# Patient Record
Sex: MALE | Race: WHITE | NOT HISPANIC OR LATINO | Employment: OTHER | URBAN - METROPOLITAN AREA
[De-identification: names, ages, dates, MRNs, and addresses within clinical notes are randomized per-mention and may not be internally consistent; named-entity substitution may affect disease eponyms.]

---

## 2017-01-16 RX ORDER — LISINOPRIL 5 MG/1
5 TABLET ORAL DAILY
Qty: 90 TABLET | Refills: 0 | Status: SHIPPED | OUTPATIENT
Start: 2017-01-16 | End: 2017-04-27 | Stop reason: SDUPTHER

## 2017-03-08 RX ORDER — METOPROLOL SUCCINATE 25 MG/1
TABLET, EXTENDED RELEASE ORAL
Qty: 90 TABLET | Refills: 0 | Status: SHIPPED | OUTPATIENT
Start: 2017-03-08 | End: 2017-03-23 | Stop reason: SDUPTHER

## 2017-03-23 ENCOUNTER — OFFICE VISIT (OUTPATIENT)
Dept: CARDIOLOGY | Facility: CLINIC | Age: 67
End: 2017-03-23

## 2017-03-23 VITALS
DIASTOLIC BLOOD PRESSURE: 80 MMHG | HEIGHT: 72 IN | WEIGHT: 234 LBS | HEART RATE: 93 BPM | SYSTOLIC BLOOD PRESSURE: 138 MMHG | BODY MASS INDEX: 31.69 KG/M2

## 2017-03-23 DIAGNOSIS — I48.20 CHRONIC ATRIAL FIBRILLATION (HCC): Primary | ICD-10-CM

## 2017-03-23 DIAGNOSIS — I25.10 CORONARY ARTERY DISEASE INVOLVING NATIVE CORONARY ARTERY OF NATIVE HEART WITHOUT ANGINA PECTORIS: Chronic | ICD-10-CM

## 2017-03-23 DIAGNOSIS — Z79.01 CHRONIC ANTICOAGULATION: Chronic | ICD-10-CM

## 2017-03-23 DIAGNOSIS — I25.5 ISCHEMIC CARDIOMYOPATHY: Chronic | ICD-10-CM

## 2017-03-23 DIAGNOSIS — I34.0 NON-RHEUMATIC MITRAL REGURGITATION: Chronic | ICD-10-CM

## 2017-03-23 PROCEDURE — 99214 OFFICE O/P EST MOD 30 MIN: CPT | Performed by: INTERNAL MEDICINE

## 2017-03-23 PROCEDURE — 93000 ELECTROCARDIOGRAM COMPLETE: CPT | Performed by: INTERNAL MEDICINE

## 2017-03-23 RX ORDER — METOPROLOL SUCCINATE 50 MG/1
50 TABLET, EXTENDED RELEASE ORAL DAILY
Qty: 90 TABLET | Refills: 3 | Status: SHIPPED | OUTPATIENT
Start: 2017-03-23 | End: 2018-01-18 | Stop reason: DRUGHIGH

## 2017-03-23 RX ORDER — ASPIRIN 81 MG/1
81 TABLET ORAL DAILY
COMMUNITY
End: 2021-02-26

## 2017-03-23 NOTE — PROGRESS NOTES
Subjective:     Encounter Date:03/23/2017      Patient ID: Renato Marquez is a 66 y.o. male.    Chief Complaint:  History of Present Illness    Dear Dr. Mata,    This patient comes in for routine follow-up of their cardiac status.  He has a history of chronic atrial fibrillation. He has a history of CAD with prior CABG. This was performed in 2009 by Dr. Jeet Bhagat. He had a three-vessel CABG with LIMA to the LAD, SVG to the obtuse marginal, and SVG to the posterior descending coronary arteries. He also has a history of cerebrovascular disease with a right carotid endarterectomy in 2009. The carotid endarterectomy was performed at the same time by Dr. Lewis.He had an ejection fraction of 37% on his stress test that was performed in January 2016.  That showed no ischemia.  We discussed cardioversion with him, but he was having no symptoms and elected to remain on his current medical regimen.    This patient denies any cardiac complaints.  This patient denies any chest pain, pressure, tightness, squeezing, or heartburn.  This patient has not experienced any feeling of palpitations, tachycardia or heart racing and no presyncope or syncope.  There has not been any problems with dizziness or lightheadedness.  There has not been any orthopnea or PND, and no problems with lower extremity edema.  This patient denies any shortness of breath at rest or with activity and has not had any wheezing.  This patient has not had any problems with unexplained nausea or vomiting. The patient has continued to perform daily activities of living without any specific problem or change in the level of activity.  He plays golf on a regular basis without any problem.  This patient has not been recently hospitalized for any reason.    The following portions of the patient's history were reviewed and updated as appropriate: allergies, current medications, past family history, past medical history, past social history, past surgical  history and problem list.    Past Medical History:   Diagnosis Date   • CAD (coronary artery disease)    • Cancer    • Carotid arterial disease    • Chronic atrial fibrillation        Past Surgical History:   Procedure Laterality Date   • CAROTID ENDARTERECTOMY Right 2009    Dr Lewis   • CORONARY ARTERY BYPASS GRAFT  2009    MIMI Frederick to LAD, SVG to OM, SVG Post Desc   • FINGER SURGERY         Social History     Social History   • Marital status:      Spouse name: N/A   • Number of children: N/A   • Years of education: N/A     Occupational History   • Not on file.     Social History Main Topics   • Smoking status: Former Smoker   • Smokeless tobacco: Not on file      Comment: caffine use   • Alcohol use Yes   • Drug use: No   • Sexual activity: Not on file     Other Topics Concern   • Not on file     Social History Narrative   • No narrative on file       Review of Systems   Constitution: Negative for chills, decreased appetite, fever and night sweats.   HENT: Negative for ear discharge, ear pain, hearing loss, nosebleeds and sore throat.    Eyes: Negative for blurred vision, double vision and pain.   Cardiovascular: Negative for cyanosis.   Respiratory: Negative for hemoptysis and sputum production.    Endocrine: Negative for cold intolerance and heat intolerance.   Hematologic/Lymphatic: Negative for adenopathy.   Skin: Negative for dry skin, itching, nail changes, rash and suspicious lesions.   Musculoskeletal: Negative for arthritis, gout, muscle cramps, muscle weakness, myalgias and neck pain.   Gastrointestinal: Negative for anorexia, bowel incontinence, constipation, diarrhea, dysphagia, hematemesis and jaundice.   Genitourinary: Negative for bladder incontinence, dysuria, flank pain, frequency, hematuria and nocturia.   Neurological: Negative for focal weakness, numbness, paresthesias and seizures.   Psychiatric/Behavioral: Negative for altered mental status, hallucinations, hypervigilance,  "suicidal ideas and thoughts of violence.   Allergic/Immunologic: Negative for persistent infections.         ECG 12 Lead  Date/Time: 3/23/2017 11:21 AM  Performed by: CINTHYA BETTS III.  Authorized by: CINTHYA BETTS III   Comparison: compared with previous ECG   Similar to previous ECG  Rhythm: atrial fibrillation  Rate: normal  Conduction: conduction normal  ST Flattening: II, III, aVF, V4, V5 and V6  T flattening: all  QRS axis: normal  Other: no other findings  Clinical impression: abnormal ECG               Objective:     Vitals:    03/23/17 1039   BP: 138/80   Pulse: 93   Weight: 234 lb (106 kg)   Height: 72\" (182.9 cm)         Physical Exam   Constitutional: He is oriented to person, place, and time. He appears well-developed and well-nourished. No distress.   HENT:   Head: Normocephalic and atraumatic.   Nose: Nose normal.   Mouth/Throat: Oropharynx is clear and moist.   Eyes: Conjunctivae and EOM are normal. Pupils are equal, round, and reactive to light. Right eye exhibits no discharge. Left eye exhibits no discharge.   Neck: Normal range of motion. Neck supple. No tracheal deviation present. No thyromegaly present.   Cardiovascular: Normal rate, S1 normal, S2 normal and normal pulses.  An irregularly irregular rhythm present. Exam reveals no S3.    Murmur heard.  High-pitched blowing holosystolic murmur is present with a grade of 1/6  at the apex  Pulmonary/Chest: Effort normal and breath sounds normal. No stridor. No respiratory distress. He exhibits no tenderness.   Abdominal: Soft. Bowel sounds are normal. He exhibits no distension and no mass. There is no tenderness. There is no rebound and no guarding.   Musculoskeletal: Normal range of motion. He exhibits no tenderness or deformity.   Lymphadenopathy:     He has no cervical adenopathy.   Neurological: He is alert and oriented to person, place, and time. He has normal reflexes.   Skin: Skin is warm and dry. No rash noted. He is not diaphoretic. No " erythema.   Psychiatric: He has a normal mood and affect. Thought content normal.       Lab Review:             Performed        Assessment:          Diagnosis Plan   1. Chronic atrial fibrillation  metoprolol succinate XL (TOPROL-XL) 50 MG 24 hr tablet    ECG 12 Lead   2. Coronary artery disease involving native coronary artery of native heart without angina pectoris  ECG 12 Lead   3. Ischemic cardiomyopathy  ECG 12 Lead   4. Non-rheumatic mitral regurgitation  ECG 12 Lead   5. Chronic anticoagulation  ECG 12 Lead          Plan:       Coronary Artery Disease  Assessment  • The patient has no angina    Plan  • Lifestyle modifications discussed include adhering to a heart healthy diet, avoidance of tobacco products, maintenance of a healthy weight, medication compliance, regular exercise and regular monitoring of cholesterol and blood pressure    Subjective - Objective  • There is a history of previous coronary artery bypass graft  • Current antiplatelet therapy includes aspirin 81 mg    Atrial Fibrillation and Atrial Flutter  Assessment  • The patient has permanent atrial fibrillation  • This is non-valvular in etiology  • The patient's CHADS2-VASc score is 2  • A [id number] score of 2 or more is considered a high risk for a thromboembolic event  Patient remains on Edoxiban and metoprolol; will increase the dose of metoprolol with his HR of 90 at rest.    I will see back in one year-we will anticipate a follow-up echocardiogram to follow-up on his ventricular function and valvular function.    Thank you very much for allowing us to participate in the care of this pleasant patient.  Please don't hesitate to call if I can be of assistance in any way.      Current Outpatient Prescriptions:   •  aspirin 81 MG EC tablet, Take 81 mg by mouth Daily., Disp: , Rfl:   •  Edoxaban Tosylate (SAVAYSA) 60 MG tablet, Take 60 mg by mouth Daily., Disp: 30 tablet, Rfl: 3  •  lisinopril (PRINIVIL,ZESTRIL) 5 MG tablet, Take 1 tablet  by mouth Daily. PT NEEDS APPT, Disp: 90 tablet, Rfl: 0  •  metoprolol succinate XL (TOPROL-XL) 25 MG 24 hr tablet, TAKE ONE TABLET BY MOUTH ONCE DAILY, Disp: 90 tablet, Rfl: 0  •  Multiple Vitamin (MULTI VITAMIN PO), Take  by mouth., Disp: , Rfl:   •  simvastatin (ZOCOR) 40 MG tablet, TAKE ONE TABLET BY MOUTH AT BEDTIME, Disp: 90 tablet, Rfl: 0         EMR Dragon/Transcription disclaimer:    Much of this encounter note is an electronic transcription/translation of spoken language to printed text. The electronic translation of spoken language may permit erroneous, or at times, nonsensical words or phrases to be inadvertently transcribed; Although I have reviewed the note for such errors, some may still exist.

## 2017-04-27 RX ORDER — SIMVASTATIN 40 MG
TABLET ORAL
Qty: 90 TABLET | Refills: 0 | Status: SHIPPED | OUTPATIENT
Start: 2017-04-27 | End: 2017-08-15 | Stop reason: SDUPTHER

## 2017-04-27 RX ORDER — LISINOPRIL 5 MG/1
TABLET ORAL
Qty: 90 TABLET | Refills: 1 | Status: SHIPPED | OUTPATIENT
Start: 2017-04-27 | End: 2017-11-29 | Stop reason: SDUPTHER

## 2017-04-27 RX ORDER — METOPROLOL SUCCINATE 25 MG/1
TABLET, EXTENDED RELEASE ORAL
Qty: 90 TABLET | Refills: 1 | Status: SHIPPED | OUTPATIENT
Start: 2017-04-27 | End: 2020-01-30

## 2017-07-06 RX ORDER — METOPROLOL SUCCINATE 50 MG/1
50 TABLET, EXTENDED RELEASE ORAL DAILY
Qty: 90 TABLET | Refills: 1 | Status: SHIPPED | OUTPATIENT
Start: 2017-07-06 | End: 2018-01-18 | Stop reason: DRUGHIGH

## 2017-07-06 RX ORDER — METOPROLOL SUCCINATE 25 MG/1
25 TABLET, EXTENDED RELEASE ORAL 2 TIMES DAILY
Qty: 90 TABLET | Refills: 1 | Status: CANCELLED | OUTPATIENT
Start: 2017-07-06

## 2017-08-16 RX ORDER — SIMVASTATIN 40 MG
TABLET ORAL
Qty: 14 TABLET | Refills: 0 | Status: SHIPPED | OUTPATIENT
Start: 2017-08-16 | End: 2020-01-30

## 2017-08-30 RX ORDER — SIMVASTATIN 40 MG
TABLET ORAL
Qty: 14 TABLET | Refills: 0 | OUTPATIENT
Start: 2017-08-30

## 2017-08-31 DIAGNOSIS — E78.2 MIXED HYPERLIPIDEMIA: Primary | ICD-10-CM

## 2017-08-31 RX ORDER — EDOXABAN TOSYLATE 60 MG/1
TABLET, FILM COATED ORAL
Qty: 90 TABLET | Refills: 1 | Status: SHIPPED | OUTPATIENT
Start: 2017-08-31 | End: 2018-01-05 | Stop reason: SDUPTHER

## 2017-08-31 RX ORDER — SIMVASTATIN 40 MG
TABLET ORAL
Qty: 14 TABLET | Refills: 0 | OUTPATIENT
Start: 2017-08-31

## 2017-09-02 ENCOUNTER — LAB (OUTPATIENT)
Dept: LAB | Facility: HOSPITAL | Age: 67
End: 2017-09-02
Attending: INTERNAL MEDICINE

## 2017-09-02 DIAGNOSIS — E78.2 MIXED HYPERLIPIDEMIA: ICD-10-CM

## 2017-09-02 LAB
ALBUMIN SERPL-MCNC: 3.9 G/DL (ref 3.5–5.2)
ALBUMIN/GLOB SERPL: 1.3 G/DL
ALP SERPL-CCNC: 64 U/L (ref 40–129)
ALT SERPL W P-5'-P-CCNC: 30 U/L (ref 5–41)
ANION GAP SERPL CALCULATED.3IONS-SCNC: 12.6 MMOL/L
AST SERPL-CCNC: 32 U/L (ref 5–40)
BILIRUB SERPL-MCNC: 1 MG/DL (ref 0.2–1.2)
BUN BLD-MCNC: 20 MG/DL (ref 8–23)
BUN/CREAT SERPL: 21.3 (ref 7–25)
CALCIUM SPEC-SCNC: 9.1 MG/DL (ref 8.8–10.5)
CHLORIDE SERPL-SCNC: 99 MMOL/L (ref 98–107)
CHOLEST SERPL-MCNC: 168 MG/DL (ref 0–200)
CO2 SERPL-SCNC: 27.4 MMOL/L (ref 22–29)
CREAT BLD-MCNC: 0.94 MG/DL (ref 0.76–1.27)
GFR SERPL CREATININE-BSD FRML MDRD: 80 ML/MIN/1.73
GLOBULIN UR ELPH-MCNC: 3.1 GM/DL
GLUCOSE BLD-MCNC: 149 MG/DL (ref 65–99)
HDLC SERPL-MCNC: 69 MG/DL (ref 40–60)
LDLC SERPL CALC-MCNC: 84 MG/DL (ref 0–100)
LDLC/HDLC SERPL: 1.22 {RATIO}
POTASSIUM BLD-SCNC: 4.1 MMOL/L (ref 3.5–5.2)
PROT SERPL-MCNC: 7 G/DL (ref 6–8.5)
SODIUM BLD-SCNC: 139 MMOL/L (ref 136–145)
TRIGL SERPL-MCNC: 75 MG/DL (ref 0–150)
VLDLC SERPL-MCNC: 15 MG/DL (ref 8–32)

## 2017-09-02 PROCEDURE — 80053 COMPREHEN METABOLIC PANEL: CPT

## 2017-09-02 PROCEDURE — 36415 COLL VENOUS BLD VENIPUNCTURE: CPT

## 2017-09-02 PROCEDURE — 80061 LIPID PANEL: CPT

## 2017-09-05 RX ORDER — SIMVASTATIN 40 MG
40 TABLET ORAL NIGHTLY
Qty: 90 TABLET | Refills: 2 | Status: SHIPPED | OUTPATIENT
Start: 2017-09-05 | End: 2018-01-18 | Stop reason: SDUPTHER

## 2017-11-29 RX ORDER — LISINOPRIL 5 MG/1
TABLET ORAL
Qty: 90 TABLET | Refills: 1 | Status: SHIPPED | OUTPATIENT
Start: 2017-11-29 | End: 2018-06-04 | Stop reason: SDUPTHER

## 2018-01-05 ENCOUNTER — TELEPHONE (OUTPATIENT)
Dept: CARDIOLOGY | Facility: CLINIC | Age: 68
End: 2018-01-05

## 2018-01-05 NOTE — TELEPHONE ENCOUNTER
Received a PA request for Savaysa 60mg    PA sent to [facility] through cover my meds.    Savayse is not covered through insurance even with a PA

## 2018-01-08 NOTE — TELEPHONE ENCOUNTER
Pt is calling about savaysa. I told him that is was not cover through his insurance even with a PA and that we would check with his insurance for an alternative.    Have you had a chance to check with [facility] to see if there is an alternative? Please advise.    Thanks Maribel

## 2018-01-08 NOTE — TELEPHONE ENCOUNTER
Spoke with Adryan at [facility]([phone]) and the preferred medications are Xarelto, Eliquis, and Warfarin

## 2018-01-11 NOTE — TELEPHONE ENCOUNTER
Can you please add the patient to  schedule on 1/18/118 3:15PM in the [place] office?     DX: discuss how to safely make the switch to Xarelto, eliquis or warfarin    Pt is aware    Thanks Maribel

## 2018-01-11 NOTE — TELEPHONE ENCOUNTER
No! He needs to refill his Savaysa to stay on OAC until we can safely switch him to something else

## 2018-01-11 NOTE — TELEPHONE ENCOUNTER
FYI: pt has ran out of Savaysa 60 MG. Is it ok for the pt to wait until his next visit with you before taking a blood thinner. His appointment with you is 1/18/18. Please advise    Thanks Maribel

## 2018-01-11 NOTE — TELEPHONE ENCOUNTER
I spoke to his wife and she will stop at the [place] office to  samples today. She is aware that he should not stop taking this until we see him on 1/18/18

## 2018-01-18 ENCOUNTER — OFFICE VISIT (OUTPATIENT)
Dept: CARDIOLOGY | Facility: CLINIC | Age: 68
End: 2018-01-18

## 2018-01-18 VITALS
WEIGHT: 240 LBS | SYSTOLIC BLOOD PRESSURE: 112 MMHG | HEIGHT: 72 IN | BODY MASS INDEX: 32.51 KG/M2 | HEART RATE: 100 BPM | DIASTOLIC BLOOD PRESSURE: 70 MMHG

## 2018-01-18 DIAGNOSIS — I25.10 CORONARY ARTERY DISEASE INVOLVING NATIVE CORONARY ARTERY OF NATIVE HEART WITHOUT ANGINA PECTORIS: Chronic | ICD-10-CM

## 2018-01-18 DIAGNOSIS — I34.0 NON-RHEUMATIC MITRAL REGURGITATION: Chronic | ICD-10-CM

## 2018-01-18 DIAGNOSIS — I48.20 CHRONIC ATRIAL FIBRILLATION (HCC): Primary | Chronic | ICD-10-CM

## 2018-01-18 DIAGNOSIS — Z79.01 CHRONIC ANTICOAGULATION: Chronic | ICD-10-CM

## 2018-01-18 DIAGNOSIS — I25.5 ISCHEMIC CARDIOMYOPATHY: Chronic | ICD-10-CM

## 2018-01-18 PROCEDURE — 93000 ELECTROCARDIOGRAM COMPLETE: CPT | Performed by: INTERNAL MEDICINE

## 2018-01-18 PROCEDURE — 99214 OFFICE O/P EST MOD 30 MIN: CPT | Performed by: INTERNAL MEDICINE

## 2018-01-18 NOTE — PROGRESS NOTES
Subjective:     Encounter Date: 1/18/2018      Patient ID: Renato Marquez is a 67 y.o. male.    Chief Complaint:  History of Present Illness    Dear Dr. Mata,    This patient comes in for routine follow-up of their cardiac status. He comes in a little bit before his one year appointment-his insurance just change the formulary and he needs to change to an alternative anticoagulant.  He has a history of chronic atrial fibrillation. He has a history of CAD with prior CABG. This was performed in 2009 by Dr. Jeet Bhagat. He had a three-vessel CABG with LIMA to the LAD, SVG to the obtuse marginal, and SVG to the posterior descending coronary arteries. He also has a history of cerebrovascular disease with a right carotid endarterectomy in 2009. The carotid endarterectomy was performed at the same time by Dr. Lewis.He had an ejection fraction of 37% on his stress test that was performed in January 2016.  That showed no ischemia.  We discussed cardioversion with him, but he was having no symptoms and elected to remain on his current medical regimen.    He states that from a symptom standpoint he's been doing great without complaints.This patient denies any chest pain, pressure, tightness, squeezing, or heartburn.  This patient has not experienced any feeling of palpitations, tachycardia or heart racing and no presyncope or syncope.  There has not been any problems with dizziness or lightheadedness.  There has not been any orthopnea or PND, and no problems with lower extremity edema.  This patient denies any shortness of breath at rest or with activity and has not had any wheezing.  This patient has not had any problems with unexplained nausea or vomiting. The patient has continued to perform daily activities of living without any specific problem or change in the level of activity.  This patient has not been recently hospitalized for any reason.      The following portions of the patient's history were reviewed and  updated as appropriate: allergies, current medications, past family history, past medical history, past social history, past surgical history and problem list.    Past Medical History:   Diagnosis Date   • CAD (coronary artery disease)    • Cancer    • Carotid arterial disease    • Chronic atrial fibrillation    • Ischemic cardiomyopathy    • Mitral regurgitation        Past Surgical History:   Procedure Laterality Date   • CAROTID ENDARTERECTOMY Right 2009    Dr Lewis   • CORONARY ARTERY BYPASS GRAFT  2009    Dr Bhagat, LIMA to LAD, SVG to OM, SVG Post Desc   • FINGER SURGERY         Social History     Social History   • Marital status:      Spouse name: N/A   • Number of children: N/A   • Years of education: N/A     Occupational History   • Not on file.     Social History Main Topics   • Smoking status: Former Smoker   • Smokeless tobacco: Not on file      Comment: caffine use   • Alcohol use Yes   • Drug use: No   • Sexual activity: Not on file     Other Topics Concern   • Not on file     Social History Narrative       Review of Systems   Constitution: Negative for chills, decreased appetite, fever and night sweats.   HENT: Negative for ear discharge, ear pain, hearing loss, nosebleeds and sore throat.    Eyes: Negative for blurred vision, double vision and pain.   Cardiovascular: Negative for cyanosis.   Respiratory: Negative for hemoptysis and sputum production.    Endocrine: Negative for cold intolerance and heat intolerance.   Hematologic/Lymphatic: Negative for adenopathy.   Skin: Negative for dry skin, itching, nail changes, rash and suspicious lesions.   Musculoskeletal: Negative for arthritis, gout, muscle cramps, muscle weakness, myalgias and neck pain.   Gastrointestinal: Negative for anorexia, bowel incontinence, constipation, diarrhea, dysphagia, hematemesis and jaundice.   Genitourinary: Negative for bladder incontinence, dysuria, flank pain, frequency, hematuria and nocturia.  "  Neurological: Negative for focal weakness, numbness, paresthesias and seizures.   Psychiatric/Behavioral: Negative for altered mental status, hallucinations, hypervigilance, suicidal ideas and thoughts of violence.   Allergic/Immunologic: Negative for persistent infections.         ECG 12 Lead  Date/Time: 1/18/2018 1:03 PM  Performed by: CINTHYA BETTS III.  Authorized by: CINTHYA BETTS III   Comparison: compared with previous ECG   Similar to previous ECG  Rhythm: atrial fibrillation  Rate: normal  Conduction: conduction normal  ST Depression: II, III, aVF, V4, V5 and V6  T Waves: T waves normal  QRS axis: normal  Other: no other findings  Clinical impression: abnormal ECG               Objective:     Vitals:    01/18/18 1220   BP: 112/70   Pulse: 100   Weight: 109 kg (240 lb)   Height: 182.9 cm (72.01\")         Physical Exam   Constitutional: He is oriented to person, place, and time. He appears well-developed and well-nourished. No distress.   HENT:   Head: Normocephalic and atraumatic.   Nose: Nose normal.   Mouth/Throat: Oropharynx is clear and moist.   Eyes: Conjunctivae and EOM are normal. Pupils are equal, round, and reactive to light. Right eye exhibits no discharge. Left eye exhibits no discharge.   Neck: Normal range of motion. Neck supple. No tracheal deviation present. No thyromegaly present.   Cardiovascular: Normal rate, S1 normal, S2 normal and normal pulses.  An irregularly irregular rhythm present. Exam reveals no S3.    Murmur heard.  High-pitched blowing holosystolic murmur is present with a grade of 1/6  at the apex  Pulmonary/Chest: Effort normal and breath sounds normal. No stridor. No respiratory distress. He exhibits no tenderness.   Abdominal: Soft. Bowel sounds are normal. He exhibits no distension and no mass. There is no tenderness. There is no rebound and no guarding.   Musculoskeletal: Normal range of motion. He exhibits no tenderness or deformity.   Lymphadenopathy:     He has no " cervical adenopathy.   Neurological: He is alert and oriented to person, place, and time. He has normal reflexes.   Skin: Skin is warm and dry. No rash noted. He is not diaphoretic. No erythema.   Psychiatric: He has a normal mood and affect. Thought content normal.       Lab Review:             Performed        Assessment:          Diagnosis Plan   1. Chronic atrial fibrillation  rivaroxaban (XARELTO) 20 MG tablet    Adult Transthoracic Echo Complete W/ Cont if Necessary Per Protocol   2. Coronary artery disease involving native coronary artery of native heart without angina pectoris  Adult Transthoracic Echo Complete W/ Cont if Necessary Per Protocol   3. Ischemic cardiomyopathy  Adult Transthoracic Echo Complete W/ Cont if Necessary Per Protocol   4. Non-rheumatic mitral regurgitation  Adult Transthoracic Echo Complete W/ Cont if Necessary Per Protocol   5. Chronic anticoagulation            Plan:       Coronary Artery Disease  Assessment  • The patient has no angina    Plan  • Lifestyle modifications discussed include adhering to a heart healthy diet, avoidance of tobacco products, maintenance of a healthy weight, medication compliance, regular exercise and regular monitoring of cholesterol and blood pressure    Subjective - Objective  • There is a history of previous coronary artery bypass graft  • Current antiplatelet therapy includes aspirin 81 mg    Atrial Fibrillation and Atrial Flutter  Assessment  • The patient has permanent atrial fibrillation  • This is non-valvular in etiology  • The patient's CHADS2-VASc score is 2  • A [id number] score of 2 or more is considered a high risk for a thromboembolic event    Plan  • Continue in atrial fibrillation with rate control  • Continue rivaroxaban for antithrombotic therapy, bleeding issues discussed  • Continue beta blocker for rate control  • We will switch him from Savaysa to Xarelto    3.  We will repeat an echocardiogram to reassess valvular and ventricular  function    Thank you very much for allowing us to participate in the care of this pleasant patient.  Please don't hesitate to call if I can be of assistance in any way.      Current Outpatient Prescriptions:   •  aspirin 81 MG EC tablet, Take 81 mg by mouth Daily., Disp: , Rfl:   •  Glucosamine-Chondroitin (MOVE FREE PO), Take  by mouth 2 (Two) Times a Day., Disp: , Rfl:   •  lisinopril (PRINIVIL,ZESTRIL) 5 MG tablet, TAKE 1 TABLET BY MOUTH ONCE DAILY, Disp: 90 tablet, Rfl: 1  •  metoprolol succinate XL (TOPROL-XL) 25 MG 24 hr tablet, TAKE ONE TABLET BY MOUTH ONCE DAILY, Disp: 90 tablet, Rfl: 1  •  Multiple Vitamin (MULTI VITAMIN PO), Take  by mouth., Disp: , Rfl:   •  simvastatin (ZOCOR) 40 MG tablet, TAKE ONE TABLET BY MOUTH AT BEDTIME, Disp: 14 tablet, Rfl: 0  •  rivaroxaban (XARELTO) 20 MG tablet, Take 1 tablet by mouth Daily., Disp: 90 tablet, Rfl: 3

## 2018-01-18 NOTE — PATIENT INSTRUCTIONS
Atrial Fibrillation  Atrial fibrillation is a type of irregular or rapid heartbeat (arrhythmia). In atrial fibrillation, the heart quivers continuously in a chaotic pattern. This occurs when parts of the heart receive disorganized signals that make the heart unable to pump blood normally. This can increase the risk for stroke, heart failure, and other heart-related conditions. There are different types of atrial fibrillation, including:  · Paroxysmal atrial fibrillation. This type starts suddenly, and it usually stops on its own shortly after it starts.  · Persistent atrial fibrillation. This type often lasts longer than a week. It may stop on its own or with treatment.  · Long-lasting persistent atrial fibrillation. This type lasts longer than 12 months.  · Permanent atrial fibrillation. This type does not go away.  Talk with your health care provider to learn about the type of atrial fibrillation that you have.  What are the causes?  This condition is caused by some heart-related conditions or procedures, including:  · A heart attack.  · Coronary artery disease.  · Heart failure.  · Heart valve conditions.  · High blood pressure.  · Inflammation of the sac that surrounds the heart (pericarditis).  · Heart surgery.  · Certain heart rhythm disorders, such as Akbar-Parkinson-White syndrome.  Other causes include:  · Pneumonia.  · Obstructive sleep apnea.  · Blockage of an artery in the lungs (pulmonary embolism, or PE).  · Lung cancer.  · Chronic lung disease.  · Thyroid problems, especially if the thyroid is overactive (hyperthyroidism).  · Caffeine.  · Excessive alcohol use or illegal drug use.  · Use of some medicines, including certain decongestants and diet pills.  Sometimes, the cause cannot be found.  What increases the risk?  This condition is more likely to develop in:  · People who are older in age.  · People who smoke.  · People who have diabetes mellitus.  · People who are overweight (obese).  · Athletes  who exercise vigorously.  What are the signs or symptoms?  Symptoms of this condition include:  · A feeling that your heart is beating rapidly or irregularly.  · A feeling of discomfort or pain in your chest.  · Shortness of breath.  · Sudden light-headedness or weakness.  · Getting tired easily during exercise.  In some cases, there are no symptoms.  How is this diagnosed?  Your health care provider may be able to detect atrial fibrillation when taking your pulse. If detected, this condition may be diagnosed with:  · An electrocardiogram (ECG).  · A Holter monitor test that records your heartbeat patterns over a 24-hour period.  · Transthoracic echocardiogram (TTE) to evaluate how blood flows through your heart.  · Transesophageal echocardiogram (ADDY) to view more detailed images of your heart.  · A stress test.  · Imaging tests, such as a CT scan or chest X-ray.  · Blood tests.  How is this treated?  The main goals of treatment are to prevent blood clots from forming and to keep your heart beating at a normal rate and rhythm. The type of treatment that you receive depends on many factors, such as your underlying medical conditions and how you feel when you are experiencing atrial fibrillation.  This condition may be treated with:  · Medicine to slow down the heart rate, bring the heart’s rhythm back to normal, or prevent clots from forming.  · Electrical cardioversion. This is a procedure that resets your heart’s rhythm by delivering a controlled, low-energy shock to the heart through your skin.  · Different types of ablation, such as catheter ablation, catheter ablation with pacemaker, or surgical ablation. These procedures destroy the heart tissues that send abnormal signals. When the pacemaker is used, it is placed under your skin to help your heart beat in a regular rhythm.  Follow these instructions at home:  · Take over-the counter and prescription medicines only as told by your health care provider.  · If  your health care provider prescribed a blood-thinning medicine (anticoagulant), take it exactly as told. Taking too much blood-thinning medicine can cause bleeding. If you do not take enough blood-thinning medicine, you will not have the protection that you need against stroke and other problems.  · Do not use tobacco products, including cigarettes, chewing tobacco, and e-cigarettes. If you need help quitting, ask your health care provider.  · If you have obstructive sleep apnea, manage your condition as told by your health care provider.  · Do not drink alcohol.  · Do not drink beverages that contain caffeine, such as coffee, soda, and tea.  · Maintain a healthy weight. Do not use diet pills unless your health care provider approves. Diet pills may make heart problems worse.  · Follow diet instructions as told by your health care provider.  · Exercise regularly as told by your health care provider.  · Keep all follow-up visits as told by your health care provider. This is important.  How is this prevented?  · Avoid drinking beverages that contain caffeine or alcohol.  · Avoid certain medicines, especially medicines that are used for breathing problems.  · Avoid certain herbs and herbal medicines, such as those that contain ephedra or ginseng.  · Do not use illegal drugs, such as cocaine and amphetamines.  · Do not smoke.  · Manage your high blood pressure.  Contact a health care provider if:  · You notice a change in the rate, rhythm, or strength of your heartbeat.  · You are taking an anticoagulant and you notice increased bruising.  · You tire more easily when you exercise or exert yourself.  Get help right away if:  · You have chest pain, abdominal pain, sweating, or weakness.  · You feel nauseous.  · You notice blood in your vomit, bowel movement, or urine.  · You have shortness of breath.  · You suddenly have swollen feet and ankles.  · You feel dizzy.  · You have sudden weakness or numbness of the face, arm,  or leg, especially on one side of the body.  · You have trouble speaking, trouble understanding, or both (aphasia).  · Your face or your eyelid droops on one side.  These symptoms may represent a serious problem that is an emergency. Do not wait to see if the symptoms will go away. Get medical help right away. Call your local emergency services (911 in the [place] Do not drive yourself to the hospital.   This information is not intended to replace advice given to you by your health care provider. Make sure you discuss any questions you have with your health care provider.  Document Released: 12/18/2006 Document Revised: 04/26/2017 Document Reviewed: 04/13/2016  [employer]vier Interactive Patient Education © 2017 [employer]

## 2018-01-24 ENCOUNTER — HOSPITAL ENCOUNTER (OUTPATIENT)
Dept: CARDIOLOGY | Facility: HOSPITAL | Age: 68
Discharge: HOME | End: 2018-01-24
Attending: INTERNAL MEDICINE | Admitting: INTERNAL MEDICINE

## 2018-01-24 VITALS
SYSTOLIC BLOOD PRESSURE: 129 MMHG | HEART RATE: 98 BPM | BODY MASS INDEX: 32.55 KG/M2 | WEIGHT: 240.3 LBS | HEIGHT: 72 IN | OXYGEN SATURATION: 94 % | DIASTOLIC BLOOD PRESSURE: 71 MMHG

## 2018-01-24 DIAGNOSIS — I25.10 CORONARY ARTERY DISEASE INVOLVING NATIVE CORONARY ARTERY OF NATIVE HEART WITHOUT ANGINA PECTORIS: Chronic | ICD-10-CM

## 2018-01-24 DIAGNOSIS — I48.20 CHRONIC ATRIAL FIBRILLATION (HCC): Chronic | ICD-10-CM

## 2018-01-24 DIAGNOSIS — I25.5 ISCHEMIC CARDIOMYOPATHY: Chronic | ICD-10-CM

## 2018-01-24 DIAGNOSIS — I34.0 NON-RHEUMATIC MITRAL REGURGITATION: Chronic | ICD-10-CM

## 2018-01-24 LAB
BH CV ECHO MEAS - ACS: 2.5 CM
BH CV ECHO MEAS - AO MAX PG (FULL): 2.2 MMHG
BH CV ECHO MEAS - AO MAX PG: 4.8 MMHG
BH CV ECHO MEAS - AO MEAN PG (FULL): 1 MMHG
BH CV ECHO MEAS - AO MEAN PG: 2 MMHG
BH CV ECHO MEAS - AO ROOT AREA (BSA CORRECTED): 1.7
BH CV ECHO MEAS - AO ROOT AREA: 12.6 CM^2
BH CV ECHO MEAS - AO ROOT DIAM: 4 CM
BH CV ECHO MEAS - AO V2 MAX: 110 CM/SEC
BH CV ECHO MEAS - AO V2 MEAN: 64.5 CM/SEC
BH CV ECHO MEAS - AO V2 VTI: 14.4 CM
BH CV ECHO MEAS - AVA(I,A): 3.9 CM^2
BH CV ECHO MEAS - AVA(I,D): 3.9 CM^2
BH CV ECHO MEAS - AVA(V,A): 3.4 CM^2
BH CV ECHO MEAS - AVA(V,D): 3.4 CM^2
BH CV ECHO MEAS - BSA(HAYCOCK): 2.4 M^2
BH CV ECHO MEAS - BSA: 2.3 M^2
BH CV ECHO MEAS - BZI_BMI: 32.5 KILOGRAMS/M^2
BH CV ECHO MEAS - BZI_METRIC_HEIGHT: 183 CM
BH CV ECHO MEAS - BZI_METRIC_WEIGHT: 109 KG
BH CV ECHO MEAS - CONTRAST EF (2CH): 52.5 ML/M^2
BH CV ECHO MEAS - CONTRAST EF 4CH: 50 ML/M^2
BH CV ECHO MEAS - EDV(CUBED): 148.9 ML
BH CV ECHO MEAS - EDV(MOD-SP2): 132 ML
BH CV ECHO MEAS - EDV(MOD-SP4): 120 ML
BH CV ECHO MEAS - EDV(TEICH): 135.3 ML
BH CV ECHO MEAS - EF(CUBED): 64.6 %
BH CV ECHO MEAS - EF(MOD-SP2): 52.5 %
BH CV ECHO MEAS - EF(MOD-SP4): 50 %
BH CV ECHO MEAS - EF(TEICH): 55.7 %
BH CV ECHO MEAS - ESV(CUBED): 52.7 ML
BH CV ECHO MEAS - ESV(MOD-SP2): 62.7 ML
BH CV ECHO MEAS - ESV(MOD-SP4): 60 ML
BH CV ECHO MEAS - ESV(TEICH): 60 ML
BH CV ECHO MEAS - FS: 29.2 %
BH CV ECHO MEAS - IVS/LVPW: 1
BH CV ECHO MEAS - IVSD: 1.1 CM
BH CV ECHO MEAS - LA DIMENSION: 5.2 CM
BH CV ECHO MEAS - LA/AO: 1.3
BH CV ECHO MEAS - LAT PEAK E' VEL: 12 CM/SEC
BH CV ECHO MEAS - LV DIASTOLIC VOL/BSA (35-75): 52.1 ML/M^2
BH CV ECHO MEAS - LV MASS(C)D: 222.1 GRAMS
BH CV ECHO MEAS - LV MASS(C)DI: 96.4 GRAMS/M^2
BH CV ECHO MEAS - LV MAX PG: 2.7 MMHG
BH CV ECHO MEAS - LV MEAN PG: 1 MMHG
BH CV ECHO MEAS - LV SYSTOLIC VOL/BSA (12-30): 26 ML/M^2
BH CV ECHO MEAS - LV V1 MAX: 82 CM/SEC
BH CV ECHO MEAS - LV V1 MEAN: 49.3 CM/SEC
BH CV ECHO MEAS - LV V1 VTI: 12.3 CM
BH CV ECHO MEAS - LVIDD: 5.3 CM
BH CV ECHO MEAS - LVIDS: 3.8 CM
BH CV ECHO MEAS - LVLD AP2: 8.7 CM
BH CV ECHO MEAS - LVLD AP4: 8.5 CM
BH CV ECHO MEAS - LVLS AP2: 8.2 CM
BH CV ECHO MEAS - LVLS AP4: 8.4 CM
BH CV ECHO MEAS - LVOT AREA (M): 4.5 CM^2
BH CV ECHO MEAS - LVOT AREA: 4.5 CM^2
BH CV ECHO MEAS - LVOT DIAM: 2.4 CM
BH CV ECHO MEAS - LVPWD: 1.1 CM
BH CV ECHO MEAS - MED PEAK E' VEL: 7 CM/SEC
BH CV ECHO MEAS - MR MAX PG: 82.8 MMHG
BH CV ECHO MEAS - MR MAX VEL: 455 CM/SEC
BH CV ECHO MEAS - MV DEC SLOPE: 641 CM/SEC^2
BH CV ECHO MEAS - MV DEC TIME: 0.15 SEC
BH CV ECHO MEAS - MV E MAX VEL: 113 CM/SEC
BH CV ECHO MEAS - MV MAX PG: 6.1 MMHG
BH CV ECHO MEAS - MV MEAN PG: 2 MMHG
BH CV ECHO MEAS - MV P1/2T MAX VEL: 121 CM/SEC
BH CV ECHO MEAS - MV P1/2T: 55.3 MSEC
BH CV ECHO MEAS - MV V2 MAX: 123 CM/SEC
BH CV ECHO MEAS - MV V2 MEAN: 56.2 CM/SEC
BH CV ECHO MEAS - MV V2 VTI: 22.1 CM
BH CV ECHO MEAS - MVA P1/2T LCG: 1.8 CM^2
BH CV ECHO MEAS - MVA(P1/2T): 4 CM^2
BH CV ECHO MEAS - MVA(VTI): 2.5 CM^2
BH CV ECHO MEAS - PA ACC TIME: 0.09 SEC
BH CV ECHO MEAS - PA MAX PG (FULL): 2 MMHG
BH CV ECHO MEAS - PA MAX PG: 3.3 MMHG
BH CV ECHO MEAS - PA PR(ACCEL): 37.6 MMHG
BH CV ECHO MEAS - PA V2 MAX: 91.2 CM/SEC
BH CV ECHO MEAS - PI END-D VEL: 164 CM/SEC
BH CV ECHO MEAS - PULM DIAS VEL: 67.7 CM/SEC
BH CV ECHO MEAS - PULM S/D: 0.6
BH CV ECHO MEAS - PULM SYS VEL: 40.7 CM/SEC
BH CV ECHO MEAS - PVA(V,A): 3.8 CM^2
BH CV ECHO MEAS - PVA(V,D): 3.8 CM^2
BH CV ECHO MEAS - QP/QS: 1.3
BH CV ECHO MEAS - RAP SYSTOLE: 3 MMHG
BH CV ECHO MEAS - RV MAX PG: 1.3 MMHG
BH CV ECHO MEAS - RV MEAN PG: 1 MMHG
BH CV ECHO MEAS - RV V1 MAX: 56.9 CM/SEC
BH CV ECHO MEAS - RV V1 MEAN: 35.6 CM/SEC
BH CV ECHO MEAS - RV V1 VTI: 11.3 CM
BH CV ECHO MEAS - RVOT AREA: 6.2 CM^2
BH CV ECHO MEAS - RVOT DIAM: 2.8 CM
BH CV ECHO MEAS - RVSP: 26.4 MMHG
BH CV ECHO MEAS - SI(AO): 78.5 ML/M^2
BH CV ECHO MEAS - SI(CUBED): 41.7 ML/M^2
BH CV ECHO MEAS - SI(LVOT): 24.1 ML/M^2
BH CV ECHO MEAS - SI(MOD-SP2): 30.1 ML/M^2
BH CV ECHO MEAS - SI(MOD-SP4): 26 ML/M^2
BH CV ECHO MEAS - SI(TEICH): 32.7 ML/M^2
BH CV ECHO MEAS - SV(AO): 181 ML
BH CV ECHO MEAS - SV(CUBED): 96.1 ML
BH CV ECHO MEAS - SV(LVOT): 55.6 ML
BH CV ECHO MEAS - SV(MOD-SP2): 69.3 ML
BH CV ECHO MEAS - SV(MOD-SP4): 60 ML
BH CV ECHO MEAS - SV(RVOT): 69.6 ML
BH CV ECHO MEAS - SV(TEICH): 75.3 ML
BH CV ECHO MEAS - TAPSE (>1.6): 1.2 CM2
BH CV ECHO MEAS - TR MAX VEL: 242 CM/SEC
BH CV VAS BP RIGHT ARM: NORMAL MMHG
BH CV XLRA - RV BASE: 4.3 CM
BH CV XLRA - TDI S': 12 CM/SEC
E/E' RATIO: 13
LEFT ATRIUM VOLUME INDEX: 48 ML/M2
LEFT ATRIUM VOLUME: 97 CM3
MAXIMAL PREDICTED HEART RATE: 151 BPM
STRESS TARGET HR: 128 BPM

## 2018-01-24 PROCEDURE — 93306 TTE W/DOPPLER COMPLETE: CPT

## 2018-01-24 PROCEDURE — 93306 TTE W/DOPPLER COMPLETE: CPT | Performed by: INTERNAL MEDICINE

## 2018-04-22 DIAGNOSIS — I48.20 CHRONIC ATRIAL FIBRILLATION (HCC): ICD-10-CM

## 2018-04-23 RX ORDER — METOPROLOL SUCCINATE 50 MG/1
TABLET, EXTENDED RELEASE ORAL
Qty: 90 TABLET | Refills: 1 | Status: SHIPPED | OUTPATIENT
Start: 2018-04-23 | End: 2018-11-07 | Stop reason: SDUPTHER

## 2018-06-05 RX ORDER — LISINOPRIL 5 MG/1
TABLET ORAL
Qty: 90 TABLET | Refills: 1 | Status: SHIPPED | OUTPATIENT
Start: 2018-06-05 | End: 2018-12-10 | Stop reason: SDUPTHER

## 2018-06-19 RX ORDER — SIMVASTATIN 40 MG
TABLET ORAL
Qty: 90 TABLET | Refills: 2 | Status: SHIPPED | OUTPATIENT
Start: 2018-06-19 | End: 2019-01-24 | Stop reason: SDUPTHER

## 2018-11-07 DIAGNOSIS — I48.20 CHRONIC ATRIAL FIBRILLATION (HCC): ICD-10-CM

## 2018-11-07 RX ORDER — METOPROLOL SUCCINATE 50 MG/1
TABLET, EXTENDED RELEASE ORAL
Qty: 90 TABLET | Refills: 1 | Status: SHIPPED | OUTPATIENT
Start: 2018-11-07 | End: 2019-01-24 | Stop reason: DRUGHIGH

## 2018-12-10 RX ORDER — LISINOPRIL 5 MG/1
5 TABLET ORAL DAILY
Qty: 30 TABLET | Refills: 0 | Status: SHIPPED | OUTPATIENT
Start: 2018-12-10 | End: 2019-01-18 | Stop reason: SDUPTHER

## 2019-01-21 RX ORDER — LISINOPRIL 5 MG/1
TABLET ORAL
Qty: 30 TABLET | Refills: 0 | Status: SHIPPED | OUTPATIENT
Start: 2019-01-21 | End: 2019-02-16 | Stop reason: SDUPTHER

## 2019-01-24 ENCOUNTER — OFFICE VISIT (OUTPATIENT)
Dept: CARDIOLOGY | Facility: CLINIC | Age: 69
End: 2019-01-24

## 2019-01-24 VITALS
BODY MASS INDEX: 29.12 KG/M2 | SYSTOLIC BLOOD PRESSURE: 122 MMHG | DIASTOLIC BLOOD PRESSURE: 74 MMHG | WEIGHT: 215 LBS | HEART RATE: 84 BPM | HEIGHT: 72 IN

## 2019-01-24 DIAGNOSIS — I48.20 CHRONIC ATRIAL FIBRILLATION (HCC): Primary | Chronic | ICD-10-CM

## 2019-01-24 DIAGNOSIS — I34.0 NON-RHEUMATIC MITRAL REGURGITATION: Chronic | ICD-10-CM

## 2019-01-24 DIAGNOSIS — I25.10 CORONARY ARTERY DISEASE INVOLVING NATIVE CORONARY ARTERY OF NATIVE HEART WITHOUT ANGINA PECTORIS: Chronic | ICD-10-CM

## 2019-01-24 DIAGNOSIS — I25.5 ISCHEMIC CARDIOMYOPATHY: Chronic | ICD-10-CM

## 2019-01-24 DIAGNOSIS — Z79.01 CHRONIC ANTICOAGULATION: Chronic | ICD-10-CM

## 2019-01-24 PROCEDURE — 99214 OFFICE O/P EST MOD 30 MIN: CPT | Performed by: INTERNAL MEDICINE

## 2019-01-24 PROCEDURE — 93000 ELECTROCARDIOGRAM COMPLETE: CPT | Performed by: INTERNAL MEDICINE

## 2019-01-24 NOTE — PROGRESS NOTES
Subjective:     Encounter Date: 1/18/2018      Patient ID: Renato Marquez is a 68 y.o. male.    Chief Complaint: CAD  History of Present Illness    Dear Dr. Mata,    This patient comes in for routine follow-up of their cardiac status.  It has been one year since his last visit.  He has a history of chronic atrial fibrillation. He has a history of CAD with prior CABG. This was performed in 2009 by Dr. Jeet Bhagat. He had a three-vessel CABG with LIMA to the LAD, SVG to the obtuse marginal, and SVG to the posterior descending coronary arteries. He also has a history of cerebrovascular disease with a right carotid endarterectomy in 2009. The carotid endarterectomy was performed at the same time by Dr. Lewis.He had an ejection fraction of 37% on his stress test that was performed in January 2016.  That showed no ischemia.  We discussed cardioversion with him, but he was having no symptoms and elected to remain on his current medical regimen.    Since his last visit he's been doing great without any complaints.  He denies any chest pain, pressure, tightness, squeezing, or heartburn.  He is not having any complaints of palpitations or tachycardia.  He has not had any presyncope or syncope.  He has not had any other medical issues.    Echo 1/2018:  Interpretation Summary     · Calculated EF = 50%.  · Left ventricular systolic function is normal.  · Left ventricular diastolic dysfunction (grade III) consistent with reversible restrictive pattern.  · Mild mitral valve regurgitation is present  · Mild pulmonic valve regurgitation is present.  · Mild tricuspid valve regurgitation is present.  · Estimated right ventricular systolic pressure from tricuspid regurgitation is normal (<35 mmHg).          He states that from a symptom standpoint he's been doing great without complaints.This patient denies any chest pain, pressure, tightness, squeezing, or heartburn.  This patient has not experienced any feeling of  palpitations, tachycardia or heart racing and no presyncope or syncope.  There has not been any problems with dizziness or lightheadedness.  There has not been any orthopnea or PND, and no problems with lower extremity edema.  This patient denies any shortness of breath at rest or with activity and has not had any wheezing.  This patient has not had any problems with unexplained nausea or vomiting. The patient has continued to perform daily activities of living without any specific problem or change in the level of activity.  This patient has not been recently hospitalized for any reason.      The following portions of the patient's history were reviewed and updated as appropriate: allergies, current medications, past family history, past medical history, past social history, past surgical history and problem list.    Past Medical History:   Diagnosis Date   • CAD (coronary artery disease)    • Cancer (CMS/HCC)    • Carotid arterial disease (CMS/HCC)    • Chronic atrial fibrillation (CMS/HCC)    • Ischemic cardiomyopathy    • Mitral regurgitation        Past Surgical History:   Procedure Laterality Date   • CAROTID ENDARTERECTOMY Right 2009    Dr Lewis   • CORONARY ARTERY BYPASS GRAFT  2009    MIMI Frederick to LAD, SVG to OM, SVG Post Desc   • FINGER SURGERY         Social History     Socioeconomic History   • Marital status:      Spouse name: Not on file   • Number of children: Not on file   • Years of education: Not on file   • Highest education level: Not on file   Social Needs   • Financial resource strain: Not on file   • Food insecurity - worry: Not on file   • Food insecurity - inability: Not on file   • Transportation needs - medical: Not on file   • Transportation needs - non-medical: Not on file   Occupational History   • Not on file   Tobacco Use   • Smoking status: Former Smoker   • Tobacco comment: caffine use   Substance and Sexual Activity   • Alcohol use: Yes   • Drug use: No   • Sexual  "activity: Not on file   Other Topics Concern   • Not on file   Social History Narrative   • Not on file       Review of Systems   Constitution: Negative for chills, decreased appetite, fever and night sweats.   HENT: Negative for ear discharge, ear pain, hearing loss, nosebleeds and sore throat.    Eyes: Negative for blurred vision, double vision and pain.   Cardiovascular: Negative for cyanosis.   Respiratory: Negative for hemoptysis and sputum production.    Endocrine: Negative for cold intolerance and heat intolerance.   Hematologic/Lymphatic: Negative for adenopathy.   Skin: Negative for dry skin, itching, nail changes, rash and suspicious lesions.   Musculoskeletal: Negative for arthritis, gout, muscle cramps, muscle weakness, myalgias and neck pain.   Gastrointestinal: Negative for anorexia, bowel incontinence, constipation, diarrhea, dysphagia, hematemesis and jaundice.   Genitourinary: Negative for bladder incontinence, dysuria, flank pain, frequency, hematuria and nocturia.   Neurological: Negative for focal weakness, numbness, paresthesias and seizures.   Psychiatric/Behavioral: Negative for altered mental status, hallucinations, hypervigilance, suicidal ideas and thoughts of violence.   Allergic/Immunologic: Negative for persistent infections.         ECG 12 Lead  Date/Time: 1/24/2019 3:42 PM  Performed by: Mauricio Scott III, MD  Authorized by: Mauricio Scott III, MD   Comparison: compared with previous ECG   Similar to previous ECG  Rhythm: atrial fibrillation  Rate: normal  Conduction: conduction normal  ST Segments: ST segments normal  T Waves: T waves normal  QRS axis: normal  Other: no other findings  Clinical impression: abnormal ECG               Objective:     Vitals:    01/24/19 1312   BP: 122/74   Pulse: 84   Weight: 97.5 kg (215 lb)   Height: 182.9 cm (72\")         Physical Exam   Constitutional: He is oriented to person, place, and time. He appears well-developed and well-nourished. No " distress.   HENT:   Head: Normocephalic and atraumatic.   Nose: Nose normal.   Mouth/Throat: Oropharynx is clear and moist.   Eyes: Conjunctivae and EOM are normal. Pupils are equal, round, and reactive to light. Right eye exhibits no discharge. Left eye exhibits no discharge.   Neck: Normal range of motion. Neck supple. No tracheal deviation present. No thyromegaly present.   Cardiovascular: Normal rate, S1 normal, S2 normal and normal pulses. An irregularly irregular rhythm present. Exam reveals no S3.   Murmur heard.  High-pitched blowing holosystolic murmur is present with a grade of 1/6 at the apex.  Pulmonary/Chest: Effort normal and breath sounds normal. No stridor. No respiratory distress. He exhibits no tenderness.   Abdominal: Soft. Bowel sounds are normal. He exhibits no distension and no mass. There is no tenderness. There is no rebound and no guarding.   Musculoskeletal: Normal range of motion. He exhibits no tenderness or deformity.   Lymphadenopathy:     He has no cervical adenopathy.   Neurological: He is alert and oriented to person, place, and time. He has normal reflexes.   Skin: Skin is warm and dry. No rash noted. He is not diaphoretic. No erythema.   Psychiatric: He has a normal mood and affect. Thought content normal.       Lab Review:             Performed        Assessment:          Diagnosis Plan   1. Chronic atrial fibrillation (CMS/HCC)  Lipid Panel    Comprehensive Metabolic Panel    ECG 12 Lead   2. Coronary artery disease involving native coronary artery of native heart without angina pectoris  Lipid Panel    Comprehensive Metabolic Panel    ECG 12 Lead   3. Chronic anticoagulation  Lipid Panel    Comprehensive Metabolic Panel    ECG 12 Lead   4. Non-rheumatic mitral regurgitation  Lipid Panel    Comprehensive Metabolic Panel    ECG 12 Lead   5. Ischemic cardiomyopathy  Lipid Panel    Comprehensive Metabolic Panel    ECG 12 Lead          Plan:       Coronary Artery  Disease  Assessment  • The patient has no angina    Plan  • Lifestyle modifications discussed include adhering to a heart healthy diet, avoidance of tobacco products, maintenance of a healthy weight, medication compliance, regular exercise and regular monitoring of cholesterol and blood pressure    Subjective - Objective  • There is a history of previous coronary artery bypass graft  • Current antiplatelet therapy includes aspirin 81 mg    Atrial Fibrillation and Atrial Flutter  Assessment  • The patient has permanent atrial fibrillation  • This is non-valvular in etiology  • The patient's CHADS2-VASc score is 2  • A [id number] score of 2 or more is considered a high risk for a thromboembolic event    Plan  • Continue in atrial fibrillation with rate control  • Continue rivaroxaban for antithrombotic therapy, bleeding issues discussed  • Continue beta blocker for rate control      Thank you very much for allowing us to participate in the care of this pleasant patient.  Please don't hesitate to call if I can be of assistance in any way.      Current Outpatient Medications:   •  aspirin 81 MG EC tablet, Take 81 mg by mouth Daily., Disp: , Rfl:   •  Glucosamine-Chondroitin (MOVE FREE PO), Take  by mouth 2 (Two) Times a Day., Disp: , Rfl:   •  lisinopril (PRINIVIL,ZESTRIL) 5 MG tablet, TAKE 1 TABLET BY MOUTH ONCE DAILY, Disp: 30 tablet, Rfl: 0  •  metoprolol succinate XL (TOPROL-XL) 25 MG 24 hr tablet, TAKE ONE TABLET BY MOUTH ONCE DAILY, Disp: 90 tablet, Rfl: 1  •  Multiple Vitamin (MULTI VITAMIN PO), Take  by mouth., Disp: , Rfl:   •  rivaroxaban (XARELTO) 20 MG tablet, Take 1 tablet by mouth Daily., Disp: 90 tablet, Rfl: 3  •  simvastatin (ZOCOR) 40 MG tablet, TAKE ONE TABLET BY MOUTH AT BEDTIME, Disp: 14 tablet, Rfl: 0

## 2019-01-29 ENCOUNTER — LAB (OUTPATIENT)
Dept: LAB | Facility: HOSPITAL | Age: 69
End: 2019-01-29
Attending: INTERNAL MEDICINE

## 2019-01-29 ENCOUNTER — TELEPHONE (OUTPATIENT)
Dept: CARDIOLOGY | Facility: CLINIC | Age: 69
End: 2019-01-29

## 2019-01-29 DIAGNOSIS — I25.5 ISCHEMIC CARDIOMYOPATHY: Chronic | ICD-10-CM

## 2019-01-29 DIAGNOSIS — I25.10 CORONARY ARTERY DISEASE INVOLVING NATIVE CORONARY ARTERY OF NATIVE HEART WITHOUT ANGINA PECTORIS: Chronic | ICD-10-CM

## 2019-01-29 DIAGNOSIS — Z79.01 CHRONIC ANTICOAGULATION: Chronic | ICD-10-CM

## 2019-01-29 DIAGNOSIS — I34.0 NON-RHEUMATIC MITRAL REGURGITATION: Chronic | ICD-10-CM

## 2019-01-29 DIAGNOSIS — I48.20 CHRONIC ATRIAL FIBRILLATION (HCC): Chronic | ICD-10-CM

## 2019-01-29 LAB
ALBUMIN SERPL-MCNC: 4.2 G/DL (ref 3.5–5.2)
ALBUMIN/GLOB SERPL: 1.4 G/DL
ALP SERPL-CCNC: 77 U/L (ref 40–129)
ALT SERPL W P-5'-P-CCNC: 22 U/L (ref 5–41)
ANION GAP SERPL CALCULATED.3IONS-SCNC: 10.5 MMOL/L
AST SERPL-CCNC: 28 U/L (ref 5–40)
BILIRUB SERPL-MCNC: 1 MG/DL (ref 0.2–1.2)
BUN BLD-MCNC: 21 MG/DL (ref 8–23)
BUN/CREAT SERPL: 24.1 (ref 7–25)
CALCIUM SPEC-SCNC: 9.5 MG/DL (ref 8.8–10.5)
CHLORIDE SERPL-SCNC: 101 MMOL/L (ref 98–107)
CHOLEST SERPL-MCNC: 154 MG/DL (ref 0–200)
CO2 SERPL-SCNC: 26.5 MMOL/L (ref 22–29)
CREAT BLD-MCNC: 0.87 MG/DL (ref 0.76–1.27)
GFR SERPL CREATININE-BSD FRML MDRD: 87 ML/MIN/1.73
GLOBULIN UR ELPH-MCNC: 3 GM/DL
GLUCOSE BLD-MCNC: 98 MG/DL (ref 65–99)
HDLC SERPL-MCNC: 74 MG/DL (ref 40–60)
LDLC SERPL CALC-MCNC: 70 MG/DL (ref 0–100)
LDLC/HDLC SERPL: 0.94 {RATIO}
POTASSIUM BLD-SCNC: 4.4 MMOL/L (ref 3.5–5.2)
PROT SERPL-MCNC: 7.2 G/DL (ref 6–8.5)
SODIUM BLD-SCNC: 138 MMOL/L (ref 136–145)
TRIGL SERPL-MCNC: 51 MG/DL (ref 0–150)
VLDLC SERPL-MCNC: 10.2 MG/DL (ref 8–32)

## 2019-01-29 PROCEDURE — 80053 COMPREHEN METABOLIC PANEL: CPT

## 2019-01-29 PROCEDURE — 80061 LIPID PANEL: CPT

## 2019-01-29 PROCEDURE — 36415 COLL VENOUS BLD VENIPUNCTURE: CPT

## 2019-01-29 NOTE — TELEPHONE ENCOUNTER
I spoke to his wife. She is aware that his lab resutls look goo and to continue his current medical regimen.    Thanks Maribel

## 2019-01-29 NOTE — TELEPHONE ENCOUNTER
----- Message from Mauricio Scott III, MD sent at 1/29/2019  9:53 AM EST -----  Please call and let this patient know that the lab results look good. Continue the current medical regimen.

## 2019-02-09 DIAGNOSIS — I48.20 CHRONIC ATRIAL FIBRILLATION (HCC): Chronic | ICD-10-CM

## 2019-02-11 RX ORDER — RIVAROXABAN 20 MG/1
TABLET, FILM COATED ORAL
Qty: 90 TABLET | Refills: 0 | Status: SHIPPED | OUTPATIENT
Start: 2019-02-11 | End: 2019-05-11 | Stop reason: SDUPTHER

## 2019-02-18 RX ORDER — LISINOPRIL 5 MG/1
TABLET ORAL
Qty: 90 TABLET | Refills: 1 | Status: SHIPPED | OUTPATIENT
Start: 2019-02-18 | End: 2019-08-17 | Stop reason: SDUPTHER

## 2019-04-03 RX ORDER — SIMVASTATIN 40 MG
TABLET ORAL
Qty: 90 TABLET | Refills: 3 | Status: SHIPPED | OUTPATIENT
Start: 2019-04-03 | End: 2020-02-19

## 2019-05-11 DIAGNOSIS — I48.20 CHRONIC ATRIAL FIBRILLATION (HCC): Chronic | ICD-10-CM

## 2019-05-13 RX ORDER — METOPROLOL SUCCINATE 25 MG/1
25 TABLET, EXTENDED RELEASE ORAL DAILY
Qty: 90 TABLET | Refills: 0 | Status: SHIPPED | OUTPATIENT
Start: 2019-05-13 | End: 2019-08-17 | Stop reason: SDUPTHER

## 2019-05-13 RX ORDER — RIVAROXABAN 20 MG/1
TABLET, FILM COATED ORAL
Qty: 90 TABLET | Refills: 0 | Status: SHIPPED | OUTPATIENT
Start: 2019-05-13 | End: 2019-08-17 | Stop reason: SDUPTHER

## 2019-08-17 DIAGNOSIS — I48.20 CHRONIC ATRIAL FIBRILLATION (HCC): Chronic | ICD-10-CM

## 2019-08-19 RX ORDER — RIVAROXABAN 20 MG/1
TABLET, FILM COATED ORAL
Qty: 90 TABLET | Refills: 0 | Status: SHIPPED | OUTPATIENT
Start: 2019-08-19 | End: 2019-11-10 | Stop reason: SDUPTHER

## 2019-08-19 RX ORDER — METOPROLOL SUCCINATE 25 MG/1
TABLET, EXTENDED RELEASE ORAL
Qty: 90 TABLET | Refills: 0 | Status: SHIPPED | OUTPATIENT
Start: 2019-08-19 | End: 2019-11-10 | Stop reason: SDUPTHER

## 2019-08-19 RX ORDER — LISINOPRIL 5 MG/1
TABLET ORAL
Qty: 90 TABLET | Refills: 1 | Status: SHIPPED | OUTPATIENT
Start: 2019-08-19 | End: 2020-02-14

## 2019-11-10 DIAGNOSIS — I48.20 CHRONIC ATRIAL FIBRILLATION (HCC): Chronic | ICD-10-CM

## 2019-11-11 RX ORDER — METOPROLOL SUCCINATE 25 MG/1
TABLET, EXTENDED RELEASE ORAL
Qty: 90 TABLET | Refills: 0 | Status: SHIPPED | OUTPATIENT
Start: 2019-11-11 | End: 2020-02-14

## 2019-11-11 RX ORDER — RIVAROXABAN 20 MG/1
TABLET, FILM COATED ORAL
Qty: 90 TABLET | Refills: 0 | Status: SHIPPED | OUTPATIENT
Start: 2019-11-11 | End: 2020-02-14

## 2020-01-30 ENCOUNTER — OFFICE VISIT (OUTPATIENT)
Dept: CARDIOLOGY | Facility: CLINIC | Age: 70
End: 2020-01-30

## 2020-01-30 VITALS
BODY MASS INDEX: 29.8 KG/M2 | DIASTOLIC BLOOD PRESSURE: 80 MMHG | HEART RATE: 90 BPM | WEIGHT: 220 LBS | SYSTOLIC BLOOD PRESSURE: 128 MMHG | HEIGHT: 72 IN

## 2020-01-30 DIAGNOSIS — I25.5 ISCHEMIC CARDIOMYOPATHY: Chronic | ICD-10-CM

## 2020-01-30 DIAGNOSIS — I25.10 CORONARY ARTERY DISEASE INVOLVING NATIVE CORONARY ARTERY OF NATIVE HEART WITHOUT ANGINA PECTORIS: Chronic | ICD-10-CM

## 2020-01-30 DIAGNOSIS — I34.0 NONRHEUMATIC MITRAL VALVE REGURGITATION: Chronic | ICD-10-CM

## 2020-01-30 DIAGNOSIS — Z79.01 CHRONIC ANTICOAGULATION: Chronic | ICD-10-CM

## 2020-01-30 DIAGNOSIS — I48.20 CHRONIC ATRIAL FIBRILLATION (HCC): Primary | Chronic | ICD-10-CM

## 2020-01-30 PROCEDURE — 93000 ELECTROCARDIOGRAM COMPLETE: CPT | Performed by: INTERNAL MEDICINE

## 2020-01-30 PROCEDURE — 99214 OFFICE O/P EST MOD 30 MIN: CPT | Performed by: INTERNAL MEDICINE

## 2020-01-30 NOTE — PROGRESS NOTES
Subjective:     Encounter Date: 01/30/20        Patient ID: Renato Marquez is a 69 y.o. male.    Chief Complaint: CAD  History of Present Illness    Dear Dr. Mata,    This patient comes in for routine follow-up of their cardiac status.  It has been one year since his last visit.  He has a history of chronic atrial fibrillation. He has a history of CAD with prior CABG. This was performed in 2009 by Dr. Jeet Bhagat. He had a three-vessel CABG with LIMA to the LAD, SVG to the obtuse marginal, and SVG to the posterior descending coronary arteries. He also has a history of cerebrovascular disease with a right carotid endarterectomy in 2009. The carotid endarterectomy was performed at the same time by Dr. Lewis.He had an ejection fraction of 37% on his stress test that was performed in January 2016.  That showed no ischemia.  We discussed cardioversion with him, but he was having no symptoms and elected to remain on his current medical regimen.    He has been doing fine with no symptoms.  No chest pain or chest discomfort.  No shortness of breath.  He is very physically active with no limitation and no symptoms at all.  He has not had a lower extremity edema.  No orthopnea or PND.    His only complaint is leg cramps at night.  He has been having that more frequently lately.  No leg pain or discomfort with activity or exercise.    Echo 1/2018:  Interpretation Summary     · Calculated EF = 50%.  · Left ventricular systolic function is normal.  · Left ventricular diastolic dysfunction (grade III) consistent with reversible restrictive pattern.  · Mild mitral valve regurgitation is present  · Mild pulmonic valve regurgitation is present.  · Mild tricuspid valve regurgitation is present.  · Estimated right ventricular systolic pressure from tricuspid regurgitation is normal (<35 mmHg).          He had a stress test in 2016 that was normal with no ischemia.      The following portions of the patient's history were  reviewed and updated as appropriate: allergies, current medications, past family history, past medical history, past social history, past surgical history and problem list.    Past Medical History:   Diagnosis Date   • CAD (coronary artery disease)    • Cancer (CMS/HCC)    • Carotid arterial disease (CMS/HCC)    • Chronic atrial fibrillation    • Ischemic cardiomyopathy    • Mitral regurgitation        Past Surgical History:   Procedure Laterality Date   • CAROTID ENDARTERECTOMY Right 2009    Dr Lewis   • CORONARY ARTERY BYPASS GRAFT  2009    Dr Bhagat, LIMA to LAD, SVG to OM, SVG Post Desc   • FINGER SURGERY         Social History     Socioeconomic History   • Marital status:      Spouse name: Not on file   • Number of children: Not on file   • Years of education: Not on file   • Highest education level: Not on file   Tobacco Use   • Smoking status: Former Smoker   • Tobacco comment: caffine use   Substance and Sexual Activity   • Alcohol use: Yes   • Drug use: No       Review of Systems   Constitution: Negative for chills, decreased appetite, fever and night sweats.   HENT: Negative for ear discharge, ear pain, hearing loss, nosebleeds and sore throat.    Eyes: Negative for blurred vision, double vision and pain.   Cardiovascular: Negative for cyanosis.   Respiratory: Negative for hemoptysis and sputum production.    Endocrine: Negative for cold intolerance and heat intolerance.   Hematologic/Lymphatic: Negative for adenopathy.   Skin: Negative for dry skin, itching, nail changes, rash and suspicious lesions.   Musculoskeletal: Negative for arthritis, gout, muscle cramps, muscle weakness, myalgias and neck pain.   Gastrointestinal: Negative for anorexia, bowel incontinence, constipation, diarrhea, dysphagia, hematemesis and jaundice.   Genitourinary: Negative for bladder incontinence, dysuria, flank pain, frequency, hematuria and nocturia.   Neurological: Negative for focal weakness, numbness,  "paresthesias and seizures.   Psychiatric/Behavioral: Negative for altered mental status, hallucinations, hypervigilance, suicidal ideas and thoughts of violence.   Allergic/Immunologic: Negative for persistent infections.         ECG 12 Lead  Date/Time: 1/30/2020 11:04 AM  Performed by: Mauricio Scott III, MD  Authorized by: Mauricio Scott III, MD   Comparison: compared with previous ECG   Similar to previous ECG  Rhythm: atrial fibrillation  Rate: normal  Conduction: conduction normal  QRS axis: normal  Other findings: non-specific ST-T wave changes    Clinical impression: abnormal EKG               Objective:     Vitals:    01/30/20 1042   BP: 128/80   Pulse: 90   Weight: 99.8 kg (220 lb)   Height: 182.9 cm (72\")         Physical Exam   Constitutional: He is oriented to person, place, and time. He appears well-developed and well-nourished. No distress.   HENT:   Head: Normocephalic and atraumatic.   Nose: Nose normal.   Mouth/Throat: Oropharynx is clear and moist.   Eyes: Pupils are equal, round, and reactive to light. Conjunctivae and EOM are normal. Right eye exhibits no discharge. Left eye exhibits no discharge.   Neck: Normal range of motion. Neck supple. No tracheal deviation present. No thyromegaly present.   Cardiovascular: Normal rate, S1 normal, S2 normal and normal pulses. An irregularly irregular rhythm present. Exam reveals no S3.   Murmur heard.  High-pitched blowing holosystolic murmur is present with a grade of 1/6 at the apex.  Pulmonary/Chest: Effort normal and breath sounds normal. No stridor. No respiratory distress. He exhibits no tenderness.   Abdominal: Soft. Bowel sounds are normal. He exhibits no distension and no mass. There is no tenderness. There is no rebound and no guarding.   Musculoskeletal: Normal range of motion. He exhibits no tenderness or deformity.   Lymphadenopathy:     He has no cervical adenopathy.   Neurological: He is alert and oriented to person, place, and time. He has " normal reflexes.   Skin: Skin is warm and dry. No rash noted. He is not diaphoretic. No erythema.   Psychiatric: He has a normal mood and affect. Thought content normal.       Lab Review:             Performed        Assessment:          Diagnosis Plan   1. Chronic atrial fibrillation  ECG 12 Lead   2. Ischemic cardiomyopathy  ECG 12 Lead   3. Coronary artery disease involving native coronary artery of native heart without angina pectoris  ECG 12 Lead   4. Nonrheumatic mitral valve regurgitation  ECG 12 Lead   5. Chronic anticoagulation  ECG 12 Lead          Plan:       Coronary Artery Disease  Assessment  • The patient has no angina    Plan  • Lifestyle modifications discussed include adhering to a heart healthy diet, avoidance of tobacco products, maintenance of a healthy weight, medication compliance, regular exercise and regular monitoring of cholesterol and blood pressure    Subjective - Objective  • There is a history of previous coronary artery bypass graft  • Current antiplatelet therapy includes aspirin 81 mg    Atrial Fibrillation and Atrial Flutter  Assessment  • The patient has permanent atrial fibrillation  • This is non-valvular in etiology  • The patient's CHADS2-VASc score is 2  • A [id number] score of 2 or more is considered a high risk for a thromboembolic event    Plan  • Continue in atrial fibrillation with rate control  • Continue rivaroxaban for antithrombotic therapy, bleeding issues discussed  • Continue beta blocker for rate control    3.  Leg cramps, we will have him hold his simvastatin and see if that is contributing  4.  No indication for any diagnostic testing today.  I will see him back in a year.  We will consider at that point whether follow-up echocardiogram and or follow-up stress testing is indicated.    Thank you very much for allowing us to participate in the care of this pleasant patient.  Please don't hesitate to call if I can be of assistance in any way.      Current  Outpatient Medications:   •  aspirin 81 MG EC tablet, Take 81 mg by mouth Daily., Disp: , Rfl:   •  Glucosamine-Chondroitin (MOVE FREE PO), Take  by mouth 2 (Two) Times a Day., Disp: , Rfl:   •  lisinopril (PRINIVIL,ZESTRIL) 5 MG tablet, TAKE 1 TABLET BY MOUTH ONCE DAILY, Disp: 90 tablet, Rfl: 1  •  metoprolol succinate XL (TOPROL-XL) 25 MG 24 hr tablet, TAKE ONE TABLET BY MOUTH ONCE DAILY, Disp: 90 tablet, Rfl: 1  •  metoprolol succinate XL (TOPROL-XL) 25 MG 24 hr tablet, TAKE 1 TABLET BY MOUTH ONCE DAILY, Disp: 90 tablet, Rfl: 0  •  Multiple Vitamin (MULTI VITAMIN PO), Take  by mouth., Disp: , Rfl:   •  simvastatin (ZOCOR) 40 MG tablet, TAKE ONE TABLET BY MOUTH AT BEDTIME, Disp: 14 tablet, Rfl: 0  •  simvastatin (ZOCOR) 40 MG tablet, TAKE 1 TABLET BY MOUTH IN THE EVENING, Disp: 90 tablet, Rfl: 3  •  XARELTO 20 MG tablet, TAKE 1 TABLET BY MOUTH ONCE DAILY, Disp: 90 tablet, Rfl: 0

## 2020-02-14 DIAGNOSIS — I48.20 CHRONIC ATRIAL FIBRILLATION (HCC): Chronic | ICD-10-CM

## 2020-02-14 RX ORDER — METOPROLOL SUCCINATE 25 MG/1
TABLET, EXTENDED RELEASE ORAL
Qty: 90 TABLET | Refills: 2 | Status: SHIPPED | OUTPATIENT
Start: 2020-02-14 | End: 2020-10-06

## 2020-02-14 RX ORDER — LISINOPRIL 5 MG/1
TABLET ORAL
Qty: 90 TABLET | Refills: 2 | Status: SHIPPED | OUTPATIENT
Start: 2020-02-14 | End: 2020-10-06 | Stop reason: SDUPTHER

## 2020-02-14 RX ORDER — RIVAROXABAN 20 MG/1
TABLET, FILM COATED ORAL
Qty: 90 TABLET | Refills: 2 | Status: SHIPPED | OUTPATIENT
Start: 2020-02-14 | End: 2020-10-06 | Stop reason: SDUPTHER

## 2020-02-19 ENCOUNTER — TELEPHONE (OUTPATIENT)
Dept: CARDIOLOGY | Facility: CLINIC | Age: 70
End: 2020-02-19

## 2020-02-19 RX ORDER — PRAVASTATIN SODIUM 40 MG
40 TABLET ORAL DAILY
Qty: 90 TABLET | Refills: 3 | Status: SHIPPED | OUTPATIENT
Start: 2020-02-19 | End: 2020-09-23

## 2020-02-19 NOTE — TELEPHONE ENCOUNTER
Pt called. He would like you to call him about his Simvastatin.   Best Number to call [phone]    Thanks   Salvador

## 2020-02-19 NOTE — TELEPHONE ENCOUNTER
The simvastatin, I have sent in a prescription for pravastatin 40 mg to take once daily.  Start that, call us back in 2 weeks.  The pravastatin is much less likely to cause any muscle soreness or leg cramps.

## 2020-02-19 NOTE — TELEPHONE ENCOUNTER
Pt was seen on 1/30/20.         Since he held his simvastatin his leg cramps has went a way some. He still has legs cramps once in a while.     Do you want to replace his simvastatin?     PT #: [phone]    Thanks Maribel

## 2020-02-20 NOTE — TELEPHONE ENCOUNTER
Pt is aware that we are replacing simvastatin with pravastatin 40 MG. He will call back in 2 weeks with an update.    Thanks Maribel

## 2020-07-21 ENCOUNTER — HOSPITAL ENCOUNTER (OUTPATIENT)
Dept: CARDIOLOGY | Facility: HOSPITAL | Age: 70
Discharge: HOME | End: 2020-07-21
Admitting: OPHTHALMOLOGY

## 2020-07-21 ENCOUNTER — LAB (OUTPATIENT)
Dept: LAB | Facility: HOSPITAL | Age: 70
End: 2020-07-21

## 2020-07-21 ENCOUNTER — TRANSCRIBE ORDERS (OUTPATIENT)
Dept: ADMINISTRATIVE | Facility: HOSPITAL | Age: 70
End: 2020-07-21

## 2020-07-21 DIAGNOSIS — I10 ESSENTIAL HYPERTENSION, BENIGN: Primary | ICD-10-CM

## 2020-07-21 DIAGNOSIS — I10 ESSENTIAL HYPERTENSION, BENIGN: ICD-10-CM

## 2020-07-21 LAB
ANION GAP SERPL CALCULATED.3IONS-SCNC: 7.6 MMOL/L (ref 5–15)
BUN SERPL-MCNC: 24 MG/DL (ref 8–23)
BUN/CREAT SERPL: 24.2 (ref 7–25)
CALCIUM SPEC-SCNC: 9 MG/DL (ref 8.6–10.5)
CHLORIDE SERPL-SCNC: 102 MMOL/L (ref 98–107)
CO2 SERPL-SCNC: 24.4 MMOL/L (ref 22–29)
CREAT SERPL-MCNC: 0.99 MG/DL (ref 0.76–1.27)
GFR SERPL CREATININE-BSD FRML MDRD: 74 ML/MIN/1.73
GLUCOSE SERPL-MCNC: 118 MG/DL (ref 65–99)
POTASSIUM SERPL-SCNC: 5.1 MMOL/L (ref 3.5–5.2)
SODIUM SERPL-SCNC: 134 MMOL/L (ref 136–145)

## 2020-07-21 PROCEDURE — 93010 ELECTROCARDIOGRAM REPORT: CPT | Performed by: INTERNAL MEDICINE

## 2020-07-21 PROCEDURE — 36415 COLL VENOUS BLD VENIPUNCTURE: CPT

## 2020-07-21 PROCEDURE — 93005 ELECTROCARDIOGRAM TRACING: CPT | Performed by: OPHTHALMOLOGY

## 2020-07-21 PROCEDURE — 80048 BASIC METABOLIC PNL TOTAL CA: CPT

## 2020-09-29 ENCOUNTER — OFFICE VISIT (OUTPATIENT)
Dept: FAMILY MEDICINE CLINIC | Facility: CLINIC | Age: 70
End: 2020-09-29

## 2020-09-29 VITALS
HEART RATE: 52 BPM | TEMPERATURE: 97.7 F | SYSTOLIC BLOOD PRESSURE: 126 MMHG | RESPIRATION RATE: 14 BRPM | OXYGEN SATURATION: 98 % | HEIGHT: 72 IN | WEIGHT: 229 LBS | BODY MASS INDEX: 31.02 KG/M2 | DIASTOLIC BLOOD PRESSURE: 70 MMHG

## 2020-09-29 DIAGNOSIS — G89.29 CHRONIC BILATERAL LOW BACK PAIN WITH RIGHT-SIDED SCIATICA: ICD-10-CM

## 2020-09-29 DIAGNOSIS — I65.21 STENOSIS OF RIGHT CAROTID ARTERY: Chronic | ICD-10-CM

## 2020-09-29 DIAGNOSIS — Z79.01 CHRONIC ANTICOAGULATION: Chronic | ICD-10-CM

## 2020-09-29 DIAGNOSIS — E78.5 DYSLIPIDEMIA: ICD-10-CM

## 2020-09-29 DIAGNOSIS — M51.36 DDD (DEGENERATIVE DISC DISEASE), LUMBAR: ICD-10-CM

## 2020-09-29 DIAGNOSIS — I10 ESSENTIAL HYPERTENSION: Primary | ICD-10-CM

## 2020-09-29 DIAGNOSIS — Z79.899 HIGH RISK MEDICATION USE: ICD-10-CM

## 2020-09-29 DIAGNOSIS — R25.2 LEG CRAMPS: ICD-10-CM

## 2020-09-29 DIAGNOSIS — I48.20 CHRONIC ATRIAL FIBRILLATION (HCC): Chronic | ICD-10-CM

## 2020-09-29 DIAGNOSIS — Z85.820 HX OF MELANOMA OF SKIN: ICD-10-CM

## 2020-09-29 DIAGNOSIS — I25.10 CORONARY ARTERY DISEASE INVOLVING NATIVE CORONARY ARTERY OF NATIVE HEART WITHOUT ANGINA PECTORIS: Chronic | ICD-10-CM

## 2020-09-29 DIAGNOSIS — M54.41 CHRONIC BILATERAL LOW BACK PAIN WITH RIGHT-SIDED SCIATICA: ICD-10-CM

## 2020-09-29 DIAGNOSIS — Z85.828 HISTORY OF BASAL CELL CARCINOMA (BCC) OF SKIN: ICD-10-CM

## 2020-09-29 DIAGNOSIS — I25.5 ISCHEMIC CARDIOMYOPATHY: Chronic | ICD-10-CM

## 2020-09-29 DIAGNOSIS — Z00.00 ENCOUNTER FOR WELL ADULT EXAM WITHOUT ABNORMAL FINDINGS: ICD-10-CM

## 2020-09-29 PROBLEM — M51.369 DDD (DEGENERATIVE DISC DISEASE), LUMBAR: Status: ACTIVE | Noted: 2020-09-29

## 2020-09-29 PROCEDURE — 99204 OFFICE O/P NEW MOD 45 MIN: CPT | Performed by: FAMILY MEDICINE

## 2020-09-29 RX ORDER — PRAVASTATIN SODIUM 40 MG
40 TABLET ORAL DAILY
COMMUNITY
End: 2020-10-06 | Stop reason: SDUPTHER

## 2020-09-29 RX ORDER — MAGNESIUM 200 MG
250 TABLET ORAL DAILY
COMMUNITY

## 2020-09-29 NOTE — ASSESSMENT & PLAN NOTE
Controlled with Lisinopril 5 mg daily and Metoprolol XL 25 mg daily. No medication side effects.  Continue current treatment

## 2020-09-29 NOTE — PROGRESS NOTES
Subjective   Renato Marquez is a 70 y.o. male is here for   Chief Complaint   Patient presents with   • Leg Pain   • Hypertension   • Hyperlipidemia   • Coronary Artery Disease   • Atrial Fibrillation       History of Present Illness     Pt has HTN. BP today is 126/70.    He had CABG X 3. He has had CEA on right.    He has a history of melanoma on his right shoulder area.  He follows with dermatologist Dr. Liang 1-2 times per year. He also has a history of basal cell skin cancer. He had basal cell carcinoma on the left side of his face. After the surgical removal he had drooping of his left lower eyelid and had cosmetic surgical repair, which has healed well.    He has ocassional leg cramps.  He states he drinks a lot of coffee and some whiskey at night and believes he is chronically dehydrated. He states he has cut back on coffee and is drinking more water.    He has a diagnosis of hypothyroidism on his chart but states that he is not aware that he has a low functioning thyroid.  Is not currently taking any thyroid medication.    He has DDD of the lumbar spine and chronic back pain.  He does not want any back surgery.  He has chronic LBP that goes down his right leg.    Health Maintenance Due   Topic Date Due   • HEPATITIS C SCREENING  07/06/2017   • MEDICARE ANNUAL WELLNESS  07/06/2017   • LIPID PANEL  01/29/2020   • AAA SCREEN (ONE-TIME)  09/29/2020       The following portions of the patient's history were reviewed and updated as appropriate: allergies, current medications, past family history, past medical history, past social history, past surgical history and problem list.     reports that he has quit smoking. He does not have any smokeless tobacco history on file. He reports current alcohol use. He reports that he does not use drugs.    Review of Systems   Constitutional: Negative for activity change and unexpected weight change.   Respiratory: Negative for shortness of breath and wheezing.   "  Cardiovascular: Negative for chest pain and palpitations.   Gastrointestinal: Negative for abdominal pain, blood in stool and constipation.   Genitourinary: Negative for difficulty urinating and hematuria.   Musculoskeletal: Negative for gait problem.   Skin: Negative for color change and rash.        PHQ-9 Depression Screening  Little interest or pleasure in doing things? 0   Feeling down, depressed, or hopeless? 0   Trouble falling or staying asleep, or sleeping too much?     Feeling tired or having little energy?     Poor appetite or overeating?     Feeling bad about yourself - or that you are a failure or have let yourself or your family down?     Trouble concentrating on things, such as reading the newspaper or watching television?     Moving or speaking so slowly that other people could have noticed? Or the opposite - being so fidgety or restless that you have been moving around a lot more than usual?     Thoughts that you would be better off dead, or of hurting yourself in some way?     PHQ-9 Total Score 0   If you checked off any problems, how difficult have these problems made it for you to do your work, take care of things at home, or get along with other people?       BP Readings from Last 12 Encounters:   09/29/20 126/70   09/23/20 126/82   01/30/20 128/80   01/24/19 122/74   01/24/18 129/71   01/18/18 112/70   03/23/17 138/80   01/15/16 110/70   11/12/14 110/68       Objective   /70 (BP Location: Left arm, Patient Position: Sitting, Cuff Size: Adult)   Pulse 52   Temp 97.7 °F (36.5 °C) (Temporal)   Resp 14   Ht 182.9 cm (72\")   Wt 104 kg (229 lb)   SpO2 98%   BMI 31.06 kg/m²   Physical Exam  Vitals signs and nursing note reviewed.   Constitutional:       General: He is not in acute distress.     Appearance: Normal appearance. He is well-developed. He is not diaphoretic.   HENT:      Head: Normocephalic and atraumatic.      Right Ear: External ear normal.      Left Ear: External ear " normal.      Nose: Nose normal.      Mouth/Throat:      Pharynx: No oropharyngeal exudate.   Eyes:      General: Lids are normal. No scleral icterus.        Right eye: No discharge.         Left eye: No discharge.   Neck:      Musculoskeletal: Full passive range of motion without pain, normal range of motion and neck supple. No edema.      Thyroid: No thyromegaly.      Trachea: Trachea normal. No tracheal deviation.   Cardiovascular:      Rate and Rhythm: Normal rate and regular rhythm.      Heart sounds: Normal heart sounds. No murmur. No friction rub. No gallop.    Pulmonary:      Effort: Pulmonary effort is normal. No tachypnea, bradypnea or respiratory distress.      Breath sounds: Normal breath sounds. No stridor. No decreased breath sounds, wheezing or rales.   Chest:      Chest wall: No tenderness.   Lymphadenopathy:      Head:      Right side of head: No submental, submandibular, tonsillar, preauricular, posterior auricular or occipital adenopathy.      Left side of head: No submental, submandibular, tonsillar, preauricular, posterior auricular or occipital adenopathy.      Cervical: No cervical adenopathy.      Right cervical: No superficial, deep or posterior cervical adenopathy.     Left cervical: No superficial, deep or posterior cervical adenopathy.   Skin:     General: Skin is warm and dry.      Capillary Refill: Capillary refill takes less than 2 seconds.      Coloration: Skin is not pale.      Findings: No erythema or rash.      Nails: There is no clubbing.     Neurological:      Mental Status: He is alert and oriented to person, place, and time. He is not disoriented.      Deep Tendon Reflexes: Reflexes are normal and symmetric.   Psychiatric:         Behavior: Behavior normal.         Procedures    Assessment/Plan   Diagnoses and all orders for this visit:    1. Essential hypertension (Primary)  Assessment & Plan:  Controlled with Lisinopril 5 mg daily and Metoprolol XL 25 mg daily. No medication  side effects.  Continue current treatment        2. Dyslipidemia  -     Lipid Panel With / Chol / HDL Ratio    3. High risk medication use  -     CBC & Differential  -     Comprehensive Metabolic Panel  -     Lipid Panel With / Chol / HDL Ratio  -     TSH  -     UA / M With / Rflx Culture([facility] ONLY) - Urine, Clean Catch    4. Encounter for well adult exam without abnormal findings  -     CBC & Differential  -     Comprehensive Metabolic Panel  -     Lipid Panel With / Chol / HDL Ratio  -     TSH  -     UA / M With / Rflx Culture([facility] ONLY) - Urine, Clean Catch    5. Chronic bilateral low back pain with right-sided sciatica  Assessment & Plan:  Xray L-spine      Orders:  -     XR Spine Lumbar Complete 4+VW    6. Chronic atrial fibrillation (CMS/HCC)  Assessment & Plan:  Xarelto 20 mg. No medication side effects. Following with Dr. Mauricio Scott, Cardiology.      7. Chronic anticoagulation    8. Stenosis of right carotid artery    9. Coronary artery disease involving native coronary artery of native heart without angina pectoris    10. History of basal cell carcinoma (BCC) of skin    11. Hx of melanoma of skin    12. Ischemic cardiomyopathy  Assessment & Plan:  Following with Dr. Mauricio Scott        13. Leg cramps    14. DDD (degenerative disc disease), lumbar  -     XR Spine Lumbar Complete 4+VW

## 2020-09-29 NOTE — PATIENT INSTRUCTIONS
Leg Cramps  Leg cramps occur when one or more muscles tighten and you have no control over this tightening (involuntary muscle contraction). Muscle cramps can develop in any muscle, but the most common place is in the calf muscles of the leg. Those cramps can occur during exercise or when you are at rest. Leg cramps are painful, and they may last for a few seconds to a few minutes. Cramps may return several times before they finally stop.  Usually, leg cramps are not caused by a serious medical problem. In many cases, the cause is not known. Some common causes include:  · Excessive physical effort (overexertion), such as during intense exercise.  · Overuse from repetitive motions, or doing the same thing over and over.  · Staying in a certain position for a long period of time.  · Improper preparation, form, or technique while performing a sport or an activity.  · Dehydration.  · Injury.  · Side effects of certain medicines.  · Abnormally low levels of minerals in your blood (electrolytes), especially potassium and calcium. This could result from:  ? Pregnancy.  ? Taking diuretic medicines.  Follow these instructions at home:  Eating and drinking  · Drink enough fluid to keep your urine pale yellow. Staying hydrated may help prevent cramps.  · Eat a healthy diet that includes plenty of nutrients to help your muscles function. A healthy diet includes fruits and vegetables, lean protein, whole grains, and low-fat or nonfat dairy products.  Managing pain, stiffness, and swelling         · Try massaging, stretching, and relaxing the affected muscle. Do this for several minutes at a time.  · If directed, put ice on areas that are sore or painful after a cramp:  ? Put ice in a plastic bag.  ? Place a towel between your skin and the bag.  ? Leave the ice on for 20 minutes, 2-3 times a day.  · If directed, apply heat to muscles that are tense or tight. Do this before you exercise, or as often as told by your health care  provider. Use the heat source that your health care provider recommends, such as a moist heat pack or a heating pad.  ? Place a towel between your skin and the heat source.  ? Leave the heat on for 20-30 minutes.  ? Remove the heat if your skin turns bright red. This is especially important if you are unable to feel pain, heat, or cold. You may have a greater risk of getting burned.  · Try taking hot showers or baths to help relax tight muscles.  General instructions  · If you are having frequent leg cramps, avoid intense exercise for several days.  · Take over-the-counter and prescription medicines only as told by your health care provider.  · Keep all follow-up visits as told by your health care provider. This is important.  Contact a health care provider if:  · Your leg cramps get more severe or more frequent, or they do not improve over time.  · Your foot becomes cold, numb, or blue.  Summary  · Muscle cramps can develop in any muscle, but the most common place is in the calf muscles of the leg.  · Leg cramps are painful, and they may last for a few seconds to a few minutes.  · Usually, leg cramps are not caused by a serious medical problem. Often, the cause is not known.  · Stay hydrated and take over-the-counter and prescription medicines only as told by your health care provider.  This information is not intended to replace advice given to you by your health care provider. Make sure you discuss any questions you have with your health care provider.  Document Released: 01/25/2006 Document Revised: 11/30/2018 Document Reviewed: 09/27/2018  [employer]vier Patient Education © 2020 [employer]

## 2020-09-30 PROBLEM — E87.5 HYPERKALEMIA: Status: ACTIVE | Noted: 2020-09-30

## 2020-09-30 LAB
ALBUMIN SERPL-MCNC: 4.4 G/DL (ref 3.5–5.2)
ALBUMIN/GLOB SERPL: 2.4 G/DL
ALP SERPL-CCNC: 74 U/L (ref 39–117)
ALT SERPL-CCNC: 22 U/L (ref 1–41)
APPEARANCE UR: CLEAR
AST SERPL-CCNC: 35 U/L (ref 1–40)
BACTERIA #/AREA URNS HPF: NORMAL /HPF
BASOPHILS # BLD AUTO: 0.02 10*3/MM3 (ref 0–0.2)
BASOPHILS NFR BLD AUTO: 0.3 % (ref 0–1.5)
BILIRUB SERPL-MCNC: 0.6 MG/DL (ref 0–1.2)
BILIRUB UR QL STRIP: NEGATIVE
BUN SERPL-MCNC: 29 MG/DL (ref 8–23)
BUN/CREAT SERPL: 25.4 (ref 7–25)
CALCIUM SERPL-MCNC: 9 MG/DL (ref 8.6–10.5)
CHLORIDE SERPL-SCNC: 103 MMOL/L (ref 98–107)
CHOLEST SERPL-MCNC: 162 MG/DL (ref 0–200)
CHOLEST/HDLC SERPL: 2.28 {RATIO}
CO2 SERPL-SCNC: 27.2 MMOL/L (ref 22–29)
COLOR UR: YELLOW
CREAT SERPL-MCNC: 1.14 MG/DL (ref 0.76–1.27)
EOSINOPHIL # BLD AUTO: 0.04 10*3/MM3 (ref 0–0.4)
EOSINOPHIL NFR BLD AUTO: 0.5 % (ref 0.3–6.2)
EPI CELLS #/AREA URNS HPF: NORMAL /HPF (ref 0–10)
ERYTHROCYTE [DISTWIDTH] IN BLOOD BY AUTOMATED COUNT: 12.3 % (ref 12.3–15.4)
GLOBULIN SER CALC-MCNC: 1.8 GM/DL
GLUCOSE SERPL-MCNC: 90 MG/DL (ref 65–99)
GLUCOSE UR QL: NEGATIVE
HCT VFR BLD AUTO: 42.3 % (ref 37.5–51)
HDLC SERPL-MCNC: 71 MG/DL (ref 40–60)
HGB BLD-MCNC: 14.5 G/DL (ref 13–17.7)
HGB UR QL STRIP: NEGATIVE
IMM GRANULOCYTES # BLD AUTO: 0.03 10*3/MM3 (ref 0–0.05)
IMM GRANULOCYTES NFR BLD AUTO: 0.4 % (ref 0–0.5)
KETONES UR QL STRIP: NEGATIVE
LDLC SERPL CALC-MCNC: 79 MG/DL (ref 0–100)
LEUKOCYTE ESTERASE UR QL STRIP: NEGATIVE
LYMPHOCYTES # BLD AUTO: 1.41 10*3/MM3 (ref 0.7–3.1)
LYMPHOCYTES NFR BLD AUTO: 18.5 % (ref 19.6–45.3)
MCH RBC QN AUTO: 33.6 PG (ref 26.6–33)
MCHC RBC AUTO-ENTMCNC: 34.3 G/DL (ref 31.5–35.7)
MCV RBC AUTO: 98.1 FL (ref 79–97)
MICRO URNS: NORMAL
MICRO URNS: NORMAL
MONOCYTES # BLD AUTO: 0.99 10*3/MM3 (ref 0.1–0.9)
MONOCYTES NFR BLD AUTO: 13 % (ref 5–12)
NEUTROPHILS # BLD AUTO: 5.15 10*3/MM3 (ref 1.7–7)
NEUTROPHILS NFR BLD AUTO: 67.3 % (ref 42.7–76)
NITRITE UR QL STRIP: NEGATIVE
NRBC BLD AUTO-RTO: 0 /100 WBC (ref 0–0.2)
PH UR STRIP: 6 [PH] (ref 5–7.5)
PLATELET # BLD AUTO: 143 10*3/MM3 (ref 140–450)
POTASSIUM SERPL-SCNC: 5.3 MMOL/L (ref 3.5–5.2)
PROT SERPL-MCNC: 6.2 G/DL (ref 6–8.5)
PROT UR QL STRIP: NEGATIVE
RBC # BLD AUTO: 4.31 10*6/MM3 (ref 4.14–5.8)
RBC #/AREA URNS HPF: NORMAL /HPF (ref 0–2)
SODIUM SERPL-SCNC: 137 MMOL/L (ref 136–145)
SP GR UR: 1.01 (ref 1–1.03)
TRIGL SERPL-MCNC: 62 MG/DL (ref 0–150)
TSH SERPL DL<=0.005 MIU/L-ACNC: 1.26 UIU/ML (ref 0.27–4.2)
URINALYSIS REFLEX: NORMAL
UROBILINOGEN UR STRIP-MCNC: 0.2 MG/DL (ref 0.2–1)
VLDLC SERPL CALC-MCNC: 12.4 MG/DL
WBC # BLD AUTO: 7.64 10*3/MM3 (ref 3.4–10.8)
WBC #/AREA URNS HPF: NORMAL /HPF (ref 0–5)

## 2020-09-30 NOTE — PROGRESS NOTES
Please call the patient regarding his result(s).  Please let the patient know his potassium level is elevated.  We will discuss this further at his upcoming October 6, 2020 appointment.  Please add hyperkalemia to the chief complaint and reason for visit for his October 6, 2020 appointment.

## 2020-10-02 ENCOUNTER — HOSPITAL ENCOUNTER (OUTPATIENT)
Dept: GENERAL RADIOLOGY | Facility: HOSPITAL | Age: 70
Discharge: HOME | End: 2020-10-02
Admitting: FAMILY MEDICINE

## 2020-10-02 PROBLEM — R93.7 ABNORMAL X-RAY OF LUMBAR SPINE: Status: ACTIVE | Noted: 2020-10-02

## 2020-10-02 PROBLEM — M47.816 LUMBAR FACET ARTHROPATHY: Status: ACTIVE | Noted: 2020-10-02

## 2020-10-02 PROCEDURE — 72110 X-RAY EXAM L-2 SPINE 4/>VWS: CPT

## 2020-10-06 ENCOUNTER — OFFICE VISIT (OUTPATIENT)
Dept: FAMILY MEDICINE CLINIC | Facility: CLINIC | Age: 70
End: 2020-10-06

## 2020-10-06 VITALS
WEIGHT: 227 LBS | BODY MASS INDEX: 30.75 KG/M2 | HEART RATE: 83 BPM | DIASTOLIC BLOOD PRESSURE: 66 MMHG | OXYGEN SATURATION: 98 % | SYSTOLIC BLOOD PRESSURE: 121 MMHG | HEIGHT: 72 IN | TEMPERATURE: 97.7 F

## 2020-10-06 DIAGNOSIS — M51.36 DDD (DEGENERATIVE DISC DISEASE), LUMBAR: Primary | ICD-10-CM

## 2020-10-06 DIAGNOSIS — E78.5 DYSLIPIDEMIA: ICD-10-CM

## 2020-10-06 DIAGNOSIS — M54.41 CHRONIC BILATERAL LOW BACK PAIN WITH RIGHT-SIDED SCIATICA: ICD-10-CM

## 2020-10-06 DIAGNOSIS — M47.816 LUMBAR FACET ARTHROPATHY: ICD-10-CM

## 2020-10-06 DIAGNOSIS — G89.29 CHRONIC BILATERAL LOW BACK PAIN WITH RIGHT-SIDED SCIATICA: ICD-10-CM

## 2020-10-06 DIAGNOSIS — Z79.899 HIGH RISK MEDICATION USE: ICD-10-CM

## 2020-10-06 DIAGNOSIS — I10 ESSENTIAL HYPERTENSION: ICD-10-CM

## 2020-10-06 DIAGNOSIS — Z12.5 SCREENING PSA (PROSTATE SPECIFIC ANTIGEN): ICD-10-CM

## 2020-10-06 DIAGNOSIS — I48.20 CHRONIC ATRIAL FIBRILLATION (HCC): Chronic | ICD-10-CM

## 2020-10-06 DIAGNOSIS — I65.21 STENOSIS OF RIGHT CAROTID ARTERY: ICD-10-CM

## 2020-10-06 DIAGNOSIS — R93.7 ABNORMAL X-RAY OF LUMBAR SPINE: ICD-10-CM

## 2020-10-06 PROCEDURE — G0402 INITIAL PREVENTIVE EXAM: HCPCS | Performed by: FAMILY MEDICINE

## 2020-10-06 PROCEDURE — 99214 OFFICE O/P EST MOD 30 MIN: CPT | Performed by: FAMILY MEDICINE

## 2020-10-06 RX ORDER — LISINOPRIL 5 MG/1
5 TABLET ORAL DAILY
Qty: 90 TABLET | Refills: 1 | Status: SHIPPED | OUTPATIENT
Start: 2020-10-06 | End: 2021-01-26 | Stop reason: SDUPTHER

## 2020-10-06 RX ORDER — METOPROLOL SUCCINATE 25 MG/1
50 TABLET, EXTENDED RELEASE ORAL DAILY
Qty: 180 TABLET | Refills: 0 | Status: CANCELLED | OUTPATIENT
Start: 2020-10-06 | End: 2021-01-04

## 2020-10-06 RX ORDER — PRAVASTATIN SODIUM 40 MG
40 TABLET ORAL DAILY
Qty: 90 TABLET | Refills: 1 | Status: SHIPPED | OUTPATIENT
Start: 2020-10-06 | End: 2021-08-17 | Stop reason: SDUPTHER

## 2020-10-06 RX ORDER — METOPROLOL SUCCINATE 50 MG/1
50 TABLET, EXTENDED RELEASE ORAL DAILY
Qty: 90 TABLET | Refills: 1 | Status: SHIPPED | OUTPATIENT
Start: 2020-10-06 | End: 2020-11-18 | Stop reason: SDUPTHER

## 2020-10-06 NOTE — PROGRESS NOTES
Subsequent Medicare Wellness Visit   The ABC's of the Annual Wellness Visit    Chief Complaint   Patient presents with   • Medicare Wellness-subsequent   • Leg Pain     cramps   • hyperkalemia       HPI:  Renato Marquez, -1950, is a 70 y.o. male who presents for a Subsequent Medicare Wellness Visit.    Recent Hospitalizations:  No hospitalization(s) within the last year..    Current Medical Providers:  Patient Care Team:  Everardo De Leon Jr., DO as PCP - General (Family Medicine)    Health Habits and Functional and Cognitive Screening and Depression Screening:  No flowsheet data found.    Compared to one year ago, the patient feels his physical health is the same and his mental health is the same.    Depression Screen:  PHQ-2/PHQ-9 Depression Screening 10/6/2020   Little interest or pleasure in doing things 0   Feeling down, depressed, or hopeless 0   Total Score 0         Past Medical/Family/Social History:  The following portions of the patient's history were reviewed and updated as appropriate: allergies, current medications, past family history, past medical history, past social history, past surgical history and problem list.    No Known Allergies      Current Outpatient Medications:   •  aspirin 81 MG EC tablet, Take 81 mg by mouth Daily., Disp: , Rfl:   •  lisinopril (PRINIVIL,ZESTRIL) 5 MG tablet, TAKE 1 TABLET BY MOUTH ONCE DAILY, Disp: 90 tablet, Rfl: 2  •  Magnesium 200 MG tablet, Take  by mouth., Disp: , Rfl:   •  metoprolol succinate XL (TOPROL-XL) 25 MG 24 hr tablet, TAKE 1 TABLET BY MOUTH ONCE DAILY (Patient taking differently: 50 mg.), Disp: 90 tablet, Rfl: 2  •  pravastatin (PRAVACHOL) 40 MG tablet, Take 40 mg by mouth Daily., Disp: , Rfl:   •  XARELTO 20 MG tablet, TAKE 1 TABLET BY MOUTH ONCE DAILY, Disp: 90 tablet, Rfl: 2    Aspirin use counseling: Taking ASA appropriately as indicated    Current medication list contains no high risk medications.  No harmful drug interactions have been  "identified.     Family History   Problem Relation Age of Onset   • Heart disease Father    • Stroke Father    • Hypertension Father        Social History     Tobacco Use   • Smoking status: Former Smoker   • Smokeless tobacco: Never Used   • Tobacco comment: caffine use   Substance Use Topics   • Alcohol use: Yes       Past Surgical History:   Procedure Laterality Date   • CAROTID ENDARTERECTOMY Right 2009    Dr Lewis   • CORONARY ARTERY BYPASS GRAFT  2009    Dr Bhagat, LIMA to LAD, SVG to OM, SVG Post Desc   • FINGER SURGERY         Patient Active Problem List   Diagnosis   • CAD (coronary artery disease)   • Carotid arterial disease (CMS/HCC)   • Chronic atrial fibrillation (CMS/HCC)   • Ischemic cardiomyopathy   • Mitral regurgitation   • Chronic anticoagulation   • Hypothyroidism   • HTN (hypertension)   • Dyslipidemia   • Hx of melanoma of skin   • History of basal cell carcinoma (BCC) of skin   • Leg cramps   • DDD (degenerative disc disease), lumbar   • Chronic bilateral low back pain with right-sided sciatica   • Hyperkalemia   • Abnormal x-ray of lumbar spine   • Lumbar facet arthropathy       Review of Systems    Objective     Vitals:    10/06/20 0807   BP: 121/66   BP Location: Left arm   Patient Position: Sitting   Cuff Size: Adult   Pulse: 83   Temp: 97.7 °F (36.5 °C)   SpO2: 98%   Weight: 103 kg (227 lb)   Height: 182.9 cm (72\")   PainSc: 0-No pain       Patient's Body mass index is 30.79 kg/m². BMI is above normal parameters. Recommendations include: exercise counseling and nutrition counseling.      No exam data present    The patient has no evidence of cognitve impairment.     Physical Exam    Recent Lab Results:  Lab Results   Component Value Date    GLU 90 09/29/2020     Lab Results   Component Value Date    CHOL 154 01/29/2019    TRIG 62 09/29/2020    HDL 71 (H) 09/29/2020    VLDL 12.4 09/29/2020    LDLHDL 0.94 01/29/2019       Assessment/Plan   Age-appropriate Screening Schedule:  Refer to the " list below for future screening recommendations based on patient's age, sex and/or medical conditions.      Health Maintenance   Topic Date Due   • INFLUENZA VACCINE  09/29/2021 (Originally 8/1/2020)   • ZOSTER VACCINE (1 of 2) 10/08/2021 (Originally 6/5/1998)   • LIPID PANEL  09/29/2021   • COLONOSCOPY  07/19/2026   • TDAP/TD VACCINES (2 - Td) 03/28/2027       Medicare Risks and Personalized Health Plan:  Obesity/Overweight       CMS-Preventive Services Quick Reference  Medicare Preventive Services Addressed:  Annual Wellness Visit (AWV)    Advance Care Planning:  ACP discussion was held with the patient during this visit. Patient does not have an advance directive, information provided.    Diagnoses and all orders for this visit:    1. Chronic atrial fibrillation (CMS/[facility])  -     metoprolol succinate XL (TOPROL-XL) 25 MG 24 hr tablet; Take 2 tablets by mouth Daily for 90 days.  Dispense: 180 tablet; Refill: 0  -     rivaroxaban (Xarelto) 20 MG tablet; Take 1 tablet by mouth Daily.  Dispense: 90 tablet; Refill: 2    Other orders  -     lisinopril (PRINIVIL,ZESTRIL) 5 MG tablet; Take 1 tablet by mouth Daily.  Dispense: 90 tablet; Refill: 2  -     pravastatin (PRAVACHOL) 40 MG tablet; Take 1 tablet by mouth Daily for 90 days.  Dispense: 90 tablet; Refill: 0        An After Visit Summary and PPPS with all of these plans were given to the patient.      Follow Up:  No follow-ups on file.

## 2020-10-06 NOTE — PROGRESS NOTES
Subjective   Renato Marquez is a 70 y.o. male is here for   Chief Complaint   Patient presents with   • Medicare Wellness-subsequent   • Leg Pain     cramps   • hyperkalemia       History of Present Illness     Pt states he started drinking water throughout the day and his leg cramps have completely resolved. His legs had collapsed in the recent past but that resolved since he has been drinking plenty of water.  He is drinking approximately 80 to 90 ounces water per day.    He has chronic LBP. He had an abnormal L-spine X-ray. He went to PT in the past. The PT helped some but not a lot.    He has HTN.    He has HLD.  No leg cramps since drinking water.     Health Maintenance Due   Topic Date Due   • HEPATITIS C SCREENING  07/06/2017   • ANNUAL WELLNESS VISIT  07/06/2017   • AAA SCREEN (ONE-TIME)  09/29/2020       The following portions of the patient's history were reviewed and updated as appropriate: allergies, current medications, past family history, past medical history, past social history, past surgical history and problem list.     reports that he has quit smoking. He has never used smokeless tobacco. He reports current alcohol use. He reports that he does not use drugs.    Review of Systems   Constitutional: Negative for activity change and unexpected weight change.   HENT: Negative for congestion.    Respiratory: Negative for shortness of breath and wheezing.    Cardiovascular: Negative for chest pain and palpitations.   Gastrointestinal: Negative for abdominal pain, blood in stool and constipation.   Genitourinary: Positive for difficulty urinating. Negative for hematuria.   Musculoskeletal: Negative for gait problem.   Skin: Negative for color change and rash.        PHQ-9 Depression Screening  Little interest or pleasure in doing things? 0   Feeling down, depressed, or hopeless? 0   Trouble falling or staying asleep, or sleeping too much?     Feeling tired or having little energy?     Poor appetite or  "overeating?     Feeling bad about yourself - or that you are a failure or have let yourself or your family down?     Trouble concentrating on things, such as reading the newspaper or watching television?     Moving or speaking so slowly that other people could have noticed? Or the opposite - being so fidgety or restless that you have been moving around a lot more than usual?     Thoughts that you would be better off dead, or of hurting yourself in some way?     PHQ-9 Total Score 0   If you checked off any problems, how difficult have these problems made it for you to do your work, take care of things at home, or get along with other people?       BP Readings from Last 12 Encounters:   10/06/20 121/66   09/29/20 126/70   09/23/20 126/82   01/30/20 128/80   01/24/19 122/74   01/24/18 129/71   01/18/18 112/70   03/23/17 138/80   01/15/16 110/70   11/12/14 110/68       Objective   /66 (BP Location: Left arm, Patient Position: Sitting, Cuff Size: Adult)   Pulse 83   Temp 97.7 °F (36.5 °C)   Ht 182.9 cm (72\")   Wt 103 kg (227 lb)   SpO2 98%   BMI 30.79 kg/m²   Physical Exam  Vitals signs and nursing note reviewed.   Constitutional:       General: He is not in acute distress.     Appearance: Normal appearance. He is well-developed. He is not diaphoretic.   HENT:      Head: Normocephalic and atraumatic.      Right Ear: External ear normal.      Left Ear: External ear normal.      Nose: Nose normal.      Mouth/Throat:      Pharynx: No oropharyngeal exudate.   Eyes:      General: Lids are normal. No scleral icterus.        Right eye: No discharge.         Left eye: No discharge.   Neck:      Musculoskeletal: Full passive range of motion without pain, normal range of motion and neck supple. No edema.      Thyroid: No thyromegaly.      Trachea: Trachea normal. No tracheal deviation.   Cardiovascular:      Rate and Rhythm: Normal rate and regular rhythm.      Heart sounds: Normal heart sounds. No murmur. No friction " rub. No gallop.    Pulmonary:      Effort: Pulmonary effort is normal. No tachypnea, bradypnea or respiratory distress.      Breath sounds: Normal breath sounds. No stridor. No decreased breath sounds, wheezing or rales.   Chest:      Chest wall: No tenderness.   Lymphadenopathy:      Head:      Right side of head: No submental, submandibular, tonsillar, preauricular, posterior auricular or occipital adenopathy.      Left side of head: No submental, submandibular, tonsillar, preauricular, posterior auricular or occipital adenopathy.      Cervical: No cervical adenopathy.      Right cervical: No superficial, deep or posterior cervical adenopathy.     Left cervical: No superficial, deep or posterior cervical adenopathy.   Skin:     General: Skin is warm and dry.      Capillary Refill: Capillary refill takes less than 2 seconds.      Coloration: Skin is not pale.      Findings: No erythema or rash.      Nails: There is no clubbing.     Neurological:      Mental Status: He is alert and oriented to person, place, and time. He is not disoriented.      Deep Tendon Reflexes: Reflexes are normal and symmetric.   Psychiatric:         Behavior: Behavior normal.         Procedures    Assessment/Plan   Diagnoses and all orders for this visit:    1. DDD (degenerative disc disease), lumbar (Primary)  -     MRI Lumbar Spine Without Contrast; Future    2. Chronic atrial fibrillation (CMS/HCC)  -     rivaroxaban (Xarelto) 20 MG tablet; Take 1 tablet by mouth Daily.  Dispense: 90 tablet; Refill: 1  -     metoprolol succinate XL (TOPROL-XL) 50 MG 24 hr tablet; Take 1 tablet by mouth Daily.  Dispense: 90 tablet; Refill: 1    3. Lumbar facet arthropathy  -     MRI Lumbar Spine Without Contrast; Future    4. Abnormal x-ray of lumbar spine  -     MRI Lumbar Spine Without Contrast; Future    5. Dyslipidemia  -     pravastatin (PRAVACHOL) 40 MG tablet; Take 1 tablet by mouth Daily.  Dispense: 90 tablet; Refill: 1  -     Lipid Panel With /  Chol / HDL Ratio; Future    6. Essential hypertension  -     lisinopril (PRINIVIL,ZESTRIL) 5 MG tablet; Take 1 tablet by mouth Daily.  Dispense: 90 tablet; Refill: 1    7. Stenosis of right carotid artery    8. Screening PSA (prostate specific antigen)  -     PSA Screen; Future    9. High risk medication use  -     CBC & Differential; Future  -     Comprehensive Metabolic Panel; Future    Other orders  -     Cancel: metoprolol succinate XL (TOPROL-XL) 25 MG 24 hr tablet; Take 2 tablets by mouth Daily for 90 days.  Dispense: 180 tablet; Refill: 0

## 2020-10-06 NOTE — PATIENT INSTRUCTIONS
Medicare Wellness  Personal Prevention Plan of Service     Date of Office Visit:  10/06/2020  Encounter Provider:  Everardo De Leon Jr., DO  Place of Service:  [facility]  Patient Name: Renato Marquez  :  1950    As part of the Medicare Wellness portion of your visit today, we are providing you with this personalized preventive plan of services (PPPS). This plan is based upon recommendations of the [place] Preventive Services Task Force (USPSTF) and the Advisory Committee on Immunization Practices (ACIP).    This lists the preventive care services that should be considered, and provides dates of when you are due. Items listed as completed are up-to-date and do not require any further intervention.    Health Maintenance   Topic Date Due   • HEPATITIS C SCREENING  2017   • ANNUAL WELLNESS VISIT  2017   • AAA SCREEN (ONE-TIME)  2020   • Pneumococcal Vaccine 65+ (1 of 1 - PPSV23) 2020 (Originally 2013)   • INFLUENZA VACCINE  2021 (Originally 2020)   • ZOSTER VACCINE (1 of 2) 10/08/2021 (Originally 1998)   • LIPID PANEL  2021   • COLONOSCOPY  2026   • TDAP/TD VACCINES (2 - Td) 2027       Orders Placed This Encounter   Procedures   • MRI Lumbar Spine Without Contrast     Standing Status:   Future     Standing Expiration Date:   10/6/2021       No follow-ups on file.          Advance Directive    Advance directives are legal documents that let you make choices ahead of time about your health care and medical treatment in case you become unable to communicate for yourself. Advance directives are a way for you to communicate your wishes to family, friends, and health care providers. This can help convey your decisions about end-of-life care if you become unable to communicate.  Discussing and writing advance directives should happen over time rather than all at once. Advance directives can be changed depending on  your situation and what you want, even after you have signed the advance directives.  If you do not have an advance directive, some states assign family decision makers to act on your behalf based on how closely you are related to them. Each state has its own laws regarding advance directives. You may want to check with your health care provider, , or state representative about the laws in your state. There are different types of advance directives, such as:  · Medical power of .  · Living will.  · Do not resuscitate (DNR) or do not attempt resuscitation (DNAR) order.  Health care proxy and medical power of   A health care proxy, also called a health care agent, is a person who is appointed to make medical decisions for you in cases in which you are unable to make the decisions yourself. Generally, people choose someone they know well and trust to represent their preferences. Make sure to ask this person for an agreement to act as your proxy. A proxy may have to exercise judgment in the event of a medical decision for which your wishes are not known.  A medical power of  is a legal document that names your health care proxy. Depending on the laws in your state, after the document is written, it may also need to be:  · Signed.  · Notarized.  · Dated.  · Copied.  · Witnessed.  · Incorporated into your medical record.  You may also want to appoint someone to manage your financial affairs in a situation in which you are unable to do so. This is called a durable power of  for finances. It is a separate legal document from the durable power of  for health care. You may choose the same person or someone different from your health care proxy to act as your agent in financial matters.  If you do not appoint a proxy, or if there is a concern that the proxy is not acting in your best interests, a court-appointed guardian may be designated to act on your behalf.  Living will  A  living will is a set of instructions documenting your wishes about medical care when you cannot express them yourself. Health care providers should keep a copy of your living will in your medical record. You may want to give a copy to family members or friends. To alert caregivers in case of an emergency, you can place a card in your wallet to let them know that you have a living will and where they can find it. A living will is used if you become:  · Terminally ill.  · Incapacitated.  · Unable to communicate or make decisions.  Items to consider in your living will include:  · The use or non-use of life-sustaining equipment, such as dialysis machines and breathing machines (ventilators).  · A DNR or DNAR order, which is the instruction not to use cardiopulmonary resuscitation (CPR) if breathing or heartbeat stops.  · The use or non-use of tube feeding.  · Withholding of food and fluids.  · Comfort (palliative) care when the goal becomes comfort rather than a cure.  · Organ and tissue donation.  A living will does not give instructions for distributing your money and property if you should pass away. It is recommended that you seek the advice of a  when writing a will. Decisions about taxes, beneficiaries, and asset distribution will be legally binding. This process can relieve your family and friends of any concerns surrounding disputes or questions that may come up about the distribution of your assets.  DNR or DNAR  A DNR or DNAR order is a request not to have CPR in the event that your heart stops beating or you stop breathing. If a DNR or DNAR order has not been made and shared, a health care provider will try to help any patient whose heart has stopped or who has stopped breathing. If you plan to have surgery, talk with your health care provider about how your DNR or DNAR order will be followed if problems occur.  Summary  · Advance directives are the legal documents that allow you to make choices ahead  of time about your health care and medical treatment in case you become unable to communicate for yourself.  · The process of discussing and writing advance directives should happen over time. You can change the advance directives, even after you have signed them.  · Advance directives include DNR or DNAR orders, living cantu, and designating an agent as your medical power of .  This information is not intended to replace advice given to you by your health care provider. Make sure you discuss any questions you have with your health care provider.  Document Released: 03/26/2009 Document Revised: 01/22/2020 Document Reviewed: 11/06/2017  [employer] Patient Education © 2020 [employer]

## 2020-10-12 ENCOUNTER — HOSPITAL ENCOUNTER (OUTPATIENT)
Dept: PHYSICAL THERAPY | Facility: HOSPITAL | Age: 70
Setting detail: THERAPIES SERIES
Discharge: HOME | End: 2020-10-12

## 2020-10-12 DIAGNOSIS — M54.41 CHRONIC BILATERAL LOW BACK PAIN WITH RIGHT-SIDED SCIATICA: ICD-10-CM

## 2020-10-12 DIAGNOSIS — M51.36 DDD (DEGENERATIVE DISC DISEASE), LUMBAR: Primary | ICD-10-CM

## 2020-10-12 DIAGNOSIS — G89.29 CHRONIC BILATERAL LOW BACK PAIN WITH RIGHT-SIDED SCIATICA: ICD-10-CM

## 2020-10-12 DIAGNOSIS — M47.816 LUMBAR FACET ARTHROPATHY: ICD-10-CM

## 2020-10-12 PROCEDURE — 97161 PT EVAL LOW COMPLEX 20 MIN: CPT

## 2020-10-12 PROCEDURE — 97110 THERAPEUTIC EXERCISES: CPT

## 2020-10-12 NOTE — THERAPY EVALUATION
Outpatient Physical Therapy Ortho Initial Evaluation  LISBETH Roberts     Patient Name: Renato Marquez  : 1950  MRN: [id number]  Today's Date: 10/12/2020      Visit Date: 10/12/2020    Patient Active Problem List   Diagnosis   • CAD (coronary artery disease)   • Carotid arterial disease (CMS/HCC)   • Chronic atrial fibrillation (CMS/HCC)   • Ischemic cardiomyopathy   • Mitral regurgitation   • Chronic anticoagulation   • Hypothyroidism   • HTN (hypertension)   • Dyslipidemia   • Hx of melanoma of skin   • History of basal cell carcinoma (BCC) of skin   • Leg cramps   • DDD (degenerative disc disease), lumbar   • Chronic bilateral low back pain with right-sided sciatica   • Hyperkalemia   • Abnormal x-ray of lumbar spine   • Lumbar facet arthropathy        Past Medical History:   Diagnosis Date   • CAD (coronary artery disease)    • Cancer (CMS/HCC)    • Carotid arterial disease (CMS/HCC)    • Chronic atrial fibrillation (CMS/HCC)    • Ischemic cardiomyopathy    • Mitral regurgitation         Past Surgical History:   Procedure Laterality Date   • CAROTID ENDARTERECTOMY Right     Dr Lewis   • CORONARY ARTERY BYPASS GRAFT      MIMI Frederick to LAD, SVG to OM, SVG Post Desc   • FINGER SURGERY         Visit Dx:     ICD-10-CM ICD-9-CM   1. DDD (degenerative disc disease), lumbar  M51.36 722.52   2. Lumbar facet arthropathy  M47.816 721.3   3. Chronic bilateral low back pain with right-sided sciatica  M54.41 724.2    G89.29 724.3     338.29         Patient History     Row Name 10/12/20 1000             History    Chief Complaint  Difficulty Walking;Difficulty with daily activities;Joint stiffness;Muscle tenderness;Muscle weakness;Pain  -AS      Type of Pain  Back pain Lumbar  -AS      Brief Description of Current Complaint  Patient states he has had low back pain for 2-3 years. States the pain is not worsening but is not improving either. He states most of his pain is right sided and is increased with  prolonged walking and standing. He states it is worse in the morning when standing at his bathroom sink.  -AS      Patient/Caregiver Goals  Relieve pain;Improve mobility;Return to prior level of function  -AS      Patient's Rating of General Health  Good  -AS      Hand Dominance  right-handed  -AS      Occupation/sports/leisure activities    -AS      Patient seeing anyone else for problem(s)?  Dr. De Leon  -AS      How has patient tried to help current problem?  rest  -AS      What clinical tests have you had for this problem?  X-ray MRI scheduled for next week.  -AS         Pain     Pain Location  Back  -AS      Pain at Present  0  -AS      Pain at Best  0  -AS      Pain at Worst  5  -AS      Pain Frequency  Intermittent  -AS      Pain Description  Aching  -AS      What Performance Factors Make the Current Problem(s) WORSE?  walking, standing  -AS      What Performance Factors Make the Current Problem(s) BETTER?  rest  -AS         Daily Activities    Primary Language  English  -AS      How does patient learn best?  Listening;Reading;Demonstration  -AS      Teaching needs identified  Home Exercise Program;Management of Condition  -AS      Patient is concerned about/has problems with  Difficulty with self care (i.e. bathing, dressing, toileting:;Flexibility;Performing home management (household chores, shopping, care of dependents);Performing job responsibilities/community activities (work, school,;Standing;Walking  -AS      Does patient have problems with the following?  None  -AS      Barriers to learning  None  -AS      Pt Participated in POC and Goals  Yes  -AS         Safety    Are you being hurt, hit, or frightened by anyone at home or in your life?  No  -AS      Are you being neglected by a caregiver  No  -AS        User Key  (r) = Recorded By, (t) = Taken By, (c) = Cosigned By    Initials Name Provider Type    AS Santi Grady, PT Physical Therapist          PT Ortho     Row Name  10/12/20 1000       Precautions and Contraindications    Precautions/Limitations  no known precautions/limitations  -AS       Posture/Observations    Posture- WNL  Posture is WNL  -AS       Lumbar/SI Special Tests    Standing Flexion Test (SI Dysfunction)  Right:;Negative  -AS    Stork Test (SI Dysfunction)  Right:;Negative  -AS    JACK (hip vs. SI Dysfunction)  Right:;Positive  -AS       Lumbosacral Palpation    Lumbosacral Segment  Right:;Tender  -AS    Piriformis  Right:;Tender  -AS    Quadratus Lumborum  Right:;Tender  -AS    Erector Spinae (Paraspinals)  Right:;Tender  -AS       Head/Neck/Trunk    Trunk Extension AROM  WNL  -AS    Trunk Flexion AROM  25% limited  -AS    Trunk Lt Lateral Flexion AROM  WNL  -AS    Trunk Rt Lateral Flexion AROM  WNL  -AS    Trunk Lt Rotation AROM  25% limited  -AS    Trunk Rt Rotation AROM  25% limited  -AS       MMT Neck/Trunk    Trunk Flexion MMT, Gross Movement  (4-/5) good minus  -AS    Trunk Extension MMT, Gross Movement  (4-/5) good minus  -AS       MMT Right Lower Ext    Rt Hip Extension MMT, Gross Movement  (4-/5) good minus  -AS    Rt Hip ABduction MMT, Gross Movement  (4-/5) good minus  -AS       MMT Left Lower Ext    Lt Hip Extension MMT, Gross Movement  (4-/5) good minus  -AS    Lt Hip ABduction MMT, Gross Movement  (4-/5) good minus  -AS       Sensation    Sensation WNL?  WNL  -AS    Light Touch  No apparent deficits  -AS       Lower Extremity Flexibility    Hamstrings  Bilateral:;Moderately limited  -AS    Hip External Rotators  Bilateral:;Moderately limited  -AS    Quadratus Lumborum  Bilateral:;Moderately limited  -AS      User Key  (r) = Recorded By, (t) = Taken By, (c) = Cosigned By    Initials Name Provider Type    AS Santi Grady, PT Physical Therapist                            PT OP Goals     Row Name 10/12/20 1000          PT Short Term Goals    STG Date to Achieve  11/02/20  -AS     STG 1  Patient to demonstrate compliance with his initial HEP for  flexibility, ROM and strengthening.  -AS     STG 2  Patient to report low back pain on VAS of 4-5/10 at worst with activity.  -AS     STG 3  Patient to demonstrate improved trunk and LE strength to 4/5 in all planes.  -AS        Long Term Goals    LTG Date to Achieve  11/23/20  -AS     LTG 1  Patient to demonstrate compliance with his advanced HEP for flexibility, ROM and strengthening.  -AS     LTG 2  Patient to report low back pain on VAS of 0-1/10 at worst with activity.  -AS     LTG 3  Patient to demonstrate improved trunk and LE strength to 4+/5 in all planes.  -AS     LTG 4  Patient to demonstrate trunk ROM to WNL in all planes.  -AS     LTG 5  Patient to report improved function on Back Index by 10-15 points.  -AS        Time Calculation    PT Goal Re-Cert Due Date  11/09/20  -AS       User Key  (r) = Recorded By, (t) = Taken By, (c) = Cosigned By    Initials Name Provider Type    AS Santi Grady, PT Physical Therapist          PT Assessment/Plan     Row Name 10/12/20 1000          PT Assessment    Functional Limitations  Limitation in home management;Limitations in community activities;Performance in sport activities;Performance in work activities;Performance in leisure activities  -AS     Impairments  Impaired flexibility;Joint mobility;Muscle strength;Pain;Range of motion  -AS     Assessment Comments  Patient presents to outpatient PT with chronic low back pain Patient has limited trunk ROM, limited trunk and hip strength, and increased pain with activity. Patient has limited function at this time secondary to the above. Patient is scheduled to have an MRI next week and will hold off on PT until MRI results are known.  -AS     Please refer to paper survey for additional self-reported information  Yes  -AS     Rehab Potential  Good  -AS     Patient/caregiver participated in establishment of treatment plan and goals  Yes  -AS     Patient would benefit from skilled therapy intervention  Yes  -AS         PT Plan    PT Frequency  1x/week;2x/week  -AS     Predicted Duration of Therapy Intervention (OT)  4-6 weeks  -AS     Planned CPT's?  PT RE-EVAL: 83513;PT THER PROC EA 15 MIN: 91508;PT THER ACT EA 15 MIN: 68053;PT MANUAL THERAPY EA 15 MIN: 21739;PT NEUROMUSC RE-EDUCATION EA 15 MIN: 46530;PT HOT OR COLD PACK TREAT MCARE;PT ELECTRICAL STIM UNATTEND:   -AS       User Key  (r) = Recorded By, (t) = Taken By, (c) = Cosigned By    Initials Name Provider Type    AS Santi Grady, PT Physical Therapist            OP Exercises     Row Name 10/12/20 1000             Subjective Pain    Able to rate subjective pain?  yes  -AS      Pre-Treatment Pain Level  0  -AS      Post-Treatment Pain Level  0  -AS         Exercise 1    Exercise Name 1  Polo. HS Stretch  -AS      Reps 1  10  -AS      Time 1  10 sec hold each  -AS         Exercise 2    Exercise Name 2  Polo. Piriformis Stretch  -AS      Reps 2  10  -AS      Time 2  10 sec hold each  -AS         Exercise 3    Exercise Name 3  Lumbar Rotations  -AS      Reps 3  25 each side  -AS         Exercise 4    Exercise Name 4  SKTC  -AS      Reps 4  10  -AS      Time 4  10 sec hold each  -AS        User Key  (r) = Recorded By, (t) = Taken By, (c) = Cosigned By    Initials Name Provider Type    AS Santi Grady, PT Physical Therapist                                  Time Calculation:     Start Time: 1000  Stop Time: 1040  Time Calculation (min): 40 min     Therapy Charges for Today     Code Description Service Date Service Provider Modifiers Qty    [id number]  PT EVAL LOW COMPLEXITY 1 10/12/2020 Santi Grady, PT GP 1    [id number]  PT THER PROC EA 15 MIN 10/12/2020 Santi Grady, PT GP 2                    Santi Grady PT  10/12/2020

## 2020-10-20 ENCOUNTER — HOSPITAL ENCOUNTER (OUTPATIENT)
Dept: MRI IMAGING | Facility: HOSPITAL | Age: 70
Discharge: HOME | End: 2020-10-20
Admitting: FAMILY MEDICINE

## 2020-10-20 DIAGNOSIS — R93.7 ABNORMAL X-RAY OF LUMBAR SPINE: ICD-10-CM

## 2020-10-20 DIAGNOSIS — M51.36 DDD (DEGENERATIVE DISC DISEASE), LUMBAR: ICD-10-CM

## 2020-10-20 DIAGNOSIS — M47.816 LUMBAR FACET ARTHROPATHY: ICD-10-CM

## 2020-10-20 PROCEDURE — 72148 MRI LUMBAR SPINE W/O DYE: CPT

## 2020-10-21 NOTE — PROGRESS NOTES
Please call the patient regarding his MRI result(s).  Please let him know that he has significant degenerative disc disease and arthritis of the lumbar spine.  Please schedule an appointment for further evaluation and management.

## 2020-10-26 ENCOUNTER — HOSPITAL ENCOUNTER (OUTPATIENT)
Dept: GENERAL RADIOLOGY | Facility: HOSPITAL | Age: 70
Discharge: HOME | End: 2020-10-26

## 2020-10-26 ENCOUNTER — OFFICE VISIT (OUTPATIENT)
Dept: FAMILY MEDICINE CLINIC | Facility: CLINIC | Age: 70
End: 2020-10-26

## 2020-10-26 VITALS
RESPIRATION RATE: 14 BRPM | HEIGHT: 72 IN | HEART RATE: 87 BPM | TEMPERATURE: 97.1 F | OXYGEN SATURATION: 98 % | SYSTOLIC BLOOD PRESSURE: 140 MMHG | DIASTOLIC BLOOD PRESSURE: 70 MMHG | WEIGHT: 229 LBS | BODY MASS INDEX: 31.02 KG/M2

## 2020-10-26 DIAGNOSIS — G89.29 CHRONIC PAIN OF BOTH KNEES: Primary | ICD-10-CM

## 2020-10-26 DIAGNOSIS — M25.562 CHRONIC PAIN OF BOTH KNEES: Primary | ICD-10-CM

## 2020-10-26 DIAGNOSIS — M25.561 CHRONIC PAIN OF BOTH KNEES: Primary | ICD-10-CM

## 2020-10-26 PROCEDURE — 73562 X-RAY EXAM OF KNEE 3: CPT

## 2020-10-26 PROCEDURE — 99214 OFFICE O/P EST MOD 30 MIN: CPT | Performed by: FAMILY MEDICINE

## 2020-10-26 NOTE — PROGRESS NOTES
Subjective   Renato Marquez is a 70 y.o. male is here for   Chief Complaint   Patient presents with   • Imaging Only     f/u x-ray & mri   • Leg Pain     bilat       History of Present Illness     Pt has chronic low back pain. He rates his back pain as 6/10 sometimes. He has been going to a chiropractor that does adjustments and also has him doing exercises for 25 minutes at the end of his sessions.  He recently started PT for his back pain.    He has B/L knee pain R>L.    Health Maintenance Due   Topic Date Due   • HEPATITIS C SCREENING  07/06/2017   • AAA SCREEN (ONE-TIME)  09/29/2020        reports that he has quit smoking. He has never used smokeless tobacco. He reports current alcohol use. He reports that he does not use drugs.    Review of Systems   Constitutional: Negative for activity change and unexpected weight change.   HENT: Negative for congestion.    Respiratory: Negative for shortness of breath and wheezing.    Cardiovascular: Negative for chest pain and palpitations.   Gastrointestinal: Negative for abdominal pain, blood in stool and constipation.   Genitourinary: Negative for difficulty urinating and hematuria.   Musculoskeletal: Positive for arthralgias and back pain. Negative for gait problem.   Skin: Negative for color change and rash.        PHQ-9 Depression Screening  Little interest or pleasure in doing things?     Feeling down, depressed, or hopeless?     Trouble falling or staying asleep, or sleeping too much?     Feeling tired or having little energy?     Poor appetite or overeating?     Feeling bad about yourself - or that you are a failure or have let yourself or your family down?     Trouble concentrating on things, such as reading the newspaper or watching television?     Moving or speaking so slowly that other people could have noticed? Or the opposite - being so fidgety or restless that you have been moving around a lot more than usual?     Thoughts that you would be better off dead,  "or of hurting yourself in some way?     PHQ-9 Total Score     If you checked off any problems, how difficult have these problems made it for you to do your work, take care of things at home, or get along with other people?       BP Readings from Last 12 Encounters:   10/26/20 140/70   10/06/20 121/66   09/29/20 126/70   09/23/20 126/82   01/30/20 128/80   01/24/19 122/74   01/24/18 129/71   01/18/18 112/70   03/23/17 138/80   01/15/16 110/70   11/12/14 110/68       Wt Readings from Last 12 Encounters:   10/26/20 104 kg (229 lb)   10/20/20 103 kg (227 lb)   10/06/20 103 kg (227 lb)   09/29/20 104 kg (229 lb)   09/23/20 97.5 kg (215 lb)   01/30/20 99.8 kg (220 lb)   01/24/19 97.5 kg (215 lb)   01/24/18 109 kg (240 lb 4.8 oz)   01/18/18 109 kg (240 lb)   03/23/17 106 kg (234 lb)   01/15/16 106 kg (233 lb 15.9 oz)   11/12/14 100 kg (221 lb 0.2 oz)        Objective   /70 (BP Location: Right arm, Patient Position: Sitting, Cuff Size: Adult)   Pulse 87   Temp 97.1 °F (36.2 °C) (Temporal)   Resp 14   Ht 182.9 cm (72\")   Wt 104 kg (229 lb)   SpO2 98%   BMI 31.06 kg/m²   Physical Exam  Vitals signs and nursing note reviewed.   Constitutional:       General: He is not in acute distress.     Appearance: Normal appearance. He is well-developed. He is not diaphoretic.   HENT:      Head: Normocephalic and atraumatic.      Right Ear: External ear normal.      Left Ear: External ear normal.      Nose: Nose normal.      Mouth/Throat:      Pharynx: No oropharyngeal exudate.   Eyes:      General: Lids are normal. No scleral icterus.        Right eye: No discharge.         Left eye: No discharge.   Neck:      Musculoskeletal: Full passive range of motion without pain, normal range of motion and neck supple. No edema.      Thyroid: No thyromegaly.      Trachea: Trachea normal. No tracheal deviation.   Cardiovascular:      Rate and Rhythm: Normal rate and regular rhythm.      Heart sounds: Normal heart sounds. No murmur. No " friction rub. No gallop.    Pulmonary:      Effort: Pulmonary effort is normal. No tachypnea, bradypnea or respiratory distress.      Breath sounds: Normal breath sounds. No stridor. No decreased breath sounds, wheezing or rales.   Chest:      Chest wall: No tenderness.   Musculoskeletal:      Comments: Right knee swelling and limited range of motion, active and passive, with flexion.   Lymphadenopathy:      Head:      Right side of head: No submental, submandibular, tonsillar, preauricular, posterior auricular or occipital adenopathy.      Left side of head: No submental, submandibular, tonsillar, preauricular, posterior auricular or occipital adenopathy.      Cervical: No cervical adenopathy.      Right cervical: No superficial, deep or posterior cervical adenopathy.     Left cervical: No superficial, deep or posterior cervical adenopathy.   Skin:     General: Skin is warm and dry.      Capillary Refill: Capillary refill takes less than 2 seconds.      Coloration: Skin is not pale.      Findings: No erythema or rash.      Nails: There is no clubbing.     Neurological:      Mental Status: He is alert and oriented to person, place, and time. He is not disoriented.      Deep Tendon Reflexes: Reflexes are normal and symmetric.   Psychiatric:         Behavior: Behavior normal.         Procedures    Assessment/Plan   Diagnoses and all orders for this visit:    1. Chronic pain of both knees (Primary)  -     XR Knee 3 View Right  -     XR Knee 3 View Left  -     MRI Knee Right Without Contrast; Future             Return if symptoms worsen or fail to improve.

## 2020-10-26 NOTE — PATIENT INSTRUCTIONS
Chronic Knee Pain, Adult  Chronic knee pain is pain in one or both knees that lasts longer than 3 months. Symptoms of chronic knee pain may include swelling, stiffness, and discomfort. Age-related wear and tear (osteoarthritis) of the knee joint is the most common cause of chronic knee pain. Other possible causes include:  · A long-term immune-related disease that causes inflammation of the knee (rheumatoid arthritis). This usually affects both knees.  · Inflammatory arthritis, such as gout or pseudogout.  · An injury to the knee that causes arthritis.  · An injury to the knee that damages the ligaments. Ligaments are strong tissues that connect bones to each other.  · Runner's knee or pain behind the kneecap.  Treatment for chronic knee pain depends on the cause. The main treatments for chronic knee pain are physical therapy and weight loss. This condition may also be treated with medicines, injections, a knee sleeve or brace, and by using crutches. Rest, ice, compression (pressure), and elevation (RICE) therapy may also be recommended.  Follow these instructions at home:  If you have a knee sleeve or brace:    · Wear it as told by your health care provider. Remove it only as told by your health care provider.  · Loosen it if your toes tingle, become numb, or turn cold and blue.  · Keep it clean.  · If the sleeve or brace is not waterproof:  ? Do not let it get wet.  ? Remove it if allowed by your health care provider, or cover it with a watertight covering when you take a bath or a shower.  Managing pain, stiffness, and swelling         · If directed, apply heat to the affected area as often as told by your health care provider. Use the heat source that your health care provider recommends, such as a moist heat pack or a heating pad.  ? If you have a removable sleeve or brace, remove it as told by your health care provider.  ? Place a towel between your skin and the heat source.  ? Leave the heat on for 20-30  minutes.  ? Remove the heat if your skin turns bright red. This is especially important if you are unable to feel pain, heat, or cold. You may have a greater risk of getting burned.  · If directed, put ice on the affected area.  ? If you have a removable sleeve or brace, remove it as told by your health care provider.  ? Put ice in a plastic bag.  ? Place a towel between your skin and the bag.  ? Leave the ice on for 20 minutes, 2-3 times a day.  · Move your toes often to reduce stiffness and swelling.  · Raise (elevate) the injured area above the level of your heart while you are sitting or lying down.  Activity  · Avoid activities where both feet leave the ground at the same time (high-impact activities). Examples are running, jumping rope, and doing jumping jacks.  · Return to your normal activities as told by your health care provider. Ask your health care provider what activities are safe for you.  · Follow the exercise plan that your health care provider designed for you. Your health care provider may suggest that you:  ? Avoid activities that make knee pain worse. This may require you to change your exercise routines, sport participation, or job duties.  ? Wear shoes with cushioned soles.  ? Avoid high-impact activities or sports that require running and sudden changes in direction.  ? Do physical therapy as told by your health care provider. Physical therapy is planned to match your needs and abilities. It may include exercises for strength, flexibility, stability, and endurance.  ? Do exercises that increase balance and strength, such as marie chi and yoga.  · Do not use the injured limb to support your body weight until your health care provider says that you can. Use crutches, a cane, or a walker, as told by your health care provider.  General instructions  · Take over-the-counter and prescription medicines only as told by your health care provider.  · Lose weight if you are overweight. Losing even a little  weight can reduce knee pain. Ask your health care provider what your ideal weight is, and how to safely lose extra weight. A food expert (dietitian) may be able to help you plan your meals.  · Do not use any products that contain nicotine or tobacco, such as cigarettes, e-cigarettes, and chewing tobacco. These can delay healing. If you need help quitting, ask your health care provider.  · Keep all follow-up visits as told by your health care provider. This is important.  Contact a health care provider if:  · You have knee pain that is not getting better or gets worse.  · You are unable to do your physical therapy exercises due to knee pain.  Get help right away if:  · Your knee swells and the swelling becomes worse.  · You cannot move your knee.  · You have severe knee pain.  Summary  · Knee pain that lasts more than 3 months is considered chronic knee pain.  · The main treatments for chronic knee pain are physical therapy and weight loss. You may also need to take medicines, wear a knee sleeve or brace, use crutches, and apply ice or heat.  · Losing even a little weight can reduce knee pain. Ask your health care provider what your ideal weight is, and how to safely lose extra weight. A food expert (dietitian) may be able to help you plan your meals.  · Work with a physical therapist to make a safe exercise program, as told by your health care provider.  This information is not intended to replace advice given to you by your health care provider. Make sure you discuss any questions you have with your health care provider.  Document Released: 02/27/2020 Document Revised: 02/27/2020 Document Reviewed: 02/27/2020  [employer] Patient Education © 2020 [employer]

## 2020-10-27 NOTE — PROGRESS NOTES
Please call the patient regarding his result(s).  Please let him know that he has moderate arthritis throughout his knee.

## 2020-10-28 ENCOUNTER — HOSPITAL ENCOUNTER (OUTPATIENT)
Dept: PHYSICAL THERAPY | Facility: HOSPITAL | Age: 70
Setting detail: THERAPIES SERIES
Discharge: HOME | End: 2020-10-28

## 2020-10-28 DIAGNOSIS — M51.36 DDD (DEGENERATIVE DISC DISEASE), LUMBAR: Primary | ICD-10-CM

## 2020-10-28 DIAGNOSIS — M54.41 CHRONIC BILATERAL LOW BACK PAIN WITH RIGHT-SIDED SCIATICA: ICD-10-CM

## 2020-10-28 DIAGNOSIS — M47.816 LUMBAR FACET ARTHROPATHY: ICD-10-CM

## 2020-10-28 DIAGNOSIS — G89.29 CHRONIC BILATERAL LOW BACK PAIN WITH RIGHT-SIDED SCIATICA: ICD-10-CM

## 2020-10-28 PROCEDURE — 97110 THERAPEUTIC EXERCISES: CPT | Performed by: PHYSICAL THERAPIST

## 2020-10-28 NOTE — THERAPY TREATMENT NOTE
Outpatient Physical Therapy Ortho Treatment Note  LISBETH Roberts     Patient Name: Renato Marquez  : 1950  MRN: [id number]  Today's Date: 10/28/2020      Visit Date: 10/28/2020    Visit Dx:    ICD-10-CM ICD-9-CM   1. DDD (degenerative disc disease), lumbar  M51.36 722.52   2. Lumbar facet arthropathy  M47.816 721.3   3. Chronic bilateral low back pain with right-sided sciatica  M54.41 724.2    G89.29 724.3     338.29       Patient Active Problem List   Diagnosis   • CAD (coronary artery disease)   • Carotid arterial disease (CMS/HCC)   • Chronic atrial fibrillation (CMS/HCC)   • Ischemic cardiomyopathy   • Mitral regurgitation   • Chronic anticoagulation   • Hypothyroidism   • HTN (hypertension)   • Dyslipidemia   • Hx of melanoma of skin   • History of basal cell carcinoma (BCC) of skin   • Leg cramps   • DDD (degenerative disc disease), lumbar   • Chronic bilateral low back pain with right-sided sciatica   • Hyperkalemia   • Abnormal x-ray of lumbar spine   • Lumbar facet arthropathy   • Chronic pain of both knees        Past Medical History:   Diagnosis Date   • CAD (coronary artery disease)    • Cancer (CMS/HCC)    • Carotid arterial disease (CMS/HCC)    • Chronic atrial fibrillation (CMS/HCC)    • Ischemic cardiomyopathy    • Mitral regurgitation         Past Surgical History:   Procedure Laterality Date   • CAROTID ENDARTERECTOMY Right     Dr Lewis   • CORONARY ARTERY BYPASS GRAFT      MIMI Frederick to LAD, SVG to OM, SVG Post Desc   • FINGER SURGERY         PT Ortho     Row Name 10/28/20 0031       Subjective Comments    Subjective Comments  Patient reports that he had an MRI on his back and was told he has arthritis.  He states that he has not been doing stretches/exercises at home because he was waiting for results of MRI.  Patient reports that his back has not been bothering him but he is having increasing bilateral knee pain.  He saw Dr. De Leon yesterday but does not have orders to  treat his knees and is now awaiting an MRI on his knees.  Patient plans to call Dr. De Leon tomorrow regarding MRI for knees and discuss if he needs referral to ortho or have PT orders.    -SP       Subjective Pain    Able to rate subjective pain?  yes  -SP    Pre-Treatment Pain Level  0 in low back  -SP    Post-Treatment Pain Level  0 in low back  -SP    Subjective Pain Comment  Patient complains of bilateral knee pain right more so than left  -SP       Posture/Observations    Posture/Observations Comments  Patient with support brace donned on right knee  -SP      User Key  (r) = Recorded By, (t) = Taken By, (c) = Cosigned By    Initials Name Provider Type    Nenita Guerra, PT Physical Therapist                      PT Assessment/Plan     Row Name 10/28/20 1737 10/28/20 1738       PT Assessment    Assessment Comments  --  Patient without complaints of low back pain, but presents with bilateral knee pain.  He has not been compliant with HEP and needs instruction to perform previously prescribed stretches with appropriate technique.  Patient tolerates progression to light trunk strengthening exercises but is limited by reports of knee pain.  -SP       PT Plan    PT Plan Comments  Patient to perform HEP daily.  He plans to speak with Dr. De Leon tomorrow 10-29-20 in regard to knee pain, knee MRI, and treatment plan.  Continue to progress trunk stabilization exercises next visit.  -SP  --      User Key  (r) = Recorded By, (t) = Taken By, (c) = Cosigned By    Initials Name Provider Type    Nenita Guerra, PT Physical Therapist            OP Exercises     Row Name 10/28/20 1700             Subjective Comments    Subjective Comments  Patient reports that he had an MRI on his back and was told he has arthritis.  He states that he has not been doing stretches/exercises at home because he was waiting for results of MRI.  Patient reports that his back has not been bothering him but he is having  increasing bilateral knee pain.  He saw Dr. De Leon yesterday but does not have orders to treat his knees and is now awaiting an MRI on his knees.  Patient plans to call Dr. De Leon tomorrow regarding MRI for knees and discuss if he needs referral to ortho or have PT orders.    -SP         Subjective Pain    Able to rate subjective pain?  yes  -SP      Pre-Treatment Pain Level  0 in low back  -SP      Post-Treatment Pain Level  0 in low back  -SP      Subjective Pain Comment  Patient complains of bilateral knee pain right more so than left  -SP         Exercise 1    Exercise Name 1  Polo. HS Stretch  -SP      Reps 1  10  -SP      Time 1  10 sec hold each  -SP         Exercise 2    Exercise Name 2  Polo. Piriformis Stretch  -SP      Reps 2  10  -SP      Time 2  10 sec hold each  -SP         Exercise 3    Exercise Name 3  Lumbar Rotations  -SP      Reps 3  25 each side  -SP         Exercise 4    Exercise Name 4  SKTC  -SP      Reps 4  10  -SP      Time 4  10 sec hold each  -SP         Exercise 5    Exercise Name 5  PPT  -SP      Cueing 5  Verbal  -SP      Reps 5  10  -SP      Time 5  5 sec  -SP         Exercise 6    Exercise Name 6  PPT with ball squeeze  -SP      Cueing 6  Verbal  -SP      Reps 6  10  -SP      Time 6  5 sec  -SP        User Key  (r) = Recorded By, (t) = Taken By, (c) = Cosigned By    Initials Name Provider Type    SP Nenita Longo, GURWINDER Physical Therapist                           Therapy Education  Education Details: Patient advised to perform all HEP daily  Given: HEP  Program: Reinforced, Progressed  How Provided: Verbal  Provided to: Patient  Level of Understanding: Verbalized, Demonstrated              Time Calculation:   Start Time: 1600  Stop Time: 1710  Time Calculation (min): 70 min  Therapy Charges for Today     Code Description Service Date Service Provider Modifiers Qty    63118593316  PT THER PROC EA 15 MIN 10/28/2020 Nenita Longo, PT GP 1                    Nenita  Virginie Longo, PT  10/28/2020

## 2020-10-29 ENCOUNTER — TELEPHONE (OUTPATIENT)
Dept: FAMILY MEDICINE CLINIC | Facility: CLINIC | Age: 70
End: 2020-10-29

## 2020-10-29 NOTE — TELEPHONE ENCOUNTER
Patient is requesting that an order for PT be placed for his knees as well as his back. He goes to PT on 11/3/20 for his visit.

## 2020-10-30 DIAGNOSIS — M54.41 CHRONIC BILATERAL LOW BACK PAIN WITH BILATERAL SCIATICA: ICD-10-CM

## 2020-10-30 DIAGNOSIS — M47.816 LUMBAR FACET ARTHROPATHY: ICD-10-CM

## 2020-10-30 DIAGNOSIS — M25.562 CHRONIC PAIN OF BOTH KNEES: Primary | ICD-10-CM

## 2020-10-30 DIAGNOSIS — M51.36 DDD (DEGENERATIVE DISC DISEASE), LUMBAR: ICD-10-CM

## 2020-10-30 DIAGNOSIS — G89.29 CHRONIC BILATERAL LOW BACK PAIN WITH BILATERAL SCIATICA: ICD-10-CM

## 2020-10-30 DIAGNOSIS — M54.42 CHRONIC BILATERAL LOW BACK PAIN WITH BILATERAL SCIATICA: ICD-10-CM

## 2020-10-30 DIAGNOSIS — G89.29 CHRONIC PAIN OF BOTH KNEES: Primary | ICD-10-CM

## 2020-10-30 DIAGNOSIS — M25.561 CHRONIC PAIN OF BOTH KNEES: Primary | ICD-10-CM

## 2020-11-03 ENCOUNTER — HOSPITAL ENCOUNTER (OUTPATIENT)
Dept: PHYSICAL THERAPY | Facility: HOSPITAL | Age: 70
Setting detail: THERAPIES SERIES
Discharge: HOME | End: 2020-11-03

## 2020-11-03 DIAGNOSIS — G89.29 CHRONIC BILATERAL LOW BACK PAIN WITH RIGHT-SIDED SCIATICA: ICD-10-CM

## 2020-11-03 DIAGNOSIS — M54.41 CHRONIC BILATERAL LOW BACK PAIN WITH RIGHT-SIDED SCIATICA: ICD-10-CM

## 2020-11-03 DIAGNOSIS — M47.816 LUMBAR FACET ARTHROPATHY: ICD-10-CM

## 2020-11-03 DIAGNOSIS — M51.36 DDD (DEGENERATIVE DISC DISEASE), LUMBAR: Primary | ICD-10-CM

## 2020-11-03 PROCEDURE — 97110 THERAPEUTIC EXERCISES: CPT

## 2020-11-03 NOTE — THERAPY TREATMENT NOTE
Outpatient Physical Therapy Ortho Treatment Note  [facility]ISB[facility] Roberts     Patient Name: Renato Marquez  : 1950  MRN: [id number]  Today's Date: 11/3/2020      Visit Date: 2020    Visit Dx:    ICD-10-CM ICD-9-CM   1. DDD (degenerative disc disease), lumbar  M51.36 722.52   2. Lumbar facet arthropathy  M47.816 721.3   3. Chronic bilateral low back pain with right-sided sciatica  M54.41 724.2    G89.29 724.3     338.29       Patient Active Problem List   Diagnosis   • CAD (coronary artery disease)   • Carotid arterial disease (CMS/HCC)   • Chronic atrial fibrillation (CMS/HCC)   • Ischemic cardiomyopathy   • Mitral regurgitation   • Chronic anticoagulation   • Hypothyroidism   • HTN (hypertension)   • Dyslipidemia   • Hx of melanoma of skin   • History of basal cell carcinoma (BCC) of skin   • Leg cramps   • DDD (degenerative disc disease), lumbar   • Chronic bilateral low back pain with right-sided sciatica   • Hyperkalemia   • Abnormal x-ray of lumbar spine   • Lumbar facet arthropathy   • Chronic pain of both knees        Past Medical History:   Diagnosis Date   • CAD (coronary artery disease)    • Cancer (CMS/HCC)    • Carotid arterial disease (CMS/HCC)    • Chronic atrial fibrillation (CMS/HCC)    • Ischemic cardiomyopathy    • Mitral regurgitation         Past Surgical History:   Procedure Laterality Date   • CAROTID ENDARTERECTOMY Right     Dr Lewis   • CORONARY ARTERY BYPASS GRAFT      MIMI Frederick to LAD, SVG to OM, SVG Post Desc   • FINGER SURGERY                         PT Assessment/Plan     Row Name 20 0700          PT Assessment    Assessment Comments  Patient tolerates exercises for low back well today. Discussed with patient about treating his knee pain. He is scheduled for MRI next week and we should wait to begin treatment until after we know results. Patient agreed to this plan.  -AS        PT Plan    PT Plan Comments  Continue with current treatment plan.  -AS       User  "Key  (r) = Recorded By, (t) = Taken By, (c) = Cosigned By    Initials Name Provider Type    AS Santi Grady, PT Physical Therapist            OP Exercises     Row Name 11/03/20 0700             Subjective Comments    Subjective Comments  Patient states his low back is \"doing really well\". He states he wants PT on his knees and is getting orders for that. He also states he just had x-rays done and is scheduled for an MRI next week. He reports the x-rays \"shows arthritis in both knees\".  -AS         Exercise 1    Exercise Name 1  Polo. HS Stretch  -AS      Reps 1  10  -AS      Time 1  10 sec hold each  -AS         Exercise 2    Exercise Name 2  Polo. Piriformis Stretch  -AS      Reps 2  10  -AS      Time 2  10 sec hold each  -AS         Exercise 3    Exercise Name 3  Lumbar Rotations  -AS      Reps 3  25 each side  -AS         Exercise 4    Exercise Name 4  SKTC  -AS      Reps 4  10  -AS      Time 4  10 sec hold each  -AS         Exercise 5    Exercise Name 5  PPT  -AS      Cueing 5  Verbal  -AS      Reps 5  25  -AS      Time 5  5 sec  -AS         Exercise 6    Exercise Name 6  PPT with ball squeeze  -AS      Cueing 6  Verbal  -AS      Reps 6  25  -AS      Time 6  5 sec  -AS        User Key  (r) = Recorded By, (t) = Taken By, (c) = Cosigned By    Initials Name Provider Type    AS Santi Grady, PT Physical Therapist                                          Time Calculation:   Start Time: 0710  Stop Time: 0750  Time Calculation (min): 40 min  Therapy Charges for Today     Code Description Service Date Service Provider Modifiers Qty    32380291730  PT THER PROC EA 15 MIN 11/3/2020 Santi Grady, PT GP 3                    Santi Grady, PT  11/3/2020     "

## 2020-11-12 ENCOUNTER — HOSPITAL ENCOUNTER (OUTPATIENT)
Dept: MRI IMAGING | Facility: HOSPITAL | Age: 70
Discharge: HOME | End: 2020-11-12
Admitting: FAMILY MEDICINE

## 2020-11-12 DIAGNOSIS — M25.562 CHRONIC PAIN OF BOTH KNEES: ICD-10-CM

## 2020-11-12 DIAGNOSIS — M25.561 CHRONIC PAIN OF BOTH KNEES: ICD-10-CM

## 2020-11-12 DIAGNOSIS — G89.29 CHRONIC PAIN OF BOTH KNEES: ICD-10-CM

## 2020-11-12 DIAGNOSIS — I48.20 CHRONIC ATRIAL FIBRILLATION (HCC): Chronic | ICD-10-CM

## 2020-11-12 PROCEDURE — 73721 MRI JNT OF LWR EXTRE W/O DYE: CPT

## 2020-11-12 RX ORDER — METOPROLOL SUCCINATE 25 MG/1
TABLET, EXTENDED RELEASE ORAL
Qty: 90 TABLET | Refills: 0 | OUTPATIENT
Start: 2020-11-12

## 2020-11-13 NOTE — PROGRESS NOTES
Please call the patient regarding his result(s).  Please let the patient know that he has several abnormalities of the right knee MRI, including torn cartilage and arthritis, along with other abnormalities.  Please schedule an appointment for further discussion of his right knee pain and MRI result, and further evaluation and management.

## 2020-11-17 ENCOUNTER — OFFICE VISIT (OUTPATIENT)
Dept: FAMILY MEDICINE CLINIC | Facility: CLINIC | Age: 70
End: 2020-11-17

## 2020-11-17 VITALS
OXYGEN SATURATION: 98 % | WEIGHT: 227 LBS | RESPIRATION RATE: 14 BRPM | SYSTOLIC BLOOD PRESSURE: 136 MMHG | HEART RATE: 93 BPM | BODY MASS INDEX: 30.75 KG/M2 | HEIGHT: 72 IN | DIASTOLIC BLOOD PRESSURE: 72 MMHG | TEMPERATURE: 97.1 F

## 2020-11-17 DIAGNOSIS — G89.29 CHRONIC PAIN OF BOTH KNEES: ICD-10-CM

## 2020-11-17 DIAGNOSIS — I48.20 CHRONIC ATRIAL FIBRILLATION (HCC): Chronic | ICD-10-CM

## 2020-11-17 DIAGNOSIS — Z23 NEED FOR VACCINATION: ICD-10-CM

## 2020-11-17 DIAGNOSIS — M25.562 CHRONIC PAIN OF BOTH KNEES: ICD-10-CM

## 2020-11-17 DIAGNOSIS — M25.561 CHRONIC PAIN OF BOTH KNEES: ICD-10-CM

## 2020-11-17 DIAGNOSIS — R93.6 ABNORMAL MRI, KNEE: Primary | ICD-10-CM

## 2020-11-17 PROCEDURE — 99214 OFFICE O/P EST MOD 30 MIN: CPT | Performed by: FAMILY MEDICINE

## 2020-11-17 PROCEDURE — G0008 ADMIN INFLUENZA VIRUS VAC: HCPCS | Performed by: FAMILY MEDICINE

## 2020-11-17 PROCEDURE — 90694 VACC AIIV4 NO PRSRV 0.5ML IM: CPT | Performed by: FAMILY MEDICINE

## 2020-11-17 NOTE — PROGRESS NOTES
Subjective   Renato Marquez is a 70 y.o. male is here for   Chief Complaint   Patient presents with   • Imaging Only     f/u abnormal knee mri       History of Present Illness     Pt is here for follow-up for chronic knee pain and to discuss his recent MRI results. He states he does not think he is ready for a knee replacement but he is open to considering arthroscopic surgery and seeing an orthopedist.    Health Maintenance Due   Topic Date Due   • HEPATITIS C SCREENING  07/06/2017   • AAA SCREEN (ONE-TIME)  09/29/2020        reports that he has quit smoking. He has never used smokeless tobacco. He reports current alcohol use. He reports that he does not use drugs.    Review of Systems   Constitutional: Negative for activity change and unexpected weight change.   HENT: Negative for congestion.    Respiratory: Negative for shortness of breath and wheezing.    Cardiovascular: Negative for chest pain and palpitations.   Gastrointestinal: Negative for abdominal pain, blood in stool and constipation.   Genitourinary: Negative for difficulty urinating and hematuria.   Musculoskeletal: Negative for gait problem.   Skin: Negative for color change and rash.        PHQ-9 Depression Screening  Little interest or pleasure in doing things?     Feeling down, depressed, or hopeless?     Trouble falling or staying asleep, or sleeping too much?     Feeling tired or having little energy?     Poor appetite or overeating?     Feeling bad about yourself - or that you are a failure or have let yourself or your family down?     Trouble concentrating on things, such as reading the newspaper or watching television?     Moving or speaking so slowly that other people could have noticed? Or the opposite - being so fidgety or restless that you have been moving around a lot more than usual?     Thoughts that you would be better off dead, or of hurting yourself in some way?     PHQ-9 Total Score     If you checked off any problems, how difficult  "have these problems made it for you to do your work, take care of things at home, or get along with other people?       BP Readings from Last 12 Encounters:   11/17/20 136/72   10/26/20 140/70   10/06/20 121/66   09/29/20 126/70   09/23/20 126/82   01/30/20 128/80   01/24/19 122/74   01/24/18 129/71   01/18/18 112/70   03/23/17 138/80   01/15/16 110/70   11/12/14 110/68       Wt Readings from Last 12 Encounters:   11/17/20 103 kg (227 lb)   11/12/20 104 kg (230 lb)   10/26/20 104 kg (229 lb)   10/20/20 103 kg (227 lb)   10/06/20 103 kg (227 lb)   09/29/20 104 kg (229 lb)   09/23/20 97.5 kg (215 lb)   01/30/20 99.8 kg (220 lb)   01/24/19 97.5 kg (215 lb)   01/24/18 109 kg (240 lb 4.8 oz)   01/18/18 109 kg (240 lb)   03/23/17 106 kg (234 lb)        Objective   /72 (BP Location: Left arm, Patient Position: Sitting, Cuff Size: Adult)   Pulse 93   Temp 97.1 °F (36.2 °C) (Temporal)   Resp 14   Ht 182.9 cm (72\")   Wt 103 kg (227 lb)   SpO2 98%   BMI 30.79 kg/m²   Physical Exam  Vitals signs and nursing note reviewed.   Constitutional:       General: He is not in acute distress.     Appearance: Normal appearance. He is well-developed. He is not diaphoretic.   HENT:      Head: Normocephalic and atraumatic.      Right Ear: External ear normal.      Left Ear: External ear normal.      Nose: Nose normal.      Mouth/Throat:      Pharynx: No oropharyngeal exudate.   Eyes:      General: Lids are normal. No scleral icterus.        Right eye: No discharge.         Left eye: No discharge.   Neck:      Musculoskeletal: Full passive range of motion without pain, normal range of motion and neck supple. No edema.      Thyroid: No thyromegaly.      Trachea: Trachea normal. No tracheal deviation.   Cardiovascular:      Rate and Rhythm: Normal rate and regular rhythm.      Heart sounds: Normal heart sounds. No murmur. No friction rub. No gallop.    Pulmonary:      Effort: Pulmonary effort is normal. No tachypnea, bradypnea or " respiratory distress.      Breath sounds: Normal breath sounds. No stridor. No decreased breath sounds, wheezing or rales.   Chest:      Chest wall: No tenderness.   Lymphadenopathy:      Head:      Right side of head: No submental, submandibular, tonsillar, preauricular, posterior auricular or occipital adenopathy.      Left side of head: No submental, submandibular, tonsillar, preauricular, posterior auricular or occipital adenopathy.      Cervical: No cervical adenopathy.      Right cervical: No superficial, deep or posterior cervical adenopathy.     Left cervical: No superficial, deep or posterior cervical adenopathy.   Skin:     General: Skin is warm and dry.      Capillary Refill: Capillary refill takes less than 2 seconds.      Coloration: Skin is not pale.      Findings: No erythema or rash.      Nails: There is no clubbing.     Neurological:      Mental Status: He is alert and oriented to person, place, and time. He is not disoriented.      Deep Tendon Reflexes: Reflexes are normal and symmetric.   Psychiatric:         Behavior: Behavior normal.         Procedures    Assessment/Plan   Diagnoses and all orders for this visit:    1. Abnormal MRI, knee (Primary)  Assessment & Plan:  Right knee medial and lateral meniscus tears. Refer to Orthopedics.    Orders:  -     Ambulatory Referral to Orthopedic Surgery    2. Chronic pain of both knees  -     Ambulatory Referral to Orthopedic Surgery    3. Need for vaccination  -     Fluad Quad 65+ yrs ([id number])  I spent over 25 minutes with the patient, of which more than 50% was counseling, including MRI results, meaning, and strength training exercises. The patient was also counseled on diet and exercise to minimize or eliminate concentrated sweets and sweetened beverages, as well as cutting back on breads and pastas.                Return if symptoms worsen or fail to improve.

## 2020-11-18 DIAGNOSIS — I48.20 CHRONIC ATRIAL FIBRILLATION (HCC): Chronic | ICD-10-CM

## 2020-11-18 RX ORDER — METOPROLOL SUCCINATE 25 MG/1
TABLET, EXTENDED RELEASE ORAL
Qty: 90 TABLET | Refills: 0 | OUTPATIENT
Start: 2020-11-18

## 2020-11-18 RX ORDER — METOPROLOL SUCCINATE 50 MG/1
50 TABLET, EXTENDED RELEASE ORAL DAILY
Qty: 90 TABLET | Refills: 1 | Status: SHIPPED | OUTPATIENT
Start: 2020-11-18 | End: 2021-06-16 | Stop reason: SDUPTHER

## 2020-11-19 ENCOUNTER — OFFICE VISIT (OUTPATIENT)
Dept: ORTHOPEDIC SURGERY | Facility: CLINIC | Age: 70
End: 2020-11-19

## 2020-11-19 VITALS
BODY MASS INDEX: 30.75 KG/M2 | SYSTOLIC BLOOD PRESSURE: 118 MMHG | WEIGHT: 227 LBS | HEIGHT: 72 IN | DIASTOLIC BLOOD PRESSURE: 82 MMHG

## 2020-11-19 DIAGNOSIS — M17.11 PRIMARY OSTEOARTHRITIS OF RIGHT KNEE: Primary | ICD-10-CM

## 2020-11-19 DIAGNOSIS — S83.231A COMPLEX TEAR OF MEDIAL MENISCUS OF RIGHT KNEE AS CURRENT INJURY, INITIAL ENCOUNTER: ICD-10-CM

## 2020-11-19 PROCEDURE — 99203 OFFICE O/P NEW LOW 30 MIN: CPT | Performed by: ORTHOPAEDIC SURGERY

## 2020-11-19 NOTE — PROGRESS NOTES
Subjective:     Patient ID: Renato Marquez is a 70 y.o. male.    Chief Complaint: right knee pain, meniscal tear, new patient to provider    History of Present Illness  Renato Marquez presents to clinic today for evaluation of his bilateral knees, right worse than left. He states that his knee pain and sweliing began about 12 days ago, with no specific injury. He rates the pain as 7/10, describes it as stabbing and sharp in nature. Localizes pain to the anterior aspect of the knee, with some radiation proximally and distally. Notes no improving factors at this time. Symptoms are exacerbated with prolonged weightbearing. Denies radiation of pain, denies associated numbness or tingling.       Social History     Occupational History   • Not on file   Tobacco Use   • Smoking status: Former Smoker   • Smokeless tobacco: Never Used   • Tobacco comment: caffine use   Substance and Sexual Activity   • Alcohol use: Yes   • Drug use: No   • Sexual activity: Not Currently      Past Medical History:   Diagnosis Date   • CAD (coronary artery disease)    • Cancer (CMS/HCC)    • Carotid arterial disease (CMS/HCC)    • Chronic atrial fibrillation (CMS/HCC)    • Ischemic cardiomyopathy    • Mitral regurgitation      Past Surgical History:   Procedure Laterality Date   • CAROTID ENDARTERECTOMY Right 2009    Dr Lewis   • CORONARY ARTERY BYPASS GRAFT  2009    MIMI Frederick to LAD, SVG to OM, SVG Post Desc   • FINGER SURGERY         Family History   Problem Relation Age of Onset   • Heart disease Father    • Stroke Father    • Hypertension Father          Review of Systems   Constitutional: Negative for chills, diaphoresis, fever and unexpected weight change.   HENT: Negative for hearing loss, nosebleeds, sore throat and tinnitus.    Eyes: Negative for pain and visual disturbance.   Respiratory: Negative for cough, shortness of breath and wheezing.    Cardiovascular: Negative for chest pain and palpitations.   Gastrointestinal:  "Negative for abdominal pain, diarrhea, nausea and vomiting.   Endocrine: Negative for cold intolerance, heat intolerance and polydipsia.   Genitourinary: Negative for difficulty urinating, dysuria and hematuria.   Musculoskeletal: Positive for arthralgias and myalgias. Negative for joint swelling.   Skin: Negative for rash and wound.   Allergic/Immunologic: Negative for environmental allergies.   Neurological: Negative for dizziness, syncope and numbness.   Hematological: Does not bruise/bleed easily.   Psychiatric/Behavioral: Negative for dysphoric mood and sleep disturbance. The patient is not nervous/anxious.            Objective:  Vitals:    11/19/20 1456   BP: 118/82   BP Location: Left arm   Weight: 103 kg (227 lb)   Height: 182.9 cm (72\")         11/19/20  1456   Weight: 103 kg (227 lb)     Body mass index is 30.79 kg/m².    Physical Exam    Vital signs reviewed.   General: No acute distress, alert and oriented  Eyes: conjunctiva clear; pupils equally round and reactive  ENT: external ears and nose atraumatic; oropharynx clear  CV: no peripheral edema  Resp: normal respiratory effort  Skin: no rashes or wounds; normal turgor  Psych: mood and affect appropriate; recent and remote memory intact        Ortho Exam       Right Knee-    ROM 0-125 degrees  4+/5 on flexion  4+/5 on extension  Maximal tenderness medial patellar facet, medial joint line  Mildly positive Alejandrina exam lateral joint line    Grade 1A Lachman  Anterior drawer- negative  Posterior drawer- negative   No opening on varus and valgus stress at 0 and 30  Active patellar compression test- negative     Positive sensation light tough all distributions symmetric to contralateral side  Brisk cap refill all digits  2+ dorsalis pedis pulse      Imaging:  Xr Knee 3 View Left    Result Date: 10/26/2020  Impression: Moderate tricompartmental joint space narrowing. Signer Name: Fannie Bonilla MD  Signed: 10/26/2020 7:38 PM  Workstation Name: [email or web address]  " [facility]    Xr Knee 3 View Right    Addendum Date: 10/26/2020    //////////// ADDENDUM #1 //////////// Addendum for correction, voice transcription error: The fourth sentence in the body the report should read there is no evidence of acute fracture or dislocation. As stated in the previous sentence, associated joint effusion seen. Impression remains unchanged. Signer Name: Fannie Bonilla MD  Signed: 10/26/2020 7:39 PM  Workstation Name: [facility]  [facility]////////////  ORIGINAL REPORT //////////// CR Knee 3 Vws RT INDICATION: Chronic right knee pain 6 months no trauma right greater than left knee pain COMPARISON: None available. FINDINGS: 3 view(s) of the right knee. Moderate patellofemoral joint space narrowing and spurring present. Associated joint effusion seen. Mild lateral tibiofemoral joint space narrowing seen and moderate medial tibiofemoral joint space narrowing noted with osteophytosis. No fracture, dislocation, or effusion. No bone erosion or destruction.  No foreign body. Radiopaque clips and vascular calcifications noted.     Result Date: 10/26/2020  Impression: Significant osteoarthritis worse in the patellofemoral compartment. Signer Name: Fannie Bonilla MD  Signed: 10/26/2020 7:34 PM  Workstation Name: [facility]  [facility]    Mri Knee Right Without Contrast    Result Date: 11/12/2020  Impression: 1. Tear in the posterior horn of the medial meniscus at the meniscal root. There is also a possible subtle peripheral vertical tear in the posterior horn medial meniscus and there is mild subluxation of the body of the medial meniscus. 2. Relatively severe medial compartment degenerative changes as described above with areas of full-thickness chondral loss. 3. Degenerative changes in the anterior horn lateral meniscus with suspected tear near the attachment of the transverse meniscal ligament. 4. Thickening and diffuse  signal abnormality in the anterior cruciate ligament compatible with intrasubstance mucoid degeneration. There is no evidence of disruption. 5. Moderate degenerative changes and chondromalacia with chondral fissuring in the midpole apical patellar articular cartilage. 6. Fluid and edema between the distal iliotibial band and the lateral femoral condyle, which could occur and iliotibial friction syndrome. Clinical correlation is recommended. Signer Name: Iliana Caicedo MD  Signed: 11/12/2020 3:06 PM  Workstation Name: [email or web address]  [facility]    Review of outside MRI of the right knee as well as x-rays right knee indicate moderate medial compartment joint space narrowing with advanced full-thickness loss of cartilage from the medial femoral condyle, full-thickness radial tear at the posterior horn medial meniscus adjacent to the meniscal root with subluxation of the medial meniscal body on coronal imaging.  Overall moderate varus alignment noted.    Assessment:        1. Primary osteoarthritis of right knee    2. Complex tear of medial meniscus of right knee as current injury, initial encounter           Plan:          1. Discussed treatment options at length with patient at today's visit including oral steroid and antiinflammatory vs cortisone injection.  2. Prescribed Medrol Dosepak and Pennsaid today. Patient to complete Medrol Dosepak and then begin using Pennsaid for two weeks.   3. Advised patient to start at-home exercise program for improvement in strength, range of motion and function with daily activities. Knee/hip exercises provided today.  4. Follow-up with me in 4-6 weeks for re-evaluation. Will consider cortisone injection if symptoms persist at that time.      Renato Marquez was in agreement with plan and had all questions answered.     Orders:  No orders of the defined types were placed in this encounter.      Medications:  New Medications Ordered This Visit   Medications    • Diclofenac Sodium (Pennsaid) 2 % solution     Sig: Apply 40 mg topically 2 (two) times a day.     Dispense:  112 g     Refill:  0   • Diclofenac Sodium (Pennsaid) 2 % solution     Sig: Apply 40 mg topically 2 (two) times a day.     Dispense:  112 g     Refill:  0   • methylPREDNISolone (MEDROL) 4 MG dose pack     Sig: Take as directed on package instructions.     Dispense:  1 each     Refill:  0       Followup:  Return in about 4 weeks (around 12/17/2020).    Diagnoses and all orders for this visit:    1. Primary osteoarthritis of right knee (Primary)    2. Complex tear of medial meniscus of right knee as current injury, initial encounter    Other orders  -     Diclofenac Sodium (Pennsaid) 2 % solution; Apply 40 mg topically 2 (two) times a day.  Dispense: 112 g; Refill: 0  -     Diclofenac Sodium (Pennsaid) 2 % solution; Apply 40 mg topically 2 (two) times a day.  Dispense: 112 g; Refill: 0  -     methylPREDNISolone (MEDROL) 4 MG dose pack; Take as directed on package instructions.  Dispense: 1 each; Refill: 0           By signing my name here, I Neli Gaines attest that all documentation on 11/24/20 at 10:28 EST has been prepared under the direction and in the presence of Dr. Daron Luis MD.    I, Dr. Daron Luis, personally performed the services described in this documentation, as scribed by Neli Gaines, in my presence, and it is both accurate and complete.        Dictated utilizing Dragon dictation

## 2020-11-23 ENCOUNTER — TELEPHONE (OUTPATIENT)
Dept: ORTHOPEDIC SURGERY | Facility: CLINIC | Age: 70
End: 2020-11-23

## 2020-11-24 RX ORDER — METHYLPREDNISOLONE 4 MG/1
TABLET ORAL
Qty: 1 EACH | Refills: 0 | Status: SHIPPED | OUTPATIENT
Start: 2020-11-24 | End: 2020-12-21

## 2020-11-24 RX ORDER — DICLOFENAC SODIUM 20 MG/G
40 SOLUTION TOPICAL 2 TIMES DAILY
Qty: 112 G | Refills: 0 | Status: SHIPPED | OUTPATIENT
Start: 2020-11-24 | End: 2021-02-26

## 2020-11-24 RX ORDER — METHYLPREDNISOLONE 4 MG/1
TABLET ORAL
Qty: 1 EACH | Refills: 0 | Status: SHIPPED | OUTPATIENT
Start: 2020-11-24 | End: 2020-11-24 | Stop reason: SDUPTHER

## 2020-11-24 NOTE — TELEPHONE ENCOUNTER
Sent an order for Pennsaid-however it is fulfilled through mail order so it may take a little bit for it to arrive.  I can send in a prescription for Voltaren gel, he can get that over-the-counter, or he can come in and get another sample pack of Pennsaid if he would like.

## 2020-11-30 ENCOUNTER — TELEPHONE (OUTPATIENT)
Dept: ORTHOPEDIC SURGERY | Facility: CLINIC | Age: 70
End: 2020-11-30

## 2020-11-30 NOTE — TELEPHONE ENCOUNTER
Spoke with wife, told her Medicare will not pay for the Pennsaid, told her to have him continue with the Voltaren gel OTC.

## 2020-12-01 ENCOUNTER — APPOINTMENT (OUTPATIENT)
Dept: PHYSICAL THERAPY | Facility: HOSPITAL | Age: 70
End: 2020-12-01

## 2020-12-08 ENCOUNTER — HOSPITAL ENCOUNTER (OUTPATIENT)
Dept: PHYSICAL THERAPY | Facility: HOSPITAL | Age: 70
Setting detail: THERAPIES SERIES
Discharge: HOME | End: 2020-12-08

## 2020-12-08 DIAGNOSIS — S83.231A COMPLEX TEAR OF MEDIAL MENISCUS OF RIGHT KNEE AS CURRENT INJURY, INITIAL ENCOUNTER: Primary | ICD-10-CM

## 2020-12-08 DIAGNOSIS — M17.11 PRIMARY OSTEOARTHRITIS OF RIGHT KNEE: ICD-10-CM

## 2020-12-08 PROCEDURE — 97161 PT EVAL LOW COMPLEX 20 MIN: CPT

## 2020-12-08 PROCEDURE — 97110 THERAPEUTIC EXERCISES: CPT

## 2020-12-08 NOTE — THERAPY EVALUATION
Outpatient Physical Therapy Ortho Initial Evaluation  LISBETH Roberts     Patient Name: Renato Marquez  : 1950  MRN: [id number]  Today's Date: 2020      Visit Date: 2020    Patient Active Problem List   Diagnosis   • CAD (coronary artery disease)   • Carotid arterial disease (CMS/HCC)   • Chronic atrial fibrillation (CMS/HCC)   • Ischemic cardiomyopathy   • Mitral regurgitation   • Chronic anticoagulation   • Hypothyroidism   • HTN (hypertension)   • Dyslipidemia   • Hx of melanoma of skin   • History of basal cell carcinoma (BCC) of skin   • Leg cramps   • DDD (degenerative disc disease), lumbar   • Chronic bilateral low back pain with right-sided sciatica   • Hyperkalemia   • Abnormal x-ray of lumbar spine   • Lumbar facet arthropathy   • Chronic pain of both knees   • Abnormal MRI, knee   • Complex tear of medial meniscus of right knee as current injury   • Primary osteoarthritis of right knee        Past Medical History:   Diagnosis Date   • CAD (coronary artery disease)    • Cancer (CMS/HCC)    • Carotid arterial disease (CMS/HCC)    • Chronic atrial fibrillation (CMS/HCC)    • Ischemic cardiomyopathy    • Mitral regurgitation         Past Surgical History:   Procedure Laterality Date   • CAROTID ENDARTERECTOMY Right     Dr Lewis   • CORONARY ARTERY BYPASS GRAFT      MIMI Frederick to LAD, SVG to OM, SVG Post Desc   • FINGER SURGERY         Visit Dx:     ICD-10-CM ICD-9-CM   1. Complex tear of medial meniscus of right knee as current injury, initial encounter  S83.231A 836.0   2. Primary osteoarthritis of right knee  M17.11 715.16         Patient History     Row Name 20 0800             History    Chief Complaint  Difficulty Walking;Difficulty with daily activities;Muscle tenderness;Muscle weakness;Pain  -AS      Type of Pain  Knee pain  -AS      Brief Description of Current Complaint  Patient presents to outpatient PT with a diagnosis of right knee OA and medial meniscus  tear. Patient has had MRI that shows moderate medial compartment joint space narrowing with advanced full-thickness loss of cartilage from the medial femoral condyle, full-thickness radial tear at the posterior horn medial meniscus adjacent to the meniscal root with subluxation of the medial meniscal body on coronal imaging. Patient states there is nothing that really helps his pain. He states the pain is exacerbated by prolonged weight bearing.  -AS      Patient/Caregiver Goals  Relieve pain;Return to prior level of function;Improve mobility;Improve strength  -AS      Patient's Rating of General Health  Good  -AS      Hand Dominance  right-handed  -AS      Occupation/sports/leisure activities    -AS      Patient seeing anyone else for problem(s)?  Dr. erazo  -AS      How has patient tried to help current problem?  rest  -AS      What clinical tests have you had for this problem?  MRI  -AS      Results of Clinical Tests  OA and meniscus tear  -AS         Pain     Pain Location  Knee  -AS      Pain at Present  5  -AS      Pain at Best  0  -AS      Pain at Worst  8  -AS      Pain Frequency  Intermittent  -AS      Pain Description  Aching  -AS      What Performance Factors Make the Current Problem(s) WORSE?  walking, standing, bending knee  -AS      What Performance Factors Make the Current Problem(s) BETTER?  rest  -AS         Daily Activities    Primary Language  English  -AS      How does patient learn best?  Listening;Reading;Demonstration  -AS      Teaching needs identified  Home Exercise Program;Management of Condition  -AS      Patient is concerned about/has problems with  Climbing Stairs;Flexibility;Performing home management (household chores, shopping, care of dependents);Performing job responsibilities/community activities (work, school,;Performing sports, recreation, and play activities;Standing;Walking  -AS      Does patient have problems with the following?  None  -AS      Barriers to  learning  None  -AS      Pt Participated in POC and Goals  Yes  -AS         Safety    Are you being hurt, hit, or frightened by anyone at home or in your life?  No  -AS      Are you being neglected by a caregiver  No  -AS        User Key  (r) = Recorded By, (t) = Taken By, (c) = Cosigned By    Initials Name Provider Type    AS Santi Grady, PT Physical Therapist          PT Ortho     Row Name 12/08/20 0800       Precautions and Contraindications    Precautions/Limitations  no known precautions/limitations  -AS       Subjective Pain    Able to rate subjective pain?  yes  -AS    Pre-Treatment Pain Level  5  -AS    Post-Treatment Pain Level  3  -AS       Posture/Observations    Posture- WNL  Posture is WNL  -AS       Patellar Accessory Motions    Superior glide  Right:;Hypomobile  -AS    Inferior glide  Right:;Hypomobile  -AS    Medial glide  Right:;Hypomobile  -AS    Lateral glide  Right:;Hypomobile  -AS       Right Lower Ext    Rt Knee Extension/Flexion AROM  0-132 degrees  -AS       MMT Right Lower Ext    Rt Hip Flexion MMT, Gross Movement  (4-/5) good minus  -AS    Rt Hip Extension MMT, Gross Movement  (4-/5) good minus  -AS    Rt Hip ABduction MMT, Gross Movement  (4-/5) good minus  -AS    Rt Knee Extension MMT, Gross Movement  (4-/5) good minus  -AS    Rt Knee Flexion MMT, Gross Movement  (4-/5) good minus  -AS       Sensation    Sensation WNL?  WNL  -AS    Light Touch  No apparent deficits  -AS       Lower Extremity Flexibility    Hamstrings  Right:;Mildly limited  -AS      User Key  (r) = Recorded By, (t) = Taken By, (c) = Cosigned By    Initials Name Provider Type    AS Santi Grady, PT Physical Therapist                      Therapy Education  Given: Symptoms/condition management, Pain management, HEP  Program: New  How Provided: Verbal, Demonstration, Written  Provided to: Patient  Level of Understanding: Teach back education performed, Verbalized, Demonstrated     PT OP Goals     Row Name  12/08/20 0800          PT Short Term Goals    STG Date to Achieve  12/22/20  -AS     STG 1  Patient to demonstrate compliance with his initial HEP for flexibility, ROM and strengthening.  -AS     STG 2  Patient to report right knee pain on VAS of 2-3/10 at worst with activity.  -AS     STG 3  Patient to demonstrate improved right knee and LE strength to 4/5 in all planes.  -AS        Long Term Goals    LTG Date to Achieve  01/05/21  -AS     LTG 1  Patient to demonstrate compliance with his initial HEP for flexibility, ROM and strengthening.  -AS     LTG 2  Patient to report right knee pain on VAS of 0-1/10 at worst with activity.  -AS     LTG 3  Patient to demonstrate improved right knee and LE strength to 4+/5 in all planes.  -AS     LTG 4  Patient to report overall improved function on LEFS to 55/80.  -AS        Time Calculation    PT Goal Re-Cert Due Date  01/05/21  -AS       User Key  (r) = Recorded By, (t) = Taken By, (c) = Cosigned By    Initials Name Provider Type    AS Santi Grady, PT Physical Therapist          PT Assessment/Plan     Row Name 12/08/20 0800          PT Assessment    Functional Limitations  Impaired gait;Limitation in home management;Limitations in community activities;Performance in leisure activities;Performance in sport activities;Performance in work activities  -AS     Impairments  Impaired flexibility;Muscle strength;Pain  -AS     Assessment Comments  Patient presents Patient presents to outpatient PT with a diagnosis of right knee OA and medial meniscus tear. Patient has limited right knee ROM, limited right knee and hip strength, and increased pain and swelling with activity, especially weight bearing activities. Patient has limited function at this time secondary to the above.   -AS     Please refer to paper survey for additional self-reported information  Yes  -AS     Rehab Potential  Good  -AS     Patient/caregiver participated in establishment of treatment plan and goals   Yes  -AS     Patient would benefit from skilled therapy intervention  Yes  -AS        PT Plan    PT Frequency  1x/week  -AS     Predicted Duration of Therapy Intervention (PT)  4 weeks  -AS     Planned CPT's?  PT RE-EVAL: 56780;PT THER PROC EA 15 MIN: 99755;PT THER ACT EA 15 MIN: 39345;PT MANUAL THERAPY EA 15 MIN: 74183;PT NEUROMUSC RE-EDUCATION EA 15 MIN: 68297  -AS       User Key  (r) = Recorded By, (t) = Taken By, (c) = Cosigned By    Initials Name Provider Type    AS Santi Grady, PT Physical Therapist            OP Exercises     Row Name 12/08/20 0800             Subjective Pain    Able to rate subjective pain?  yes  -AS      Pre-Treatment Pain Level  5  -AS      Post-Treatment Pain Level  3  -AS         Exercise 1    Exercise Name 1  HS Stretch  -AS      Reps 1  10  -AS      Time 1  10 sec hold each  -AS         Exercise 2    Exercise Name 2  Quad Sets  -AS      Reps 2  25  -AS      Time 2  5 sec hold each  -AS         Exercise 3    Exercise Name 3  3-Way Hip  -AS      Reps 3  25 each  -AS         Exercise 4    Exercise Name 4  Supine Clams  -AS      Reps 4  25  -AS      Time 4  Gold  -AS         Exercise 5    Exercise Name 5  Bridge vs Band  -AS      Reps 5  25  -AS      Time 5  Gold  -AS         Exercise 6    Exercise Name 6  SAQ Ball Squeeze  -AS      Reps 6  25  -AS         Exercise 7    Exercise Name 7  LAQ Ball Squeeze  -AS      Reps 7  25  -AS         Exercise 8    Exercise Name 8  TKE  -AS      Reps 8  25  -AS      Time 8  Gold  -AS        User Key  (r) = Recorded By, (t) = Taken By, (c) = Cosigned By    Initials Name Provider Type    AS Santi Grady, PT Physical Therapist                        Outcome Measure Options: Lower Extremity Functional Scale (LEFS)  Lower Extremity Functional Index  Any of your usual work, housework or school activities: No difficulty  Your usual hobbies, recreational or sporting activities: No difficulty  Getting into or out of the bath: No  difficulty  Walking between rooms: No difficulty  Putting on your shoes or socks: No difficulty  Squatting: No difficulty  Lifting an object, like a bag of groceries from the floor: No difficulty  Performing light activities around your home: No difficulty  Performing heavy activities around your home: No difficulty  Getting into or out of a car: No difficulty  Walking 2 blocks: A little bit of difficulty  Walking a mile: Quite a bit of difficulty  Going up or down 10 stairs (about 1 flight of stairs): A little bit of difficulty  Standing for 1 hour: Moderate difficulty  Sitting for 1 hour: A little bit of difficulty  Running on even ground: Quite a bit of difficulty  Running on uneven ground: Extreme difficulty or unable to perform activity  Making sharp turns while running fast: Moderate difficulty  Hopping: Moderate difficulty  Rolling over in bed: No difficulty  Total: 61      Time Calculation:     Start Time: 0800  Stop Time: 0845  Time Calculation (min): 45 min     Therapy Charges for Today     Code Description Service Date Service Provider Modifiers Qty    16866652768 HC PT EVAL LOW COMPLEXITY 1 12/8/2020 Santi Grady, PT GP 1    70450177236 HC PT THER PROC EA 15 MIN 12/8/2020 Santi Grady, PT GP 2          PT G-Codes  Outcome Measure Options: Lower Extremity Functional Scale (LEFS)  Total: 61         Santi Grady, PT  12/8/2020

## 2020-12-15 ENCOUNTER — DOCUMENTATION (OUTPATIENT)
Dept: PHYSICAL THERAPY | Facility: HOSPITAL | Age: 70
End: 2020-12-15

## 2020-12-21 ENCOUNTER — APPOINTMENT (OUTPATIENT)
Dept: PHYSICAL THERAPY | Facility: HOSPITAL | Age: 70
End: 2020-12-21

## 2020-12-21 ENCOUNTER — LAB (OUTPATIENT)
Dept: LAB | Facility: HOSPITAL | Age: 70
End: 2020-12-21

## 2020-12-21 ENCOUNTER — TRANSCRIBE ORDERS (OUTPATIENT)
Dept: OBSTETRICS AND GYNECOLOGY | Facility: CLINIC | Age: 70
End: 2020-12-21

## 2020-12-21 ENCOUNTER — OFFICE VISIT (OUTPATIENT)
Dept: CARDIOLOGY | Facility: CLINIC | Age: 70
End: 2020-12-21

## 2020-12-21 VITALS
SYSTOLIC BLOOD PRESSURE: 146 MMHG | BODY MASS INDEX: 32.37 KG/M2 | OXYGEN SATURATION: 99 % | WEIGHT: 239 LBS | HEART RATE: 96 BPM | RESPIRATION RATE: 16 BRPM | DIASTOLIC BLOOD PRESSURE: 80 MMHG | HEIGHT: 72 IN

## 2020-12-21 DIAGNOSIS — I25.5 ISCHEMIC CARDIOMYOPATHY: Chronic | ICD-10-CM

## 2020-12-21 DIAGNOSIS — I65.21 STENOSIS OF RIGHT CAROTID ARTERY: Chronic | ICD-10-CM

## 2020-12-21 DIAGNOSIS — I42.9 CARDIOMYOPATHY, UNSPECIFIED TYPE (HCC): ICD-10-CM

## 2020-12-21 DIAGNOSIS — I25.10 CORONARY ARTERY DISEASE INVOLVING NATIVE CORONARY ARTERY OF NATIVE HEART WITHOUT ANGINA PECTORIS: Primary | Chronic | ICD-10-CM

## 2020-12-21 DIAGNOSIS — I10 ESSENTIAL HYPERTENSION: ICD-10-CM

## 2020-12-21 DIAGNOSIS — Z01.818 OTHER SPECIFIED PRE-OPERATIVE EXAMINATION: Primary | ICD-10-CM

## 2020-12-21 DIAGNOSIS — R06.83 LOUD SNORING: ICD-10-CM

## 2020-12-21 DIAGNOSIS — I34.0 NONRHEUMATIC MITRAL VALVE REGURGITATION: Chronic | ICD-10-CM

## 2020-12-21 DIAGNOSIS — I25.10 CORONARY ARTERY DISEASE INVOLVING NATIVE CORONARY ARTERY OF NATIVE HEART WITHOUT ANGINA PECTORIS: Chronic | ICD-10-CM

## 2020-12-21 DIAGNOSIS — I48.20 CHRONIC ATRIAL FIBRILLATION (HCC): Chronic | ICD-10-CM

## 2020-12-21 LAB — NT-PROBNP SERPL-MCNC: 2423 PG/ML (ref 0–900)

## 2020-12-21 PROCEDURE — 83880 ASSAY OF NATRIURETIC PEPTIDE: CPT

## 2020-12-21 PROCEDURE — 36415 COLL VENOUS BLD VENIPUNCTURE: CPT

## 2020-12-21 PROCEDURE — 80048 BASIC METABOLIC PNL TOTAL CA: CPT

## 2020-12-21 PROCEDURE — 99214 OFFICE O/P EST MOD 30 MIN: CPT | Performed by: NURSE PRACTITIONER

## 2020-12-21 PROCEDURE — 93000 ELECTROCARDIOGRAM COMPLETE: CPT | Performed by: NURSE PRACTITIONER

## 2020-12-21 NOTE — PROGRESS NOTES
Date of Office Visit: 2020  Encounter Provider: SIRIA Womack  Place of Service: [facility] CARDIOLOGY  Patient Name: Renato Marquez  :1950  Primary Cardiologist: Dr. Scott    CC:  KEON kim    Dear Dr. De Leon    HPI: Renato Marquez is a pleasant 70 y.o. male who presents 2020 for cardiac follow up. He is a new patient to me and I have reviewed his pat medical records.  He is a patient of Dr. Scott.    He has a history of chronic atrial fibrillation. He has a history of CAD with prior CABG. This was performed in  by Dr. Jeet Bhagat. He had a three-vessel CABG with LIMA to the LAD, SVG to the obtuse marginal, and SVG to the posterior descending coronary arteries. He also has a history of cerebrovascular disease with a right carotid endarterectomy in . The carotid endarterectomy was performed at the same time by Dr. Lewis.He had an ejection fraction of 37% on his stress test that was performed in 2016.  That showed no ischemia.  We discussed cardioversion with him, but he was having no symptoms and elected to remain on his current medical regimen. He had a stress test in  that was normal with no ischemia.     Echo 2018:  Interpretation Summary      · Calculated EF = 50%.  · Left ventricular systolic function is normal.  · Left ventricular diastolic dysfunction (grade III) consistent with reversible restrictive pattern.  · Mild mitral valve regurgitation is present  · Mild pulmonic valve regurgitation is present.  · Mild tricuspid valve regurgitation is present.  · Estimated right ventricular systolic pressure from tricuspid regurgitation is normal (<35 mmHg).            He states 2 years ago he was over 40 pounds.  He states he has gradually gained it back and is now almost back to where he originally was at 244.  He has noticed that his clothes do not fit as well he does not feel as well and he is having some shortness of breath with  exertion that was not previous there.  He states over the last 3 weeks he has felt some palpitations and shortness of air with exertion.  He has had some intermittent lower extremity edema in his feet.  He also noticed swelling in his knees and he had back pain.  He had testing and then was sent to Dr. Luis in Ortho.  He has arthritis in his back and bilateral knees.  He has been taking diclofenac that had helped quite a bit.  He also stated that after starting the diclofenac after couple weeks he felt the swelling back in his knees and down in his feet and ankles.  He has not had any dizziness, chest pain or chest pressure.  He continues to work full-time and states he just cannot do the things he wants to get done in the day.  He does have fatigue.  He also is having leg cramps and cramps in his hands.  When he saw Dr. Scott at the beginning of the year, he was instructed to stop his simvastatin.  This did help his leg cramping.  He has since been started on Pravachol and was doing well.  He does not know if it is the Pravachol that is causing the leg and hand cramps or if he is having intermittent issues with dehydration.  He is trying to be very conscientious and drink plenty of water.  He knows he has gained weight and he is committed to getting back down to around 200 which is where he feels good.  He also has not been quite as active because he states he just does not have the energy.       Past Medical History:   Diagnosis Date   • CAD (coronary artery disease)    • Cancer (CMS/HCC)    • Carotid arterial disease (CMS/HCC)    • Chronic atrial fibrillation (CMS/HCC)    • Ischemic cardiomyopathy    • Mitral regurgitation        Past Surgical History:   Procedure Laterality Date   • CAROTID ENDARTERECTOMY Right 2009    Dr Lewis   • CORONARY ARTERY BYPASS GRAFT  2009    MIMI Frederick to LAD, SVG to OM, SVG Post Desc   • FINGER SURGERY         Social History     Socioeconomic History   • Marital status:       Spouse name: Not on file   • Number of children: Not on file   • Years of education: Not on file   • Highest education level: Not on file   Tobacco Use   • Smoking status: Former Smoker   • Smokeless tobacco: Never Used   • Tobacco comment: caffine use   Substance and Sexual Activity   • Alcohol use: Yes   • Drug use: No   • Sexual activity: Not Currently       Family History   Problem Relation Age of Onset   • Heart disease Father    • Stroke Father    • Hypertension Father        The following portion of the patient's history were reviewed and updated as appropriate: past medical history, past surgical history, past social history, past family history, allergies, current medications, and problem list.    Review of Systems   Constitution: Positive for malaise/fatigue. Negative for diaphoresis and fever.   HENT: Negative for congestion, hearing loss, hoarse voice, nosebleeds and sore throat.    Eyes: Negative for photophobia, vision loss in left eye, vision loss in right eye and visual disturbance.   Cardiovascular: Positive for leg swelling (since using the diclofenac cream on his knees) and palpitations (with exertion). Negative for chest pain, dyspnea on exertion, irregular heartbeat, near-syncope, orthopnea, paroxysmal nocturnal dyspnea and syncope.   Respiratory: Positive for shortness of breath (with exertion). Negative for cough, hemoptysis, sleep disturbances due to breathing, snoring, sputum production and wheezing.    Endocrine: Negative for cold intolerance, heat intolerance, polydipsia, polyphagia and polyuria.   Hematologic/Lymphatic: Negative for bleeding problem. Does not bruise/bleed easily.   Skin: Negative for color change, dry skin, poor wound healing, rash and suspicious lesions.   Musculoskeletal: Positive for arthritis (back, knees) and joint swelling (knees and hands). Negative for back pain, falls, gout, joint pain, muscle cramps, muscle weakness and myalgias.   Gastrointestinal:  "Negative for bloating, abdominal pain, constipation, diarrhea, dysphagia, melena, nausea and vomiting.   Neurological: Negative for excessive daytime sleepiness, dizziness, headaches, light-headedness, loss of balance, numbness, paresthesias, seizures, vertigo and weakness.   Psychiatric/Behavioral: Negative for depression, memory loss and substance abuse. The patient is not nervous/anxious.        No Known Allergies      Current Outpatient Medications:   •  aspirin 81 MG EC tablet, Take 81 mg by mouth Daily., Disp: , Rfl:   •  Diclofenac Sodium (Pennsaid) 2 % solution, Apply 40 mg topically 2 (two) times a day., Disp: 112 g, Rfl: 0  •  Diclofenac Sodium (Pennsaid) 2 % solution, Apply 40 mg topically 2 (two) times a day., Disp: 112 g, Rfl: 0  •  lisinopril (PRINIVIL,ZESTRIL) 5 MG tablet, Take 1 tablet by mouth Daily., Disp: 90 tablet, Rfl: 1  •  Magnesium 200 MG tablet, Take  by mouth., Disp: , Rfl:   •  metoprolol succinate XL (TOPROL-XL) 50 MG 24 hr tablet, Take 1 tablet by mouth Daily., Disp: 90 tablet, Rfl: 1  •  pravastatin (PRAVACHOL) 40 MG tablet, Take 1 tablet by mouth Daily., Disp: 90 tablet, Rfl: 1  •  rivaroxaban (Xarelto) 20 MG tablet, Take 1 tablet by mouth Daily., Disp: 90 tablet, Rfl: 1        Objective:     Vitals:    12/21/20 1426   BP: 146/80   Pulse: 96   Resp: 16   SpO2: 99%   Weight: 108 kg (239 lb)   Height: 182.9 cm (72\")     Body mass index is 32.41 kg/m².      Vitals signs reviewed.   Constitutional:       General: Not in acute distress.     Appearance: Healthy appearance. Well-developed, well-groomed, overweight and not in distress.   Eyes:      General:         Right eye: No discharge.         Left eye: No discharge.      Conjunctiva/sclera: Conjunctivae normal.   HENT:      Head: Normocephalic and atraumatic.      Right Ear: External ear normal.      Left Ear: External ear normal.      Nose: Nose normal.   Neck:      Musculoskeletal: Normal range of motion and neck supple.      Thyroid: " No thyromegaly.      Vascular: No JVD.      Trachea: No tracheal deviation.      Lymphadenopathy: No cervical adenopathy.   Pulmonary:      Effort: Pulmonary effort is normal. No respiratory distress.      Breath sounds: Normal breath sounds. No wheezing. No rales.   Chest:      Chest wall: Not tender to palpatation.   Cardiovascular:      Normal rate. Irregular rhythm.      No gallop.   Pulses:     Intact distal pulses.   Edema:     Peripheral edema absent.   Abdominal:      General: There is no distension.      Palpations: Abdomen is soft.      Tenderness: There is no abdominal tenderness.   Musculoskeletal: Normal range of motion.         General: No tenderness or deformity.   Skin:     General: Skin is warm and dry.      Findings: No erythema or rash.   Neurological:      Mental Status: Alert and oriented to person, place, and time.      Coordination: Coordination normal.   Psychiatric:         Behavior: Behavior normal. Behavior is cooperative.         Thought Content: Thought content normal.         Judgment: Judgment normal.               ECG 12 Lead    Date/Time: 12/21/2020 2:30 PM  Performed by: Sharon Amro APRN  Authorized by: Sharon Amor APRN   Comparison: compared with previous ECG from 7/21/2020  Similar to previous ECG  Rhythm: atrial fibrillation  Ectopy: unifocal PVCs  Rate: normal  Conduction: left posterior fascicular block  ST Depression: II, III, aVF, V4, V5 and V6  T inversion: II  QRS axis: borderline right axis     Clinical impression: abnormal EKG              Assessment:       Diagnosis Plan   1. Coronary artery disease involving native coronary artery of native heart without angina pectoris  Adult Transthoracic Echo Complete W/ Cont if Necessary Per Protocol    BNP    Basic Metabolic Panel   2. Chronic atrial fibrillation (CMS/HCC)  Adult Transthoracic Echo Complete W/ Cont if Necessary Per Protocol    BNP    Basic Metabolic Panel   3. Ischemic cardiomyopathy  Adult Transthoracic Echo  Complete W/ Cont if Necessary Per Protocol    BNP    Basic Metabolic Panel   4. Nonrheumatic mitral valve regurgitation  Adult Transthoracic Echo Complete W/ Cont if Necessary Per Protocol   5. Essential hypertension     6. Stenosis of right carotid artery     7. Loud snoring  Ambulatory Referral to Sleep Lab   8. Cardiomyopathy, unspecified type (CMS/HCC)   BNP          Plan:       1.  Coronary Artery Disease  Assessment  • The patient denies angina     Plan  • Lifestyle modifications discussed include adhering to a heart healthy diet, avoidance of tobacco products, maintenance of a healthy weight, medication compliance, regular exercise and regular monitoring of cholesterol and blood pressure.  Actively trying to los weight.     Subjective - Objective  • There is a history of previous coronary artery bypass graft  • Current antiplatelet therapy includes aspirin 81 mg     2.  Atrial Fibrillation and Atrial Flutter  Assessment  • The patient has permanent atrial fibrillation.  Cannot feel irregular HR.  HE is on Xarelto and BB  • This is non-valvular in etiology  • The patient's CHADS2-VASc score is 2  • A [id number] score of 2 or more is considered a high risk for a thromboembolic event     Plan  • Continue in atrial fibrillation with rate control  • Continue rivaroxaban for antithrombotic therapy, bleeding issues discussed  • Continue beta blocker for rate control    3.  Cardiomyopathy - increase SOA with activity, mild LE edema.  Will check echo, BMP and ProBNP.  He is not on any diuretic.  Await labs and echo prior to any medication changes.      4.  Snoring - referral to sleep medicine.    Echo, labs, referral to sleep med.      ADDENDUM:  ProBNP 0963.  BMP was not run, I spoke to lab and they are going to try and add it on.  BMP from 9/2020 showed creatinine of 1.14.  Spoke to pateint, will start lasix 20 mg daily.  He will check his weight dialy.  Echo scheduled for 1/5/2021.  Await those results for  further treatment.    As always, it has been a pleasure to participate in your patient's care. Thank you.       Sincerely,       SIRIA Womack      Current Outpatient Medications:   •  aspirin 81 MG EC tablet, Take 81 mg by mouth Daily., Disp: , Rfl:   •  Diclofenac Sodium (Pennsaid) 2 % solution, Apply 40 mg topically 2 (two) times a day., Disp: 112 g, Rfl: 0  •  Diclofenac Sodium (Pennsaid) 2 % solution, Apply 40 mg topically 2 (two) times a day., Disp: 112 g, Rfl: 0  •  lisinopril (PRINIVIL,ZESTRIL) 5 MG tablet, Take 1 tablet by mouth Daily., Disp: 90 tablet, Rfl: 1  •  Magnesium 200 MG tablet, Take  by mouth., Disp: , Rfl:   •  metoprolol succinate XL (TOPROL-XL) 50 MG 24 hr tablet, Take 1 tablet by mouth Daily., Disp: 90 tablet, Rfl: 1  •  pravastatin (PRAVACHOL) 40 MG tablet, Take 1 tablet by mouth Daily., Disp: 90 tablet, Rfl: 1  •  rivaroxaban (Xarelto) 20 MG tablet, Take 1 tablet by mouth Daily., Disp: 90 tablet, Rfl: 1    Dictated utilizing Dragon dictation

## 2020-12-22 LAB
ANION GAP SERPL CALCULATED.3IONS-SCNC: 15.6 MMOL/L (ref 5–15)
BUN SERPL-MCNC: 23 MG/DL (ref 8–23)
BUN/CREAT SERPL: 21.7 (ref 7–25)
CALCIUM SPEC-SCNC: 8.9 MG/DL (ref 8.6–10.5)
CHLORIDE SERPL-SCNC: 105 MMOL/L (ref 98–107)
CO2 SERPL-SCNC: 18.4 MMOL/L (ref 22–29)
CREAT SERPL-MCNC: 1.06 MG/DL (ref 0.76–1.27)
GFR SERPL CREATININE-BSD FRML MDRD: 69 ML/MIN/1.73
GLUCOSE SERPL-MCNC: 89 MG/DL (ref 65–99)
POTASSIUM SERPL-SCNC: 5.2 MMOL/L (ref 3.5–5.2)
SODIUM SERPL-SCNC: 139 MMOL/L (ref 136–145)

## 2020-12-22 RX ORDER — FUROSEMIDE 20 MG/1
20 TABLET ORAL DAILY
Qty: 30 TABLET | Refills: 11 | Status: SHIPPED | OUTPATIENT
Start: 2020-12-22 | End: 2021-01-08 | Stop reason: SDUPTHER

## 2021-01-02 ENCOUNTER — LAB (OUTPATIENT)
Dept: LAB | Facility: HOSPITAL | Age: 71
End: 2021-01-02

## 2021-01-02 DIAGNOSIS — Z01.818 OTHER SPECIFIED PRE-OPERATIVE EXAMINATION: ICD-10-CM

## 2021-01-02 LAB — SARS-COV-2 ORF1AB RESP QL NAA+PROBE: NOT DETECTED

## 2021-01-02 PROCEDURE — C9803 HOPD COVID-19 SPEC COLLECT: HCPCS

## 2021-01-02 PROCEDURE — U0004 COV-19 TEST NON-CDC HGH THRU: HCPCS | Performed by: OBSTETRICS & GYNECOLOGY

## 2021-01-05 ENCOUNTER — HOSPITAL ENCOUNTER (OUTPATIENT)
Dept: CARDIOLOGY | Facility: HOSPITAL | Age: 71
Discharge: HOME | End: 2021-01-05
Admitting: NURSE PRACTITIONER

## 2021-01-05 VITALS — WEIGHT: 239 LBS | HEIGHT: 72 IN | BODY MASS INDEX: 32.37 KG/M2

## 2021-01-05 DIAGNOSIS — I25.10 CORONARY ARTERY DISEASE INVOLVING NATIVE CORONARY ARTERY OF NATIVE HEART WITHOUT ANGINA PECTORIS: ICD-10-CM

## 2021-01-05 DIAGNOSIS — I25.5 ISCHEMIC CARDIOMYOPATHY: ICD-10-CM

## 2021-01-05 DIAGNOSIS — I34.0 NONRHEUMATIC MITRAL VALVE REGURGITATION: ICD-10-CM

## 2021-01-05 DIAGNOSIS — I48.20 CHRONIC ATRIAL FIBRILLATION (HCC): ICD-10-CM

## 2021-01-05 PROCEDURE — 93306 TTE W/DOPPLER COMPLETE: CPT | Performed by: INTERNAL MEDICINE

## 2021-01-05 PROCEDURE — 93306 TTE W/DOPPLER COMPLETE: CPT

## 2021-01-05 PROCEDURE — 25010000002 PERFLUTREN (DEFINITY) 8.476 MG IN SODIUM CHLORIDE 0.9 % 10 ML INJECTION: Performed by: NURSE PRACTITIONER

## 2021-01-05 RX ADMIN — PERFLUTREN 2 ML: 6.52 INJECTION, SUSPENSION INTRAVENOUS at 16:59

## 2021-01-06 LAB
AORTIC DIMENSIONLESS INDEX: 1.1 (DI)
BH CV ECHO MEAS - ACS: 2.6 CM
BH CV ECHO MEAS - AO MAX PG: 5 MMHG
BH CV ECHO MEAS - AO MEAN PG (FULL): 2 MMHG
BH CV ECHO MEAS - AO MEAN PG: 3 MMHG
BH CV ECHO MEAS - AO ROOT AREA (BSA CORRECTED): 1.7
BH CV ECHO MEAS - AO ROOT AREA: 12.6 CM^2
BH CV ECHO MEAS - AO ROOT DIAM: 4 CM
BH CV ECHO MEAS - AO V2 MAX: 112 CM/SEC
BH CV ECHO MEAS - AO V2 MEAN: 77.3 CM/SEC
BH CV ECHO MEAS - AO V2 VTI: 21.1 CM
BH CV ECHO MEAS - ASC AORTA: 3 CM
BH CV ECHO MEAS - AVA(I,A): 3.5 CM^2
BH CV ECHO MEAS - AVA(I,D): 3.5 CM^2
BH CV ECHO MEAS - BSA(HAYCOCK): 2.4 M^2
BH CV ECHO MEAS - BSA: 2.3 M^2
BH CV ECHO MEAS - BZI_BMI: 32.4 KILOGRAMS/M^2
BH CV ECHO MEAS - BZI_METRIC_HEIGHT: 182.9 CM
BH CV ECHO MEAS - BZI_METRIC_WEIGHT: 108.4 KG
BH CV ECHO MEAS - CONTRAST EF 4CH: 46.7 CM2
BH CV ECHO MEAS - EDV(CUBED): 141.4 ML
BH CV ECHO MEAS - EDV(MOD-SP2): 94.3 ML
BH CV ECHO MEAS - EDV(MOD-SP4): 163 ML
BH CV ECHO MEAS - EDV(TEICH): 130.1 ML
BH CV ECHO MEAS - EF(CUBED): 68.1 %
BH CV ECHO MEAS - EF(MOD-BP): 45.7 %
BH CV ECHO MEAS - EF(MOD-SP2): 46.8 %
BH CV ECHO MEAS - EF(MOD-SP4): 45.2 %
BH CV ECHO MEAS - EF(TEICH): 59.3 %
BH CV ECHO MEAS - ESV(CUBED): 45.1 ML
BH CV ECHO MEAS - ESV(MOD-SP2): 50.2 ML
BH CV ECHO MEAS - ESV(MOD-SP4): 89.3 ML
BH CV ECHO MEAS - ESV(TEICH): 53 ML
BH CV ECHO MEAS - FS: 31.7 %
BH CV ECHO MEAS - IVS/LVPW: 1.2
BH CV ECHO MEAS - IVSD: 1.5 CM
BH CV ECHO MEAS - LAT PEAK E' VEL: 10.4 CM/SEC
BH CV ECHO MEAS - LV DIASTOLIC VOL/BSA (35-75): 70.9 ML/M^2
BH CV ECHO MEAS - LV MASS(C)D: 320.2 GRAMS
BH CV ECHO MEAS - LV MASS(C)DI: 139.3 GRAMS/M^2
BH CV ECHO MEAS - LV MAX PG: 3 MMHG
BH CV ECHO MEAS - LV MEAN PG: 1 MMHG
BH CV ECHO MEAS - LV SYSTOLIC VOL/BSA (12-30): 38.9 ML/M^2
BH CV ECHO MEAS - LV V1 MAX: 80 CM/SEC
BH CV ECHO MEAS - LV V1 MEAN: 49.8 CM/SEC
BH CV ECHO MEAS - LV V1 VTI: 16.4 CM
BH CV ECHO MEAS - LVIDD: 5.2 CM
BH CV ECHO MEAS - LVIDS: 3.6 CM
BH CV ECHO MEAS - LVLD AP2: 8.3 CM
BH CV ECHO MEAS - LVLD AP4: 8.9 CM
BH CV ECHO MEAS - LVLS AP2: 7.4 CM
BH CV ECHO MEAS - LVLS AP4: 8.3 CM
BH CV ECHO MEAS - LVOT AREA (M): 4.5 CM^2
BH CV ECHO MEAS - LVOT AREA: 4.5 CM^2
BH CV ECHO MEAS - LVOT DIAM: 2.4 CM
BH CV ECHO MEAS - LVPWD: 1.3 CM
BH CV ECHO MEAS - MED PEAK E' VEL: 6.4 CM/SEC
BH CV ECHO MEAS - MV DEC SLOPE: 825 CM/SEC^2
BH CV ECHO MEAS - MV DEC TIME: 0.11 SEC
BH CV ECHO MEAS - MV E MAX VEL: 90.2 CM/SEC
BH CV ECHO MEAS - MV MEAN PG: 2 MMHG
BH CV ECHO MEAS - MV P1/2T MAX VEL: 120 CM/SEC
BH CV ECHO MEAS - MV P1/2T: 42.6 MSEC
BH CV ECHO MEAS - MV V2 MEAN: 61.1 CM/SEC
BH CV ECHO MEAS - MV V2 VTI: 20.1 CM
BH CV ECHO MEAS - MVA P1/2T LCG: 1.8 CM^2
BH CV ECHO MEAS - MVA(P1/2T): 5.2 CM^2
BH CV ECHO MEAS - MVA(VTI): 3.7 CM^2
BH CV ECHO MEAS - PA ACC TIME: 0.06 SEC
BH CV ECHO MEAS - PA MAX PG: 1.8 MMHG
BH CV ECHO MEAS - PA PR(ACCEL): 52.9 MMHG
BH CV ECHO MEAS - PA V2 MAX: 66.5 CM/SEC
BH CV ECHO MEAS - PI END-D VEL: 145 CM/SEC
BH CV ECHO MEAS - QP/QS: 1.3
BH CV ECHO MEAS - RAP SYSTOLE: 3 MMHG
BH CV ECHO MEAS - RV MEAN PG: 1 MMHG
BH CV ECHO MEAS - RV V1 MEAN: 39.8 CM/SEC
BH CV ECHO MEAS - RV V1 VTI: 13.4 CM
BH CV ECHO MEAS - RVOT AREA: 7.1 CM^2
BH CV ECHO MEAS - RVOT DIAM: 3 CM
BH CV ECHO MEAS - RVSP: 26 MMHG
BH CV ECHO MEAS - SI(AO): 115.4 ML/M^2
BH CV ECHO MEAS - SI(CUBED): 41.9 ML/M^2
BH CV ECHO MEAS - SI(LVOT): 32.3 ML/M^2
BH CV ECHO MEAS - SI(MOD-SP2): 19.2 ML/M^2
BH CV ECHO MEAS - SI(MOD-SP4): 32.1 ML/M^2
BH CV ECHO MEAS - SI(TEICH): 33.5 ML/M^2
BH CV ECHO MEAS - SV(AO): 265.2 ML
BH CV ECHO MEAS - SV(CUBED): 96.3 ML
BH CV ECHO MEAS - SV(LVOT): 74.2 ML
BH CV ECHO MEAS - SV(MOD-SP2): 44.1 ML
BH CV ECHO MEAS - SV(MOD-SP4): 73.7 ML
BH CV ECHO MEAS - SV(RVOT): 94.7 ML
BH CV ECHO MEAS - SV(TEICH): 77.1 ML
BH CV ECHO MEAS - TAPSE (>1.6): 1.6 CM
BH CV ECHO MEAS - TR MAX VEL: 235.5 CM/SEC
BH CV ECHO MEASUREMENTS AVERAGE E/E' RATIO: 10.74
BH CV XLRA - RV BASE: 4.3 CM
BH CV XLRA - TDI S': 11.3 CM/SEC
LEFT ATRIUM VOLUME INDEX: 28.2 ML/M2
LV EF 2D ECHO EST: 46 %
MAXIMAL PREDICTED HEART RATE: 148 BPM
STRESS TARGET HR: 126 BPM

## 2021-01-07 ENCOUNTER — TELEPHONE (OUTPATIENT)
Dept: CARDIOLOGY | Facility: CLINIC | Age: 71
End: 2021-01-07

## 2021-01-08 DIAGNOSIS — I25.5 ISCHEMIC CARDIOMYOPATHY: Chronic | ICD-10-CM

## 2021-01-08 DIAGNOSIS — I25.10 CORONARY ARTERY DISEASE INVOLVING NATIVE CORONARY ARTERY OF NATIVE HEART WITHOUT ANGINA PECTORIS: Primary | Chronic | ICD-10-CM

## 2021-01-08 RX ORDER — FUROSEMIDE 20 MG/1
40 TABLET ORAL DAILY
Qty: 60 TABLET | Refills: 11 | Status: SHIPPED | OUTPATIENT
Start: 2021-01-08 | End: 2021-06-02

## 2021-01-12 ENCOUNTER — OFFICE VISIT (OUTPATIENT)
Dept: ORTHOPEDIC SURGERY | Facility: CLINIC | Age: 71
End: 2021-01-12

## 2021-01-12 VITALS — WEIGHT: 239 LBS | HEIGHT: 72 IN | BODY MASS INDEX: 32.37 KG/M2

## 2021-01-12 DIAGNOSIS — S83.231A COMPLEX TEAR OF MEDIAL MENISCUS OF RIGHT KNEE AS CURRENT INJURY, INITIAL ENCOUNTER: Primary | ICD-10-CM

## 2021-01-12 DIAGNOSIS — M17.11 PRIMARY OSTEOARTHRITIS OF RIGHT KNEE: ICD-10-CM

## 2021-01-12 PROCEDURE — 99212 OFFICE O/P EST SF 10 MIN: CPT | Performed by: ORTHOPAEDIC SURGERY

## 2021-01-12 NOTE — PROGRESS NOTES
Subjective:     Patient ID: Renato Marquez is a 70 y.o. male.    Chief Complaint:  Follow-up right knee pain, DJD, medial meniscal root tear  History of Present Illness  Renato Marquez returns to clinic today for evaluation of right knee, states he is doing very well at this point time as he had significant improvement with both the Medrol Dosepak as well as the Pennsaid topical anti-inflammatory cream.  He has transition to Voltaren gel because his insurance would not cover the Pennsaid and has had moderate improvement with it as well.  Still some mild residual pain intermittently over the anterior medial aspect of the knee rates as a 1-2 out of 10 and aching in nature primarily with prolonged weightbearing activities as well as deep flexion and rotation.  No keanu locking or catching of his knee.  Mild intermittent swelling.     Social History     Occupational History   • Not on file   Tobacco Use   • Smoking status: Former Smoker   • Smokeless tobacco: Never Used   • Tobacco comment: caffine use   Substance and Sexual Activity   • Alcohol use: Yes   • Drug use: No   • Sexual activity: Not Currently      Past Medical History:   Diagnosis Date   • CAD (coronary artery disease)    • Cancer (CMS/HCC)    • Carotid arterial disease (CMS/HCC)    • Chronic atrial fibrillation (CMS/HCC)    • Ischemic cardiomyopathy    • Mitral regurgitation      Past Surgical History:   Procedure Laterality Date   • CAROTID ENDARTERECTOMY Right 2009    Dr Lewis   • CORONARY ARTERY BYPASS GRAFT  2009    MIMI Frederick to LAD, SVG to OM, SVG Post Desc   • FINGER SURGERY         Family History   Problem Relation Age of Onset   • Heart disease Father    • Stroke Father    • Hypertension Father          Review of Systems   Constitutional: Negative for chills, diaphoresis, fever and unexpected weight change.   HENT: Negative for hearing loss, nosebleeds, sore throat and tinnitus.    Eyes: Negative for pain and visual disturbance.  "  Respiratory: Negative for cough, shortness of breath and wheezing.    Cardiovascular: Negative for chest pain and palpitations.   Gastrointestinal: Negative for abdominal pain, diarrhea, nausea and vomiting.   Endocrine: Negative for cold intolerance, heat intolerance and polydipsia.   Genitourinary: Negative for difficulty urinating, dysuria and hematuria.   Musculoskeletal: Positive for arthralgias and myalgias. Negative for joint swelling.   Skin: Negative for rash and wound.   Allergic/Immunologic: Negative for environmental allergies.   Neurological: Negative for dizziness, syncope and numbness.   Hematological: Does not bruise/bleed easily.   Psychiatric/Behavioral: Negative for dysphoric mood and sleep disturbance. The patient is not nervous/anxious.            Objective:  Vitals:    01/12/21 1556   Weight: 108 kg (239 lb)   Height: 182.9 cm (72\")         01/12/21  1556   Weight: 108 kg (239 lb)     Body mass index is 32.41 kg/m².  Physical Exam    Vital signs reviewed.   General: No acute distress, alert and oriented  Eyes: conjunctiva clear; pupils equally round and reactive  ENT: external ears and nose atraumatic; oropharynx clear  CV: no peripheral edema  Resp: normal respiratory effort  Skin: no rashes or wounds; normal turgor  Psych: mood and affect appropriate; recent and remote memory intact          Ortho Exam     Right knee- TTP along medial joint line and anterior medial aspect of knee with moderate tenderness also along medial and lateral patellar facet, mildly positive Alejandrina exam medial joint line   ROM 0- 125   Strength 4+/5 on flex/ext   minimal effusion   Stable to varus and valgus stress at 0 and 30 deg   Positive sensation right foot      Imaging:  Review again of prior imaging indicates moderate tricompartmental osteoarthritis no significant medial compartment and patellofemoral compartment, MRI confirms medial meniscal root tear with subluxation of the body    Assessment:        1. " Complex tear of medial meniscus of right knee as current injury, initial encounter    2. Primary osteoarthritis of right knee           Plan:          1. Discussed treatment options at length with patient at today's visit.  Patient is doing very well at this point time with conservative treatment, continue with topical anti-inflammatory medications as tolerated.  He is on Xarelto so he is limited on oral anti-inflammatory use.  Recommended continued use of home exercise program for hip, core, and quad strengthening.  Can also continue use of compression knee sleeve as tolerated.  I will see him back as needed, if pain does exacerbate could consider intra-articular injection options      Renato Alma was in agreement with plan and had all questions answered.     Orders:  No orders of the defined types were placed in this encounter.      Medications:  No orders of the defined types were placed in this encounter.      Followup:  Return if symptoms worsen or fail to improve.    Diagnoses and all orders for this visit:    1. Complex tear of medial meniscus of right knee as current injury, initial encounter (Primary)    2. Primary osteoarthritis of right knee          Dictated utilizing Dragon dictation

## 2021-01-16 ENCOUNTER — TRANSCRIBE ORDERS (OUTPATIENT)
Dept: ADMINISTRATIVE | Facility: HOSPITAL | Age: 71
End: 2021-01-16

## 2021-01-16 DIAGNOSIS — Z01.818 OTHER SPECIFIED PRE-OPERATIVE EXAMINATION: Primary | ICD-10-CM

## 2021-01-22 ENCOUNTER — OFFICE VISIT (OUTPATIENT)
Dept: SLEEP MEDICINE | Facility: HOSPITAL | Age: 71
End: 2021-01-22

## 2021-01-22 ENCOUNTER — LAB (OUTPATIENT)
Dept: LAB | Facility: HOSPITAL | Age: 71
End: 2021-01-22

## 2021-01-22 VITALS
SYSTOLIC BLOOD PRESSURE: 122 MMHG | DIASTOLIC BLOOD PRESSURE: 65 MMHG | HEART RATE: 91 BPM | WEIGHT: 233 LBS | BODY MASS INDEX: 31.56 KG/M2 | HEIGHT: 72 IN

## 2021-01-22 DIAGNOSIS — I25.5 ISCHEMIC CARDIOMYOPATHY: Chronic | ICD-10-CM

## 2021-01-22 DIAGNOSIS — Z01.818 OTHER SPECIFIED PRE-OPERATIVE EXAMINATION: ICD-10-CM

## 2021-01-22 DIAGNOSIS — R06.83 SNORING: ICD-10-CM

## 2021-01-22 DIAGNOSIS — G47.30 OBSERVED SLEEP APNEA: Primary | ICD-10-CM

## 2021-01-22 DIAGNOSIS — E66.09 CLASS 1 OBESITY DUE TO EXCESS CALORIES WITHOUT SERIOUS COMORBIDITY WITH BODY MASS INDEX (BMI) OF 31.0 TO 31.9 IN ADULT: ICD-10-CM

## 2021-01-22 DIAGNOSIS — G47.10 HYPERSOMNIA: ICD-10-CM

## 2021-01-22 DIAGNOSIS — I48.91 ATRIAL FIBRILLATION, UNSPECIFIED TYPE (HCC): ICD-10-CM

## 2021-01-22 PROBLEM — E66.811 CLASS 1 OBESITY DUE TO EXCESS CALORIES WITHOUT SERIOUS COMORBIDITY WITH BODY MASS INDEX (BMI) OF 31.0 TO 31.9 IN ADULT: Status: ACTIVE | Noted: 2021-01-22

## 2021-01-22 LAB — SARS-COV-2 ORF1AB RESP QL NAA+PROBE: NOT DETECTED

## 2021-01-22 PROCEDURE — C9803 HOPD COVID-19 SPEC COLLECT: HCPCS

## 2021-01-22 PROCEDURE — 99204 OFFICE O/P NEW MOD 45 MIN: CPT | Performed by: INTERNAL MEDICINE

## 2021-01-22 PROCEDURE — G0463 HOSPITAL OUTPT CLINIC VISIT: HCPCS

## 2021-01-22 PROCEDURE — U0004 COV-19 TEST NON-CDC HGH THRU: HCPCS | Performed by: OBSTETRICS & GYNECOLOGY

## 2021-01-22 NOTE — PROGRESS NOTES
[facility]  [address]  [address]  [place] [address]  Phone   Fax       Renato Marquez  1950  70 y.o.  male      Referring Provider SIRIA Huang   PCP Everardo De Leon Jr., DO    Type of service: Initial consult  Date of service: 1/22/2021      Chief Complaint   Patient presents with   • Witnessed Apnea   • Snoring   • Fatigue   • Daytime Sleepiness       History of present illness;  Thank you for asking to see Renato Marquez, 70 y.o.  for evaluation of sleep apnea. The patient was seen today on 1/22/2021 at [facility].   Patient is being sent for surgical evaluation of sleep apnea because of symptoms of waking up several times and snoring.    Patient gives the following sleep history.  Sleep schedule:  Bedtime: 10 PM  Wake time: 30 a.m.  Normally takes about 20-30 minutes to fall asleep  Average hours of sleep 6-7  Number of naps per day no  When patient wakes up still feels tired and not rested  Snoring; yes  Witnessed apneas; yes  Have you ever awakened gasping for breath, coughing, choking: Yes      Past Medical History:   Diagnosis Date   • CAD (coronary artery disease)    • Cancer (CMS/HCC)    • Carotid arterial disease (CMS/HCC)    • Chronic atrial fibrillation (CMS/HCC)    • Ischemic cardiomyopathy    • Mitral regurgitation        Past Surgical history   has a past surgical history that includes Finger surgery; Coronary artery bypass graft (2009); and Carotid endarterectomy (Right, 2009).     Social history:  Shift work: No  Tobacco use: Smokes 3 cigars/week  Alcohol use: 2/day  Caffeinated drinks: Decaf  Over-the-counter sleeping aid and medications: No  Narcotic medications: No    Family Hx  Family history of sleep apnea, negative  Family History   Problem Relation Age of Onset   • Heart disease Father    • Stroke Father    • Hypertension Father        Medications: reviewed    Current Outpatient Medications:   •  aspirin  "81 MG EC tablet, Take 81 mg by mouth Daily., Disp: , Rfl:   •  Diclofenac Sodium (Pennsaid) 2 % solution, Apply 40 mg topically 2 (two) times a day., Disp: 112 g, Rfl: 0  •  Diclofenac Sodium (Pennsaid) 2 % solution, Apply 40 mg topically 2 (two) times a day., Disp: 112 g, Rfl: 0  •  furosemide (LASIX) 20 MG tablet, Take 2 tablets by mouth Daily., Disp: 60 tablet, Rfl: 11  •  lisinopril (PRINIVIL,ZESTRIL) 5 MG tablet, Take 1 tablet by mouth Daily., Disp: 90 tablet, Rfl: 1  •  Magnesium 200 MG tablet, Take  by mouth., Disp: , Rfl:   •  metoprolol succinate XL (TOPROL-XL) 50 MG 24 hr tablet, Take 1 tablet by mouth Daily., Disp: 90 tablet, Rfl: 1  •  pravastatin (PRAVACHOL) 40 MG tablet, Take 1 tablet by mouth Daily., Disp: 90 tablet, Rfl: 1  •  rivaroxaban (Xarelto) 20 MG tablet, Take 1 tablet by mouth Daily., Disp: 90 tablet, Rfl: 1    Review of systems:  [place] Sleepiness Scale: Total score: 14   Positive for snoring, witnessed apneas, fatigue and daytime excessive sleepiness,   Negative for shortness of breath, cough, wheezing, chest pain, nausea and vomiting, palpitation, swelling of feet:    Morning headaches: No  Awaken with sore throat or dry mouth : Yes  Leg jerking at night: No  Crawly feeling/urge sensation to move in the legs: No  Teeth grinding: No  Sleepwalking, nightmares, muscle weakness with laughing or anger,sleep paralysis: No  Nasal Congestion: No  Nocturia (how many times/night): 3-4  Memory Problem: No  Change in weight, no significant change    Physical exam:  Vitals:    01/22/21 0900   BP: 122/65   Pulse: 91   Weight: 106 kg (233 lb)   Height: 182.9 cm (72\")    Body mass index is 31.6 kg/m². Neck Circumference: 16.75 inches  HEENT: Head is atraumatic, normocephalic   Eyes:pupils are round equal and reacting to light and accommodation, conjunctiva normal  Nose:no nasal septal defects or deviation and the nasal passages are clear, no nasal polyps,   Throat: tonsils are not enlarged, tongue normal " size, oral airway Mallampati class 3  NECK: No lymphadenopathy, trachea is in the midline, thyroid not enlarged  RESPIRATORY SYSTEM: Breath sounds are equal on both sides, there are no wheezes or crackles  CARDIOVASULAR SYSTEM: Heart sounds are regular rhythm and liat rate, there are no murmurs or thrills  ABDOMEN: Soft, no hepatosplenomegaly, no evidence of ascites  EXTREMITES: No cyanosis, clubbing or edema   NEUROLOGICAL SYSTEM: Oriented x 3, no gross neurological defects, gait normal    Medical records and labs reviewed in [facility] reviewed    Assessment and plan:  · Sleep apnea unspecified, (G47.30) Patient's symptoms and examination is consistent with sleep apnea.  I have talked to the patient about the signs and symptoms of sleep apnea and consequences of untreated sleep apnea. Discussed sleep testing.  I have placed a order in epic for a full night polysomnography.  Patient will have a follow-up after this sleep test is done.   · Obesity, patient's BMI is Body mass index is 31.6 kg/m².. I have discussed the relationship between weight and sleep apnea.There is direct correction between weight and severity of sleep apnea.  Weight reduction is encouraged. I have also discussed with the patient diet and exercise.  · Snoring secondary to sleep apnea  · Hypersomnia with [place] Sleepiness Scale of Total score: 14 due to sleep apnea.  · Atrial fibrillation, on medical management  · Cardiomyopathy      I once again thank you for asking me to see this patient in consultation and I have forwarded my opinion and treatment plan.  Please do not hesitate to call me if you have any questions.     Jett Mcguire MD  Sleep Medicine  Medical Director, [facility]  1/22/2021 ,

## 2021-01-25 ENCOUNTER — HOSPITAL ENCOUNTER (OUTPATIENT)
Dept: CARDIOLOGY | Facility: HOSPITAL | Age: 71
Discharge: HOME | End: 2021-01-25

## 2021-01-25 ENCOUNTER — HOSPITAL ENCOUNTER (OUTPATIENT)
Dept: NUCLEAR MEDICINE | Facility: HOSPITAL | Age: 71
Discharge: HOME | End: 2021-01-25

## 2021-01-25 DIAGNOSIS — I25.5 ISCHEMIC CARDIOMYOPATHY: ICD-10-CM

## 2021-01-25 DIAGNOSIS — I25.10 CORONARY ARTERY DISEASE INVOLVING NATIVE CORONARY ARTERY OF NATIVE HEART WITHOUT ANGINA PECTORIS: ICD-10-CM

## 2021-01-25 LAB
BH CV NUCLEAR PRIOR STUDY: 3
BH CV STRESS BP STAGE 1: NORMAL
BH CV STRESS COMMENTS STAGE 1: NORMAL
BH CV STRESS DOSE REGADENOSON STAGE 1: 0.4
BH CV STRESS DURATION MIN STAGE 1: 0
BH CV STRESS DURATION SEC STAGE 1: 30
BH CV STRESS HR STAGE 1: 76
BH CV STRESS O2 STAGE 1: 96
BH CV STRESS PROTOCOL 1: NORMAL
BH CV STRESS RECOVERY BP: NORMAL MMHG
BH CV STRESS RECOVERY HR: 91 BPM
BH CV STRESS RECOVERY O2: 98 %
BH CV STRESS STAGE 1: 1
LV EF NUC BP: 40 %
MAXIMAL PREDICTED HEART RATE: 148 BPM
PERCENT MAX PREDICTED HR: 70.95 %
STRESS BASELINE BP: NORMAL MMHG
STRESS BASELINE HR: 85 BPM
STRESS O2 SAT REST: 99 %
STRESS PERCENT HR: 83 %
STRESS POST ESTIMATED WORKLOAD: 1 METS
STRESS POST EXERCISE DUR SEC: 30 SEC
STRESS POST O2 SAT PEAK: 99 %
STRESS POST PEAK BP: NORMAL MMHG
STRESS POST PEAK HR: 105 BPM
STRESS TARGET HR: 126 BPM

## 2021-01-25 PROCEDURE — A9500 TC99M SESTAMIBI: HCPCS | Performed by: NURSE PRACTITIONER

## 2021-01-25 PROCEDURE — 93018 CV STRESS TEST I&R ONLY: CPT | Performed by: INTERNAL MEDICINE

## 2021-01-25 PROCEDURE — 25010000002 REGADENOSON 0.4 MG/5ML SOLUTION: Performed by: NURSE PRACTITIONER

## 2021-01-25 PROCEDURE — 93017 CV STRESS TEST TRACING ONLY: CPT

## 2021-01-25 PROCEDURE — 0 TECHNETIUM SESTAMIBI: Performed by: NURSE PRACTITIONER

## 2021-01-25 PROCEDURE — 78452 HT MUSCLE IMAGE SPECT MULT: CPT

## 2021-01-25 PROCEDURE — 78452 HT MUSCLE IMAGE SPECT MULT: CPT | Performed by: INTERNAL MEDICINE

## 2021-01-25 PROCEDURE — 93016 CV STRESS TEST SUPVJ ONLY: CPT | Performed by: INTERNAL MEDICINE

## 2021-01-25 RX ADMIN — TECHNETIUM TC 99M SESTAMIBI 1 DOSE: 1 INJECTION INTRAVENOUS at 09:54

## 2021-01-25 RX ADMIN — REGADENOSON 0.4 MG: 0.08 INJECTION, SOLUTION INTRAVENOUS at 09:54

## 2021-01-25 RX ADMIN — TECHNETIUM TC 99M SESTAMIBI 1 DOSE: 1 INJECTION INTRAVENOUS at 08:04

## 2021-01-26 DIAGNOSIS — I48.91 ATRIAL FIBRILLATION, UNSPECIFIED TYPE (HCC): ICD-10-CM

## 2021-01-26 DIAGNOSIS — I10 ESSENTIAL HYPERTENSION: ICD-10-CM

## 2021-01-26 DIAGNOSIS — I25.10 CORONARY ARTERY DISEASE INVOLVING NATIVE CORONARY ARTERY OF NATIVE HEART WITHOUT ANGINA PECTORIS: Primary | Chronic | ICD-10-CM

## 2021-01-26 DIAGNOSIS — I11.0 HYPERTENSIVE HEART DISEASE WITH HEART FAILURE (HCC): ICD-10-CM

## 2021-01-26 DIAGNOSIS — I25.5 ISCHEMIC CARDIOMYOPATHY: Chronic | ICD-10-CM

## 2021-01-26 RX ORDER — LISINOPRIL 10 MG/1
10 TABLET ORAL DAILY
Qty: 90 TABLET | Refills: 3 | Status: ON HOLD | OUTPATIENT
Start: 2021-01-26 | End: 2021-06-22 | Stop reason: SDUPTHER

## 2021-02-03 ENCOUNTER — HOSPITAL ENCOUNTER (OUTPATIENT)
Dept: SLEEP MEDICINE | Facility: HOSPITAL | Age: 71
Discharge: HOME | End: 2021-02-03
Admitting: INTERNAL MEDICINE

## 2021-02-03 DIAGNOSIS — I48.91 ATRIAL FIBRILLATION, UNSPECIFIED TYPE (HCC): ICD-10-CM

## 2021-02-03 DIAGNOSIS — E66.09 CLASS 1 OBESITY DUE TO EXCESS CALORIES WITHOUT SERIOUS COMORBIDITY WITH BODY MASS INDEX (BMI) OF 31.0 TO 31.9 IN ADULT: ICD-10-CM

## 2021-02-03 DIAGNOSIS — R06.83 SNORING: ICD-10-CM

## 2021-02-03 DIAGNOSIS — G47.10 HYPERSOMNIA: ICD-10-CM

## 2021-02-03 DIAGNOSIS — I25.5 ISCHEMIC CARDIOMYOPATHY: ICD-10-CM

## 2021-02-03 DIAGNOSIS — G47.30 OBSERVED SLEEP APNEA: ICD-10-CM

## 2021-02-03 PROCEDURE — 95810 POLYSOM 6/> YRS 4/> PARAM: CPT

## 2021-02-03 PROCEDURE — 95810 POLYSOM 6/> YRS 4/> PARAM: CPT | Performed by: INTERNAL MEDICINE

## 2021-02-04 ENCOUNTER — LAB (OUTPATIENT)
Dept: LAB | Facility: HOSPITAL | Age: 71
End: 2021-02-04

## 2021-02-04 DIAGNOSIS — I25.5 ISCHEMIC CARDIOMYOPATHY: Chronic | ICD-10-CM

## 2021-02-04 DIAGNOSIS — I48.91 ATRIAL FIBRILLATION, UNSPECIFIED TYPE (HCC): ICD-10-CM

## 2021-02-04 DIAGNOSIS — I25.10 CORONARY ARTERY DISEASE INVOLVING NATIVE CORONARY ARTERY OF NATIVE HEART WITHOUT ANGINA PECTORIS: Chronic | ICD-10-CM

## 2021-02-04 LAB
ANION GAP SERPL CALCULATED.3IONS-SCNC: 10.4 MMOL/L (ref 5–15)
BUN SERPL-MCNC: 29 MG/DL (ref 8–23)
BUN/CREAT SERPL: 19.2 (ref 7–25)
CALCIUM SPEC-SCNC: 9.6 MG/DL (ref 8.6–10.5)
CHLORIDE SERPL-SCNC: 101 MMOL/L (ref 98–107)
CO2 SERPL-SCNC: 27.6 MMOL/L (ref 22–29)
CREAT SERPL-MCNC: 1.51 MG/DL (ref 0.76–1.27)
GFR SERPL CREATININE-BSD FRML MDRD: 46 ML/MIN/1.73
GLUCOSE SERPL-MCNC: 92 MG/DL (ref 65–99)
POTASSIUM SERPL-SCNC: 4.4 MMOL/L (ref 3.5–5.2)
SODIUM SERPL-SCNC: 139 MMOL/L (ref 136–145)

## 2021-02-04 PROCEDURE — 80048 BASIC METABOLIC PNL TOTAL CA: CPT

## 2021-02-04 PROCEDURE — 36415 COLL VENOUS BLD VENIPUNCTURE: CPT

## 2021-02-05 DIAGNOSIS — G47.33 OSA (OBSTRUCTIVE SLEEP APNEA): ICD-10-CM

## 2021-02-05 DIAGNOSIS — G47.34 SLEEP RELATED HYPOXIA: ICD-10-CM

## 2021-02-05 DIAGNOSIS — R06.3 CENTRAL SLEEP APNEA DUE TO CHEYNE-STOKES RESPIRATION: Primary | ICD-10-CM

## 2021-02-05 DIAGNOSIS — R06.83 SNORING: ICD-10-CM

## 2021-02-05 RX ORDER — ZOLPIDEM TARTRATE 5 MG/1
5 TABLET ORAL TAKE AS DIRECTED
Qty: 2 TABLET | Refills: 0 | Status: SHIPPED | OUTPATIENT
Start: 2021-02-05 | End: 2021-06-02

## 2021-02-08 ENCOUNTER — TELEPHONE (OUTPATIENT)
Dept: SLEEP MEDICINE | Facility: HOSPITAL | Age: 71
End: 2021-02-08

## 2021-02-08 ENCOUNTER — TRANSCRIBE ORDERS (OUTPATIENT)
Dept: OBSTETRICS AND GYNECOLOGY | Facility: CLINIC | Age: 71
End: 2021-02-08

## 2021-02-08 DIAGNOSIS — Z01.818 OTHER SPECIFIED PRE-OPERATIVE EXAMINATION: Primary | ICD-10-CM

## 2021-02-22 ENCOUNTER — LAB (OUTPATIENT)
Dept: LAB | Facility: HOSPITAL | Age: 71
End: 2021-02-22

## 2021-02-22 DIAGNOSIS — Z01.818 OTHER SPECIFIED PRE-OPERATIVE EXAMINATION: Primary | ICD-10-CM

## 2021-02-22 LAB — SARS-COV-2 RNA PNL SPEC NAA+PROBE: NOT DETECTED

## 2021-02-22 PROCEDURE — C9803 HOPD COVID-19 SPEC COLLECT: HCPCS

## 2021-02-22 PROCEDURE — 87635 SARS-COV-2 COVID-19 AMP PRB: CPT | Performed by: OBSTETRICS & GYNECOLOGY

## 2021-02-24 ENCOUNTER — HOSPITAL ENCOUNTER (OUTPATIENT)
Dept: SLEEP MEDICINE | Facility: HOSPITAL | Age: 71
Discharge: HOME | End: 2021-02-24
Admitting: INTERNAL MEDICINE

## 2021-02-24 DIAGNOSIS — R06.83 SNORING: ICD-10-CM

## 2021-02-24 DIAGNOSIS — G47.34 SLEEP RELATED HYPOXIA: ICD-10-CM

## 2021-02-24 DIAGNOSIS — R06.3 CENTRAL SLEEP APNEA DUE TO CHEYNE-STOKES RESPIRATION: ICD-10-CM

## 2021-02-24 DIAGNOSIS — G47.33 OSA (OBSTRUCTIVE SLEEP APNEA): ICD-10-CM

## 2021-02-24 PROCEDURE — 95811 POLYSOM 6/>YRS CPAP 4/> PARM: CPT

## 2021-02-24 PROCEDURE — 95811 POLYSOM 6/>YRS CPAP 4/> PARM: CPT | Performed by: INTERNAL MEDICINE

## 2021-03-01 ENCOUNTER — TELEPHONE (OUTPATIENT)
Dept: SLEEP MEDICINE | Facility: HOSPITAL | Age: 71
End: 2021-03-01

## 2021-04-02 ENCOUNTER — OFFICE VISIT (OUTPATIENT)
Dept: SLEEP MEDICINE | Facility: HOSPITAL | Age: 71
End: 2021-04-02

## 2021-04-02 VITALS
BODY MASS INDEX: 31.97 KG/M2 | HEIGHT: 72 IN | DIASTOLIC BLOOD PRESSURE: 86 MMHG | HEART RATE: 86 BPM | WEIGHT: 236 LBS | SYSTOLIC BLOOD PRESSURE: 148 MMHG

## 2021-04-02 DIAGNOSIS — E66.09 CLASS 1 OBESITY DUE TO EXCESS CALORIES WITHOUT SERIOUS COMORBIDITY WITH BODY MASS INDEX (BMI) OF 31.0 TO 31.9 IN ADULT: ICD-10-CM

## 2021-04-02 DIAGNOSIS — G47.31 COMPLEX SLEEP APNEA SYNDROME: Primary | ICD-10-CM

## 2021-04-02 PROBLEM — G47.39 COMPLEX SLEEP APNEA SYNDROME: Status: ACTIVE | Noted: 2021-04-02

## 2021-04-02 PROBLEM — G47.30 OBSERVED SLEEP APNEA: Status: RESOLVED | Noted: 2021-01-22 | Resolved: 2021-04-02

## 2021-04-02 PROCEDURE — G0463 HOSPITAL OUTPT CLINIC VISIT: HCPCS

## 2021-04-02 PROCEDURE — 99213 OFFICE O/P EST LOW 20 MIN: CPT | Performed by: INTERNAL MEDICINE

## 2021-04-02 NOTE — PROGRESS NOTES
[facility]  [address]  [address]  [place] [address]  Phone   Fax       SLEEP CLINIC FOLLOW UP PROGRESS NOTE.    Renato Marquez  1950  70 y.o.  male      PCP: Everardo De Leon Jr., DO      Date of visit: 4/2/2021    Chief Complaint   Patient presents with   • Sleep Apnea       HPI:  This is a 70 y.o. years old patient who has a history of snoring and he underwent polysomnography which showed complex sleep apnea with both obstructive sleep apnea and central apneas.  He underwent a BiPAP ST titration.  Patient is here to discuss the test results and is prognosis and condition.    Normally goes to bed around 10 PM wakes up at around 6:30 AM.  Feels tired and exhausted.    Past Medical History:   Diagnosis Date   • CAD (coronary artery disease)    • Cancer (CMS/HCC)    • Carotid arterial disease (CMS/HCC)    • Chronic atrial fibrillation (CMS/HCC)    • Complex sleep apnea syndrome     BiPAP ST   • Ischemic cardiomyopathy    • Mitral regurgitation        MEDICATIONS: reviewed by me    Current Outpatient Medications:   •  furosemide (LASIX) 20 MG tablet, Take 2 tablets by mouth Daily., Disp: 60 tablet, Rfl: 11  •  lisinopril (PRINIVIL,ZESTRIL) 10 MG tablet, Take 1 tablet by mouth Daily., Disp: 90 tablet, Rfl: 3  •  Magnesium 200 MG tablet, Take 250 mg by mouth., Disp: , Rfl:   •  metoprolol succinate XL (TOPROL-XL) 50 MG 24 hr tablet, Take 1 tablet by mouth Daily., Disp: 90 tablet, Rfl: 1  •  pravastatin (PRAVACHOL) 40 MG tablet, Take 1 tablet by mouth Daily., Disp: 90 tablet, Rfl: 1  •  predniSONE (DELTASONE) 10 MG tablet, 6 tabs days 1&2, 4 tabs days 3&4, 2 tabs days 5&6, 1 tab days 7&8, 1/2 tab days 9&10 then dc, Disp: 27 tablet, Rfl: 0  •  rivaroxaban (Xarelto) 20 MG tablet, Take 1 tablet by mouth Daily., Disp: 90 tablet, Rfl: 1  •  zolpidem (AMBIEN) 5 MG tablet, Take 1 tablet by mouth Take As Directed for 2 doses. Bring the medication to the sleep lab. DO  "NOT USE AT HOME, Disp: 2 tablet, Rfl: 0    No Known Allergies reviewed by me    SOCIAL, FAMILY HISTORY: Medical records are reviewed and noted by me.  History of smoking no  History of alcohol use temporary  Caffeine use 3    REVIEW OF SYSTEMS:   [place] Sleepiness Scale :Total score: 8   Snoring: Yes  Morning headache: y  Nasal congestion: n      PHYSICAL EXAMINATION:  CONSTITUTIONAL:  Vitals:    04/02/21 0700   BP: 148/86   Pulse: 86   Weight: 107 kg (236 lb)   Height: 182.9 cm (72\")    Body mass index is 32.01 kg/m².    EYES: pupils are round equal and reacting to light and accommodation,   NOSE: nasal passages are clear, no nasal polyps, septum in the midline.  THROAT: throat is clear, oral airway Mallampati class 3  RESP SYSTEM: Breath sounds are normal, no wheezes or crackles  CARDIOVASULAR: Heart rate is regular without murmur. No edema  Musculosketal: No cyanosis, No clubbing, gait is normal        ASSESSMENT AND PLAN:  · Complex sleep apnea.  Patient has complex sleep apnea and has taken more than 15 minutes to talk to him about the type of sleep apnea he has which is both central sleep apnea and obstructive sleep apnea.  I have given him a printout and education material from [facility].  I have explained the pathophysiology of both central sleep apnea and obstructive sleep apnea.  It is very important for him to be treated with a BiPAP ST because of the central apneas patient may get into complications and even death.  I have arranged BiPAP ST to be delivered and he will see me in about 2 months for follow-up.  · Obesity, patient's BMI is Body mass index is 32.01 kg/m²..  I have discussed weight reduction and the health benefits.  I have also discuss the relationship between the weight and sleep apnea and encouraged the patient to lose weight which is beneficial in treating sleep apnea. Discussed diet and exercise with the patient.  ·   · Return to clinic in 2 months for follow-up and patient also " instructed to call the sleep clinic for any problems.  All the patient's questions were answered.        Jett Mcguire MD  Sleep Medicine  Medical Director for [facility]  4/2/2021

## 2021-05-16 DIAGNOSIS — I48.20 CHRONIC ATRIAL FIBRILLATION (HCC): Chronic | ICD-10-CM

## 2021-05-18 RX ORDER — RIVAROXABAN 20 MG/1
TABLET, FILM COATED ORAL
Qty: 90 TABLET | Refills: 0 | OUTPATIENT
Start: 2021-05-18

## 2021-05-21 ENCOUNTER — OFFICE VISIT (OUTPATIENT)
Dept: SLEEP MEDICINE | Facility: HOSPITAL | Age: 71
End: 2021-05-21

## 2021-05-21 VITALS
HEIGHT: 72 IN | SYSTOLIC BLOOD PRESSURE: 118 MMHG | BODY MASS INDEX: 32.23 KG/M2 | WEIGHT: 238 LBS | HEART RATE: 60 BPM | DIASTOLIC BLOOD PRESSURE: 62 MMHG

## 2021-05-21 DIAGNOSIS — E66.09 CLASS 1 OBESITY DUE TO EXCESS CALORIES WITHOUT SERIOUS COMORBIDITY WITH BODY MASS INDEX (BMI) OF 31.0 TO 31.9 IN ADULT: ICD-10-CM

## 2021-05-21 DIAGNOSIS — G47.31 COMPLEX SLEEP APNEA SYNDROME: Primary | ICD-10-CM

## 2021-05-21 PROCEDURE — G0463 HOSPITAL OUTPT CLINIC VISIT: HCPCS

## 2021-05-21 PROCEDURE — 99214 OFFICE O/P EST MOD 30 MIN: CPT | Performed by: INTERNAL MEDICINE

## 2021-05-21 NOTE — PROGRESS NOTES
"  [facility]  [address]  [address]  [place] [address]  Phone   Fax       SLEEP CLINIC FOLLOW UP PROGRESS NOTE.    Renato Marquez  1950  70 y.o.  male      PCP: Everardo De Leon Jr.,       Date of visit: 5/21/2021    Chief Complaint   Patient presents with   • Sleep Apnea   • Obesity       HPI:  This is a 70 y.o. years old patient who has a history of complex sleep apnea is here for  the initial compliance follow-up.  Sleep apnea is severe in severity with a AHI of 70/h with central apneas 43/hr. Patient is using positive airway pressure therapy with BiPAP ST and the symptoms of snoring, non-restorative sleep and daytime excessive sleepiness have improved significantly on the therapy. Normally goes to bed at 12 midnight and wakes up at 5:30 AM.  The patient wakes up 3 time(s) during the night and has no problem going back to sleep.  Feels refreshed after waking up.  Patient also denies headaches and nasal congestion.   Patient feels lot better but he also is using the BiPAP ST when he is watching television sitting in a recliner.  He is slowly transitioning into sleep.    Mediactions and allergies are reviewed by me and documented in the encounter.     SOCIAL ( habits pertaining to sleep medicine)  History of smoking:No   History of alcohol use: 7 per week  Caffeine use: 3     REVIEW OF SYSTEMS:   [place] Sleepiness Scale :Total score: 9   Nsaal congestion:No   Dry mouth/nose:No   Post nasal drip; No   Acid reflux/Heartburn:No   Abd bloating:No   Morining headache:No   Anxiety:No   Depression:No     PHYSICAL EXAMINATION:  CONSTITUTIONAL:  Vitals:    05/21/21 0900   BP: 118/62   Pulse: 60   Weight: 108 kg (238 lb)   Height: 182.9 cm (72\")    Body mass index is 32.28 kg/m².   NOSE: nasal passages are clear, no nasal polyps, septum in the midline.  THROAT: throat is clear, oral airway Mallampati class 3  RESP SYSTEM: Breath sounds are normal, no wheezes or " crackles  CARDIOVASULAR: Heart rate is regular without murmur. No edema      Data reviewed:  The Smart card downloaded on 5/21/2021 has been reviewed independently by me for compliance and discussed the data with the patient.   Compliance; 70%  > 4 hr use, 33%  Average use of the device 3 hours and 45 minutes per night  Residual AHI: 14 /hr , central 6/h   mask type: Full facemask  DME: [facility]         ASSESSMENT AND PLAN:  · Complex sleep apnea .  It includes both central sleep apnea and obstructive sleep apnea and is on BiPAP ST.  The symptoms of sleep apnea have improved with the device and the treatment.  Patient's compliance with the device is excellent for treatment of sleep apnea.  I have independently reviewed the smart card down load and discussed with the patient the download data and encouarged the patient to continue to use the device.The residual AHI is acceptable. The device is benefiting the patient and the device is medically necessary.  Without proper control of sleep apnea and good compliance there is a increased risk for hypertension, diabetes mellitus and nonrestorative sleep with hypersomnia which can increase risk for motor vehicle accidents.  Untreated sleep apnea is also a risk factor for development of atrial fibrillation, pulmonary hypertension and stroke. The patient is also instructed to get the supplies from the [employer] company and and change them on a regular basis.  A prescription for supplies has been sent to the [employer] company.  I have also discussed the good sleep hygiene habits and adequate amount of sleep needed for good health.  · Obesity, class 1 with BMI is Body mass index is 32.28 kg/m².. I have discuss the relationship between the weight and sleep apnea. The benefit of weight loss in reducing severity of sleep apnea was discussed. Discussed diet and exercise with the patient to archive ideal BMI.  · Return in about 1 year (around 5/21/2022) for Annual visit with [employer]  download. . Patient's questions were answered.        Jett Mcguire MD  Sleep Medicine  Medical Director for [facility]  5/21/2021

## 2021-05-25 DIAGNOSIS — I48.20 CHRONIC ATRIAL FIBRILLATION (HCC): Chronic | ICD-10-CM

## 2021-05-28 ENCOUNTER — APPOINTMENT (OUTPATIENT)
Dept: SLEEP MEDICINE | Facility: HOSPITAL | Age: 71
End: 2021-05-28

## 2021-06-02 ENCOUNTER — HOSPITAL ENCOUNTER (OUTPATIENT)
Facility: HOSPITAL | Age: 71
Discharge: HOME | End: 2021-06-03
Attending: EMERGENCY MEDICINE | Admitting: HOSPITALIST

## 2021-06-02 DIAGNOSIS — K92.2 GASTROINTESTINAL HEMORRHAGE, UNSPECIFIED GASTROINTESTINAL HEMORRHAGE TYPE: Primary | ICD-10-CM

## 2021-06-02 DIAGNOSIS — K62.5 GASTROINTESTINAL HEMORRHAGE ASSOCIATED WITH ANORECTAL SOURCE: ICD-10-CM

## 2021-06-02 DIAGNOSIS — D62 ANEMIA DUE TO ACUTE BLOOD LOSS: ICD-10-CM

## 2021-06-02 DIAGNOSIS — Z79.01 CHRONIC ANTICOAGULATION: ICD-10-CM

## 2021-06-02 LAB
ABO GROUP BLD: NORMAL
ABO GROUP BLD: NORMAL
ALBUMIN SERPL-MCNC: 3.7 G/DL (ref 3.5–5.2)
ALBUMIN/GLOB SERPL: 1.5 G/DL
ALP SERPL-CCNC: 90 U/L (ref 39–117)
ALT SERPL W P-5'-P-CCNC: 20 U/L (ref 1–41)
ANION GAP SERPL CALCULATED.3IONS-SCNC: 9.9 MMOL/L (ref 5–15)
APTT PPP: 41.6 SECONDS (ref 24.3–38.1)
AST SERPL-CCNC: 29 U/L (ref 1–40)
BASOPHILS # BLD AUTO: 0.01 10*3/MM3 (ref 0–0.2)
BASOPHILS NFR BLD AUTO: 0.2 % (ref 0–1.5)
BILIRUB SERPL-MCNC: 0.8 MG/DL (ref 0–1.2)
BLD GP AB SCN SERPL QL: NEGATIVE
BUN SERPL-MCNC: 29 MG/DL (ref 8–23)
BUN/CREAT SERPL: 25.2 (ref 7–25)
CALCIUM SPEC-SCNC: 9.2 MG/DL (ref 8.6–10.5)
CHLORIDE SERPL-SCNC: 102 MMOL/L (ref 98–107)
CO2 SERPL-SCNC: 24.1 MMOL/L (ref 22–29)
CREAT SERPL-MCNC: 1.15 MG/DL (ref 0.76–1.27)
DEPRECATED RDW RBC AUTO: 48 FL (ref 37–54)
EOSINOPHIL # BLD AUTO: 0.05 10*3/MM3 (ref 0–0.4)
EOSINOPHIL NFR BLD AUTO: 0.8 % (ref 0.3–6.2)
ERYTHROCYTE [DISTWIDTH] IN BLOOD BY AUTOMATED COUNT: 13 % (ref 12.3–15.4)
GFR SERPL CREATININE-BSD FRML MDRD: 63 ML/MIN/1.73
GLOBULIN UR ELPH-MCNC: 2.5 GM/DL
GLUCOSE SERPL-MCNC: 112 MG/DL (ref 65–99)
HCT VFR BLD AUTO: 39.1 % (ref 37.5–51)
HGB BLD-MCNC: 12.6 G/DL (ref 13–17.7)
IMM GRANULOCYTES # BLD AUTO: 0.01 10*3/MM3 (ref 0–0.05)
IMM GRANULOCYTES NFR BLD AUTO: 0.2 % (ref 0–0.5)
INR PPP: 2.92 (ref 0.9–1.1)
LYMPHOCYTES # BLD AUTO: 0.94 10*3/MM3 (ref 0.7–3.1)
LYMPHOCYTES NFR BLD AUTO: 15.7 % (ref 19.6–45.3)
MCH RBC QN AUTO: 32.1 PG (ref 26.6–33)
MCHC RBC AUTO-ENTMCNC: 32.2 G/DL (ref 31.5–35.7)
MCV RBC AUTO: 99.7 FL (ref 79–97)
MONOCYTES # BLD AUTO: 0.75 10*3/MM3 (ref 0.1–0.9)
MONOCYTES NFR BLD AUTO: 12.5 % (ref 5–12)
NEUTROPHILS NFR BLD AUTO: 4.22 10*3/MM3 (ref 1.7–7)
NEUTROPHILS NFR BLD AUTO: 70.6 % (ref 42.7–76)
PLATELET # BLD AUTO: 141 10*3/MM3 (ref 140–450)
PMV BLD AUTO: 10.4 FL (ref 6–12)
POTASSIUM SERPL-SCNC: 4.7 MMOL/L (ref 3.5–5.2)
PROT SERPL-MCNC: 6.2 G/DL (ref 6–8.5)
PROTHROMBIN TIME: 29.7 SECONDS (ref 12.1–15)
RBC # BLD AUTO: 3.92 10*6/MM3 (ref 4.14–5.8)
RH BLD: POSITIVE
RH BLD: POSITIVE
SARS-COV-2 RNA PNL SPEC NAA+PROBE: NOT DETECTED
SODIUM SERPL-SCNC: 136 MMOL/L (ref 136–145)
T&S EXPIRATION DATE: NORMAL
WBC # BLD AUTO: 5.98 10*3/MM3 (ref 3.4–10.8)

## 2021-06-02 PROCEDURE — 87635 SARS-COV-2 COVID-19 AMP PRB: CPT | Performed by: HOSPITALIST

## 2021-06-02 PROCEDURE — 86901 BLOOD TYPING SEROLOGIC RH(D): CPT | Performed by: EMERGENCY MEDICINE

## 2021-06-02 PROCEDURE — 86850 RBC ANTIBODY SCREEN: CPT | Performed by: EMERGENCY MEDICINE

## 2021-06-02 PROCEDURE — 85025 COMPLETE CBC W/AUTO DIFF WBC: CPT | Performed by: EMERGENCY MEDICINE

## 2021-06-02 PROCEDURE — 99219 PR INITIAL OBSERVATION CARE/DAY 50 MINUTES: CPT | Performed by: HOSPITALIST

## 2021-06-02 PROCEDURE — 85610 PROTHROMBIN TIME: CPT | Performed by: EMERGENCY MEDICINE

## 2021-06-02 PROCEDURE — 99284 EMERGENCY DEPT VISIT MOD MDM: CPT

## 2021-06-02 PROCEDURE — G0378 HOSPITAL OBSERVATION PER HR: HCPCS

## 2021-06-02 PROCEDURE — 86901 BLOOD TYPING SEROLOGIC RH(D): CPT

## 2021-06-02 PROCEDURE — 86900 BLOOD TYPING SEROLOGIC ABO: CPT | Performed by: EMERGENCY MEDICINE

## 2021-06-02 PROCEDURE — 25010000003 PHYTONADIONE 10 MG/ML SOLUTION: Performed by: EMERGENCY MEDICINE

## 2021-06-02 PROCEDURE — 85730 THROMBOPLASTIN TIME PARTIAL: CPT | Performed by: EMERGENCY MEDICINE

## 2021-06-02 PROCEDURE — 80053 COMPREHEN METABOLIC PANEL: CPT | Performed by: EMERGENCY MEDICINE

## 2021-06-02 PROCEDURE — 86900 BLOOD TYPING SEROLOGIC ABO: CPT

## 2021-06-02 PROCEDURE — 99204 OFFICE O/P NEW MOD 45 MIN: CPT | Performed by: INTERNAL MEDICINE

## 2021-06-02 PROCEDURE — 99285 EMERGENCY DEPT VISIT HI MDM: CPT | Performed by: EMERGENCY MEDICINE

## 2021-06-02 PROCEDURE — C9803 HOPD COVID-19 SPEC COLLECT: HCPCS

## 2021-06-02 RX ORDER — POLYETHYLENE GLYCOL 3350 17 G/17G
119 POWDER, FOR SOLUTION ORAL ONCE
Status: COMPLETED | OUTPATIENT
Start: 2021-06-02 | End: 2021-06-02

## 2021-06-02 RX ORDER — SODIUM CHLORIDE 0.9 % (FLUSH) 0.9 %
10 SYRINGE (ML) INJECTION AS NEEDED
Status: DISCONTINUED | OUTPATIENT
Start: 2021-06-02 | End: 2021-06-03 | Stop reason: HOSPADM

## 2021-06-02 RX ORDER — METOPROLOL SUCCINATE 50 MG/1
50 TABLET, EXTENDED RELEASE ORAL DAILY
Status: DISCONTINUED | OUTPATIENT
Start: 2021-06-02 | End: 2021-06-03 | Stop reason: HOSPADM

## 2021-06-02 RX ORDER — PHYTONADIONE 2 MG/ML
5 INJECTION, EMULSION INTRAMUSCULAR; INTRAVENOUS; SUBCUTANEOUS ONCE
Status: COMPLETED | OUTPATIENT
Start: 2021-06-02 | End: 2021-06-02

## 2021-06-02 RX ORDER — MAGNESIUM CARB/ALUMINUM HYDROX 105-160MG
300 TABLET,CHEWABLE ORAL ONCE
Status: COMPLETED | OUTPATIENT
Start: 2021-06-02 | End: 2021-06-02

## 2021-06-02 RX ORDER — POLYETHYLENE GLYCOL 3350 17 G/17G
119 POWDER, FOR SOLUTION ORAL DAILY
Status: DISCONTINUED | OUTPATIENT
Start: 2021-06-03 | End: 2021-06-03 | Stop reason: HOSPADM

## 2021-06-02 RX ORDER — FUROSEMIDE 40 MG/1
40 TABLET ORAL DAILY
COMMUNITY
End: 2021-06-16

## 2021-06-02 RX ADMIN — POLYETHYLENE GLYCOL 3350 119 G: 17 POWDER, FOR SOLUTION ORAL at 21:22

## 2021-06-02 RX ADMIN — MAGNESIUM CITRATE 300 ML: 1.75 LIQUID ORAL at 18:45

## 2021-06-02 RX ADMIN — METOPROLOL SUCCINATE 50 MG: 50 TABLET, EXTENDED RELEASE ORAL at 21:21

## 2021-06-02 RX ADMIN — PHYTONADIONE 5 MG: 10 INJECTION, EMULSION INTRAMUSCULAR; INTRAVENOUS; SUBCUTANEOUS at 09:50

## 2021-06-02 NOTE — CONSULTS
Patient Care Team:  Everardo De Leon Jr., DO as PCP - General (Family Medicine)  Mauricio Scott III, MD as Consulting Physician (Cardiology)    CHIEF COMPLAINT: Rectal Bleeding , Anemia    HISTORY OF PRESENT ILLNESS:  70 > 12 years out from his last colonoscopy and has been on anticoagulation for at least that amount of time but no sig history of rectal bleed until this last week and had a warning bleed but overnight was up three times with large bloody BMs and has only had small clots at times of urination today. Does take daily Ibuprofen for his back and shoulders. No previous PUD but his daughter has had a bleeding ulcer, no family history of CRC. His INR is elevated and is on Xarelto only so not sure if this is malnutrition or consumptive coagulopathy    Past Medical History:   Diagnosis Date   • CAD (coronary artery disease)    • Cancer (CMS/HCC)    • Carotid arterial disease (CMS/HCC)    • Chronic atrial fibrillation (CMS/HCC)    • Complex sleep apnea syndrome     BiPAP ST   • Ischemic cardiomyopathy    • Mitral regurgitation      Past Surgical History:   Procedure Laterality Date   • CAROTID ENDARTERECTOMY Right 2009    Dr Lewis   • CORONARY ARTERY BYPASS GRAFT  2009    MIMI [place] to LAD, SVG to OM, SVG Post Desc   • FINGER SURGERY       Family History   Problem Relation Age of Onset   • Heart disease Father    • Stroke Father    • Hypertension Father      Social History     Tobacco Use   • Smoking status: Former Smoker   • Smokeless tobacco: Never Used   • Tobacco comment: caffine use   Substance Use Topics   • Alcohol use: Yes     Comment: social    • Drug use: No     Medications Prior to Admission   Medication Sig Dispense Refill Last Dose   • furosemide (LASIX) 40 MG tablet Take 40 mg by mouth Daily.   6/2/2021 at Unknown time   • lisinopril (PRINIVIL,ZESTRIL) 10 MG tablet Take 1 tablet by mouth Daily. (Patient taking differently: Take 5 mg by mouth Daily.) 90 tablet 3 6/2/2021 at Unknown time  "  • Magnesium 200 MG tablet Take 250 mg by mouth Daily.   6/2/2021 at Unknown time   • metoprolol succinate XL (TOPROL-XL) 50 MG 24 hr tablet Take 1 tablet by mouth Daily. 90 tablet 1 6/2/2021 at Unknown time   • pravastatin (PRAVACHOL) 40 MG tablet Take 1 tablet by mouth Daily. 90 tablet 1 6/2/2021 at Unknown time   • rivaroxaban (Xarelto) 20 MG tablet Take 1 tablet by mouth Daily. 90 tablet 1 6/2/2021 at Unknown time     Allergies:  Patient has no known allergies.    REVIEW OF SYSTEMS:  Please see the above history of present illness for pertinent positives and negatives.  The remainder of the patient's systems have been reviewed and are negative.     Vital Signs  Temp:  [97.5 °F (36.4 °C)-97.8 °F (36.6 °C)] 97.8 °F (36.6 °C)  Heart Rate:  [57-78] 72  Resp:  [18] 18  BP: (130-138)/(59-83) 136/73    Flowsheet Rows      First Filed Value   Admission Height  182.9 cm (72\") Documented at 06/02/2021 0751   Admission Weight  104 kg (230 lb) Documented at 06/02/2021 0751           Physical Exam:  Physical Exam   Constitutional: Patient appears well-developed Overweight/Obese and well-nourished and in no acute distress   HEENT:   Head: Normocephalic and atraumatic.   Eyes:  Pupils are equal, round, and reactive to light. EOM are intact. Sclerae are anicteric and non-injected.  Mouth and Throat: Patient has moist mucous membranes. Oropharynx is clear of any erythema or exudate.     Neck: Neck supple. No JVD present. No thyromegaly present. No lymphadenopathy present.  Cardiovascular: Regular rate, regular rhythm, S1 normal and S2 normal.  Exam reveals no gallop and no friction rub.  No murmur heard.  Pulmonary/Chest: Lungs are clear to auscultation bilaterally. No respiratory distress. No wheezes. No rhonchi. No rales.   Abdominal: Soft. Bowel sounds are normal. No distension and no mass. There is no hepatosplenomegaly. There is NO tenderness.   Musculoskeletal: Normal Muscle tone  Extremities: No edema. Pulses are " palpable in all 4 extremities.  Neurological: Patient is alert and oriented to person, place, and time. Cranial nerves II-XII are grossly intact with no focal deficits.  Skin: Skin is warm. No rash noted. Nails show no clubbing.  No cyanosis or erythema.    Debilities/Disabilities Identified: None  Emotional Behavior: Appropriate     Results Review:   I reviewed the patient's new clinical results.    Lab Results (most recent)     Procedure Component Value Units Date/Time    COVID PRE-OP / PRE-PROCEDURE SCREENING ORDER (NO ISOLATION) - Swab, Nasal Cavity [[id number]]  (Normal) Collected: 06/02/21 1052    Specimen: Swab from Nasal Cavity Updated: 06/02/21 1236    Narrative:      The following orders were created for panel order COVID PRE-OP / PRE-PROCEDURE SCREENING ORDER (NO ISOLATION) - Swab, Nasal Cavity.  Procedure                               Abnormality         Status                     ---------                               -----------         ------                     COVID-19,[employer] IN-NERI...[[id number]]  Normal              Final result                 Please view results for these tests on the individual orders.    COVID-19,[employer] IN-HOUSE,Nasal Swab No Transport Media 3-4 HR TAT - Swab, Nasal Cavity [[id number]]  (Normal) Collected: 06/02/21 1052    Specimen: Swab from Nasal Cavity Updated: 06/02/21 1236     COVID19 Not Detected    Narrative:      Fact sheet for providers: https://www.fda.gov/media/889928/download     Fact sheet for patients: https://www.fda.gov/media/871223/download    Test performed by PCR.    Consider negative results in combination with clinical observations, patient history, and epidemiological information.    Comprehensive Metabolic Panel [[id number]]  (Abnormal) Collected: 06/02/21 0818    Specimen: Blood Updated: 06/02/21 0853     Glucose 112 mg/dL      BUN 29 mg/dL      Creatinine 1.15 mg/dL      Sodium 136 mmol/L      Potassium 4.7 mmol/L      Chloride 102 mmol/L      CO2  24.1 mmol/L      Calcium 9.2 mg/dL      Total Protein 6.2 g/dL      Albumin 3.70 g/dL      ALT (SGPT) 20 U/L      AST (SGOT) 29 U/L      Alkaline Phosphatase 90 U/L      Total Bilirubin 0.8 mg/dL      eGFR Non African Amer 63 mL/min/1.73      Globulin 2.5 gm/dL      A/G Ratio 1.5 g/dL      BUN/Creatinine Ratio 25.2     Anion Gap 9.9 mmol/L     Narrative:      GFR Normal >60  Chronic Kidney Disease <60  Kidney Failure <15      Protime-INR [[id number]]  (Abnormal) Collected: 06/02/21 0818    Specimen: Blood Updated: 06/02/21 0848     Protime 29.7 Seconds      INR 2.92    Narrative:      Therapeutic Ranges for INR: 2.0-3.0 (PT 20-30)                              2.5-3.5 (PT 25-34)    aPTT [[id number]]  (Abnormal) Collected: 06/02/21 0818    Specimen: Blood Updated: 06/02/21 0848     PTT 41.6 seconds     Narrative:      PTT = The equivalent PTT values for the therapeutic range of heparin levels at 0.1 to 0.7 U/ml are 53 to 110 seconds.      CBC & Differential [[id number]]  (Abnormal) Collected: 06/02/21 0818    Specimen: Blood Updated: 06/02/21 0829    Narrative:      The following orders were created for panel order CBC & Differential.  Procedure                               Abnormality         Status                     ---------                               -----------         ------                     CBC Auto Differential[[id number]]        Abnormal            Final result                 Please view results for these tests on the individual orders.    CBC Auto Differential [[id number]]  (Abnormal) Collected: 06/02/21 0818    Specimen: Blood Updated: 06/02/21 0829     WBC 5.98 10*3/mm3      RBC 3.92 10*6/mm3      Hemoglobin 12.6 g/dL      Hematocrit 39.1 %      MCV 99.7 fL      MCH 32.1 pg      MCHC 32.2 g/dL      RDW 13.0 %      RDW-SD 48.0 fl      MPV 10.4 fL      Platelets 141 10*3/mm3      Neutrophil % 70.6 %      Lymphocyte % 15.7 %      Monocyte % 12.5 %      Eosinophil % 0.8 %      Basophil % 0.2 %       Immature Grans % 0.2 %      Neutrophils, Absolute 4.22 10*3/mm3      Lymphocytes, Absolute 0.94 10*3/mm3      Monocytes, Absolute 0.75 10*3/mm3      Eosinophils, Absolute 0.05 10*3/mm3      Basophils, Absolute 0.01 10*3/mm3      Immature Grans, Absolute 0.01 10*3/mm3           Imaging Results (Most Recent)     None        reviewed    ECG/EMG Results (most recent)     None        reviewed    Assessment/Plan   Rectal Bleeding   Anemia  AFIB/AC  Coagulopathy  Osteoarthritis    EGD and colonoscopy are recommended at this time not knowing if this is a lower or upper GIBLeed, He is comfortable proceeding with the plan of Double endoscopy and bowel prep for this. R/B/O were discussed.      I discussed the patient's findings and my recommendations with patient and Dr Meyer.     Luan Meek MD  06/02/21  17:47 EDT    Time: Not Recorded

## 2021-06-02 NOTE — ED PROVIDER NOTES
Subjective   History of Present Illness  History of Present Illness    Chief complaint: Rectal bleeding    Location: Gastrointestinal    Quality/Severity: Moderate bleeding    Timing/Duration: On and off for several days this week.  Seems to be much worse over the past 2 days    Modifying Factors: None    Narrative: This patient presents for evaluation of rectal bleeding.  He takes Xarelto because of chronic atrial fibrillation.  He says this week he has noticed some light bleeding with his stools for several days.  However in the past 1 to 2 days the bleeding seems to have increased a lot.  He says he is now passing mostly blood and no stool.  He describes it as bright red blood.  He denies any abdominal pain.  He denies any rectal pain.  He denies any nausea or vomiting.  He denies any fevers or chest pain or difficulties breathing.    Associated Symptoms: As above    Review of Systems   Constitutional: Negative for activity change, diaphoresis and fever.   HENT: Negative.    Respiratory: Negative for cough and shortness of breath.    Cardiovascular: Negative for chest pain.   Gastrointestinal: Positive for blood in stool. Negative for abdominal pain, diarrhea, nausea and vomiting.   Genitourinary: Negative for dysuria and hematuria.   Skin: Negative for color change and rash.   Neurological: Negative for syncope and headaches.   All other systems reviewed and are negative.      Past Medical History:   Diagnosis Date   • CAD (coronary artery disease)    • Cancer (CMS/HCC)    • Carotid arterial disease (CMS/HCC)    • Chronic atrial fibrillation (CMS/HCC)    • Complex sleep apnea syndrome     BiPAP ST   • Ischemic cardiomyopathy    • Mitral regurgitation        No Known Allergies    Past Surgical History:   Procedure Laterality Date   • CAROTID ENDARTERECTOMY Right 2009    Dr Lewis   • CORONARY ARTERY BYPASS GRAFT  2009    Dr Bhagat, [place] to LAD, SVG to OM, SVG Post Desc   • FINGER SURGERY         Family History    Problem Relation Age of Onset   • Heart disease Father    • Stroke Father    • Hypertension Father        Social History     Socioeconomic History   • Marital status:      Spouse name: Not on file   • Number of children: Not on file   • Years of education: Not on file   • Highest education level: Not on file   Tobacco Use   • Smoking status: Former Smoker   • Smokeless tobacco: Never Used   • Tobacco comment: caffine use   Substance and Sexual Activity   • Alcohol use: Yes     Comment: social    • Drug use: No   • Sexual activity: Not Currently       ED Triage Vitals [06/02/21 0751]   Temp Heart Rate Resp BP SpO2   97.5 °F (36.4 °C) 78 18 138/73 95 %      Temp src Heart Rate Source Patient Position BP Location FiO2 (%)   Oral Monitor -- -- --         Objective   Physical Exam  Vitals and nursing note reviewed.   Constitutional:       General: He is not in acute distress.     Appearance: He is well-developed. He is not toxic-appearing or diaphoretic.   HENT:      Head: Normocephalic and atraumatic.   Eyes:      General:         Right eye: No discharge.         Left eye: No discharge.      Pupils: Pupils are equal, round, and reactive to light.   Cardiovascular:      Rate and Rhythm: Normal rate. Rhythm irregular.      Pulses: Normal pulses.      Heart sounds: No murmur heard.     Pulmonary:      Effort: Pulmonary effort is normal. No respiratory distress.      Breath sounds: Normal breath sounds. No stridor. No wheezing.   Abdominal:      Palpations: Abdomen is soft.      Tenderness: There is no abdominal tenderness. There is no guarding or rebound.      Hernia: No hernia is present.      Comments: Soft, nontender abdominal exam in all 4 quadrants.   Genitourinary:     Rectum: Guaiac result positive.      Comments: Normal rectal tone.  No hemorrhoids or masses are visible or palpable.  Digital rectal exam demonstrates some mild to moderate amount of gross blood visible.  Positive result noted on Hemoccult  card  Musculoskeletal:         General: No deformity. Normal range of motion.      Cervical back: Normal range of motion and neck supple.   Skin:     General: Skin is warm and dry.      Findings: No erythema or rash.   Neurological:      General: No focal deficit present.      Mental Status: He is alert and oriented to person, place, and time. Mental status is at baseline.      Motor: No weakness.   Psychiatric:         Behavior: Behavior normal.         Thought Content: Thought content normal.         Judgment: Judgment normal.       Results for orders placed or performed during the hospital encounter of 06/02/21   Comprehensive Metabolic Panel    Specimen: Blood   Result Value Ref Range    Glucose 112 (H) 65 - 99 mg/dL    BUN 29 (H) 8 - 23 mg/dL    Creatinine 1.15 0.76 - 1.27 mg/dL    Sodium 136 136 - 145 mmol/L    Potassium 4.7 3.5 - 5.2 mmol/L    Chloride 102 98 - 107 mmol/L    CO2 24.1 22.0 - 29.0 mmol/L    Calcium 9.2 8.6 - 10.5 mg/dL    Total Protein 6.2 6.0 - 8.5 g/dL    Albumin 3.70 3.50 - 5.20 g/dL    ALT (SGPT) 20 1 - 41 U/L    AST (SGOT) 29 1 - 40 U/L    Alkaline Phosphatase 90 39 - 117 U/L    Total Bilirubin 0.8 0.0 - 1.2 mg/dL    eGFR Non African Amer 63 >60 mL/min/1.73    Globulin 2.5 gm/dL    A/G Ratio 1.5 g/dL    BUN/Creatinine Ratio 25.2 (H) 7.0 - 25.0    Anion Gap 9.9 5.0 - 15.0 mmol/L   Protime-INR    Specimen: Blood   Result Value Ref Range    Protime 29.7 (H) 12.1 - 15.0 Seconds    INR 2.92 (H) 0.90 - 1.10   aPTT    Specimen: Blood   Result Value Ref Range    PTT 41.6 (H) 24.3 - 38.1 seconds   CBC Auto Differential    Specimen: Blood   Result Value Ref Range    WBC 5.98 3.40 - 10.80 10*3/mm3    RBC 3.92 (L) 4.14 - 5.80 10*6/mm3    Hemoglobin 12.6 (L) 13.0 - 17.7 g/dL    Hematocrit 39.1 37.5 - 51.0 %    MCV 99.7 (H) 79.0 - 97.0 fL    MCH 32.1 26.6 - 33.0 pg    MCHC 32.2 31.5 - 35.7 g/dL    RDW 13.0 12.3 - 15.4 %    RDW-SD 48.0 37.0 - 54.0 fl    MPV 10.4 6.0 - 12.0 fL    Platelets 141 140 - 450  10*3/mm3    Neutrophil % 70.6 42.7 - 76.0 %    Lymphocyte % 15.7 (L) 19.6 - 45.3 %    Monocyte % 12.5 (H) 5.0 - 12.0 %    Eosinophil % 0.8 0.3 - 6.2 %    Basophil % 0.2 0.0 - 1.5 %    Immature Grans % 0.2 0.0 - 0.5 %    Neutrophils, Absolute 4.22 1.70 - 7.00 10*3/mm3    Lymphocytes, Absolute 0.94 0.70 - 3.10 10*3/mm3    Monocytes, Absolute 0.75 0.10 - 0.90 10*3/mm3    Eosinophils, Absolute 0.05 0.00 - 0.40 10*3/mm3    Basophils, Absolute 0.01 0.00 - 0.20 10*3/mm3    Immature Grans, Absolute 0.01 0.00 - 0.05 10*3/mm3   Type & Screen    Specimen: Blood   Result Value Ref Range    ABO Type A     RH type Positive     Antibody Screen Negative     T&S Expiration Date 6/5/2021 11:59:59 PM        Procedures           ED Course  ED Course as of Jun 02 0954   Wed Jun 02, 2021   0953 Patient presented for evaluation of gastrointestinal bleeding.  He is on chronic anticoagulation.  His hemoglobin is 12.6 but this is about 2 points lower than his previous lab value.  I gave him 1 dose of vitamin K orally here today per Dr. Meek's request.  Will observe the patient to the hospitalist team today for anticipated GI endoscopy procedures as needed.  Patient is agreeable to this plan.    [ASHLY]      ED Course User Index  [ASHLY] Delroy Xie MD                                           MDM  Number of Diagnoses or Management Options     Amount and/or Complexity of Data Reviewed  Clinical lab tests: reviewed and ordered  Decide to obtain previous medical records or to obtain history from someone other than the patient: yes  Review and summarize past medical records: yes  Discuss the patient with other providers: yes (Dr. Meek, gastroenterology  Dr. Meyer, hospitalist)    Risk of Complications, Morbidity, and/or Mortality  Presenting problems: moderate  Diagnostic procedures: moderate  Management options: moderate        Final diagnoses:   Gastrointestinal hemorrhage, unspecified gastrointestinal hemorrhage type   Chronic  anticoagulation       ED Disposition  ED Disposition     ED Disposition Condition Comment    Decision to Admit  Level of Care: Med/Surg [1]   Diagnosis: GI bleed [[id number]]   Admitting Physician: JOE VALENCIA [[id number]]   Attending Physician: JOE VALENCIA [[id number]]            No follow-up provider specified.       Medication List      ASK your doctor about these medications    furosemide 40 MG tablet  Commonly known as: LASIX  Ask about: Which instructions should I use?             Delroy Xie MD  06/02/21 0982

## 2021-06-02 NOTE — ED NOTES
"Pt stated he has had bright red rectal bleeding for about a week, worse past two days.  He stated he has a h/o hemorrhoids but does not remember how they were treated or whether or not they resolved.  He stated the bleeding was in the toilet after using the bathroom.  He said he is getting on an airplane tomorrow so he thought he \"better have it looked at before I leave\".     Iliana Mast RN  06/02/21 0807    "

## 2021-06-02 NOTE — PLAN OF CARE
Goal Outcome Evaluation:  Plan of Care Reviewed With: patient  Progress: no change  Outcome Summary: Patient ER admit with GI bleed, on xarelto for a-fib, has noticed recent blood in stool for the past week & was suppose to go out of town tomorrow, GI consulted & ordered EGD & colonscopy for tomorrow. 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 Anticipate home with wife at discharge.

## 2021-06-02 NOTE — H&P
"[facility] HOSPITALIST     Everardo De Leon Jr., DO    CHIEF COMPLAINT: Blood in stool.    HISTORY OF PRESENT ILLNESS:    Mr. Marquez is a very pleasant, 69 y/o  male who presents after noticing some blood in his stool.  He reports the blood seemed to be intermittent, but would turn the bowel \"red\".  He had no associated nausea or abdominal cramping.  Over the last few days, it seems to be happening every time he moves his bowels.  He does note some constipation and hard stools.   This has happened to him previously after eating \"beets\".  He has had beets last week, but it doesn't appear to be related to this.  He denies dizziness and numbness in his legs.  He denies chest pain and palpitations.  He denies dyspnea. He does use ibuprofen on occasion for his OA.      Past Medical History:   Diagnosis Date   • CAD (coronary artery disease)    • Cancer (CMS/HCC)    • Carotid arterial disease (CMS/HCC)    • Chronic atrial fibrillation (CMS/HCC)    • Complex sleep apnea syndrome     BiPAP ST   • Ischemic cardiomyopathy    • Mitral regurgitation      Past Surgical History:   Procedure Laterality Date   • CAROTID ENDARTERECTOMY Right 2009    Dr Lewis   • CORONARY ARTERY BYPASS GRAFT  2009    MIMI [place] to LAD, SVG to OM, SVG Post Desc   • FINGER SURGERY       Family History   Problem Relation Age of Onset   • Heart disease Father    • Stroke Father    • Hypertension Father      Social History     Tobacco Use   • Smoking status: Former Smoker   • Smokeless tobacco: Never Used   • Tobacco comment: caffine use   Substance Use Topics   • Alcohol use: Yes     Comment: social    • Drug use: No     Medications Prior to Admission   Medication Sig Dispense Refill Last Dose   • furosemide (LASIX) 40 MG tablet Take 40 mg by mouth Daily.   6/2/2021 at Unknown time   • lisinopril (PRINIVIL,ZESTRIL) 10 MG tablet Take 1 tablet by mouth Daily. (Patient taking differently: Take 5 mg by mouth Daily.) 90 " "tablet 3 6/2/2021 at Unknown time   • Magnesium 200 MG tablet Take 250 mg by mouth Daily.   6/2/2021 at Unknown time   • metoprolol succinate XL (TOPROL-XL) 50 MG 24 hr tablet Take 1 tablet by mouth Daily. 90 tablet 1 6/2/2021 at Unknown time   • pravastatin (PRAVACHOL) 40 MG tablet Take 1 tablet by mouth Daily. 90 tablet 1 6/2/2021 at Unknown time   • rivaroxaban (Xarelto) 20 MG tablet Take 1 tablet by mouth Daily. 90 tablet 1 6/2/2021 at Unknown time     Allergies:  Patient has no known allergies.    REVIEW OF SYSTEMS:  Please see the above history of present illness for pertinent positives and negatives.  The remainder of the patient's systems have been reviewed and are negative.    Vital Signs  Temp:  [97.5 °F (36.4 °C)-97.8 °F (36.6 °C)] 97.8 °F (36.6 °C)  Heart Rate:  [57-78] 72  Resp:  [18] 18  BP: (130-138)/(59-83) 136/73  Oxygen Therapy  SpO2: 97 %  Pulse Oximetry Type: Continuous  Device (Oxygen Therapy): room air}  Body mass index is 32.4 kg/m².     Flowsheet Rows      First Filed Value   Admission Height  182.9 cm (72\") Documented at 06/02/2021 0751   Admission Weight  104 kg (230 lb) Documented at 06/02/2021 0751           Physical Exam:  Physical Exam   Constitutional: Patient appears well-developed and well-nourished and in no acute distress   HEENT:   Head: Normocephalic and atraumatic.   Eyes:  Pupils are equal, round, and reactive to light. EOM are intact. Sclerae are anicteric and noninjected.  Neck: Neck supple. No JVD present. No thyromegaly present. No lymphadenopathy present.  Cardiovascular: Regular rate, regular rhythm, S1 normal and S2 normal.  Exam reveals no gallop and no friction rub.  No murmur heard.  Radial pulses are 2+ and symmetric.  Pulmonary/Chest: Lungs are clear to auscultation bilaterally. No respiratory distress. No wheezes. No rhonchi. No rales.   Abdominal: Obese. Soft. Bowel sounds are normal. No distension and no mass. There is no tenderness.   Musculoskeletal: Normal " muscle tone  Extremities: No edema. No asymmetry.  Neurological: Cranial nerves II-XII are grossly intact with no focal deficits.    Emotional Behavior:    Judgement and Insight: Normal   Mental Status:  Alertness  Normal   Memory:  Normal   Mood and Affect:         Depression  None               Anxiety  None    Debilities:   Physical Weakness None   Handicaps  None   Disabilities  None   Agitation  None     Results Review:    I reviewed the patient's new clinical results.  Lab Results (most recent)     Procedure Component Value Units Date/Time    COVID PRE-OP / PRE-PROCEDURE SCREENING ORDER (NO ISOLATION) - Swab, Nasal Cavity [[id number]]  (Normal) Collected: 06/02/21 1052    Specimen: Swab from Nasal Cavity Updated: 06/02/21 1236    Narrative:      The following orders were created for panel order COVID PRE-OP / PRE-PROCEDURE SCREENING ORDER (NO ISOLATION) - Swab, Nasal Cavity.  Procedure                               Abnormality         Status                     ---------                               -----------         ------                     COVID-19,[facility] [employer] IN-NERI...[[id number]]  Normal              Final result                 Please view results for these tests on the individual orders.    COVID-19,[facility] IN-HOUSE,Nasal Swab No Transport Media 3-4 HR TAT - Swab, Nasal Cavity [[id number]]  (Normal) Collected: 06/02/21 1052    Specimen: Swab from Nasal Cavity Updated: 06/02/21 1236     COVID19 Not Detected    Narrative:      Fact sheet for providers: https://www.fda.gov/media/609776/download     Fact sheet for patients: https://www.fda.gov/media/008191/download    Test performed by PCR.    Consider negative results in combination with clinical observations, patient history, and epidemiological information.    Comprehensive Metabolic Panel [[id number]]  (Abnormal) Collected: 06/02/21 0818    Specimen: Blood Updated: 06/02/21 0853     Glucose 112 mg/dL      BUN 29 mg/dL      Creatinine 1.15 mg/dL       Sodium 136 mmol/L      Potassium 4.7 mmol/L      Chloride 102 mmol/L      CO2 24.1 mmol/L      Calcium 9.2 mg/dL      Total Protein 6.2 g/dL      Albumin 3.70 g/dL      ALT (SGPT) 20 U/L      AST (SGOT) 29 U/L      Alkaline Phosphatase 90 U/L      Total Bilirubin 0.8 mg/dL      eGFR Non African Amer 63 mL/min/1.73      Globulin 2.5 gm/dL      A/G Ratio 1.5 g/dL      BUN/Creatinine Ratio 25.2     Anion Gap 9.9 mmol/L     Narrative:      GFR Normal >60  Chronic Kidney Disease <60  Kidney Failure <15      Protime-INR [[id number]]  (Abnormal) Collected: 06/02/21 0818    Specimen: Blood Updated: 06/02/21 0848     Protime 29.7 Seconds      INR 2.92    Narrative:      Therapeutic Ranges for INR: 2.0-3.0 (PT 20-30)                              2.5-3.5 (PT 25-34)    aPTT [[id number]]  (Abnormal) Collected: 06/02/21 0818    Specimen: Blood Updated: 06/02/21 0848     PTT 41.6 seconds     Narrative:      PTT = The equivalent PTT values for the therapeutic range of heparin levels at 0.1 to 0.7 U/ml are 53 to 110 seconds.      CBC & Differential [[id number]]  (Abnormal) Collected: 06/02/21 0818    Specimen: Blood Updated: 06/02/21 0829    Narrative:      The following orders were created for panel order CBC & Differential.  Procedure                               Abnormality         Status                     ---------                               -----------         ------                     CBC Auto Differential[[id number]]        Abnormal            Final result                 Please view results for these tests on the individual orders.    CBC Auto Differential [[id number]]  (Abnormal) Collected: 06/02/21 0818    Specimen: Blood Updated: 06/02/21 0829     WBC 5.98 10*3/mm3      RBC 3.92 10*6/mm3      Hemoglobin 12.6 g/dL      Hematocrit 39.1 %      MCV 99.7 fL      MCH 32.1 pg      MCHC 32.2 g/dL      RDW 13.0 %      RDW-SD 48.0 fl      MPV 10.4 fL      Platelets 141 10*3/mm3      Neutrophil % 70.6 %      Lymphocyte % 15.7 %       Monocyte % 12.5 %      Eosinophil % 0.8 %      Basophil % 0.2 %      Immature Grans % 0.2 %      Neutrophils, Absolute 4.22 10*3/mm3      Lymphocytes, Absolute 0.94 10*3/mm3      Monocytes, Absolute 0.75 10*3/mm3      Eosinophils, Absolute 0.05 10*3/mm3      Basophils, Absolute 0.01 10*3/mm3      Immature Grans, Absolute 0.01 10*3/mm3           Imaging Results (Most Recent)     None            ECG/EMG Results (most recent)     None          Assessment/Plan     1.  GI Bleed: Hgb down a little.  BUN appears okay so I doubt upper source.  With hard stool, it makes hemorrhoids a possibility, but none noted on rectal exam by ER doctor.  Discussed w/ Dr. Meek and he will see.  Likely EGD and C-scope.    2.  Atrial Fibrillation: Bigeminy on monitor.  Holding AC.  Continue Torpol.    3.  CAD: No chest pain.  Continue Toprol.  Holding statin.    4.  KATIE: May use home device.    I discussed the patients findings and my recommendations with patient.     Estevan Meyer MD  06/02/21  16:49 EDT

## 2021-06-03 ENCOUNTER — ANESTHESIA EVENT (OUTPATIENT)
Dept: PERIOP | Facility: HOSPITAL | Age: 71
End: 2021-06-03

## 2021-06-03 ENCOUNTER — READMISSION MANAGEMENT (OUTPATIENT)
Dept: CALL CENTER | Facility: HOSPITAL | Age: 71
End: 2021-06-03

## 2021-06-03 ENCOUNTER — ANESTHESIA (OUTPATIENT)
Dept: PERIOP | Facility: HOSPITAL | Age: 71
End: 2021-06-03

## 2021-06-03 VITALS
TEMPERATURE: 97.8 F | SYSTOLIC BLOOD PRESSURE: 138 MMHG | HEART RATE: 72 BPM | BODY MASS INDEX: 32.23 KG/M2 | RESPIRATION RATE: 18 BRPM | WEIGHT: 238 LBS | OXYGEN SATURATION: 98 % | DIASTOLIC BLOOD PRESSURE: 91 MMHG | HEIGHT: 72 IN

## 2021-06-03 PROBLEM — K62.5 GASTROINTESTINAL HEMORRHAGE ASSOCIATED WITH ANORECTAL SOURCE: Status: ACTIVE | Noted: 2021-06-02

## 2021-06-03 PROBLEM — D62 ANEMIA DUE TO ACUTE BLOOD LOSS: Status: ACTIVE | Noted: 2021-06-02

## 2021-06-03 LAB
ANION GAP SERPL CALCULATED.3IONS-SCNC: 7.6 MMOL/L (ref 5–15)
BASOPHILS # BLD AUTO: 0.01 10*3/MM3 (ref 0–0.2)
BASOPHILS NFR BLD AUTO: 0.2 % (ref 0–1.5)
BUN SERPL-MCNC: 21 MG/DL (ref 8–23)
BUN/CREAT SERPL: 23.1 (ref 7–25)
CALCIUM SPEC-SCNC: 9.5 MG/DL (ref 8.6–10.5)
CHLORIDE SERPL-SCNC: 104 MMOL/L (ref 98–107)
CO2 SERPL-SCNC: 27.4 MMOL/L (ref 22–29)
CREAT SERPL-MCNC: 0.91 MG/DL (ref 0.76–1.27)
DEPRECATED RDW RBC AUTO: 49 FL (ref 37–54)
EOSINOPHIL # BLD AUTO: 0.07 10*3/MM3 (ref 0–0.4)
EOSINOPHIL NFR BLD AUTO: 1.2 % (ref 0.3–6.2)
ERYTHROCYTE [DISTWIDTH] IN BLOOD BY AUTOMATED COUNT: 13.2 % (ref 12.3–15.4)
GFR SERPL CREATININE-BSD FRML MDRD: 82 ML/MIN/1.73
GLUCOSE SERPL-MCNC: 115 MG/DL (ref 65–99)
HCT VFR BLD AUTO: 40.5 % (ref 37.5–51)
HGB BLD-MCNC: 12.9 G/DL (ref 13–17.7)
IMM GRANULOCYTES # BLD AUTO: 0.01 10*3/MM3 (ref 0–0.05)
IMM GRANULOCYTES NFR BLD AUTO: 0.2 % (ref 0–0.5)
INR PPP: 1.57 (ref 0.9–1.1)
LYMPHOCYTES # BLD AUTO: 1.31 10*3/MM3 (ref 0.7–3.1)
LYMPHOCYTES NFR BLD AUTO: 23.3 % (ref 19.6–45.3)
MCH RBC QN AUTO: 32.1 PG (ref 26.6–33)
MCHC RBC AUTO-ENTMCNC: 31.9 G/DL (ref 31.5–35.7)
MCV RBC AUTO: 100.7 FL (ref 79–97)
MONOCYTES # BLD AUTO: 0.71 10*3/MM3 (ref 0.1–0.9)
MONOCYTES NFR BLD AUTO: 12.6 % (ref 5–12)
NEUTROPHILS NFR BLD AUTO: 3.51 10*3/MM3 (ref 1.7–7)
NEUTROPHILS NFR BLD AUTO: 62.5 % (ref 42.7–76)
NRBC BLD AUTO-RTO: 0 /100 WBC (ref 0–0.2)
PLATELET # BLD AUTO: 149 10*3/MM3 (ref 140–450)
PMV BLD AUTO: 10.7 FL (ref 6–12)
POTASSIUM SERPL-SCNC: 4.7 MMOL/L (ref 3.5–5.2)
PROTHROMBIN TIME: 18.6 SECONDS (ref 12.1–15)
RBC # BLD AUTO: 4.02 10*6/MM3 (ref 4.14–5.8)
SODIUM SERPL-SCNC: 139 MMOL/L (ref 136–145)
WBC # BLD AUTO: 5.62 10*3/MM3 (ref 3.4–10.8)

## 2021-06-03 PROCEDURE — 88305 TISSUE EXAM BY PATHOLOGIST: CPT | Performed by: INTERNAL MEDICINE

## 2021-06-03 PROCEDURE — 43239 EGD BIOPSY SINGLE/MULTIPLE: CPT | Performed by: INTERNAL MEDICINE

## 2021-06-03 PROCEDURE — 85610 PROTHROMBIN TIME: CPT | Performed by: HOSPITALIST

## 2021-06-03 PROCEDURE — 45385 COLONOSCOPY W/LESION REMOVAL: CPT | Performed by: INTERNAL MEDICINE

## 2021-06-03 PROCEDURE — G0378 HOSPITAL OBSERVATION PER HR: HCPCS

## 2021-06-03 PROCEDURE — 88342 IMHCHEM/IMCYTCHM 1ST ANTB: CPT | Performed by: INTERNAL MEDICINE

## 2021-06-03 PROCEDURE — 80048 BASIC METABOLIC PNL TOTAL CA: CPT | Performed by: HOSPITALIST

## 2021-06-03 PROCEDURE — 85025 COMPLETE CBC W/AUTO DIFF WBC: CPT | Performed by: HOSPITALIST

## 2021-06-03 PROCEDURE — 25010000002 PHENYLEPHRINE PER 1 ML: Performed by: NURSE ANESTHETIST, CERTIFIED REGISTERED

## 2021-06-03 PROCEDURE — 25010000002 PROPOFOL 10 MG/ML EMULSION: Performed by: NURSE ANESTHETIST, CERTIFIED REGISTERED

## 2021-06-03 PROCEDURE — 45380 COLONOSCOPY AND BIOPSY: CPT | Performed by: INTERNAL MEDICINE

## 2021-06-03 PROCEDURE — 99217 PR OBSERVATION CARE DISCHARGE MANAGEMENT: CPT | Performed by: HOSPITALIST

## 2021-06-03 RX ORDER — SODIUM CHLORIDE, SODIUM LACTATE, POTASSIUM CHLORIDE, CALCIUM CHLORIDE 600; 310; 30; 20 MG/100ML; MG/100ML; MG/100ML; MG/100ML
100 INJECTION, SOLUTION INTRAVENOUS CONTINUOUS
Status: DISCONTINUED | OUTPATIENT
Start: 2021-06-03 | End: 2021-06-03 | Stop reason: HOSPADM

## 2021-06-03 RX ORDER — PROPOFOL 10 MG/ML
VIAL (ML) INTRAVENOUS AS NEEDED
Status: DISCONTINUED | OUTPATIENT
Start: 2021-06-03 | End: 2021-06-03 | Stop reason: SURG

## 2021-06-03 RX ORDER — SODIUM CHLORIDE, SODIUM LACTATE, POTASSIUM CHLORIDE, CALCIUM CHLORIDE 600; 310; 30; 20 MG/100ML; MG/100ML; MG/100ML; MG/100ML
INJECTION, SOLUTION INTRAVENOUS CONTINUOUS PRN
Status: DISCONTINUED | OUTPATIENT
Start: 2021-06-03 | End: 2021-06-03 | Stop reason: SURG

## 2021-06-03 RX ORDER — ONDANSETRON 2 MG/ML
4 INJECTION INTRAMUSCULAR; INTRAVENOUS ONCE AS NEEDED
Status: DISCONTINUED | OUTPATIENT
Start: 2021-06-03 | End: 2021-06-03 | Stop reason: HOSPADM

## 2021-06-03 RX ORDER — OMEPRAZOLE 40 MG/1
40 CAPSULE, DELAYED RELEASE ORAL DAILY
Qty: 90 CAPSULE | Refills: 3 | Status: SHIPPED | OUTPATIENT
Start: 2021-06-03 | End: 2021-08-02

## 2021-06-03 RX ORDER — ASPIRIN 81 MG/1
81 TABLET ORAL DAILY
COMMUNITY
End: 2021-06-03 | Stop reason: HOSPADM

## 2021-06-03 RX ADMIN — PROPOFOL 125 MCG/KG/MIN: 10 INJECTION, EMULSION INTRAVENOUS at 15:45

## 2021-06-03 RX ADMIN — PHENYLEPHRINE HYDROCHLORIDE 100 MCG: 10 INJECTION, SOLUTION INTRAMUSCULAR; INTRAVENOUS; SUBCUTANEOUS at 16:15

## 2021-06-03 RX ADMIN — METOPROLOL SUCCINATE 50 MG: 50 TABLET, EXTENDED RELEASE ORAL at 09:16

## 2021-06-03 RX ADMIN — SODIUM CHLORIDE, POTASSIUM CHLORIDE, SODIUM LACTATE AND CALCIUM CHLORIDE: 600; 310; 30; 20 INJECTION, SOLUTION INTRAVENOUS at 15:42

## 2021-06-03 RX ADMIN — PROPOFOL 30 MG: 10 INJECTION, EMULSION INTRAVENOUS at 15:50

## 2021-06-03 RX ADMIN — PROPOFOL 50 MG: 10 INJECTION, EMULSION INTRAVENOUS at 15:45

## 2021-06-03 RX ADMIN — POLYETHYLENE GLYCOL 3350 119 G: 17 POWDER, FOR SOLUTION ORAL at 05:56

## 2021-06-03 NOTE — CASE MANAGEMENT/SOCIAL WORK
"Discharge Planning Assessment  [facility]     Patient Name: Renato Marquez  MRN: [id number]  Today's Date: 6/3/2021    Admit Date: 6/2/2021    Discharge Needs Assessment     Row Name 06/03/21 1049       Living Environment    Lives With  spouse    Name(s) of Who Lives With Patient  Wife Evelin \"Roxane\"    Current Living Arrangements  home/apartment/condo    Primary Care Provided by  self    Provides Primary Care For  no one    Family Caregiver if Needed  spouse    Family Caregiver Names  Roxane    Quality of Family Relationships  helpful;involved;supportive    Able to Return to Prior Arrangements  yes       Resource/Environmental Concerns    Resource/Environmental Concerns  none       Transition Planning    Patient/Family Anticipates Transition to  home with family    Transportation Anticipated  family or friend will provide       Discharge Needs Assessment    Readmission Within the Last 30 Days  no previous admission in last 30 days    Equipment Currently Used at Home  CPAP    Concerns to be Addressed  no discharge needs identified;denies needs/concerns at this time    Anticipated Changes Related to Illness  none    Equipment Needed After Discharge  none        Discharge Plan     Row Name 06/03/21 1111       Plan    Plan  Home when stable    Provided Post Acute Provider List?  N/A    Patient/Family in Agreement with Plan  yes    Plan Comments  Spoke with Mr Marquez and his wife (with permission) at bedside.  They live gretta home in [place].  Facesheet verified. He has a CPAP that he uses that was provided by [employer].  He is independent with his ADL\"s, works full time and drives himself.  His pharmacy is [facility] in [place].  There are no issues with paying for his prescriptions.  He does not have an advanced directive and has no interest in such.  Plan is home after EGD and colonoscopy.  Will continue to follow        Continued Care and Services - Admitted Since 6/2/2021    Coordination has not been started for this " encounter.         Demographic Summary     Row Name 06/03/21 1042       General Information    Admission Type  observation    Arrived From  home    Referral Source  admission list    Reason for Consult  discharge planning    Preferred Language  English     Used During This Interaction  no       Contact Information    Permission Granted to Share Info With          Functional Status    No documentation.       Psychosocial    No documentation.       Abuse/Neglect    No documentation.       Legal    No documentation.       Substance Abuse    No documentation.       Patient Forms    No documentation.           Chelita Recio RN

## 2021-06-03 NOTE — OP NOTE
ESOPHAGOGASTRODUODENOSCOPY, COLONOSCOPY  Procedure Report    Patient Name:  Renato Marquez  YOB: 1950    Date of Surgery:  6/3/2021     Indications:  Chronic anticoagulation [Z79.01]  Gastrointestinal hemorrhage associated with anorectal source [K62.5]  Anemia due to acute blood loss [D62]      Pre-op Diagnosis:   Chronic anticoagulation [Z79.01]  Gastrointestinal hemorrhage associated with anorectal source [K62.5]  Anemia due to acute blood loss [D62]    Post-Op Diagnosis Codes:     * Chronic anticoagulation [Z79.01]     * Gastrointestinal hemorrhage associated with anorectal source [K62.5]     * Anemia due to acute blood loss [D62]     * Reflux esophagitis [K21.00]     * Multiple gastric ulcers [K25.9]     * Multiple duodenal ulcers [K29.81]     * Diverticulosis [K57.90]     * Colon polyp [K63.5]         Procedure/CPT® Codes:      Procedure(s):  ESOPHAGOGASTRODUODENOSCOPY with biopsies  COLONOSCOPY, polypectomies    Staff:  Surgeon(s):  Luan Meek MD         Anesthesia: Monitored Anesthesia Care    Estimated Blood Loss: none    Specimens:   ID Type Source Tests Collected by Time   A (Not marked as sent) :  Tissue Stomach TISSUE PATHOLOGY EXAM Luan Meek MD 6/3/2021 1554   B (Not marked as sent) :  Tissue Esophagus, Distal TISSUE PATHOLOGY EXAM Luan Meek MD 6/3/2021 1556   C (Not marked as sent) : polyp x 4 (3 cold snare) Polyp Large Intestine, Right / Ascending Colon TISSUE PATHOLOGY EXAM Luan Meek MD 6/3/2021 1605   D (Not marked as sent) : cold snare Polyp Large Intestine, Cecum TISSUE PATHOLOGY EXAM Luan Meek MD 6/3/2021 1612   E (Not marked as sent) :  Polyp Large Intestine, Rectum TISSUE PATHOLOGY EXAM Luan Meek MD 6/3/2021 1621       Implants:    Nothing was implanted during the procedure      Description of Procedure: After having signed informed consent, he was brought to the endoscopy  suite, placed in left lateral decubitus position and given his IV sedation. A bite block was placed between his incisors. The scope was introduced in the oropharynx, advanced under direct visualization in the esophagus, through the distal esophagus. The Z-line was mildly irregular consistent with reflux esophagitis, but there were no varices, no evidence of Elena-Wilcox tear, no evidence of ulcer. The scope was advanced in the stomach and retroflexed, revealing normal cardia and fundus. There was some petechial erythema to the gastric mucosa. As the scope was deretroflexed and advanced in the antrum, there were scattered small erosions/ulcers noted. None of these were bleeding. There was no evidence of adherent clot or visible vessel. The scope was advanced through the pylorus, through the duodenal bulb and there were at least 2 superficial ulcers leading to the angle. These were clean based and there was no adherent clot. The scope was advanced around the angle, up to the 2nd and 3rd portions of the duodenum which were normal appearing. The scope was withdrawn back into the antrum. Biopsies were taken to rule out H. pylori in a patient taking known NSAIDs. The scope was withdrawn back in the distal esophagus. Biopsies were taken to rule out short segment Florian’s esophagus, but this was not suspected visually. As the scope was withdrawn, the remainder of the esophagus was normal appearing. The scope was taken from the patient. He tolerated that procedure well.     The patient was kept in left lateral decubitus position and repositioned in the room. Rectal exam revealed no external lesions, normal anal tone, no rectal mass. The scope was introduced in the rectum and advanced under direct visualization with ease through the rectum, sigmoid colon, past scattered diverticula, through the descending colon, to and around the splenic flexure; reduced; advanced through the transverse colon with some looping, to and  around the hepatic flexure; reduced in the ascending colon. There was an ascending colon polyp that was 6 mm in size. It was biopsied, felt to be completely removed. The scope was then reduced. Using variable resistance and abdominal splinting, the scope could be advanced into the cecum. The cecum was identified by the appendiceal orifice and the ileocecal valve. Within the cecum was a sessile 7-8 mm polyp that was removed with cold snare technique. The scope was then withdrawn back into the ascending colon. In the distal ascending colon, 3 other sessile what appeared to be serrated adenomas were identified. All 3 were removed with cold snare technique and sent with the initial ascending colon polyp as ascending colon polyps x4. The scope was then withdrawn around the hepatic flexure, through the transverse, descending and sigmoid colon. No further mucosal lesions were noted there. Within the proximal rectum was a sessile similar-appearing 8 mm polyp that was removed with cold biopsy forceps, felt to be completely removed with excellent hemostasis. The scope was then retroflexed revealing intact dentate line and no further mucosal lesion. The scope was deretroflexed and withdrawn. The patient tolerated both procedures well.           Findings: Duodenal ulcers  Multiple gastric Ulcers-Biopsy  Reflux Esophagitis-biopsy    Colon to Cecum  Diverticulosis  Polyps-6-Cold Snare/Biopsy     Complications: None    Recommendations: Results to be called. and No aspirin or NSAIDS and resume Oral Anticoagulation in 10 days.      Luan Meek MD     Date: 6/3/2021  Time: 16:29 EDT

## 2021-06-03 NOTE — ANESTHESIA PREPROCEDURE EVALUATION
Anesthesia Evaluation     Patient summary reviewed and Nursing notes reviewed   NPO Solid Status: > 8 hours  NPO Liquid Status: > 6 hours           Airway   Mallampati: II  TM distance: >3 FB  Neck ROM: full  No difficulty expected  Dental - normal exam     Pulmonary     breath sounds clear to auscultation  (+) a smoker ( cigar occasional) Current, sleep apnea on CPAP,   Cardiovascular   Exercise tolerance: good (4-7 METS)    ECG reviewed  Patient on routine beta blocker and Beta blocker given within 24 hours of surgery  Rhythm: regular  Rate: normal    (+) hypertension well controlled 2 medications or greater, CAD, CABG ( 2009) >6 Months, dysrhythmias Atrial Fib, hyperlipidemia,  carotid artery disease ( carotid endarterectomy in 2009) right carotid      Neuro/Psych  GI/Hepatic/Renal/Endo    (+) obesity,  GI bleeding active bleeding,     Musculoskeletal     Abdominal    Substance History   (+) alcohol use ( bourbon 2x/night),      OB/GYN negative ob/gyn ROS         Other   arthritis,    history of cancer ( melanoma and basal cells removed. resolved)    ROS/Med Hx Other: Contains abnormal data ECG 12 Lead  Order: [id number]  Status:  Final result   Visible to patient:  No (blocked) Next appt:  06/16/2021 at 11:00 AM in Cardiology ([facility]) Dx:  Coronary artery disease involving carlos...    Narrative      Sharon Amor APRN     12/21/2020  4:05 PM     ECG 12 Lead     Date/Time: 12/21/2020 2:30 PM   Performed by: Sharon Amor APRN   Authorized by: Sharon Amor APRN   Comparison: compared with previous ECG from 7/21/2020   Similar to previous ECG   Rhythm: atrial fibrillation   Ectopy: unifocal PVCs   Rate: normal   Conduction: left posterior fascicular block   ST Depression: II, III, aVF, V4, V5 and V6   T inversion: II   QRS axis: borderline right axis     Clinical impression: abnormal EKG       · Myocardial perfusion imaging indicates a medium-sized infarct located in the inferior wall with no  significant ischemia noted.  · Left ventricular ejection fraction is mildly reduced. (Calculated EF = 40%).      · The study is technically difficult for diagnosis.  · Calculated left ventricular EF = 45.7% Estimated left ventricular EF = 46% Estimated left ventricular EF was in agreement with the calculated left ventricular EF. Left ventricular systolic function is low normal. The left ventricular cavity is mildly dilated. Left ventricular wall thickness is consistent with moderate concentric hypertrophy. There is left ventricular global hypokinesis noted. Left ventricular diastolic function was indeterminate.  · The right ventricular cavity is mildly dilated. Normal right ventricular wall thickness noted. Mildly reduced right ventricular systolic function noted.  · The left atrial cavity is moderately dilated  · The right atrial cavity is moderately dilated.  · No aortic valve regurgitation or stenosis is present. The aortic valve is grossly normal in structure. There is mild annular calcification  · Moderate mitral annular calcification is present. Mild to moderate mitral valve regurgitation is present. The mitral valve regurgitation may be underestimated on the study as apical views are suboptimal No significant mitral valve stenosis is present                      Anesthesia Plan    ASA 3     MAC     intravenous induction     Anesthetic plan, all risks, benefits, and alternatives have been provided, discussed and informed consent has been obtained with: patient.  Use of blood products discussed with patient  Consented to blood products.   Plan discussed with CRNA.

## 2021-06-03 NOTE — BRIEF OP NOTE
ESOPHAGOGASTRODUODENOSCOPY, COLONOSCOPY  Progress Note    Renato Marquez  6/3/2021    Pre-op Diagnosis:   Chronic anticoagulation [Z79.01]  Gastrointestinal hemorrhage associated with anorectal source [K62.5]  Anemia due to acute blood loss [D62]       Post-Op Diagnosis Codes:     * Chronic anticoagulation [Z79.01]     * Gastrointestinal hemorrhage associated with anorectal source [K62.5]     * Anemia due to acute blood loss [D62]     * Reflux esophagitis [K21.00]     * Multiple gastric ulcers [K25.9]     * Multiple duodenal ulcers [K29.81]     * Diverticulosis [K57.90]     * Colon polyp [K63.5]    Procedure/CPT® Codes:        Procedure(s):  ESOPHAGOGASTRODUODENOSCOPY with biopsies  COLONOSCOPY, polypectomies    Surgeon(s):  Luan Meek MD    Anesthesia: Monitored Anesthesia Care    Staff:   Circulator: Minnie Zheng RN  Scrub Person: Letitia Feng; Chayo Leonard         Estimated Blood Loss: none    Urine Voided: * No values recorded between 6/3/2021  3:37 PM and 6/3/2021  4:24 PM *    Specimens:                Specimens     ID Source Type Tests Collected By Collected At Frozen?    A Stomach Tissue · TISSUE PATHOLOGY EXAM   Luan Meek MD 6/3/21 1554     This specimen was not marked as sent.    B Esophagus, Distal Tissue · TISSUE PATHOLOGY EXAM   Luan Meek MD 6/3/21 1556     This specimen was not marked as sent.    C Large Intestine, Right / Ascending Colon Polyp · TISSUE PATHOLOGY EXAM   Luan Meek MD 6/3/21 1605     Description: polyp x 4 (3 cold snare)    This specimen was not marked as sent.    D Large Intestine, Cecum Polyp · TISSUE PATHOLOGY EXAM   Luan Meek MD 6/3/21 1612     Description: cold snare    This specimen was not marked as sent.    E Large Intestine, Rectum Polyp · TISSUE PATHOLOGY EXAM   Luan Meek MD 6/3/21 1621     This specimen was not marked as sent.                Drains: * No LDAs found  *    Findings: Duodenal ulcers  Multiple gastric Ulcers-Biopsy  Reflux Esophagitis-biopsy    Colon to Cecum  Diverticulosis  Polyps-6-Cold Snare/Biopsy    Complications: None          Luan Meek MD     Date: 6/3/2021  Time: 16:27 EDT

## 2021-06-03 NOTE — ANESTHESIA POSTPROCEDURE EVALUATION
Patient: Renato Marquez    Procedure Summary     Date: 06/03/21 Room / Location: [facility] ENDOSCOPY 1 /  [facility]    Anesthesia Start: 1542 Anesthesia Stop: 1626    Procedures:       ESOPHAGOGASTRODUODENOSCOPY with biopsies (N/A Esophagus)      COLONOSCOPY, polypectomies (N/A ) Diagnosis:       Chronic anticoagulation      Gastrointestinal hemorrhage associated with anorectal source      Anemia due to acute blood loss      Reflux esophagitis      Multiple gastric ulcers      Multiple duodenal ulcers      Diverticulosis      Colon polyp      (Chronic anticoagulation [Z79.01])      (Gastrointestinal hemorrhage associated with anorectal source [K62.5])      (Anemia due to acute blood loss [D62])    Surgeons: Luan Meek MD Provider: Erica Garcia CRNA    Anesthesia Type: MAC ASA Status: 3          Anesthesia Type: MAC    Vitals  Vitals Value Taken Time   /65 06/03/21 1640   Temp 98.2 °F (36.8 °C) 06/03/21 1632   Pulse 81 06/03/21 1640   Resp 18 06/03/21 1640   SpO2 93 % 06/03/21 1640           Post Anesthesia Care and Evaluation    Patient location during evaluation: PHASE II  Patient participation: complete - patient participated  Level of consciousness: awake  Pain score: 0  Pain management: adequate  Airway patency: patent  Anesthetic complications: No anesthetic complications  PONV Status: none  Cardiovascular status: acceptable  Respiratory status: acceptable  Hydration status: acceptable

## 2021-06-03 NOTE — PLAN OF CARE
Goal Outcome Evaluation:  Plan of Care Reviewed With: patient  Progress: improving  Outcome Summary: VSS, pt resting well throughout the night, EGD colonoscopy today, carla. afib on tele, bowel prep given 1x,  will continue to monitor

## 2021-06-04 ENCOUNTER — TRANSITIONAL CARE MANAGEMENT TELEPHONE ENCOUNTER (OUTPATIENT)
Dept: CALL CENTER | Facility: HOSPITAL | Age: 71
End: 2021-06-04

## 2021-06-04 NOTE — OUTREACH NOTE
Prep Survey      Responses   [facility] facility patient discharged from?  [place]   Is LACE score < 7 ?  Yes   Emergency Room discharge w/ pulse ox?  No   Eligibility  [facility]   Date of Admission  06/02/21   Date of Discharge  06/03/21   Discharge Disposition  Home or Self Care   Discharge diagnosis  ESOPHAGOGASTRODUODENOSCOPY with biopsies   Does the patient have one of the following disease processes/diagnoses(primary or secondary)?  Other   Does the patient have Home health ordered?  No   Is there a DME ordered?  No   Prep survey completed?  Yes          Chelita Self RN

## 2021-06-04 NOTE — OUTREACH NOTE
Call Center TCM Note      Responses   [facility] patient discharged from?  [place]   Does the patient have one of the following disease processes/diagnoses(primary or secondary)?  Other   TCM attempt successful?  Yes   Call start time  1554   Call end time  1601   Discharge diagnosis  ESOPHAGOGASTRODUODENOSCOPY with biopsies, rectal bleeding   Is patient permission given to speak with other caregiver?  Yes   List who call center can speak with  Evelin Marquez, spouse   Medication alerts for this patient  Aspirin and Xarelto stopped.    Meds reviewed with patient/caregiver?  Yes   Is the patient having any side effects they believe may be caused by any medication additions or changes?  No   Does the patient have all medications ordered at discharge?  Yes   Is the patient taking all medications as directed (includes completed medication regime)?  Yes   Comments regarding appointments  Cardiology appt 6/16/21, wife to call Dr Turner office for appt 4-6 weeks.    Does the patient have a primary care provider?   No [Wife states that patient no longer wants to see Dr De Leon. Declined to schedule hospital follow up appt. ]   PCP Nursing Intervention  Provided number to obtain PCP [Wife states that they were given PCP list at hospital. ]   Does the patient have an appointment with their PCP within 7 days of discharge?  No   Nursing Interventions  Educated patient on importance of making appointment, Advised patient to make appointment   Has the patient kept scheduled appointments due by today?  N/A   Has home health visited the patient within 72 hours of discharge?  N/A   Psychosocial issues?  No   Did the patient receive a copy of their discharge instructions?  Yes   Nursing interventions  Reviewed instructions with patient   What is the patient's perception of their health status since discharge?  Improving   Is the patient/caregiver able to teach back signs and symptoms related to disease process for when  to call PCP?  Yes   Is the patient/caregiver able to teach back signs and symptoms related to disease process for when to call 911?  Yes   Is the patient/caregiver able to teach back the hierarchy of who to call/visit for symptoms/problems? PCP, Specialist, Home health nurse, Urgent Care, ED, 911  Yes   TCM call completed?  Yes   Wrap up additional comments  Denies further questions or needs today.           Chelita Segura RN    6/4/2021, 16:01 EDT

## 2021-06-04 NOTE — CASE MANAGEMENT/SOCIAL WORK
Case Management Discharge Note      Final Note: Discharged home.    Provided Post Acute Provider List?: N/A    Selected Continued Care - Discharged on 6/3/2021 Admission date: 6/2/2021 - Discharge disposition: Home or Self Care    Destination    No services have been selected for the patient.              Durable Medical Equipment    No services have been selected for the patient.              Dialysis/Infusion    No services have been selected for the patient.              Home Medical Care    No services have been selected for the patient.              Therapy    No services have been selected for the patient.              Community Resources    No services have been selected for the patient.                       Final Discharge Disposition Code: 01 - home or self-care

## 2021-06-08 PROBLEM — D12.2 ADENOMATOUS POLYP OF ASCENDING COLON: Status: ACTIVE | Noted: 2021-06-08

## 2021-06-08 LAB
LAB AP CASE REPORT: NORMAL
LAB AP SPECIAL STAINS: NORMAL
PATH REPORT.FINAL DX SPEC: NORMAL
PATH REPORT.GROSS SPEC: NORMAL

## 2021-06-16 ENCOUNTER — OFFICE VISIT (OUTPATIENT)
Dept: CARDIOLOGY | Facility: CLINIC | Age: 71
End: 2021-06-16

## 2021-06-16 ENCOUNTER — HOSPITAL ENCOUNTER (OUTPATIENT)
Dept: CARDIOLOGY | Facility: HOSPITAL | Age: 71
Discharge: HOME | End: 2021-06-16
Admitting: NURSE PRACTITIONER

## 2021-06-16 VITALS
HEART RATE: 118 BPM | HEIGHT: 72 IN | DIASTOLIC BLOOD PRESSURE: 70 MMHG | OXYGEN SATURATION: 98 % | WEIGHT: 231 LBS | SYSTOLIC BLOOD PRESSURE: 138 MMHG | BODY MASS INDEX: 31.29 KG/M2

## 2021-06-16 DIAGNOSIS — I34.0 NONRHEUMATIC MITRAL VALVE REGURGITATION: Chronic | ICD-10-CM

## 2021-06-16 DIAGNOSIS — I48.20 CHRONIC ATRIAL FIBRILLATION (HCC): Chronic | ICD-10-CM

## 2021-06-16 DIAGNOSIS — I25.5 ISCHEMIC CARDIOMYOPATHY: Chronic | ICD-10-CM

## 2021-06-16 DIAGNOSIS — E78.5 DYSLIPIDEMIA: ICD-10-CM

## 2021-06-16 DIAGNOSIS — I49.8 BIGEMINY: ICD-10-CM

## 2021-06-16 DIAGNOSIS — I10 ESSENTIAL HYPERTENSION: ICD-10-CM

## 2021-06-16 DIAGNOSIS — I48.91 ATRIAL FIBRILLATION, UNSPECIFIED TYPE (HCC): ICD-10-CM

## 2021-06-16 DIAGNOSIS — I65.21 STENOSIS OF RIGHT CAROTID ARTERY: Chronic | ICD-10-CM

## 2021-06-16 DIAGNOSIS — I25.10 CORONARY ARTERY DISEASE INVOLVING NATIVE CORONARY ARTERY OF NATIVE HEART WITHOUT ANGINA PECTORIS: Primary | Chronic | ICD-10-CM

## 2021-06-16 PROCEDURE — 93225 XTRNL ECG REC<48 HRS REC: CPT

## 2021-06-16 PROCEDURE — 93000 ELECTROCARDIOGRAM COMPLETE: CPT | Performed by: NURSE PRACTITIONER

## 2021-06-16 PROCEDURE — 93226 XTRNL ECG REC<48 HR SCAN A/R: CPT

## 2021-06-16 PROCEDURE — 99214 OFFICE O/P EST MOD 30 MIN: CPT | Performed by: NURSE PRACTITIONER

## 2021-06-16 RX ORDER — METOPROLOL SUCCINATE 50 MG/1
50 TABLET, EXTENDED RELEASE ORAL 2 TIMES DAILY
Qty: 180 TABLET | Refills: 3 | Status: SHIPPED | OUTPATIENT
Start: 2021-06-16 | End: 2022-10-13 | Stop reason: SDUPTHER

## 2021-06-16 RX ORDER — FUROSEMIDE 20 MG/1
20 TABLET ORAL DAILY
Qty: 90 TABLET | Refills: 3 | Status: ON HOLD | OUTPATIENT
Start: 2021-06-16 | End: 2021-06-22 | Stop reason: SDUPTHER

## 2021-06-16 NOTE — PROGRESS NOTES
Date of Office Visit: 2021  Encounter Provider: SIRIA Womack  Place of Service: [facility] CARDIOLOGY  Patient Name: Renato Marquez  :1950  Primary Cardiologist: Dr. Scott    Chief Complaint   Patient presents with   • Coronary Artery Disease     follow up   • Atrial Fibrillation   :     Dear Dr. De Leon    HPI: Renato Marquez is a pleasant 71 y.o. male who presents 2021 for cardiac follow up. I have reviewed his past medical records including notes,labs and testing in preparation for today's visit.    He has a history of chronic atrial fibrillation. He has a history of CAD with prior CABG. This was performed in  by Dr. Jeet Bhagat. He had a three-vessel CABG with LIMA to the LAD, SVG to the obtuse marginal, and SVG to the posterior descending coronary arteries. He also has a history of cerebrovascular disease with a right carotid endarterectomy in . The carotid endarterectomy was performed at the same time by Dr. Lewis.He had an ejection fraction of 37% on his stress test that was performed in 2016.  That showed no ischemia.  We discussed cardioversion with him, but he was having no symptoms and elected to remain on his current medical regimen. He had a stress test in  that was normal with no ischemia.      Echo 2018:  Interpretation Summary      · Calculated EF = 50%.  · Left ventricular systolic function is normal.  · Left ventricular diastolic dysfunction (grade III) consistent with reversible restrictive pattern.  · Mild mitral valve regurgitation is present  · Mild pulmonic valve regurgitation is present.  · Mild tricuspid valve regurgitation is present.  · Estimated right ventricular systolic pressure from tricuspid regurgitation is normal (<35 mmHg).            I saw him in 2020. He states 2 years ago he was over 40 pounds.  He states he has gradually gained it back and is now almost back to where he originally was at 244.   He has noticed that his clothes do not fit as well he does not feel as well and he is having some shortness of breath with exertion that was not previous there.  He states over the last 3 weeks he has felt some palpitations and shortness of air with exertion.  He has had some intermittent lower extremity edema in his feet.  He also noticed swelling in his knees and he had back pain.  He had testing and then was sent to Dr. Luis in Ortho.  He has arthritis in his back and bilateral knees.  He has been taking diclofenac that had helped quite a bit.  He also stated that after starting the diclofenac after couple weeks he felt the swelling back in his knees and down in his feet and ankles.  He has not had any dizziness, chest pain or chest pressure.  He continues to work full-time and states he just cannot do the things he wants to get done in the day.  He does have fatigue.  He also is having leg cramps and cramps in his hands.  When he saw Dr. Scott at the beginning of the year, he was instructed to stop his simvastatin.  This did help his leg cramping.  He has since been started on Pravachol and was doing well.  He does not know if it is the Pravachol that is causing the leg and hand cramps or if he is having intermittent issues with dehydration.  He is trying to be very conscientious and drink plenty of water.  He knows he has gained weight and he is committed to getting back down to around 200 which is where he feels good.  He also has not been quite as active because he states he just does not have the energy.    Echo  1/2021 - Interpretation Summary  · The study is technically difficult for diagnosis.  · Calculated left ventricular EF = 45.7% Estimated left ventricular EF = 46% Estimated left ventricular EF was in agreement with the calculated left ventricular EF. Left ventricular systolic function is low normal. The left ventricular cavity is mildly dilated. Left ventricular wall thickness is consistent with  "moderate concentric hypertrophy. There is left ventricular global hypokinesis noted. Left ventricular diastolic function was indeterminate.  · The right ventricular cavity is mildly dilated. Normal right ventricular wall thickness noted. Mildly reduced right ventricular systolic function noted.  · The left atrial cavity is moderately dilated  · The right atrial cavity is moderately dilated.  · No aortic valve regurgitation or stenosis is present. The aortic valve is grossly normal in structure. There is mild annular calcification  · Moderate mitral annular calcification is present. Mild to moderate mitral valve regurgitation is present. The mitral valve regurgitation may be underestimated on the study as apical views are suboptimal No significant mitral valve stenosis is present        1/25/21 Stress test  Interpretation Summary  · Myocardial perfusion imaging indicates a medium-sized infarct located in the inferior wall with no significant ischemia noted.  · Left ventricular ejection fraction is mildly reduced. (Calculated EF = 40%).         He presented to the ED at [facility] on 6/2/2021 with  blood in his stool.  He reported the blood seemed to be intermittent, but would turn the bowel \"red\".  He had no associated nausea or abdominal cramping.  Over the prior few days, it seemed to be happening every time he moved his bowels.  He does note some constipation and hard stools.   This has happened to him previously after eating \"beets\".  He has had beets last week, but it doesn't appear to be related to this.  He stated he used ibuprofen on occasion for his OA.  Was admitted and seen by GI who performed an endoscopy and colonoscopy.These test showed reflux esophagitis and duodenal ulcer.  He was discharged home on 6/3/2021 with instructions to hold his Xarelto for 10 days.  He was told to stop his aspirin.  His lisinopril was decreased to 5 mg daily and his Lasix was stopped. Since I saw him last, he has been " diagnosed with complex sleep apnea and strted on BiPAP.  He states he is feeling less tired and fatigued.    He presents today in follow-up.  He states he has lower extremity edema that has worsened since stopping his Lasix.  He also has some mild shortness of breath.  He feels that his abdomen is mildly distended.  He denies any palpitations, dizziness or lightheadedness.  He is not had any chest pain or chest pressure.  He has minimal fatigue.  His blood pressure is controlled.  His EKG shows bigeminy with a heart rate of 118.  He has not had any more rectal bleeding, dark or tarry stools.          Past Medical History:   Diagnosis Date   • CAD (coronary artery disease)    • Cancer (CMS/HCC)    • Carotid arterial disease (CMS/HCC)    • Chronic atrial fibrillation (CMS/HCC)    • Complex sleep apnea syndrome     BiPAP ST   • Hyperlipidemia    • Hypertension    • Ischemic cardiomyopathy    • Mitral regurgitation    • Rectal bleeding        Past Surgical History:   Procedure Laterality Date   • CAROTID ENDARTERECTOMY Right 2009    Dr Lewis   • COLONOSCOPY     • COLONOSCOPY W/ POLYPECTOMY N/A 6/3/2021    Procedure: COLONOSCOPY, polypectomies;  Surgeon: Luan Meek MD;  Location: [facility] OR;  Service: Gastroenterology;  Laterality: N/A;  Diverticulosis  Ascending colon polyp x 4 (3 cold snare)  Cecal polyp (cold snare)  Rectal polyp   • CORONARY ARTERY BYPASS GRAFT  2009    MIMI Frederick to LAD, SVG to OM, SVG Post Desc   • ENDOSCOPY N/A 6/3/2021    Procedure: ESOPHAGOGASTRODUODENOSCOPY with biopsies;  Surgeon: Luan Meek MD;  Location: [facility] OR;  Service: Gastroenterology;  Laterality: N/A;  Reflux  Gastric and duodenal ulcers  Biopsies: gastric, distal esophagus   • FINGER SURGERY         Social History     Socioeconomic History   • Marital status:      Spouse name: Not on file   • Number of children: Not on file   • Years of education: Not on file   • Highest education  level: Not on file   Tobacco Use   • Smoking status: Former Smoker   • Smokeless tobacco: Never Used   • Tobacco comment: caffine use   Vaping Use   • Vaping Use: Never assessed   Substance and Sexual Activity   • Alcohol use: Yes     Comment: social    • Drug use: No   • Sexual activity: Not Currently       Family History   Problem Relation Age of Onset   • Heart disease Father    • Stroke Father    • Hypertension Father        The following portion of the patient's history were reviewed and updated as appropriate: past medical history, past surgical history, past social history, past family history, allergies, current medications, and problem list.    Review of Systems   Constitutional: Positive for malaise/fatigue (mild). Negative for diaphoresis and fever.   HENT: Negative for congestion, hearing loss, hoarse voice, nosebleeds and sore throat.    Eyes: Negative for photophobia, vision loss in left eye, vision loss in right eye and visual disturbance.   Cardiovascular: Positive for leg swelling. Negative for chest pain, dyspnea on exertion, irregular heartbeat, near-syncope, orthopnea, palpitations, paroxysmal nocturnal dyspnea and syncope.   Respiratory: Positive for shortness of breath. Negative for cough, hemoptysis, sleep disturbances due to breathing, snoring, sputum production and wheezing.    Endocrine: Negative for cold intolerance, heat intolerance, polydipsia, polyphagia and polyuria.   Hematologic/Lymphatic: Negative for bleeding problem. Does not bruise/bleed easily.   Skin: Negative for color change, dry skin, poor wound healing, rash and suspicious lesions.   Musculoskeletal: Negative for arthritis, back pain, falls, gout, joint pain, joint swelling, muscle cramps, muscle weakness and myalgias.   Gastrointestinal: Positive for bloating. Negative for abdominal pain, constipation, diarrhea, dysphagia, melena, nausea and vomiting.   Neurological: Negative for excessive daytime sleepiness, dizziness,  "headaches, light-headedness, loss of balance, numbness, paresthesias, seizures, vertigo and weakness.   Psychiatric/Behavioral: Negative for depression, memory loss and substance abuse. The patient is not nervous/anxious.        No Known Allergies      Current Outpatient Medications:   •  lisinopril (PRINIVIL,ZESTRIL) 10 MG tablet, Take 1 tablet by mouth Daily. (Patient taking differently: Take 5 mg by mouth Daily. Taking 5 mg), Disp: 90 tablet, Rfl: 3  •  Magnesium 200 MG tablet, Take 250 mg by mouth Daily., Disp: , Rfl:   •  metoprolol succinate XL (TOPROL-XL) 50 MG 24 hr tablet, Take 1 tablet by mouth 2 (two) times a day., Disp: 180 tablet, Rfl: 3  •  omeprazole (priLOSEC) 40 MG capsule, Take 1 capsule by mouth Daily., Disp: 90 capsule, Rfl: 3  •  pravastatin (PRAVACHOL) 40 MG tablet, Take 1 tablet by mouth Daily., Disp: 90 tablet, Rfl: 1  •  furosemide (LASIX) 20 MG tablet, Take 1 tablet by mouth Daily., Disp: 90 tablet, Rfl: 3  •  rivaroxaban (Xarelto) 20 MG tablet, Take 1 tablet by mouth Daily., Disp: 90 tablet, Rfl: 3        Objective:     Vitals:    06/16/21 1105   BP: 138/70   Pulse: 118   SpO2: 98%   Weight: 105 kg (231 lb)   Height: 182.9 cm (72\")     Body mass index is 31.33 kg/m².      Vitals reviewed.   Constitutional:       General: Not in acute distress.     Appearance: Healthy appearance. Well-developed.   Eyes:      General:         Right eye: No discharge.         Left eye: No discharge.      Conjunctiva/sclera: Conjunctivae normal.   HENT:      Head: Normocephalic and atraumatic.      Right Ear: External ear normal.      Left Ear: External ear normal.      Nose: Nose normal.   Neck:      Thyroid: No thyromegaly.      Vascular: No JVD.      Trachea: No tracheal deviation.      Lymphadenopathy: No cervical adenopathy.   Pulmonary:      Effort: Pulmonary effort is normal. No respiratory distress.      Breath sounds: Normal breath sounds. No wheezing. No rales.   Chest:      Chest wall: Not tender to " palpatation.   Cardiovascular:      Normal rate. Frequent ectopic beats. Regularly irregular rhythm.      No gallop.   Pulses:     Intact distal pulses.   Edema:     Peripheral edema present.     Ankle: 1+ pitting edema of the left ankle and 2+ pitting edema of the right ankle.     Feet: 1+ pitting edema of the left foot and 2+ pitting edema of the right foot.  Abdominal:      General: There is distension (mildly).      Tenderness: There is no abdominal tenderness.   Musculoskeletal: Normal range of motion.         General: No tenderness or deformity.      Cervical back: Normal range of motion and neck supple. Skin:     General: Skin is warm and dry.      Findings: No erythema or rash.   Neurological:      Mental Status: Alert and oriented to person, place, and time.      Coordination: Coordination normal.   Psychiatric:         Attention and Perception: Attention normal.         Mood and Affect: Mood normal.         Speech: Speech normal.         Behavior: Behavior normal.         Thought Content: Thought content normal.         Cognition and Memory: Cognition normal.         Judgment: Judgment normal.               ECG 12 Lead    Date/Time: 6/16/2021 11:20 AM  Performed by: Sharon Amor APRN  Authorized by: Sharon Amor APRN   Comparison: compared with previous ECG from 12/21/2020  Similar to previous ECG  Rhythm: atrial fibrillation  Ectopy: bigeminy  Rate: tachycardic  Conduction: conduction normal  ST Segments: ST segments normal  ST Depression: II, III, aVF, V3, V4, V5 and V6  T Waves: T waves normal  T inversion: III and aVF  QRS axis: normal    Clinical impression: abnormal EKG              Assessment:       Diagnosis Plan   1. Coronary artery disease involving native coronary artery of native heart without angina pectoris     2. Atrial fibrillation, unspecified type (CMS/HCC)  Holter Monitor - 48 Hour   3. Ischemic cardiomyopathy     4. Nonrheumatic mitral valve regurgitation     5. Essential  hypertension     6. Dyslipidemia     7. Stenosis of right carotid artery     8. Bigeminy  Holter Monitor - 48 Hour   9. Chronic atrial fibrillation (CMS/HCC)  metoprolol succinate XL (TOPROL-XL) 50 MG 24 hr tablet          Plan:        1.  Coronary Artery Disease  Assessment  • Denies angina   Plan  • Lifestyle modifications discussed include adhering to a heart healthy diet, avoidance of tobacco products, maintenance of a healthy weight, medication compliance, regular exercise and regular monitoring of cholesterol and blood pressure.  Actively trying to los weight.   Subjective - Objective  • There is a history of previous coronary artery bypass graft  Aspirin was stopped secondary to GI bleed 6/2021     2.  Atrial Fibrillation and Atrial Flutter  Assessment  • The patient has permanent atrial fibrillation. EKG shows bigeminy today.  His Xarelto was stopped 6/3/2021 secondary to GI bleed.  Instruction to hold for 10 days.  I am restarting his Xarelto today.  • This is non-valvular in etiology  • The patient's CHADS2-VASc score is 2  • A [id number] score of 2 or more is considered a high risk for a thromboembolic event  Plan  • Continue in atrial fibrillation with rate control  • Continue rivaroxaban for antithrombotic therapy, bleeding issues discussed  • Continue beta blocker for rate control     3.  Bigeminy - check 48 hour Holter.  Restart Xarelto.  Increase metoprolol to 50 mg BID    4..  Cardiomyopathy -  Lasix was stopped during his recent admission.  He has 1-2+ LE edema and mild abdominal distention.  I am restarting his Lasix today at 20 mg.       5.  Sleep apnea- now on BiPAP less fatigued and short winded.    6.  GI bleed - EGD and colonoscopy 6/2021 which revealed duodenal ulcer and reflex gastritis.  Xarleto was stopped and held for 10 days.  He will now restart his NOAC.    Now in Bigeminy - increase metoprolol to 50 mg BID and check 48hour Holter.  Restart lasix 20 mg daily.  Restart  [place].       As always, it has been a pleasure to participate in your patient's care. Thank you.       Sincerely,       SIRIA Womack      Current Outpatient Medications:   •  lisinopril (PRINIVIL,ZESTRIL) 10 MG tablet, Take 1 tablet by mouth Daily. (Patient taking differently: Take 5 mg by mouth Daily. Taking 5 mg), Disp: 90 tablet, Rfl: 3  •  Magnesium 200 MG tablet, Take 250 mg by mouth Daily., Disp: , Rfl:   •  metoprolol succinate XL (TOPROL-XL) 50 MG 24 hr tablet, Take 1 tablet by mouth 2 (two) times a day., Disp: 180 tablet, Rfl: 3  •  omeprazole (priLOSEC) 40 MG capsule, Take 1 capsule by mouth Daily., Disp: 90 capsule, Rfl: 3  •  pravastatin (PRAVACHOL) 40 MG tablet, Take 1 tablet by mouth Daily., Disp: 90 tablet, Rfl: 1  •  furosemide (LASIX) 20 MG tablet, Take 1 tablet by mouth Daily., Disp: 90 tablet, Rfl: 3  •  rivaroxaban (Xarelto) 20 MG tablet, Take 1 tablet by mouth Daily., Disp: 90 tablet, Rfl: 3    Dictated utilizing Dragon dictation

## 2021-06-18 ENCOUNTER — TELEPHONE (OUTPATIENT)
Dept: CARDIOLOGY | Facility: CLINIC | Age: 71
End: 2021-06-18

## 2021-06-18 NOTE — TELEPHONE ENCOUNTER
Wife called to ask if  is to continue with statin, I called her back and advised that he is to  Continue with statin due to long standing hx of cad. She went on to mention that  was having issues muscle cramping in his hands and legs and this has been ongoing for a while and he forgot to mention it while he was here.

## 2021-06-20 ENCOUNTER — HOSPITAL ENCOUNTER (EMERGENCY)
Facility: HOSPITAL | Age: 71
Discharge: ANOTHER HOSPITAL | End: 2021-06-20
Attending: EMERGENCY MEDICINE | Admitting: EMERGENCY MEDICINE

## 2021-06-20 VITALS
HEART RATE: 90 BPM | RESPIRATION RATE: 20 BRPM | TEMPERATURE: 97.9 F | SYSTOLIC BLOOD PRESSURE: 142 MMHG | BODY MASS INDEX: 33.82 KG/M2 | DIASTOLIC BLOOD PRESSURE: 85 MMHG | HEIGHT: 72 IN | WEIGHT: 249.7 LBS | OXYGEN SATURATION: 94 %

## 2021-06-20 DIAGNOSIS — D64.9 SYMPTOMATIC ANEMIA: ICD-10-CM

## 2021-06-20 DIAGNOSIS — Z79.01 ANTICOAGULATED BY ANTICOAGULATION TREATMENT: ICD-10-CM

## 2021-06-20 DIAGNOSIS — R60.0 PEDAL EDEMA: ICD-10-CM

## 2021-06-20 DIAGNOSIS — K92.2 ACUTE LOWER GI BLEEDING: Primary | ICD-10-CM

## 2021-06-20 DIAGNOSIS — I49.8 VENTRICULAR BIGEMINY: ICD-10-CM

## 2021-06-20 LAB
ABO GROUP BLD: NORMAL
ALBUMIN SERPL-MCNC: 3.9 G/DL (ref 3.5–5.2)
ALBUMIN/GLOB SERPL: 1.4 G/DL
ALP SERPL-CCNC: 90 U/L (ref 39–117)
ALT SERPL W P-5'-P-CCNC: 21 U/L (ref 1–41)
ANION GAP SERPL CALCULATED.3IONS-SCNC: 8 MMOL/L (ref 5–15)
AST SERPL-CCNC: 30 U/L (ref 1–40)
BASOPHILS # BLD AUTO: 0.02 10*3/MM3 (ref 0–0.2)
BASOPHILS NFR BLD AUTO: 0.3 % (ref 0–1.5)
BILIRUB SERPL-MCNC: 0.9 MG/DL (ref 0–1.2)
BLD GP AB SCN SERPL QL: NEGATIVE
BUN SERPL-MCNC: 27 MG/DL (ref 8–23)
BUN/CREAT SERPL: 23.9 (ref 7–25)
CALCIUM SPEC-SCNC: 8.9 MG/DL (ref 8.6–10.5)
CHLORIDE SERPL-SCNC: 99 MMOL/L (ref 98–107)
CO2 SERPL-SCNC: 24 MMOL/L (ref 22–29)
CREAT SERPL-MCNC: 1.13 MG/DL (ref 0.76–1.27)
DEPRECATED RDW RBC AUTO: 46.4 FL (ref 37–54)
EOSINOPHIL # BLD AUTO: 0.06 10*3/MM3 (ref 0–0.4)
EOSINOPHIL NFR BLD AUTO: 0.8 % (ref 0.3–6.2)
ERYTHROCYTE [DISTWIDTH] IN BLOOD BY AUTOMATED COUNT: 13 % (ref 12.3–15.4)
GFR SERPL CREATININE-BSD FRML MDRD: 64 ML/MIN/1.73
GLOBULIN UR ELPH-MCNC: 2.8 GM/DL
GLUCOSE SERPL-MCNC: 108 MG/DL (ref 65–99)
HCT VFR BLD AUTO: 38.1 % (ref 37.5–51)
HGB BLD-MCNC: 12.4 G/DL (ref 13–17.7)
IMM GRANULOCYTES # BLD AUTO: 0.02 10*3/MM3 (ref 0–0.05)
IMM GRANULOCYTES NFR BLD AUTO: 0.3 % (ref 0–0.5)
INR PPP: 1.85 (ref 0.9–1.1)
LYMPHOCYTES # BLD AUTO: 1.1 10*3/MM3 (ref 0.7–3.1)
LYMPHOCYTES NFR BLD AUTO: 15.6 % (ref 19.6–45.3)
MAGNESIUM SERPL-MCNC: 2 MG/DL (ref 1.6–2.4)
MCH RBC QN AUTO: 31.4 PG (ref 26.6–33)
MCHC RBC AUTO-ENTMCNC: 32.5 G/DL (ref 31.5–35.7)
MCV RBC AUTO: 96.5 FL (ref 79–97)
MONOCYTES # BLD AUTO: 0.98 10*3/MM3 (ref 0.1–0.9)
MONOCYTES NFR BLD AUTO: 13.9 % (ref 5–12)
NEUTROPHILS NFR BLD AUTO: 4.88 10*3/MM3 (ref 1.7–7)
NEUTROPHILS NFR BLD AUTO: 69.1 % (ref 42.7–76)
NRBC BLD AUTO-RTO: 0 /100 WBC (ref 0–0.2)
NT-PROBNP SERPL-MCNC: 4035 PG/ML (ref 0–900)
PLATELET # BLD AUTO: 163 10*3/MM3 (ref 140–450)
PMV BLD AUTO: 10.3 FL (ref 6–12)
POTASSIUM SERPL-SCNC: 4.2 MMOL/L (ref 3.5–5.2)
PROT SERPL-MCNC: 6.7 G/DL (ref 6–8.5)
PROTHROMBIN TIME: 21 SECONDS (ref 12.1–15)
QT INTERVAL: 373 MS
RBC # BLD AUTO: 3.95 10*6/MM3 (ref 4.14–5.8)
RH BLD: POSITIVE
SARS-COV-2 RNA PNL SPEC NAA+PROBE: NOT DETECTED
SODIUM SERPL-SCNC: 131 MMOL/L (ref 136–145)
T&S EXPIRATION DATE: NORMAL
TROPONIN T SERPL-MCNC: <0.01 NG/ML (ref 0–0.03)
WBC # BLD AUTO: 7.06 10*3/MM3 (ref 3.4–10.8)

## 2021-06-20 PROCEDURE — 83880 ASSAY OF NATRIURETIC PEPTIDE: CPT | Performed by: PHYSICIAN ASSISTANT

## 2021-06-20 PROCEDURE — 86900 BLOOD TYPING SEROLOGIC ABO: CPT | Performed by: EMERGENCY MEDICINE

## 2021-06-20 PROCEDURE — 84484 ASSAY OF TROPONIN QUANT: CPT | Performed by: PHYSICIAN ASSISTANT

## 2021-06-20 PROCEDURE — 87635 SARS-COV-2 COVID-19 AMP PRB: CPT | Performed by: EMERGENCY MEDICINE

## 2021-06-20 PROCEDURE — 86901 BLOOD TYPING SEROLOGIC RH(D): CPT | Performed by: EMERGENCY MEDICINE

## 2021-06-20 PROCEDURE — 93010 ELECTROCARDIOGRAM REPORT: CPT | Performed by: INTERNAL MEDICINE

## 2021-06-20 PROCEDURE — C9803 HOPD COVID-19 SPEC COLLECT: HCPCS

## 2021-06-20 PROCEDURE — 80053 COMPREHEN METABOLIC PANEL: CPT | Performed by: EMERGENCY MEDICINE

## 2021-06-20 PROCEDURE — 93005 ELECTROCARDIOGRAM TRACING: CPT | Performed by: EMERGENCY MEDICINE

## 2021-06-20 PROCEDURE — 99283 EMERGENCY DEPT VISIT LOW MDM: CPT

## 2021-06-20 PROCEDURE — 83735 ASSAY OF MAGNESIUM: CPT | Performed by: PHYSICIAN ASSISTANT

## 2021-06-20 PROCEDURE — 99285 EMERGENCY DEPT VISIT HI MDM: CPT | Performed by: PHYSICIAN ASSISTANT

## 2021-06-20 PROCEDURE — 86850 RBC ANTIBODY SCREEN: CPT | Performed by: EMERGENCY MEDICINE

## 2021-06-20 PROCEDURE — 85610 PROTHROMBIN TIME: CPT | Performed by: EMERGENCY MEDICINE

## 2021-06-20 PROCEDURE — 85025 COMPLETE CBC W/AUTO DIFF WBC: CPT | Performed by: EMERGENCY MEDICINE

## 2021-06-20 RX ORDER — SODIUM CHLORIDE 0.9 % (FLUSH) 0.9 %
10 SYRINGE (ML) INJECTION AS NEEDED
Status: DISCONTINUED | OUTPATIENT
Start: 2021-06-20 | End: 2021-06-21 | Stop reason: HOSPADM

## 2021-06-21 ENCOUNTER — HOSPITAL ENCOUNTER (OUTPATIENT)
Facility: HOSPITAL | Age: 71
Setting detail: HOSPITAL OUTPATIENT SURGERY
End: 2021-06-21
Attending: INTERNAL MEDICINE | Admitting: INTERNAL MEDICINE

## 2021-06-21 ENCOUNTER — HOSPITAL ENCOUNTER (OUTPATIENT)
Facility: HOSPITAL | Age: 71
Discharge: HOME | End: 2021-06-22
Attending: HOSPITALIST | Admitting: INTERNAL MEDICINE

## 2021-06-21 DIAGNOSIS — K62.5 RECTAL BLEEDING: Primary | ICD-10-CM

## 2021-06-21 DIAGNOSIS — I10 ESSENTIAL HYPERTENSION: ICD-10-CM

## 2021-06-21 PROBLEM — E87.1 HYPONATREMIA: Status: ACTIVE | Noted: 2021-06-21

## 2021-06-21 PROBLEM — R79.89 ELEVATED BRAIN NATRIURETIC PEPTIDE (BNP) LEVEL: Status: ACTIVE | Noted: 2021-06-21

## 2021-06-21 PROBLEM — D68.318 HEMORRHAGIC DISORDER DUE TO CIRCULATING ANTICOAGULANTS: Status: ACTIVE | Noted: 2021-06-21

## 2021-06-21 PROBLEM — I50.43 ACUTE ON CHRONIC COMBINED SYSTOLIC AND DIASTOLIC CHF (CONGESTIVE HEART FAILURE) (HCC): Status: ACTIVE | Noted: 2021-06-21

## 2021-06-21 LAB
ANION GAP SERPL CALCULATED.3IONS-SCNC: 11.1 MMOL/L (ref 5–15)
BASOPHILS # BLD AUTO: 0.02 10*3/MM3 (ref 0–0.2)
BASOPHILS NFR BLD AUTO: 0.3 % (ref 0–1.5)
BUN SERPL-MCNC: 24 MG/DL (ref 8–23)
BUN/CREAT SERPL: 25 (ref 7–25)
CALCIUM SPEC-SCNC: 8.8 MG/DL (ref 8.6–10.5)
CHLORIDE SERPL-SCNC: 101 MMOL/L (ref 98–107)
CO2 SERPL-SCNC: 22.9 MMOL/L (ref 22–29)
CREAT SERPL-MCNC: 0.96 MG/DL (ref 0.76–1.27)
DEPRECATED RDW RBC AUTO: 45.3 FL (ref 37–54)
EOSINOPHIL # BLD AUTO: 0.03 10*3/MM3 (ref 0–0.4)
EOSINOPHIL NFR BLD AUTO: 0.5 % (ref 0.3–6.2)
ERYTHROCYTE [DISTWIDTH] IN BLOOD BY AUTOMATED COUNT: 12.6 % (ref 12.3–15.4)
GFR SERPL CREATININE-BSD FRML MDRD: 77 ML/MIN/1.73
GLUCOSE SERPL-MCNC: 131 MG/DL (ref 65–99)
HCT VFR BLD AUTO: 36.7 % (ref 37.5–51)
HCT VFR BLD AUTO: 39.4 % (ref 37.5–51)
HCT VFR BLD AUTO: 39.4 % (ref 37.5–51)
HGB BLD-MCNC: 12.3 G/DL (ref 13–17.7)
HGB BLD-MCNC: 12.8 G/DL (ref 13–17.7)
HGB BLD-MCNC: 12.8 G/DL (ref 13–17.7)
IMM GRANULOCYTES # BLD AUTO: 0.02 10*3/MM3 (ref 0–0.05)
IMM GRANULOCYTES NFR BLD AUTO: 0.3 % (ref 0–0.5)
INR PPP: 1.63 (ref 0.9–1.1)
LYMPHOCYTES # BLD AUTO: 0.93 10*3/MM3 (ref 0.7–3.1)
LYMPHOCYTES NFR BLD AUTO: 15.2 % (ref 19.6–45.3)
MAXIMAL PREDICTED HEART RATE: 147 BPM
MCH RBC QN AUTO: 31.8 PG (ref 26.6–33)
MCHC RBC AUTO-ENTMCNC: 32.5 G/DL (ref 31.5–35.7)
MCV RBC AUTO: 97.8 FL (ref 79–97)
MONOCYTES # BLD AUTO: 0.62 10*3/MM3 (ref 0.1–0.9)
MONOCYTES NFR BLD AUTO: 10.1 % (ref 5–12)
NEUTROPHILS NFR BLD AUTO: 4.49 10*3/MM3 (ref 1.7–7)
NEUTROPHILS NFR BLD AUTO: 73.6 % (ref 42.7–76)
NRBC BLD AUTO-RTO: 0 /100 WBC (ref 0–0.2)
PLATELET # BLD AUTO: 152 10*3/MM3 (ref 140–450)
PMV BLD AUTO: 10.5 FL (ref 6–12)
POTASSIUM SERPL-SCNC: 4.8 MMOL/L (ref 3.5–5.2)
PROTHROMBIN TIME: 19.1 SECONDS (ref 11.7–14.2)
RBC # BLD AUTO: 4.03 10*6/MM3 (ref 4.14–5.8)
SODIUM SERPL-SCNC: 135 MMOL/L (ref 136–145)
STRESS TARGET HR: 125 BPM
WBC # BLD AUTO: 6.11 10*3/MM3 (ref 3.4–10.8)

## 2021-06-21 PROCEDURE — 85018 HEMOGLOBIN: CPT | Performed by: NURSE PRACTITIONER

## 2021-06-21 PROCEDURE — 96375 TX/PRO/DX INJ NEW DRUG ADDON: CPT

## 2021-06-21 PROCEDURE — G0378 HOSPITAL OBSERVATION PER HR: HCPCS

## 2021-06-21 PROCEDURE — 99214 OFFICE O/P EST MOD 30 MIN: CPT | Performed by: NURSE PRACTITIONER

## 2021-06-21 PROCEDURE — 85025 COMPLETE CBC W/AUTO DIFF WBC: CPT | Performed by: NURSE PRACTITIONER

## 2021-06-21 PROCEDURE — 99214 OFFICE O/P EST MOD 30 MIN: CPT | Performed by: INTERNAL MEDICINE

## 2021-06-21 PROCEDURE — 96361 HYDRATE IV INFUSION ADD-ON: CPT

## 2021-06-21 PROCEDURE — 85610 PROTHROMBIN TIME: CPT | Performed by: NURSE PRACTITIONER

## 2021-06-21 PROCEDURE — 93227 XTRNL ECG REC<48 HR R&I: CPT | Performed by: INTERNAL MEDICINE

## 2021-06-21 PROCEDURE — 80048 BASIC METABOLIC PNL TOTAL CA: CPT | Performed by: NURSE PRACTITIONER

## 2021-06-21 PROCEDURE — 85014 HEMATOCRIT: CPT | Performed by: NURSE PRACTITIONER

## 2021-06-21 PROCEDURE — 25010000002 FUROSEMIDE PER 20 MG: Performed by: INTERNAL MEDICINE

## 2021-06-21 PROCEDURE — 96374 THER/PROPH/DIAG INJ IV PUSH: CPT

## 2021-06-21 RX ORDER — SODIUM CHLORIDE 0.9 % (FLUSH) 0.9 %
10 SYRINGE (ML) INJECTION EVERY 12 HOURS SCHEDULED
Status: DISCONTINUED | OUTPATIENT
Start: 2021-06-21 | End: 2021-06-21

## 2021-06-21 RX ORDER — ONDANSETRON 2 MG/ML
4 INJECTION INTRAMUSCULAR; INTRAVENOUS EVERY 6 HOURS PRN
Status: DISCONTINUED | OUTPATIENT
Start: 2021-06-21 | End: 2021-06-22 | Stop reason: HOSPADM

## 2021-06-21 RX ORDER — SODIUM CHLORIDE 0.9 % (FLUSH) 0.9 %
10 SYRINGE (ML) INJECTION AS NEEDED
Status: DISCONTINUED | OUTPATIENT
Start: 2021-06-21 | End: 2021-06-21

## 2021-06-21 RX ORDER — ACETAMINOPHEN 160 MG/5ML
650 SOLUTION ORAL EVERY 4 HOURS PRN
Status: DISCONTINUED | OUTPATIENT
Start: 2021-06-21 | End: 2021-06-21

## 2021-06-21 RX ORDER — LISINOPRIL 5 MG/1
5 TABLET ORAL DAILY
Status: DISCONTINUED | OUTPATIENT
Start: 2021-06-21 | End: 2021-06-22 | Stop reason: HOSPADM

## 2021-06-21 RX ORDER — ACETAMINOPHEN 650 MG/1
650 SUPPOSITORY RECTAL EVERY 4 HOURS PRN
Status: DISCONTINUED | OUTPATIENT
Start: 2021-06-21 | End: 2021-06-22 | Stop reason: HOSPADM

## 2021-06-21 RX ORDER — ACETAMINOPHEN 325 MG/1
650 TABLET ORAL EVERY 4 HOURS PRN
Status: DISCONTINUED | OUTPATIENT
Start: 2021-06-21 | End: 2021-06-22 | Stop reason: HOSPADM

## 2021-06-21 RX ORDER — SUCRALFATE 1 G/1
1 TABLET ORAL
Status: DISCONTINUED | OUTPATIENT
Start: 2021-06-21 | End: 2021-06-22 | Stop reason: HOSPADM

## 2021-06-21 RX ORDER — ACETAMINOPHEN 325 MG/1
650 TABLET ORAL EVERY 4 HOURS PRN
Status: DISCONTINUED | OUTPATIENT
Start: 2021-06-21 | End: 2021-06-21

## 2021-06-21 RX ORDER — ACETAMINOPHEN 650 MG/1
650 SUPPOSITORY RECTAL EVERY 4 HOURS PRN
Status: DISCONTINUED | OUTPATIENT
Start: 2021-06-21 | End: 2021-06-21

## 2021-06-21 RX ORDER — SODIUM CHLORIDE 0.9 % (FLUSH) 0.9 %
10 SYRINGE (ML) INJECTION AS NEEDED
Status: DISCONTINUED | OUTPATIENT
Start: 2021-06-21 | End: 2021-06-22 | Stop reason: HOSPADM

## 2021-06-21 RX ORDER — PANTOPRAZOLE SODIUM 40 MG/1
40 TABLET, DELAYED RELEASE ORAL
Status: DISCONTINUED | OUTPATIENT
Start: 2021-06-21 | End: 2021-06-22 | Stop reason: HOSPADM

## 2021-06-21 RX ORDER — ONDANSETRON 2 MG/ML
4 INJECTION INTRAMUSCULAR; INTRAVENOUS EVERY 6 HOURS PRN
Status: DISCONTINUED | OUTPATIENT
Start: 2021-06-21 | End: 2021-06-21

## 2021-06-21 RX ORDER — FUROSEMIDE 10 MG/ML
40 INJECTION INTRAMUSCULAR; INTRAVENOUS DAILY
Status: DISCONTINUED | OUTPATIENT
Start: 2021-06-21 | End: 2021-06-22 | Stop reason: HOSPADM

## 2021-06-21 RX ORDER — METOPROLOL SUCCINATE 50 MG/1
50 TABLET, EXTENDED RELEASE ORAL EVERY 12 HOURS SCHEDULED
Status: DISCONTINUED | OUTPATIENT
Start: 2021-06-21 | End: 2021-06-22 | Stop reason: HOSPADM

## 2021-06-21 RX ORDER — SODIUM CHLORIDE 0.9 % (FLUSH) 0.9 %
10 SYRINGE (ML) INJECTION EVERY 12 HOURS SCHEDULED
Status: DISCONTINUED | OUTPATIENT
Start: 2021-06-21 | End: 2021-06-22 | Stop reason: HOSPADM

## 2021-06-21 RX ORDER — SODIUM CHLORIDE 9 MG/ML
75 INJECTION, SOLUTION INTRAVENOUS CONTINUOUS
Status: DISCONTINUED | OUTPATIENT
Start: 2021-06-21 | End: 2021-06-21

## 2021-06-21 RX ORDER — PANTOPRAZOLE SODIUM 40 MG/10ML
40 INJECTION, POWDER, LYOPHILIZED, FOR SOLUTION INTRAVENOUS
Status: DISCONTINUED | OUTPATIENT
Start: 2021-06-21 | End: 2021-06-21

## 2021-06-21 RX ORDER — ACETAMINOPHEN 160 MG/5ML
650 SOLUTION ORAL EVERY 4 HOURS PRN
Status: DISCONTINUED | OUTPATIENT
Start: 2021-06-21 | End: 2021-06-22 | Stop reason: HOSPADM

## 2021-06-21 RX ORDER — PRAVASTATIN SODIUM 40 MG
40 TABLET ORAL DAILY
Status: DISCONTINUED | OUTPATIENT
Start: 2021-06-21 | End: 2021-06-22 | Stop reason: HOSPADM

## 2021-06-21 RX ORDER — NITROGLYCERIN 0.4 MG/1
0.4 TABLET SUBLINGUAL
Status: DISCONTINUED | OUTPATIENT
Start: 2021-06-21 | End: 2021-06-22 | Stop reason: HOSPADM

## 2021-06-21 RX ADMIN — SODIUM CHLORIDE 75 ML/HR: 9 INJECTION, SOLUTION INTRAVENOUS at 02:22

## 2021-06-21 RX ADMIN — POLYETHYLENE GLYCOL 3350, SODIUM SULFATE ANHYDROUS, SODIUM BICARBONATE, SODIUM CHLORIDE, POTASSIUM CHLORIDE 2000 ML: 236; 22.74; 6.74; 5.86; 2.97 POWDER, FOR SOLUTION ORAL at 17:23

## 2021-06-21 RX ADMIN — PANTOPRAZOLE SODIUM 40 MG: 40 INJECTION, POWDER, FOR SOLUTION INTRAVENOUS at 06:49

## 2021-06-21 RX ADMIN — SODIUM CHLORIDE, PRESERVATIVE FREE 10 ML: 5 INJECTION INTRAVENOUS at 22:19

## 2021-06-21 RX ADMIN — PANTOPRAZOLE SODIUM 40 MG: 40 TABLET, DELAYED RELEASE ORAL at 09:12

## 2021-06-21 RX ADMIN — SODIUM CHLORIDE, PRESERVATIVE FREE 10 ML: 5 INJECTION INTRAVENOUS at 09:12

## 2021-06-21 RX ADMIN — METOPROLOL SUCCINATE 50 MG: 50 TABLET, EXTENDED RELEASE ORAL at 22:18

## 2021-06-21 RX ADMIN — SODIUM CHLORIDE, PRESERVATIVE FREE 10 ML: 5 INJECTION INTRAVENOUS at 02:37

## 2021-06-21 RX ADMIN — PRAVASTATIN SODIUM 40 MG: 40 TABLET ORAL at 09:11

## 2021-06-21 RX ADMIN — METOPROLOL SUCCINATE 50 MG: 50 TABLET, EXTENDED RELEASE ORAL at 09:11

## 2021-06-21 RX ADMIN — SUCRALFATE 1 G: 1 TABLET ORAL at 17:23

## 2021-06-21 RX ADMIN — FUROSEMIDE 40 MG: 10 INJECTION, SOLUTION INTRAMUSCULAR; INTRAVENOUS at 14:04

## 2021-06-21 RX ADMIN — SUCRALFATE 1 G: 1 TABLET ORAL at 11:49

## 2021-06-21 RX ADMIN — SUCRALFATE 1 G: 1 TABLET ORAL at 22:18

## 2021-06-21 RX ADMIN — LISINOPRIL 5 MG: 5 TABLET ORAL at 09:11

## 2021-06-21 NOTE — ED NOTES
DOREENM for [facility] transfer call center. Will call back shortly     James Phelps  06/20/21 [id number]

## 2021-06-21 NOTE — ED NOTES
Pt left ED via private vehicle to be driven directly to [facility] by wife. Pt is in stable condition     Kimberly Nuñez RN  06/20/21 [id number]

## 2021-06-21 NOTE — ED PROVIDER NOTES
EMERGENCY DEPARTMENT ENCOUNTER      Room Number: 06/06    History is provided by the patient, no translation services needed    HPI:    Chief complaint: Rectal bleeding    Location: Bright red blood in bowel movements    Quality/Severity: Patient reports large amount of blood with each bowel movement today.    Timing/Duration: Bloody bowel movements x4 today    Modifying Factors: Patient is on Xarelto for paroxysmal A. fib.    Associated Symptoms: Positive for rectal bleeding, lightheadedness, fatigue.  Denies syncope, chest pain, or shortness of breath.  He also adds that he has had some increased pedal edema over the last month or so.    Narrative: Pt is a 71 y.o. male who presents complaining of bright red blood in bowel movements x4 today.  Patient was recently seen here at [facility] for similar presentation.  He was admitted on 6/2/21 and discharged on 6/3/2021, he was found to have reflux esophagitis with multiple gastric ulcers and duodenal ulcers, he was also found to have diverticulosis.  He denies any abdominal pain or distention.  No nausea or vomiting.  Patient states he went golfing today and has felt very fatigued and lightheaded especially with standing.      PMD: Provider, No Known    REVIEW OF SYSTEMS  Review of Systems   Constitutional: Positive for fatigue. Negative for chills and fever.   Respiratory: Positive for shortness of breath (Exertional). Negative for cough.    Cardiovascular: Positive for leg swelling. Negative for chest pain and palpitations.   Gastrointestinal: Positive for blood in stool. Negative for abdominal pain.   Musculoskeletal: Negative for arthralgias and joint swelling.   Skin: Negative for pallor and wound.   Neurological: Positive for light-headedness. Negative for dizziness, syncope and numbness.   Psychiatric/Behavioral: Negative for confusion. The patient is not nervous/anxious.          PAST MEDICAL HISTORY  Active Ambulatory Problems     Diagnosis Date  Noted   • CAD (coronary artery disease)    • Carotid arterial disease (CMS/HCC)    • Atrial fibrillation (CMS/HCC)    • Ischemic cardiomyopathy    • Mitral regurgitation    • Chronic anticoagulation 03/23/2017   • Hypothyroidism 03/14/2013   • HTN (hypertension) 03/14/2013   • Dyslipidemia 03/14/2013   • Hx of melanoma of skin 09/29/2020   • History of basal cell carcinoma (BCC) of skin 09/29/2020   • Leg cramps 09/29/2020   • DDD (degenerative disc disease), lumbar 09/29/2020   • Chronic bilateral low back pain with right-sided sciatica 09/29/2020   • Hyperkalemia 09/30/2020   • Abnormal x-ray of lumbar spine 10/02/2020   • Lumbar facet arthropathy 10/02/2020   • Chronic pain of both knees 10/26/2020   • Abnormal MRI, knee 11/17/2020   • Complex tear of medial meniscus of right knee as current injury 11/19/2020   • Primary osteoarthritis of right knee 11/19/2020   • Snoring 01/22/2021   • Hypersomnia 01/22/2021   • Class 1 obesity due to excess calories without serious comorbidity with body mass index (BMI) of 31.0 to 31.9 in adult 01/22/2021   • Complex sleep apnea syndrome 04/02/2021   • GI bleed 06/02/2021   • Gastrointestinal hemorrhage associated with anorectal source 06/02/2021   • Anemia due to acute blood loss 06/02/2021   • Adenomatous polyp of ascending colon 06/08/2021     Resolved Ambulatory Problems     Diagnosis Date Noted   • Observed sleep apnea 01/22/2021     Past Medical History:   Diagnosis Date   • Cancer (CMS/HCC)    • Chronic atrial fibrillation (CMS/HCC)    • Hyperlipidemia    • Hypertension    • Rectal bleeding        PAST SURGICAL HISTORY  Past Surgical History:   Procedure Laterality Date   • CAROTID ENDARTERECTOMY Right 2009    Dr Lewis   • COLONOSCOPY     • COLONOSCOPY W/ POLYPECTOMY N/A 6/3/2021    Procedure: COLONOSCOPY, polypectomies;  Surgeon: Luan Meek MD;  Location: [facility];  Service: Gastroenterology;  Laterality: N/A;  Diverticulosis  Ascending colon polyp x  4 (3 cold snare)  Cecal polyp (cold snare)  Rectal polyp   • CORONARY ARTERY BYPASS GRAFT  2009    RALPH FrederickA to LAD, SVG to OM, SVG Post Desc   • ENDOSCOPY N/A 6/3/2021    Procedure: ESOPHAGOGASTRODUODENOSCOPY with biopsies;  Surgeon: Luan Meek MD;  Location: [facility];  Service: Gastroenterology;  Laterality: N/A;  Reflux  Gastric and duodenal ulcers  Biopsies: gastric, distal esophagus   • FINGER SURGERY         FAMILY HISTORY  Family History   Problem Relation Age of Onset   • Heart disease Father    • Stroke Father    • Hypertension Father        SOCIAL HISTORY  Social History     Socioeconomic History   • Marital status:      Spouse name: Not on file   • Number of children: Not on file   • Years of education: Not on file   • Highest education level: Not on file   Tobacco Use   • Smoking status: Former Smoker   • Smokeless tobacco: Never Used   • Tobacco comment: caffine use   Vaping Use   • Vaping Use: Never assessed   Substance and Sexual Activity   • Alcohol use: Yes     Comment: social    • Drug use: No   • Sexual activity: Not Currently       ALLERGIES  Patient has no known allergies.      Current Facility-Administered Medications:   •  [COMPLETED] Insert peripheral IV, , , Once **AND** sodium chloride 0.9 % flush 10 mL, 10 mL, Intravenous, PRN, Delroy Xie MD    Current Outpatient Medications:   •  furosemide (LASIX) 20 MG tablet, Take 1 tablet by mouth Daily., Disp: 90 tablet, Rfl: 3  •  lisinopril (PRINIVIL,ZESTRIL) 10 MG tablet, Take 1 tablet by mouth Daily. (Patient taking differently: Take 5 mg by mouth Daily. Taking 5 mg), Disp: 90 tablet, Rfl: 3  •  Magnesium 200 MG tablet, Take 250 mg by mouth Daily., Disp: , Rfl:   •  metoprolol succinate XL (TOPROL-XL) 50 MG 24 hr tablet, Take 1 tablet by mouth 2 (two) times a day., Disp: 180 tablet, Rfl: 3  •  omeprazole (priLOSEC) 40 MG capsule, Take 1 capsule by mouth Daily., Disp: 90 capsule, Rfl: 3  •  pravastatin  (PRAVACHOL) 40 MG tablet, Take 1 tablet by mouth Daily., Disp: 90 tablet, Rfl: 1  •  rivaroxaban (Xarelto) 20 MG tablet, Take 1 tablet by mouth Daily., Disp: 90 tablet, Rfl: 3    PHYSICAL EXAM  ED Triage Vitals [06/20/21 2110]   Temp Heart Rate Resp BP SpO2   97.9 °F (36.6 °C) 108 20 115/63 93 %      Temp src Heart Rate Source Patient Position BP Location FiO2 (%)   Oral Monitor Lying Right arm --       Physical Exam  Vitals and nursing note reviewed.   Constitutional:       General: He is not in acute distress.     Appearance: He is not toxic-appearing.      Comments: BMI 33.87   HENT:      Head: Normocephalic and atraumatic.   Eyes:      Conjunctiva/sclera: Conjunctivae normal.      Pupils: Pupils are equal, round, and reactive to light.   Cardiovascular:      Rate and Rhythm: Normal rate. Rhythm irregular.      Pulses: Normal pulses.      Heart sounds: Normal heart sounds.   Pulmonary:      Effort: Pulmonary effort is normal.      Breath sounds: Normal breath sounds. No rales.   Abdominal:      General: Bowel sounds are normal. There is distension (Mild).      Palpations: Abdomen is soft.      Tenderness: There is no abdominal tenderness. There is no guarding.   Musculoskeletal:         General: Normal range of motion.      Cervical back: Normal range of motion and neck supple.      Right lower leg: Edema (1+ pitting) present.      Left lower leg: Edema (1+ pitting) present.   Skin:     General: Skin is warm and dry.      Capillary Refill: Capillary refill takes less than 2 seconds.   Neurological:      General: No focal deficit present.      Mental Status: He is alert and oriented to person, place, and time.   Psychiatric:         Mood and Affect: Mood and affect normal.         Behavior: Behavior normal.         Thought Content: Thought content normal.         Cognition and Memory: Memory normal.         Judgment: Judgment normal.           LAB RESULTS  Lab Results (last 24 hours)     Procedure Component Value  Units Date/Time    CBC & Differential [[id number]]  (Abnormal) Collected: 06/20/21 2118    Specimen: Blood Updated: 06/20/21 2128    Narrative:      The following orders were created for panel order CBC & Differential.  Procedure                               Abnormality         Status                     ---------                               -----------         ------                     CBC Auto Differential[[id number]]        Abnormal            Final result                 Please view results for these tests on the individual orders.    Comprehensive Metabolic Panel [[id number]]  (Abnormal) Collected: 06/20/21 2118    Specimen: Blood Updated: 06/20/21 2144     Glucose 108 mg/dL      BUN 27 mg/dL      Creatinine 1.13 mg/dL      Sodium 131 mmol/L      Potassium 4.2 mmol/L      Chloride 99 mmol/L      CO2 24.0 mmol/L      Calcium 8.9 mg/dL      Total Protein 6.7 g/dL      Albumin 3.90 g/dL      ALT (SGPT) 21 U/L      AST (SGOT) 30 U/L      Alkaline Phosphatase 90 U/L      Total Bilirubin 0.9 mg/dL      eGFR Non African Amer 64 mL/min/1.73      Globulin 2.8 gm/dL      A/G Ratio 1.4 g/dL      BUN/Creatinine Ratio 23.9     Anion Gap 8.0 mmol/L     Narrative:      GFR Normal >60  Chronic Kidney Disease <60  Kidney Failure <15      Protime-INR [[id number]]  (Abnormal) Collected: 06/20/21 2118    Specimen: Blood Updated: 06/20/21 2139     Protime 21.0 Seconds      INR 1.85    Narrative:      Therapeutic Ranges for INR: 2.0-3.0 (PT 20-30)                              2.5-3.5 (PT 25-34)    CBC Auto Differential [[id number]]  (Abnormal) Collected: 06/20/21 2118    Specimen: Blood Updated: 06/20/21 2128     WBC 7.06 10*3/mm3      RBC 3.95 10*6/mm3      Hemoglobin 12.4 g/dL      Hematocrit 38.1 %      MCV 96.5 fL      MCH 31.4 pg      MCHC 32.5 g/dL      RDW 13.0 %      RDW-SD 46.4 fl      MPV 10.3 fL      Platelets 163 10*3/mm3      Neutrophil % 69.1 %      Lymphocyte % 15.6 %      Monocyte % 13.9 %      Eosinophil % 0.8 %       Basophil % 0.3 %      Immature Grans % 0.3 %      Neutrophils, Absolute 4.88 10*3/mm3      Lymphocytes, Absolute 1.10 10*3/mm3      Monocytes, Absolute 0.98 10*3/mm3      Eosinophils, Absolute 0.06 10*3/mm3      Basophils, Absolute 0.02 10*3/mm3      Immature Grans, Absolute 0.02 10*3/mm3      nRBC 0.0 /100 WBC     Magnesium [[id number]]  (Normal) Collected: 06/20/21 2118    Specimen: Blood Updated: 06/20/21 2149     Magnesium 2.0 mg/dL     Troponin [[id number]]  (Normal) Collected: 06/20/21 2118    Specimen: Blood Updated: 06/20/21 2216     Troponin T <0.010 ng/mL     Narrative:      Troponin T Reference Range:  <= 0.03 ng/mL-   Negative for AMI  >0.03 ng/mL-     Abnormal for myocardial necrosis.  Clinicians would have to utilize clinical acumen, EKG, Troponin and serial changes to determine if it is an Acute Myocardial Infarction or myocardial injury due to an underlying chronic condition.       Results may be falsely decreased if patient taking Biotin.      BNP [[id number]]  (Abnormal) Collected: 06/20/21 2118    Specimen: Blood Updated: 06/20/21 2207     proBNP 4,035.0 pg/mL     Narrative:      Among patients with dyspnea, NT-proBNP is highly sensitive for the detection of acute congestive heart failure. In addition NT-proBNP of <300 pg/ml effectively rules out acute congestive heart failure with 99% negative predictive value.    Results may be falsely decreased if patient taking Biotin.              I ordered the above labs and reviewed the results    RADIOLOGY  No Radiology Exams Resulted Within Past 24 Hours    I ordered the above radiologic testing and reviewed the results    PROCEDURES  Procedures      PROGRESS AND CONSULTS  ED Course as of Jun 20 2250   Sun Jun 20, 2021 2211 EKG         EKG time / Interpretation time: 2122/2124  Rhythm/Rate: Sinus rhythm, 94   HI: 204  QRS, axis: 74  QTc 467  ST and T waves: Ventricular bigeminy.  There is no significant ST elevation or depression, no T wave inversion.  EKG  Tracing Interpreted Contemporaneously by me, independently viewed by me and MD.  Previous EKG from 6/16/2021 showed atrial fibrillation with ventricular bigeminy with increased rate.  Otherwise EKGs appear similar.      [KS]   5304 I have discussed laboratory findings with Dr. Simpson, hospitalist on-call at [facility].  I have also discussed laboratory findings with patient.  I am concerned for patient since he is having symptomatic anemia, with 4 episodes of bright red bleeding per rectum today.  He is also anticoagulated on Xarelto.  Since we do not have GI coverage here I have recommended transfer for admission for observation at a facility where GI is able to consult.  Patient is agreeable with this plan, and Dr. Simpson accepts patient for admission.  He does ask that we consult gastroenterology to make sure they are aware of patient.  Gastroenterology paged, and care handed over to Dr. Xie at this time.    [KS]      ED Course User Index  [KS] Nahomi Jon PA-C           MEDICAL DECISION MAKING    MDM       DIAGNOSIS  Final diagnoses:   Acute lower GI bleeding   Anticoagulated by anticoagulation treatment   Symptomatic anemia   Ventricular bigeminy   Pedal edema       Latest Documented Vital Signs:  As of 22:50 EDT  BP- 141/84 HR- 98 Temp- 97.9 °F (36.6 °C) (Oral) O2 sat- 93%    DISPOSITION  Patient transferred to [facility].         Medication List      No changes were made to your prescriptions during this visit.                   Dictated utilizing Dragon dictation     Nahomi Jon PA-C  06/20/21 1386       Nahomi Jon PA-C  06/20/21 8309

## 2021-06-21 NOTE — TELEPHONE ENCOUNTER
Left a detailed msg on home asking spouse or pt to call back for further instructions re: muscle pain.

## 2021-06-21 NOTE — ED NOTES
Went in room with Dr. Xie to speak with patient and wife as they want to travel by private vehicle instead of by ambulance. Dr. Xie explained all the risks to patient and wife. Wife is in agreement of accepting risks of blood loss, loss of consciousness and possibly death by driving him herself and was agreeable. DR. iXe also approved [facility]  06/20/21 2716

## 2021-06-21 NOTE — PLAN OF CARE
Goal Outcome Evaluation:         Pt. Arrived this unit at 0100 from [facility] ER with his wife, alert, oriented x4, ambulatory, cont. B&B, used bathroom self, Pt. Denied pain at that time, noted ABD. Distended, non tenderness, soft, bowels sounds + in all 4quads , edema noted to bilateral lower ext. NS infusion stafrted @75cc/hr per IV access to left arm as ordered , c-pan brought here, Pt. C-pap on during sleeping time, v/s stable, no s/s acute distress noted

## 2021-06-21 NOTE — CONSULTS
Patient Name: Renato Marquez  :1950  71 y.o.    Date of Admission: 2021  Date of Consultation:  21  Encounter Provider: SIRIA Jiang  Place of Service: [facility] CARDIOLOGY  Referring Provider: Raghav Simpson MD  Patient Care Team:  Provider, No Known as PCP - Mauricio Baker III, MD as Consulting Physician (Cardiology)      Chief complaint: Blood in stool     History of Present Illness: Mr. Marquez is a 71 year old male patient of Dr. Scott with a history of coronary artery disease with prior CABG, chronic atrial fibrillation anticoagulated with rivaroxaban, carotid artery disease with right CEA in . And obstructive sleep apnea on cpap.     He was admitted in early  for GIB. He had an upper endoscopy and colonoscopy that showed esophagitis and a duodenal ulcer. GI recommended holding AC for 10 days and stop aspirin. Avoid NSAIDS. He saw Justice Amor 21. His AC was restarted. EKG showed bigeminy. 48 hr holter was placed and his metoprolol was increased from 50mg daily to 50 mg BID. He also complained of lower extremity edema and abdominal distention and his lasix was restarted. He has HILARIO.     Dr. Scott read his holter today and it showed frequent ventricular ectopy with a run of wide complex tachycardia which appeared consistent with VT lasting 27 beats at 170 bpm.     Patient made medications as noted above. He has not really noted a change in shortness of breath, edema, or abdominal distention.     He presented to the [place] [facility] overnight with rectal bleeding and increased fatigue that started Saturday eveneing. He had four bowel movements yesterday, each with progressively more blood in his stool. He reports his stool was initially bright red but the last bowel movement was darker. GI was consulted and has tentatively planned for a colonoscopy tomorrow. Xarelto is on hold.     He remains in atrial fibrillation with a rate in the  80s. His home metoprolol has been restarted. His lasix has not been restarted. Troponin negative. ProBNP 4,035. Hemoglobin has been stable at 12.4-12.8.     He has chronic symptoms of orthopnea.   No PND.   No syncope or near syncope.   He does not feel his heart racing or palpitations. No chest pain or pressure.    Prior Cardiac Testing:   Holter Monitor 6/16/2021  · Atrial fibrillation is present throughout  · Average heart beat 93 bpm, range during A. fib  bpm, frequent episodes of heart rate greater than 100 are seen.  · Frequent ventricular ectopy is seen  · 1 episode of wide-complex tachycardia that appears to be consistent with ventricular tachycardia is present at 1:25 PM, 29 beats duration, 170 bpm    Stress Test 1/25/2021  · Myocardial perfusion imaging indicates a medium-sized infarct located in the inferior wall with no significant ischemia noted.  · Left ventricular ejection fraction is mildly reduced. (Calculated EF = 40%).    Echo 1/5/2021  · The study is technically difficult for diagnosis.  · Calculated left ventricular EF = 45.7% Estimated left ventricular EF = 46% Estimated left ventricular EF was in agreement with the calculated left ventricular EF. Left ventricular systolic function is low normal. The left ventricular cavity is mildly dilated. Left ventricular wall thickness is consistent with moderate concentric hypertrophy. There is left ventricular global hypokinesis noted. Left ventricular diastolic function was indeterminate.  · The right ventricular cavity is mildly dilated. Normal right ventricular wall thickness noted. Mildly reduced right ventricular systolic function noted.  · The left atrial cavity is moderately dilated  · The right atrial cavity is moderately dilated.  · No aortic valve regurgitation or stenosis is present. The aortic valve is grossly normal in structure. There is mild annular calcification  · Moderate mitral annular calcification is present. Mild to moderate  mitral valve regurgitation is present. The mitral valve regurgitation may be underestimated on the study as apical views are suboptimal No significant mitral valve stenosis is present    Past Medical History:   Diagnosis Date   • CAD (coronary artery disease)    • Cancer (CMS/HCC)     skin cancer   • Carotid arterial disease (CMS/HCC)    • Chronic atrial fibrillation (CMS/HCC)    • Complex sleep apnea syndrome     BiPAP ST   • Hyperlipidemia    • Hypertension    • Ischemic cardiomyopathy    • Mitral regurgitation    • Rectal bleeding        Past Surgical History:   Procedure Laterality Date   • CAROTID ENDARTERECTOMY Right 2009    Dr Lewis   • COLONOSCOPY     • COLONOSCOPY W/ POLYPECTOMY N/A 6/3/2021    Procedure: COLONOSCOPY, polypectomies;  Surgeon: Luan Meek MD;  Location: [facility] OR;  Service: Gastroenterology;  Laterality: N/A;  Diverticulosis  Ascending colon polyp x 4 (3 cold snare)  Cecal polyp (cold snare)  Rectal polyp   • CORONARY ARTERY BYPASS GRAFT  2009    MIMI Frederick to LAD, SVG to OM, SVG Post Desc   • ENDOSCOPY N/A 6/3/2021    Procedure: ESOPHAGOGASTRODUODENOSCOPY with biopsies;  Surgeon: Luan Meek MD;  Location: [facility] OR;  Service: Gastroenterology;  Laterality: N/A;  Reflux  Gastric and duodenal ulcers  Biopsies: gastric, distal esophagus   • FINGER SURGERY           Prior to Admission medications    Medication Sig Start Date End Date Taking? Authorizing Provider   furosemide (LASIX) 20 MG tablet Take 1 tablet by mouth Daily. 6/16/21  Yes Sharon Amor APRN   lisinopril (PRINIVIL,ZESTRIL) 10 MG tablet Take 1 tablet by mouth Daily.  Patient taking differently: Take 5 mg by mouth Daily. Taking 5 mg 1/26/21  Yes Sharon Amor APRN   Magnesium 200 MG tablet Take 250 mg by mouth Daily.   Yes Provider, MD Edgar   metoprolol succinate XL (TOPROL-XL) 50 MG 24 hr tablet Take 1 tablet by mouth 2 (two) times a day. 6/16/21  Yes Sharon Amor APRN  "  omeprazole (priLOSEC) 40 MG capsule Take 1 capsule by mouth Daily. 6/3/21  Yes Luan Meek MD   pravastatin (PRAVACHOL) 40 MG tablet Take 1 tablet by mouth Daily. 10/6/20  Yes Everardo De Leon Jr.,    rivaroxaban (Xarelto) 20 MG tablet Take 1 tablet by mouth Daily. 6/16/21  Yes Sharon Amor APRN       No Known Allergies    Social History     Socioeconomic History   • Marital status:      Spouse name: Not on file   • Number of children: Not on file   • Years of education: Not on file   • Highest education level: Not on file   Tobacco Use   • Smoking status: Former Smoker   • Smokeless tobacco: Never Used   • Tobacco comment: caffine use   Vaping Use   • Vaping Use: Never assessed   Substance and Sexual Activity   • Alcohol use: Yes     Comment: social    • Drug use: No   • Sexual activity: Not Currently       Family History   Problem Relation Age of Onset   • Heart disease Father    • Stroke Father    • Hypertension Father        REVIEW OF SYSTEMS:   All systems reviewed.  Pertinent positives identified in HPI.  All other systems are negative.      Objective:     Vitals:    06/21/21 0152 06/21/21 0714 06/21/21 1405 06/21/21 1451   BP: 112/84 148/89 147/85 137/59   BP Location: Right arm Right arm Left arm Right arm   Patient Position: Sitting Lying Lying Lying   Pulse: 89 81 90 84   Resp: 16 16 16    Temp: 97.4 °F (36.3 °C)  98 °F (36.7 °C)    TempSrc: Oral Oral Oral    SpO2: 94% 94% 96% 94%   Weight:       Height: 182 cm (71.65\")        Body mass index is 33.4 kg/m².    General Appearance:    Alert, cooperative, in no acute distress   Head:    Normocephalic, without obvious abnormality, atraumatic   Eyes:            Lids and lashes normal, conjunctivae and sclerae normal, no icterus, no pallor, corneas clear, PERRLA   Ears:    Ears appear intact with no abnormalities noted   Throat:   No oral lesions, no thrush, oral mucosa moist   Neck:   No adenopathy, supple, trachea midline, no " thyromegaly, no carotid bruit, no JVD   Back:     No kyphosis present, no scoliosis present, no skin lesions, erythema or scars, no tenderness to percussion or palpation, range of motion normal   Lungs:     Clear to auscultation, respirations regular, even and unlabored    Heart:    Regular rhythm and normal rate, normal S1 and S2, no murmur, no gallop, no rub, no click   Chest Wall:    No abnormalities observed   Abdomen:     Normal bowel sounds, no masses, no organomegaly, soft, nontender, nondistended, no guarding, no rebound  tenderness   Extremities:   Moves all extremities well, no edema, no cyanosis, no redness   Pulses:   Pulses palpable and equal bilaterally. Normal radial, carotid, femoral, dorsalis pedis and posterior tibial pulses bilaterally. Normal abdominal aorta   Skin:  Psychiatric:   No bleeding, bruising or rash    Alert and oriented x 3, normal mood and affect   Lab Review:     Results from last 7 days   Lab Units 06/21/21  0756 06/20/21  2118   SODIUM mmol/L 135* 131*   POTASSIUM mmol/L 4.8 4.2   CHLORIDE mmol/L 101 99   CO2 mmol/L 22.9 24.0   BUN mg/dL 24* 27*   CREATININE mg/dL 0.96 1.13   CALCIUM mg/dL 8.8 8.9   BILIRUBIN mg/dL  --  0.9   ALK PHOS U/L  --  90   ALT (SGPT) U/L  --  21   AST (SGOT) U/L  --  30   GLUCOSE mg/dL 131* 108*     Results from last 7 days   Lab Units 06/20/21  2118   TROPONIN T ng/mL <0.010     Results from last 7 days   Lab Units 06/21/21  1545 06/21/21  0756   WBC 10*3/mm3  --  6.11   HEMOGLOBIN g/dL 12.3* 12.8*  12.8*   HEMATOCRIT % 36.7* 39.4  39.4   PLATELETS 10*3/mm3  --  152     Results from last 7 days   Lab Units 06/21/21  0756 06/20/21  2118   INR  1.63* 1.85*     Results from last 7 days   Lab Units 06/20/21  2118   MAGNESIUM mg/dL 2.0                   EKG 6/20/2021      Prior EKG 7/21/2020      I personally viewed and interpreted the patient's EKG/Telemetry data.        Assessment and Plan:       1. Rectal bleeding with recent duodenal ucler as well as  polypectomy on 6/3. AC held 10 days and restarted 6/16. Colonoscopy planned for tomorrow. Ok to proceed from a cardiac standpoint.   2. Ventricular ectopy - bigeminy as well as 27 beat run of wide complex tachycardia noted on recent holter. Beta blocker titrated. Will observe for now. He had a stress in January that showed medium sized infarct without significant ischemia. He has known CAD with prior CABG. Scar VT vs ischemic vs alternative etiology. Discussed with Dr. Scott. Will observe for now and see what pans out with #1. Can consider EP eval in the future. Continue beta blocker. Electrolytes stable. He has a mild cardiomyopathy. Continue medical therapy. Check TSH.  3. Atrial fibrillation AC on hold. Monitor rate control. He is on beta blocker.   4. History of CAD with prior CABG  5. Acute on chronic diastolic heart failure - no improvement in symptoms since restarting oral lasix. He is being prepped for colonoscopy so will avoid aggressive diuresis for now. Will optimize volume after scope.  6. Hypothyroidism  7. Hypertension  8. Hyperlipidemia     Claire Zuniga, APRN  06/21/21  17:48 EDT

## 2021-06-21 NOTE — H&P
Patient Name:  Renato Marquez  YOB: 1950  MRN:  [id number]  Admit Date:  6/21/2021  Patient Care Team:  Provider, No Known as PCP - Mauricio Baker III, MD as Consulting Physician (Cardiology)      Subjective   History Present Illness     Chief complaint: Rectal bleeding    History of Present Illness   Mr. Marquez is a 71-year-old male with history of A. fib, CAD, cardiomyopathy, chronic anticoagulation, hypothyroidism, hypertension, chronic back pain, dyslipidemia who presents to the emergency Room at [facility] with complaints of rectal bleeding.  Patient states he noticed blood in his stool yesterday, he had 4 bowel movements and each time had significantly more stool.  He states initially was bright red, but then it was turning dark the last time he had a bowel movement.  He did recently have colonoscopy and EGD with Dr. Meek about 2 weeks ago because he had had some rectal bleeding, since the colonoscopy the bleeding had stopped until yesterday.  The scopes revealed a gastric ulcer, but no other significant finding according to the patient.  Patient has been on Xarelto for A. fib, had been taken off of that by Dr. Meek, was seen by cardiology last week and started back on the Xarelto for A. fib, after starting back on the Xarelto patient noticed the rectal bleeding.  He has had some increased fatigue today, denies any dizziness or chest pain.  Upon arrival here to [facility] patient appears to be in no acute distress, he is A. fib on the monitor, denies any chest pain or shortness of breath.  Vital signs stable.  Was transferred here to [facility] from [place] due to no gastroenterology services at [place] this week.    Review of Systems   Constitutional: Positive for fatigue. Negative for appetite change and fever.   HENT: Negative for nosebleeds and trouble swallowing.    Eyes: Negative for photophobia, redness and visual  disturbance.   Respiratory: Negative for cough, chest tightness, shortness of breath and wheezing.    Cardiovascular: Negative for chest pain, palpitations and leg swelling.   Gastrointestinal: Positive for blood in stool, constipation and diarrhea. Negative for abdominal distention, abdominal pain, nausea and vomiting.   Endocrine: Negative.    Genitourinary: Negative.    Musculoskeletal: Negative for gait problem and joint swelling.   Skin: Negative.    Neurological: Negative for dizziness, seizures, speech difficulty, weakness, light-headedness and headaches.   Hematological: Negative.    Psychiatric/Behavioral: Negative for behavioral problems and confusion.        Personal History     Past Medical History:   Diagnosis Date   • CAD (coronary artery disease)    • Cancer (CMS/HCC)     skin cancer   • Carotid arterial disease (CMS/HCC)    • Chronic atrial fibrillation (CMS/HCC)    • Complex sleep apnea syndrome     BiPAP ST   • Hyperlipidemia    • Hypertension    • Ischemic cardiomyopathy    • Mitral regurgitation    • Rectal bleeding      Past Surgical History:   Procedure Laterality Date   • CAROTID ENDARTERECTOMY Right 2009    Dr Lewis   • COLONOSCOPY     • COLONOSCOPY W/ POLYPECTOMY N/A 6/3/2021    Procedure: COLONOSCOPY, polypectomies;  Surgeon: Luan Meek MD;  Location: [facility] OR;  Service: Gastroenterology;  Laterality: N/A;  Diverticulosis  Ascending colon polyp x 4 (3 cold snare)  Cecal polyp (cold snare)  Rectal polyp   • CORONARY ARTERY BYPASS GRAFT  2009    MIMI Frederick to LAD, SVG to OM, SVG Post Desc   • ENDOSCOPY N/A 6/3/2021    Procedure: ESOPHAGOGASTRODUODENOSCOPY with biopsies;  Surgeon: Luan Meek MD;  Location: [facility] OR;  Service: Gastroenterology;  Laterality: N/A;  Reflux  Gastric and duodenal ulcers  Biopsies: gastric, distal esophagus   • FINGER SURGERY       Family History   Problem Relation Age of Onset   • Heart disease Father    • Stroke Father     • Hypertension Father      Social History     Tobacco Use   • Smoking status: Former Smoker   • Smokeless tobacco: Never Used   • Tobacco comment: caffine use   Vaping Use   • Vaping Use: Never assessed   Substance Use Topics   • Alcohol use: Yes     Comment: social    • Drug use: No     Current Facility-Administered Medications on File Prior to Encounter   Medication Dose Route Frequency Provider Last Rate Last Admin   • [DISCONTINUED] sodium chloride 0.9 % flush 10 mL  10 mL Intravenous PRN Delroy Xie MD         Current Outpatient Medications on File Prior to Encounter   Medication Sig Dispense Refill   • furosemide (LASIX) 20 MG tablet Take 1 tablet by mouth Daily. 90 tablet 3   • lisinopril (PRINIVIL,ZESTRIL) 10 MG tablet Take 1 tablet by mouth Daily. (Patient taking differently: Take 5 mg by mouth Daily. Taking 5 mg) 90 tablet 3   • Magnesium 200 MG tablet Take 250 mg by mouth Daily.     • metoprolol succinate XL (TOPROL-XL) 50 MG 24 hr tablet Take 1 tablet by mouth 2 (two) times a day. 180 tablet 3   • omeprazole (priLOSEC) 40 MG capsule Take 1 capsule by mouth Daily. 90 capsule 3   • pravastatin (PRAVACHOL) 40 MG tablet Take 1 tablet by mouth Daily. 90 tablet 1   • rivaroxaban (Xarelto) 20 MG tablet Take 1 tablet by mouth Daily. 90 tablet 3     No Known Allergies    Objective    Objective     Vital Signs  Temp:  [97.4 °F (36.3 °C)] 97.4 °F (36.3 °C)  Heart Rate:  [] 89  Resp:  [16-18] 16  BP: (112)/(84) 112/84  SpO2:  [94 %] 94 %  on   ;   Device (Oxygen Therapy): room air  Body mass index is 33.4 kg/m².    Physical Exam  Vitals and nursing note reviewed.   Constitutional:       General: He is not in acute distress.     Appearance: He is well-developed.   HENT:      Head: Normocephalic.   Neck:      Vascular: No JVD.   Cardiovascular:      Rate and Rhythm: Normal rate. Rhythm irregular.      Heart sounds: Normal heart sounds.      Comments: JUSTICEAyana yanes on the monitor with heart rate 86 during my  exam.  Patient denies any chest pain or shortness of breath.  He does have mild bilateral lower extremity edema, which he states is baseline  Pulmonary:      Effort: Pulmonary effort is normal.      Breath sounds: Normal breath sounds.      Comments: Lung sounds clear, sats 97% on room air  Abdominal:      General: Bowel sounds are normal. There is distension.      Palpations: Abdomen is soft.      Tenderness: There is no abdominal tenderness.   Musculoskeletal:         General: Normal range of motion.      Cervical back: Normal range of motion.      Right lower le+ Pitting Edema present.      Left lower le+ Pitting Edema present.   Skin:     General: Skin is warm and dry.      Capillary Refill: Capillary refill takes less than 2 seconds.   Neurological:      General: No focal deficit present.      Mental Status: He is alert and oriented to person, place, and time.   Psychiatric:         Attention and Perception: Attention normal.         Mood and Affect: Mood normal.         Speech: Speech normal.         Behavior: Behavior normal.         Thought Content: Thought content normal.         Cognition and Memory: Cognition normal.         Judgment: Judgment normal.         Results Review:  I reviewed the patient's new clinical results.  I reviewed the patient's new imaging results and agree with the interpretation.  I reviewed the patient's other test results and agree with the interpretation  I personally viewed and interpreted the patient's EKG/Telemetry data  Discussed with ED provider.    Lab Results (last 24 hours)     Procedure Component Value Units Date/Time    CBC & Differential [[id number]]  (Abnormal) Collected: 21    Specimen: Blood Updated: 21    Narrative:      The following orders were created for panel order CBC & Differential.  Procedure                               Abnormality         Status                     ---------                               -----------          ------                     CBC Auto Differential[[id number]]        Abnormal            Final result                 Please view results for these tests on the individual orders.    Comprehensive Metabolic Panel [[id number]]  (Abnormal) Collected: 06/20/21 2118    Specimen: Blood Updated: 06/20/21 2144     Glucose 108 mg/dL      BUN 27 mg/dL      Creatinine 1.13 mg/dL      Sodium 131 mmol/L      Potassium 4.2 mmol/L      Chloride 99 mmol/L      CO2 24.0 mmol/L      Calcium 8.9 mg/dL      Total Protein 6.7 g/dL      Albumin 3.90 g/dL      ALT (SGPT) 21 U/L      AST (SGOT) 30 U/L      Alkaline Phosphatase 90 U/L      Total Bilirubin 0.9 mg/dL      eGFR Non African Amer 64 mL/min/1.73      Globulin 2.8 gm/dL      A/G Ratio 1.4 g/dL      BUN/Creatinine Ratio 23.9     Anion Gap 8.0 mmol/L     Narrative:      GFR Normal >60  Chronic Kidney Disease <60  Kidney Failure <15      Protime-INR [[id number]]  (Abnormal) Collected: 06/20/21 2118    Specimen: Blood Updated: 06/20/21 2139     Protime 21.0 Seconds      INR 1.85    Narrative:      Therapeutic Ranges for INR: 2.0-3.0 (PT 20-30)                              2.5-3.5 (PT 25-34)    CBC Auto Differential [[id number]]  (Abnormal) Collected: 06/20/21 2118    Specimen: Blood Updated: 06/20/21 2128     WBC 7.06 10*3/mm3      RBC 3.95 10*6/mm3      Hemoglobin 12.4 g/dL      Hematocrit 38.1 %      MCV 96.5 fL      MCH 31.4 pg      MCHC 32.5 g/dL      RDW 13.0 %      RDW-SD 46.4 fl      MPV 10.3 fL      Platelets 163 10*3/mm3      Neutrophil % 69.1 %      Lymphocyte % 15.6 %      Monocyte % 13.9 %      Eosinophil % 0.8 %      Basophil % 0.3 %      Immature Grans % 0.3 %      Neutrophils, Absolute 4.88 10*3/mm3      Lymphocytes, Absolute 1.10 10*3/mm3      Monocytes, Absolute 0.98 10*3/mm3      Eosinophils, Absolute 0.06 10*3/mm3      Basophils, Absolute 0.02 10*3/mm3      Immature Grans, Absolute 0.02 10*3/mm3      nRBC 0.0 /100 WBC     Magnesium [[id number]]  (Normal) Collected:  06/20/21 2118    Specimen: Blood Updated: 06/20/21 2149     Magnesium 2.0 mg/dL     Troponin [[id number]]  (Normal) Collected: 06/20/21 2118    Specimen: Blood Updated: 06/20/21 2216     Troponin T <0.010 ng/mL     Narrative:      Troponin T Reference Range:  <= 0.03 ng/mL-   Negative for AMI  >0.03 ng/mL-     Abnormal for myocardial necrosis.  Clinicians would have to utilize clinical acumen, EKG, Troponin and serial changes to determine if it is an Acute Myocardial Infarction or myocardial injury due to an underlying chronic condition.       Results may be falsely decreased if patient taking Biotin.      BNP [[id number]]  (Abnormal) Collected: 06/20/21 2118    Specimen: Blood Updated: 06/20/21 2207     proBNP 4,035.0 pg/mL     Narrative:      Among patients with dyspnea, NT-proBNP is highly sensitive for the detection of acute congestive heart failure. In addition NT-proBNP of <300 pg/ml effectively rules out acute congestive heart failure with 99% negative predictive value.    Results may be falsely decreased if patient taking Biotin.      COVID PRE-OP / PRE-PROCEDURE SCREENING ORDER (NO ISOLATION) - Swab, Nasal Cavity [[id number]]  (Normal) Collected: 06/20/21 2254    Specimen: Swab from Nasal Cavity Updated: 06/20/21 2332    Narrative:      The following orders were created for panel order COVID PRE-OP / PRE-PROCEDURE SCREENING ORDER (NO ISOLATION) - Swab, Nasal Cavity.  Procedure                               Abnormality         Status                     ---------                               -----------         ------                     COVID-19,[employer] IN-NERI...[[id number]]  Normal              Final result                 Please view results for these tests on the individual orders.    COVID-19,[employer] IN-HOUSE,Nasal Swab No Transport Media 3-4 HR TAT - Swab, Nasal Cavity [[id number]]  (Normal) Collected: 06/20/21 2254    Specimen: Swab from Nasal Cavity Updated: 06/20/21 2332     COVID19 Not Detected     Narrative:      Fact sheet for providers: https://www.fda.gov/media/420302/download     Fact sheet for patients: https://www.fda.gov/media/696896/download    Test performed by PCR.    Consider negative results in combination with clinical observations, patient history, and epidemiological information.          Imaging Results (Last 24 Hours)     ** No results found for the last 24 hours. **          Results for orders placed during the hospital encounter of 01/05/21    Adult Transthoracic Echo Complete W/ Cont if Necessary Per Protocol    Interpretation Summary  · The study is technically difficult for diagnosis.  · Calculated left ventricular EF = 45.7% Estimated left ventricular EF = 46% Estimated left ventricular EF was in agreement with the calculated left ventricular EF. Left ventricular systolic function is low normal. The left ventricular cavity is mildly dilated. Left ventricular wall thickness is consistent with moderate concentric hypertrophy. There is left ventricular global hypokinesis noted. Left ventricular diastolic function was indeterminate.  · The right ventricular cavity is mildly dilated. Normal right ventricular wall thickness noted. Mildly reduced right ventricular systolic function noted.  · The left atrial cavity is moderately dilated  · The right atrial cavity is moderately dilated.  · No aortic valve regurgitation or stenosis is present. The aortic valve is grossly normal in structure. There is mild annular calcification  · Moderate mitral annular calcification is present. Mild to moderate mitral valve regurgitation is present. The mitral valve regurgitation may be underestimated on the study as apical views are suboptimal No significant mitral valve stenosis is present      No orders to display        Assessment/Plan     Active Hospital Problems    Diagnosis  POA   • **Rectal bleeding [K62.5]  Unknown   • Hyponatremia [E87.1]  Unknown   • Elevated brain natriuretic peptide (BNP) level  [R79.89]  Unknown   • 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]  Yes   • Chronic bilateral low back pain with right-sided sciatica [M54.41, G89.29]  Yes     9/29/2020: He has DDD of the lumbar spine and chronic back pain.  He does not want any back surgery.  He has chronic LBP that goes down his right leg. Xray L-spine    10/2/2020: Lumbar spine Xray Result:  5 views of the lumbar spine. There is moderate lumbar dextroscoliosis  and secondary degenerative change. No distinct pars defect. Vertebral  body heights and alignment preserved. Advanced discogenic changes  present from L2 through S1. Multilevel marginal osteophyte formation. At  least moderate diffuse facet arthropathy. Aorta demonstrates  atherosclerotic change and is borderline aneurysmal at 2.9 cm. Vacuum  disc phenomenon at the lower lumbar levels..     IMPRESSION:  Degenerative changes of the lumbar spine. No acute findings.       • Chronic anticoagulation [Z79.01]  Not Applicable   • CAD (coronary artery disease) [I25.10]  Yes     9/29/2020: He had CABG X 3. He has had CEA on right.  Following with Dr. Mauricio Scott, Cardiology.       • Ischemic cardiomyopathy [I25.5]  Yes     9/29/2020:  Following with Dr. Mauricio Scott     • Atrial fibrillation (CMS/HCC) [I48.91]  Yes     9/29/2020: Nonvalvular permanent Afib. CHADS Scrore of 2 is high risk for thrombotic event. Xarelto 20 mg and beta blocker. No medication side effects. Following with Dr. Mauricio Scott, Cardiology.     • Hypothyroidism [E03.9]  Yes     September 29, 2020: He has a diagnosis of hypothyroidism on his chart but states that he is not aware that he has a low functioning thyroid.  He is not currently taking any thyroid medication.  Check TSH.       • HTN (hypertension) [I10]  Yes     9/29/2020:  Controlled with Lisinopril 5 mg daily and Metoprolol XL 25 mg daily. No medication side effects.  Continue current treatment.  Potassium elevated at 5.3 on 9/29/2020. At upcoming 10/6/2020 visit consider  stopping or reducing dose of Lisinopril 5 mg.     • Dyslipidemia [E78.5]  Yes     9/29/2020: He has ocassional leg cramps.  He was switched from Pravastatin to Simvastatin. He states he drinks a lot of coffee and some whiskey at night and believes he is chronically dehydrated. He states he has cut back on coffee and is drinking more water.  Checking with Dr. Scott. Consider holding statin for a few days to determine if the medication is causing leg cramps.  Consider Atorvastatin or Rosuvastatin.         Mr. Marquez is a 71-year-old male with history of A. fib, CAD, cardiomyopathy, chronic anticoagulation, hypothyroidism, hypertension, chronic back pain, dyslipidemia who presents to the emergency [place] at [facility] with complaints of rectal bleeding.    Rectal bleeding  -Serial H&H  -Consult gastroenterology, patient just seen by Dr. Meek 2 weeks ago and did receive colonoscopy and EGD that showed gastric ulcer  -Protonix IV twice daily  -We will hold Xarelto for now, consult cardiology for input, patient was recently started back on Xarelto due to his A. Fib    A. Fib/elevated BNP/hyponatremia  cardiomyopathy/hypertension/dyslipidemia  -Continue home medications, except hold Xarelto for now  -Last 2D echo in January 2021 showed ejection fraction of 46%.  -Normal saline at 75 cc an hour overnight, monitor fluid volume closely, recheck BMP in a.m., watch for any fluid overload  -Consult cardiology, patient sees Dr. Scott    · I discussed the patient's findings and my recommendations with patient and ED provider.    VTE Prophylaxis - SCDs.  Code Status - Full code.       SIRIA Kee  [facility]  06/21/21  03:57 EDT

## 2021-06-21 NOTE — PLAN OF CARE
Pt denies pain, had one non bloody bm this am, given iv lasix, will start bowel prep this evening, vss, will ctm.

## 2021-06-21 NOTE — CASE MANAGEMENT/SOCIAL WORK
Discharge Planning Assessment  [facility]     Patient Name: Renato Marquez  MRN: [id number]  Today's Date: 6/21/2021    Admit Date: 6/21/2021    Discharge Needs Assessment     Row Name 06/21/21 1139       Living Environment    Lives With  spouse    Name(s) of Who Lives With Patient  Spouse  ( Evelin Marquez  [phone])    Current Living Arrangements  home/apartment/condo    Primary Care Provided by  self    Provides Primary Care For  no one    Family Caregiver if Needed  spouse    Family Caregiver Names  Spouse  ( Evelin Marquez  [phone])    Quality of Family Relationships  involved;helpful    Able to Return to Prior Arrangements  yes    Living Arrangement Comments  Pt lives in a tri level house with spouse  ( Evelin Marquez  [phone]).       Resource/Environmental Concerns    Resource/Environmental Concerns  none    Transportation Concerns  car, none       Transition Planning    Patient/Family Anticipates Transition to  home with family    Patient/Family Anticipated Services at Transition  none    Transportation Anticipated  family or friend will provide       Discharge Needs Assessment    Equipment Currently Used at Home  cpap    Concerns to be Addressed  no discharge needs identified;denies needs/concerns at this time    Equipment Needed After Discharge  cpap        Discharge Plan     Row Name 06/21/21 1143       Plan    Plan  Plan home with family.  MALENA Villarreal RN    Patient/Family in Agreement with Plan  yes    Plan Comments  FACE SHEET VERIFIED/ HINES SIGNED.   Spoke with pt at bedside.  Pt lives in a tri level house with spouse  ( Evelin Marquez  [phone]).  Pt is independent with ADLs.  Pt has a C PAP for home use.  Pt gets his prescriptions at [facility] in [place].  Pt denies any issues affording medications. Pt is not current with HH.  Pt has not been in SNF.  Pt denies any discharge needs.  Pt's spouse will transport pt home.  Plan home with family.   MALENA Villarreal RN         Continued Care and Services - Admitted Since 6/21/2021    Coordination has not been started for this encounter.         Demographic Summary     Row Name 06/21/21 1138       General Information    Admission Type  observation    Arrived From  emergency department    Required Notices Provided  Observation Status Notice    Referral Source  admission list    Reason for Consult  discharge planning    Preferred Language  English     Used During This Interaction  no        Functional Status     Row Name 06/21/21 1139       Functional Status    Usual Activity Tolerance  good    Current Activity Tolerance  good       Functional Status, IADL    Medications  independent    Meal Preparation  independent    Housekeeping  assistive person    Laundry  assistive person    Shopping  assistive person       Mental Status    General Appearance WDL  WDL       Mental Status Summary    Recent Changes in Mental Status/Cognitive Functioning  no changes        Psychosocial    No documentation.       Abuse/Neglect    No documentation.       Legal    No documentation.       Substance Abuse    No documentation.       Patient Forms    No documentation.           Rachel Villarreal RN

## 2021-06-21 NOTE — CASE MANAGEMENT/SOCIAL WORK
Continued Stay Note  [facility]     Patient Name: Renato Marquez  MRN: [id number]  Today's Date: 6/21/2021    Admit Date: 6/21/2021    Discharge Plan     Row Name 06/21/21 1141       Plan    Plan  Plan home with family.  MALENA Villarreal RN    Patient/Family in Agreement with Plan  yes    Plan Comments  FACE SHEET VERIFIED/ HINES SIGNED.   Spoke with pt at bedside.  Pt lives in a tri level house with spouse  ( Evelin Marquez  [phone]).  Pt is independent with ADLs.  Pt has a C PAP for home use.  Pt gets his prescriptions at [facility] in [place].  Pt denies any issues affording medications. Pt is not current with HH.  Pt has not been in SNF.  Pt denies any discharge needs.  Pt's spouse will transport pt home.  Plan home with family.   MALENA Villarreal RN        Discharge Codes    No documentation.             Rachel Villarreal RN

## 2021-06-21 NOTE — ED NOTES
Wife left to go get patient's bi-pap machine to take to [facility] with patient     Kimberly Nuñez RN  06/20/21 4178

## 2021-06-21 NOTE — CONSULTS
[facility]  Initial Inpatient Consult Note    Referring Provider: Dr. Lio Powell    Reason for Consultation: rectal bleeding while on Xarelto    Subjective     History of present illness:    71 y.o. male  and patient of Dr. Alvaro Meek with CAD, a fib (on Xarelto, last dose the AM of 6/20/21), KATIE (on Bipap), HTN, h/o colon polyps, and possible Florian's esophagus (?)  who was admitted last night with bright red blood and dark red blood per rectum for the last 2 days.  His last bowel movement was about 30 minutes ago.  He had no dizziness or shortness of breath.  He has had no abdominal pain or chest pain.  He does feel like he has no energy.  His last episode of GI bleeding was earlier this month when he was admitted to [facility] with rectal bleeding.  He last had an EGD and colonoscopy on 6/3/2021 by Dr. Alvaro Meek.  The EGD showed 2 superficial duodenal ulcers in the bulb with scattered small erosions/ulcers in the stomach.  The patient also had reflux esophagitis.  Dr. Meek had said he did not think that the patient had Florian's esophagus based on how the distal esophagus looked.  Pathology from the EGD did show no evidence of Helicobacter pylori.  Biopsies of the distal esophagus were consistent with Florian's esophagus but no dysplasia.  The colonoscopy also done 6/3/2021 by Dr. Alvaro Meek did show diverticulosis and about 6 small colon polyps that were removed.  In the ascending colon there was a 6 mm polyp that was cold biopsy removed.  In the cecum there was a 7 to 8 mm polyp that was cold snared.  In the ascending colon there were 3 other sessile polyps removed with cold snare in the rectum there was a sessile 8 mm polyp that was cold biopsy removed.  Pathology showed that the rectal polyp was hyperplastic.  The other polyps were serrated adenomas and tubular adenomas.  Dr. Meek had recommended a repeat EGD and  colonoscopy in 3 years.  He had also recommended that the patient hold anticoagulation for 10 days after the scopes.  His last Xarelto dose was 6/20/2020 1 in the morning.  He was put back on Xarelto on 6/16/2021.  Patient's weight is stable.  No diarrhea or constipation.  No nausea or vomiting.  He has been off nonsteroidal anti-inflammatory drugs since earlier this month when Dr. Meek told him to stop the nonsteroidal anti-inflammatory drugs based on the gastric and duodenal ulcers.  The patient does smoke an occasional cigar.  He drinks 1-2 drinks a night.  He is .  He has 2 children.  He is a  and states that he is quite busy.    Past Medical History:  Past Medical History:   Diagnosis Date   • CAD (coronary artery disease)    • Cancer (CMS/HCC)     skin cancer   • Carotid arterial disease (CMS/HCC)    • Chronic atrial fibrillation (CMS/HCC)    • Complex sleep apnea syndrome     BiPAP ST   • Hyperlipidemia    • Hypertension    • Ischemic cardiomyopathy    • Mitral regurgitation    • Rectal bleeding      Past Surgical History:  Past Surgical History:   Procedure Laterality Date   • CAROTID ENDARTERECTOMY Right 2009    Dr Lewis   • COLONOSCOPY     • COLONOSCOPY W/ POLYPECTOMY N/A 6/3/2021    Procedure: COLONOSCOPY, polypectomies;  Surgeon: Luan Meek MD;  Location: [facility] OR;  Service: Gastroenterology;  Laterality: N/A;  Diverticulosis  Ascending colon polyp x 4 (3 cold snare)  Cecal polyp (cold snare)  Rectal polyp   • CORONARY ARTERY BYPASS GRAFT  2009    MIMI Frederick to LAD, SVG to OM, SVG Post Desc   • ENDOSCOPY N/A 6/3/2021    Procedure: ESOPHAGOGASTRODUODENOSCOPY with biopsies;  Surgeon: Luan Meek MD;  Location: [facility] OR;  Service: Gastroenterology;  Laterality: N/A;  Reflux  Gastric and duodenal ulcers  Biopsies: gastric, distal esophagus   • FINGER SURGERY        Social History:   Social History     Tobacco Use   • Smoking status:  Former Smoker   • Smokeless tobacco: Never Used   • Tobacco comment: caffine use   Substance Use Topics   • Alcohol use: Yes     Comment: social       Family History:  Family History   Problem Relation Age of Onset   • Heart disease Father    • Stroke Father    • Hypertension Father        Home Meds:  Medications Prior to Admission   Medication Sig Dispense Refill Last Dose   • furosemide (LASIX) 20 MG tablet Take 1 tablet by mouth Daily. 90 tablet 3 6/20/2021 at Unknown time   • lisinopril (PRINIVIL,ZESTRIL) 10 MG tablet Take 1 tablet by mouth Daily. (Patient taking differently: Take 5 mg by mouth Daily. Taking 5 mg) 90 tablet 3 6/20/2021 at Unknown time   • Magnesium 200 MG tablet Take 250 mg by mouth Daily.   6/20/2021 at Unknown time   • metoprolol succinate XL (TOPROL-XL) 50 MG 24 hr tablet Take 1 tablet by mouth 2 (two) times a day. 180 tablet 3 6/20/2021 at Unknown time   • omeprazole (priLOSEC) 40 MG capsule Take 1 capsule by mouth Daily. 90 capsule 3 6/20/2021 at Unknown time   • pravastatin (PRAVACHOL) 40 MG tablet Take 1 tablet by mouth Daily. 90 tablet 1 6/20/2021 at Unknown time   • rivaroxaban (Xarelto) 20 MG tablet Take 1 tablet by mouth Daily. 90 tablet 3 6/20/2021 at Unknown time     Current Meds:   lisinopril, 5 mg, Oral, Daily  metoprolol succinate XL, 50 mg, Oral, Q12H  pantoprazole, 40 mg, Oral, Q AM  pravastatin, 40 mg, Oral, Daily  sodium chloride, 10 mL, Intravenous, Q12H  sucralfate, 1 g, Oral, 4x Daily AC & at Bedtime      Allergies:  No Known Allergies  Review of Systems  The following systems were reviewed and negative;  constitution, ENT, respiratory, cardiovascular, musculoskeletal and neurological     Objective     Vital Signs  Temp:  [97.4 °F (36.3 °C)-97.9 °F (36.6 °C)] 97.4 °F (36.3 °C)  Heart Rate:  [] 81  Resp:  [16-20] 16  BP: (112-159)/(56-97) 148/89  Physical Exam:  General Appearance:    Alert, cooperative, in no acute distress   Head:    Normocephalic, without obvious  abnormality, atraumatic   Eyes:            Lids and lashes normal, conjunctivae and sclerae normal, no   icterus   Throat:   No oral lesions, no thrush, oral mucosa moist   Neck:   No adenopathy, supple, trachea midline, no thyromegaly, no   carotid bruit, no JVD   Lungs:     Clear to auscultation,respirations regular, even and                   unlabored    Heart:    Regular rhythm and normal rate, normal S1 and S2, no            murmur, no gallop, no rub, no click   Chest Wall:    No abnormalities observed   Abdomen:     Normal bowel sounds, no masses, no organomegaly, soft        nontender, nondistended, no guarding, no rebound                 tenderness   Rectal:     Deferred   Extremities:   1-2+ pitting pretibial edema, no cyanosis, no redness   Skin:   No bleeding, bruising or rash   Lymph nodes:   No palpable adenopathy   Psychiatric:  Judgement and insight: normal   Orientation to person place and time: normal   Mood and affect: normal   Results Review:   I reviewed the patient's new clinical results.    Results from last 7 days   Lab Units 06/21/21  0756 06/20/21 2118   WBC 10*3/mm3 6.11 7.06   HEMOGLOBIN g/dL 12.8*  12.8* 12.4*   HEMATOCRIT % 39.4  39.4 38.1   PLATELETS 10*3/mm3 152 163     Results from last 7 days   Lab Units 06/20/21 2118   SODIUM mmol/L 131*   POTASSIUM mmol/L 4.2   CHLORIDE mmol/L 99   CO2 mmol/L 24.0   BUN mg/dL 27*   CREATININE mg/dL 1.13   CALCIUM mg/dL 8.9   BILIRUBIN mg/dL 0.9   ALK PHOS U/L 90   ALT (SGPT) U/L 21   AST (SGOT) U/L 30   GLUCOSE mg/dL 108*     Results from last 7 days   Lab Units 06/21/21  0756 06/20/21 2118   INR  1.63* 1.85*     No results found for: LIPASE    Radiology:  No orders to display       Assessment/Plan   Assessment:   1. 71 y.o. male  and patient of Dr. Alvaro Meek with CAD, a fib (on Xarelto, last dose the AM of 6/20/21), KATIE (on Bipap), HTN, h/o colon polyps, and possible Florian's esophagus (?)    2.  He has rectal  bleeding while on Xarelto (for atrial fibrillation).  The patient did have an EGD and colonoscopy with polypectomy on 6/3/2021.  Is he bleeding from one of the polypectomy sites?  Versus other?  3.  The patient has a history of gastric ulcers and duodenal ulcers probably from nonsteroidal anti-inflammatory drugs?  4.  Patient also has what was described as gastroesophageal reflux disease but biopsies were consistent with Florian's esophagus.  Dr. Alvaro Meek had said it did not endoscopically look like he had Florian's esophagus.  5.  The patient has a history of colonic adenomas.      Plan:   1.  I would hold anticoagulation for now.  2.  I would continue serial H&H's and transfuse as necessary.  3.  I would have Cardiology (Dr. Mauricio Scott knows him) see and follow with us.  4.  I would continue Protonix 40 mg p.o. daily.  5.  I will put him on for a colonoscopy to be done by Dr. Fernando Jacobson tomorrow to look for a bleeding source?    I discussed the patient's findings and my recommendations with patient.         Dilan Cerda M.D.  [facility]  [address] [place] [address]  Office: [phone]

## 2021-06-22 ENCOUNTER — ANESTHESIA (OUTPATIENT)
Dept: GASTROENTEROLOGY | Facility: HOSPITAL | Age: 71
End: 2021-06-22

## 2021-06-22 ENCOUNTER — ANESTHESIA EVENT (OUTPATIENT)
Dept: GASTROENTEROLOGY | Facility: HOSPITAL | Age: 71
End: 2021-06-22

## 2021-06-22 VITALS
WEIGHT: 243 LBS | HEIGHT: 71 IN | HEART RATE: 73 BPM | RESPIRATION RATE: 16 BRPM | BODY MASS INDEX: 34.02 KG/M2 | OXYGEN SATURATION: 97 % | DIASTOLIC BLOOD PRESSURE: 94 MMHG | TEMPERATURE: 97.5 F | SYSTOLIC BLOOD PRESSURE: 138 MMHG

## 2021-06-22 LAB
HCT VFR BLD AUTO: 36.8 % (ref 37.5–51)
HCT VFR BLD AUTO: 38 % (ref 37.5–51)
HGB BLD-MCNC: 11.8 G/DL (ref 13–17.7)
HGB BLD-MCNC: 12 G/DL (ref 13–17.7)
TSH SERPL DL<=0.05 MIU/L-ACNC: 1.51 UIU/ML (ref 0.27–4.2)

## 2021-06-22 PROCEDURE — 84443 ASSAY THYROID STIM HORMONE: CPT | Performed by: NURSE PRACTITIONER

## 2021-06-22 PROCEDURE — 85018 HEMOGLOBIN: CPT | Performed by: NURSE PRACTITIONER

## 2021-06-22 PROCEDURE — 25010000002 FUROSEMIDE PER 20 MG: Performed by: INTERNAL MEDICINE

## 2021-06-22 PROCEDURE — 85014 HEMATOCRIT: CPT | Performed by: NURSE PRACTITIONER

## 2021-06-22 PROCEDURE — 25010000002 PROPOFOL 10 MG/ML EMULSION: Performed by: ANESTHESIOLOGY

## 2021-06-22 PROCEDURE — G0378 HOSPITAL OBSERVATION PER HR: HCPCS

## 2021-06-22 PROCEDURE — 96376 TX/PRO/DX INJ SAME DRUG ADON: CPT

## 2021-06-22 PROCEDURE — S0260 H&P FOR SURGERY: HCPCS | Performed by: INTERNAL MEDICINE

## 2021-06-22 PROCEDURE — 45378 DIAGNOSTIC COLONOSCOPY: CPT | Performed by: INTERNAL MEDICINE

## 2021-06-22 RX ORDER — LIDOCAINE HYDROCHLORIDE 10 MG/ML
0.5 INJECTION, SOLUTION INFILTRATION; PERINEURAL ONCE AS NEEDED
Status: CANCELLED | OUTPATIENT
Start: 2021-06-22

## 2021-06-22 RX ORDER — LISINOPRIL 10 MG/1
5 TABLET ORAL DAILY
Start: 2021-06-22 | End: 2021-09-20 | Stop reason: SDUPTHER

## 2021-06-22 RX ORDER — SODIUM CHLORIDE 0.9 % (FLUSH) 0.9 %
10 SYRINGE (ML) INJECTION AS NEEDED
Status: CANCELLED | OUTPATIENT
Start: 2021-06-22

## 2021-06-22 RX ORDER — SODIUM CHLORIDE 9 MG/ML
INJECTION, SOLUTION INTRAVENOUS CONTINUOUS PRN
Status: DISCONTINUED | OUTPATIENT
Start: 2021-06-22 | End: 2021-06-22 | Stop reason: SURG

## 2021-06-22 RX ORDER — FUROSEMIDE 40 MG/1
40 TABLET ORAL DAILY
Qty: 30 TABLET | Refills: 0 | Status: SHIPPED | OUTPATIENT
Start: 2021-06-22 | End: 2021-09-07 | Stop reason: SDUPTHER

## 2021-06-22 RX ORDER — PROPOFOL 10 MG/ML
VIAL (ML) INTRAVENOUS AS NEEDED
Status: DISCONTINUED | OUTPATIENT
Start: 2021-06-22 | End: 2021-06-22 | Stop reason: SURG

## 2021-06-22 RX ORDER — NALOXONE HCL 0.4 MG/ML
0.4 VIAL (ML) INJECTION AS NEEDED
Status: DISCONTINUED | OUTPATIENT
Start: 2021-06-22 | End: 2021-06-22 | Stop reason: HOSPADM

## 2021-06-22 RX ORDER — SUCRALFATE 1 G/1
1 TABLET ORAL
Qty: 120 TABLET | Refills: 0 | Status: SHIPPED | OUTPATIENT
Start: 2021-06-22 | End: 2021-08-02 | Stop reason: SDUPTHER

## 2021-06-22 RX ORDER — SODIUM CHLORIDE 9 MG/ML
1000 INJECTION, SOLUTION INTRAVENOUS CONTINUOUS
Status: CANCELLED | OUTPATIENT
Start: 2021-06-22

## 2021-06-22 RX ORDER — DIPHENHYDRAMINE HYDROCHLORIDE 50 MG/ML
6.25 INJECTION INTRAMUSCULAR; INTRAVENOUS
Status: DISCONTINUED | OUTPATIENT
Start: 2021-06-22 | End: 2021-06-22 | Stop reason: HOSPADM

## 2021-06-22 RX ORDER — FENTANYL CITRATE 50 UG/ML
25 INJECTION, SOLUTION INTRAMUSCULAR; INTRAVENOUS
Status: DISCONTINUED | OUTPATIENT
Start: 2021-06-22 | End: 2021-06-22 | Stop reason: HOSPADM

## 2021-06-22 RX ORDER — ONDANSETRON 2 MG/ML
4 INJECTION INTRAMUSCULAR; INTRAVENOUS ONCE AS NEEDED
Status: DISCONTINUED | OUTPATIENT
Start: 2021-06-22 | End: 2021-06-22 | Stop reason: HOSPADM

## 2021-06-22 RX ORDER — HYDRALAZINE HYDROCHLORIDE 20 MG/ML
10 INJECTION INTRAMUSCULAR; INTRAVENOUS
Status: DISCONTINUED | OUTPATIENT
Start: 2021-06-22 | End: 2021-06-22 | Stop reason: HOSPADM

## 2021-06-22 RX ORDER — LIDOCAINE HYDROCHLORIDE 20 MG/ML
INJECTION, SOLUTION INFILTRATION; PERINEURAL AS NEEDED
Status: DISCONTINUED | OUTPATIENT
Start: 2021-06-22 | End: 2021-06-22 | Stop reason: SURG

## 2021-06-22 RX ORDER — EPHEDRINE SULFATE 50 MG/ML
5 INJECTION, SOLUTION INTRAVENOUS ONCE AS NEEDED
Status: DISCONTINUED | OUTPATIENT
Start: 2021-06-22 | End: 2021-06-22 | Stop reason: HOSPADM

## 2021-06-22 RX ORDER — LABETALOL HYDROCHLORIDE 5 MG/ML
5 INJECTION, SOLUTION INTRAVENOUS
Status: DISCONTINUED | OUTPATIENT
Start: 2021-06-22 | End: 2021-06-22 | Stop reason: HOSPADM

## 2021-06-22 RX ADMIN — SODIUM CHLORIDE, PRESERVATIVE FREE 10 ML: 5 INJECTION INTRAVENOUS at 09:21

## 2021-06-22 RX ADMIN — METOPROLOL SUCCINATE 50 MG: 50 TABLET, EXTENDED RELEASE ORAL at 09:20

## 2021-06-22 RX ADMIN — PROPOFOL 160 MCG/KG/MIN: 10 INJECTION, EMULSION INTRAVENOUS at 12:37

## 2021-06-22 RX ADMIN — LISINOPRIL 5 MG: 5 TABLET ORAL at 09:20

## 2021-06-22 RX ADMIN — PROPOFOL 120 MG: 10 INJECTION, EMULSION INTRAVENOUS at 12:37

## 2021-06-22 RX ADMIN — FUROSEMIDE 40 MG: 10 INJECTION, SOLUTION INTRAMUSCULAR; INTRAVENOUS at 09:20

## 2021-06-22 RX ADMIN — SUCRALFATE 1 G: 1 TABLET ORAL at 09:20

## 2021-06-22 RX ADMIN — POLYETHYLENE GLYCOL 3350, SODIUM SULFATE ANHYDROUS, SODIUM BICARBONATE, SODIUM CHLORIDE, POTASSIUM CHLORIDE 2000 ML: 236; 22.74; 6.74; 5.86; 2.97 POWDER, FOR SOLUTION ORAL at 05:10

## 2021-06-22 RX ADMIN — PRAVASTATIN SODIUM 40 MG: 40 TABLET ORAL at 09:20

## 2021-06-22 RX ADMIN — SODIUM CHLORIDE: 9 INJECTION, SOLUTION INTRAVENOUS at 12:30

## 2021-06-22 RX ADMIN — LIDOCAINE HYDROCHLORIDE 60 MG: 20 INJECTION, SOLUTION INFILTRATION; PERINEURAL at 12:37

## 2021-06-22 NOTE — PLAN OF CARE
Goal Outcome Evaluation:   Pt. A&O. Pt medicated per orders. Pt continued bowel prep. Pt had no SOA throughout shift. Pt had no s/s of distress. VSS. Will continue to monitor.

## 2021-06-22 NOTE — DISCHARGE SUMMARY
NAME: Renato Marquez ADMIT: 2021   : 1950  PCP: Provider, No Known    MRN: [id number] LOS: 0 days   AGE/SEX: 71 y.o. male  ROOM: E648/1     Date of Admission:  2021  Date of Discharge:  2021    PCP: Provider, No Known    CHIEF COMPLAINT  No chief complaint on file.      DISCHARGE DIAGNOSIS  Active Hospital Problems    Diagnosis  POA   • **Rectal bleeding [K62.5]  Yes   • Hyponatremia [E87.1]  Yes   • Elevated brain natriuretic peptide (BNP) level [R79.89]  Yes   • Acute on chronic combined systolic and diastolic CHF (congestive heart failure) (CMS/HCC) [I50.43]  Yes   • Hemorrhagic disorder due to circulating anticoagulants (CMS/HCC) [D68.318]  Yes   • 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]  Yes   • Chronic bilateral low back pain with right-sided sciatica [M54.41, G89.29]  Yes   • Chronic anticoagulation [Z79.01]  Not Applicable   • CAD (coronary artery disease) [I25.10]  Yes   • Ischemic cardiomyopathy [I25.5]  Yes   • Atrial fibrillation (CMS/HCC) [I48.91]  Yes   • Hypothyroidism [E03.9]  Yes   • HTN (hypertension) [I10]  Yes   • Dyslipidemia [E78.5]  Yes      Resolved Hospital Problems   No resolved problems to display.       SECONDARY DIAGNOSES  Past Medical History:   Diagnosis Date   • CAD (coronary artery disease)    • Cancer (CMS/HCC)     skin cancer   • Carotid arterial disease (CMS/HCC)    • Chronic atrial fibrillation (CMS/HCC)    • Complex sleep apnea syndrome     BiPAP ST   • Hyperlipidemia    • Hypertension    • Ischemic cardiomyopathy    • Mitral regurgitation    • Rectal bleeding        CONSULTS   Cardiology  GI    HOSPITAL COURSE  Patient is a 71 y.o. male with history of coronary artery disease with prior CABG, chronic atrial fibrillation on Xarelto, carotid artery disease with previous CVA, obstructive sleep apnea. He was admitted in early  for GIB. He had an upper endoscopy and colonoscopy that showed esophagitis and a duodenal ulcer. GI recommended holding AC for  10 days and stop aspirin.  He presented with rectal bleeding and dyspnea.  He was admitted as observation and had resolution of any of his bleeding.  He had colonoscopy with full results as below but there is no active bleeding seen.  His hemoglobin is stable and again he is not bleeding and he wishes for discharge.  It is recommended that he hold his anticoagulation for 48 additional hours then cautiously restart.  He should not be on any aspirin.  He should follow-up with his outpatient physicians.     DIAGNOSTICS    C-Scope 6/22/21  - Non-thrombosed external hemorrhoids found on perianal exam.  - Diverticulosis in the sigmoid colon, in the descending colon and in the ascending colon.  - The examined portion of the ileum was normal.  - Tortuous colon.  - Internal hemorrhoids.  - No specimens collected.  Impression:  - Do a GI bleeding (tagged RBC) scan if symptoms persist.    Collected Updated Procedure Result Status    06/22/2021 0805 06/22/2021 0859 TSH [[id number]]   Blood    Final result Component Value Units   TSH Baseline 1.510 uIU/mL           06/22/2021 0805 06/22/2021 0828 Hemoglobin & Hematocrit, Blood [[id number]]   (Abnormal)   Blood    Final result Component Value Units   Hemoglobin 11.8Low  g/dL   Hematocrit 36.8Low              PHYSICAL EXAM  Objective    Alert  nad  No resp distress  Soft, nt  Trace LE edema    CONDITION ON DISCHARGE  Stable.      DISCHARGE DISPOSITION   Home or Self Care      DISCHARGE MEDICATIONS       Your medication list      START taking these medications      Instructions Last Dose Given Next Dose Due   sucralfate 1 g tablet  Commonly known as: CARAFATE      Take 1 tablet by mouth 4 (Four) Times a Day Before Meals & at Bedtime.          CHANGE how you take these medications      Instructions Last Dose Given Next Dose Due   furosemide 40 MG tablet  Commonly known as: LASIX  What changed:   · medication strength  · how much to take      Take 1 tablet by mouth Daily.        rivaroxaban 20 MG tablet  Commonly known as: Xarelto  Start taking on: June 25, 2021  What changed: These instructions start on June 25, 2021. If you are unsure what to do until then, ask your doctor or other care provider.      Take 1 tablet by mouth Daily.          CONTINUE taking these medications      Instructions Last Dose Given Next Dose Due   lisinopril 10 MG tablet  Commonly known as: PRINIVIL,ZESTRIL      Take 0.5 tablets by mouth Daily. Taking 5 mg       Magnesium 200 MG tablet      Take 250 mg by mouth Daily.       metoprolol succinate XL 50 MG 24 hr tablet  Commonly known as: TOPROL-XL      Take 1 tablet by mouth 2 (two) times a day.       omeprazole 40 MG capsule  Commonly known as: priLOSEC      Take 1 capsule by mouth Daily.       pravastatin 40 MG tablet  Commonly known as: PRAVACHOL      Take 1 tablet by mouth Daily.             Where to Get Your Medications      These medications were sent to [facility] [address] [place] [address] [place] [facility] [address] [facility] [phone] [facility] [phone]   [address] [place], [place] [address] [address]    Phone: [phone]   · furosemide 40 MG tablet  · sucralfate 1 g tablet     Information about where to get these medications is not yet available    Ask your nurse or doctor about these medications  · lisinopril 10 MG tablet  · rivaroxaban 20 MG tablet          Future Appointments   Date Time Provider Department Center   8/2/2021  8:30 AM Arlin Varghese APRN [facility]   12/29/2021 10:15 AM Mauricio Scott III, MD [facility]   5/20/2022  9:00 AM Jett Mcguire MD [facility] None     Follow-up Information     Provider, No Known .    Contact information:  [facility] [place] [address] [phone]                   TEST  RESULTS PENDING AT DISCHARGE         Lio Powell MD  [facility]  06/22/21  15:39 EDT      Time: greater than 32 minutes on discharge  It was a pleasure taking care of this patient while in the  [facility] [place].

## 2021-06-22 NOTE — H&P
[facility]  Pre Procedure History & Physical    Chief Complaint:   Rectal bleeding     Subjective     HPI:   May be secondary to post polypectomy bleed but cannot rule out other etiologies.    Past Medical History:   Past Medical History:   Diagnosis Date   • CAD (coronary artery disease)    • Cancer (CMS/HCC)     skin cancer   • Carotid arterial disease (CMS/HCC)    • Chronic atrial fibrillation (CMS/HCC)    • Complex sleep apnea syndrome     BiPAP ST   • Hyperlipidemia    • Hypertension    • Ischemic cardiomyopathy    • Mitral regurgitation    • Rectal bleeding        Past Surgical History:  Past Surgical History:   Procedure Laterality Date   • CAROTID ENDARTERECTOMY Right 2009    Dr Lewis   • COLONOSCOPY     • COLONOSCOPY W/ POLYPECTOMY N/A 6/3/2021    Procedure: COLONOSCOPY, polypectomies;  Surgeon: Luan Meek MD;  Location: [facility] OR;  Service: Gastroenterology;  Laterality: N/A;  Diverticulosis  Ascending colon polyp x 4 (3 cold snare)  Cecal polyp (cold snare)  Rectal polyp   • CORONARY ARTERY BYPASS GRAFT  2009    Dr Bhagat, LIMA to LAD, SVG to OM, SVG Post Desc   • ENDOSCOPY N/A 6/3/2021    Procedure: ESOPHAGOGASTRODUODENOSCOPY with biopsies;  Surgeon: Luan Meek MD;  Location: [facility] OR;  Service: Gastroenterology;  Laterality: N/A;  Reflux  Gastric and duodenal ulcers  Biopsies: gastric, distal esophagus   • FINGER SURGERY         Family History:  Family History   Problem Relation Age of Onset   • Heart disease Father    • Stroke Father    • Hypertension Father        Social History:   reports that he has quit smoking. He has never used smokeless tobacco. He reports current alcohol use. He reports that he does not use drugs.    Medications:   Medications Prior to Admission   Medication Sig Dispense Refill Last Dose   • furosemide (LASIX) 20 MG tablet Take 1 tablet by mouth Daily. 90 tablet 3 6/20/2021 at Unknown time   • lisinopril  "(PRINIVIL,ZESTRIL) 10 MG tablet Take 1 tablet by mouth Daily. (Patient taking differently: Take 5 mg by mouth Daily. Taking 5 mg) 90 tablet 3 6/20/2021 at Unknown time   • Magnesium 200 MG tablet Take 250 mg by mouth Daily.   6/20/2021 at Unknown time   • metoprolol succinate XL (TOPROL-XL) 50 MG 24 hr tablet Take 1 tablet by mouth 2 (two) times a day. 180 tablet 3 6/20/2021 at Unknown time   • omeprazole (priLOSEC) 40 MG capsule Take 1 capsule by mouth Daily. 90 capsule 3 6/20/2021 at Unknown time   • pravastatin (PRAVACHOL) 40 MG tablet Take 1 tablet by mouth Daily. 90 tablet 1 6/20/2021 at Unknown time   • rivaroxaban (Xarelto) 20 MG tablet Take 1 tablet by mouth Daily. 90 tablet 3 6/20/2021 at Unknown time       Allergies:  Patient has no known allergies.    ROS:    Pertinent items are noted in HPI, all other systems reviewed and negative     Objective     Blood pressure 129/97, pulse 80, temperature 97.9 °F (36.6 °C), temperature source Oral, resp. rate 16, height 180.3 cm (71\"), weight 110 kg (243 lb), SpO2 96 %.    Physical Exam   Constitutional: Pt is oriented to person, place, and time and well-developed, well-nourished, and in no distress.   Mouth/Throat: Oropharynx is clear and moist.   Neck: Normal range of motion.   Cardiovascular: Normal rate, regular rhythm and normal heart sounds.    Pulmonary/Chest: Effort normal and breath sounds normal.   Abdominal: Soft. Nontender  Skin: Skin is warm and dry.   Psychiatric: Mood, memory, affect and judgment normal.     Assessment/Plan     Diagnosis:  Rectal bleeding    Anticipated Surgical Procedure:  Colonoscopy    The risks, benefits, and alternatives of this procedure have been discussed with the patient or the responsible party- the patient understands and agrees to proceed.                                                          "

## 2021-06-22 NOTE — ANESTHESIA POSTPROCEDURE EVALUATION
"Patient: Renato Marquez    Procedure Summary     Date: 06/22/21 Room / Location:  [facility] /  [facility]    Anesthesia Start: 1230 Anesthesia Stop: 1300    Procedure: COLONOSCOPY INTO CECUM AND TI (N/A ) Diagnosis:       Diverticulosis      Tortuous colon      Hemorrhoids      (Rectal bleeding [K62.5])    Surgeons: Artur Jacobson MD Provider: Santi Barnes MD    Anesthesia Type: MAC ASA Status: 3          Anesthesia Type: MAC    Vitals  Vitals Value Taken Time   /109 06/22/21 1257   Temp     Pulse 80 06/22/21 1257   Resp 16 06/22/21 1257   SpO2 92 % 06/22/21 1257           Post Anesthesia Care and Evaluation    Patient location during evaluation: bedside  Patient participation: complete - patient participated  Level of consciousness: awake  Pain management: adequate  Airway patency: patent  Anesthetic complications: No anesthetic complications    Cardiovascular status: acceptable  Respiratory status: acceptable  Hydration status: acceptable    Comments: *BP (!) 134/109 (BP Location: Right arm, Patient Position: Lying)   Pulse 80   Temp 36.6 °C (97.9 °F) (Oral)   Resp 16   Ht 180.3 cm (71\")   Wt 110 kg (243 lb)   SpO2 92%   BMI 33.89 kg/m²         "

## 2021-06-22 NOTE — ANESTHESIA PREPROCEDURE EVALUATION
Anesthesia Evaluation     no history of anesthetic complications:               Airway   Mallampati: II  Neck ROM: full  no difficulty expected  Dental - normal exam     Pulmonary - normal exam   (+) a smoker Former, sleep apnea (BiPAP),   (-) COPD, asthma    PE comment: nonlabored  Cardiovascular - normal exam    Rhythm: regular  Rate: normal    (+) hypertension, valvular problems/murmurs MR, CAD, CABG, dysrhythmias Atrial Fib, PVC, CHF (combined systolic & diastollic CHF) , hyperlipidemia,  carotid artery disease  (-) past MI, angina    ROS comment: LV EF 40%;  Ischemic cardiomyopathy    Neuro/Psych- negative ROS  (-) seizures, TIA, CVA  GI/Hepatic/Renal/Endo    (+) obesity,  GI bleeding (rectal bleeding) , thyroid problem hypothyroidism  (-) GERD, liver disease, no renal disease, diabetes    Musculoskeletal     (+) arthralgias, back pain, chronic pain,   Abdominal    Substance History      OB/GYN          Other   arthritis, blood dyscrasia anemia,   history of cancer (skin cancer (basal cell; melanoma))                    Anesthesia Plan    ASA 3     MAC       Anesthetic plan, all risks, benefits, and alternatives have been provided, discussed and informed consent has been obtained with: patient.

## 2021-06-23 ENCOUNTER — READMISSION MANAGEMENT (OUTPATIENT)
Dept: CALL CENTER | Facility: HOSPITAL | Age: 71
End: 2021-06-23

## 2021-06-23 NOTE — CASE MANAGEMENT/SOCIAL WORK
Case Management Discharge Note      Final Note: Pt discharged home on 6/22.   MALENA Villarreal RN         Selected Continued Care - Discharged on 6/22/2021 Admission date: 6/21/2021 - Discharge disposition: Home or Self Care    Destination    No services have been selected for the patient.              Durable Medical Equipment    No services have been selected for the patient.              Dialysis/Infusion    No services have been selected for the patient.              Home Medical Care    No services have been selected for the patient.              Therapy    No services have been selected for the patient.              Community Resources    No services have been selected for the patient.              Community & DME    No services have been selected for the patient.                  Transportation Services  Private: Car    Final Discharge Disposition Code: 01 - home or self-care

## 2021-06-23 NOTE — OUTREACH NOTE
Prep Survey      Responses   [facility] facility patient discharged from?  [place]   Is LACE score < 7 ?  No   Emergency Room discharge w/ pulse ox?  No   Eligibility  Readm Mgmt   Discharge diagnosis  GI Bleed   Does the patient have one of the following disease processes/diagnoses(primary or secondary)?  Other   Does the patient have Home health ordered?  No   Is there a DME ordered?  No   Medication alerts for this patient  lasix, xarelto   Prep survey completed?  Yes          Lori Liang RN

## 2021-06-25 ENCOUNTER — READMISSION MANAGEMENT (OUTPATIENT)
Dept: CALL CENTER | Facility: HOSPITAL | Age: 71
End: 2021-06-25

## 2021-06-25 NOTE — OUTREACH NOTE
Medical Week 1 Survey      Responses   [facility]   Does the patient have one of the following disease processes/diagnoses(primary or secondary)?  Other   Week 1 attempt successful?  Yes   Call start time  1523   Call end time  1526   Discharge diagnosis  GI Bleed   Person spoke with today (if not patient) and relationship  Spouse(Evelin)   Meds reviewed with patient/caregiver?  Yes   Is the patient having any side effects they believe may be caused by any medication additions or changes?  No   Does the patient have all medications ordered at discharge?  Yes   Is the patient taking all medications as directed (includes completed medication regime)?  Yes   Medication comments  Spouse(Evelin) states pt. will discuss with Cardiology and Gastroenterology regarding Xarelto.    Does the patient have a primary care provider?   No   PCP Nursing Intervention  Provided number to obtain PCP [ Provider # given.]   Does the patient have an appointment with their PCP within 7 days of discharge?  No   What is preventing the patient from scheduling follow up appointments within 7 days of discharge?  -- [Does not have a PCP at this time per spouse.]   Nursing Interventions  Educated patient on importance of making appointment, Advised patient to make appointment   Has the patient kept scheduled appointments due by today?  N/A   Has home health visited the patient within 72 hours of discharge?  N/A   Psychosocial issues?  No   Did the patient receive a copy of their discharge instructions?  Yes   Nursing interventions  Reviewed instructions with patient   What is the patient's perception of their health status since discharge?  Improving   Is the patient/caregiver able to teach back signs and symptoms related to disease process for when to call PCP?  Yes   Is the patient/caregiver able to teach back signs and symptoms related to disease process for when to call 911?  Yes   Is the  patient/caregiver able to teach back the hierarchy of who to call/visit for symptoms/problems? PCP, Specialist, Home health nurse, Urgent Care, ED, 911  Yes   If the patient is a current smoker, are they able to teach back resources for cessation?  Not a smoker   Week 1 call completed?  Yes          Tequila Edward, RN

## 2021-07-13 ENCOUNTER — TELEPHONE (OUTPATIENT)
Dept: CARDIOLOGY | Facility: CLINIC | Age: 71
End: 2021-07-13

## 2021-07-13 ENCOUNTER — DOCUMENTATION (OUTPATIENT)
Dept: CARDIOLOGY | Facility: CLINIC | Age: 71
End: 2021-07-13

## 2021-07-13 NOTE — PROGRESS NOTES
Patient was discharged from the hospital on the 23rd with instructions to restart his Xarelto on June 25.  He states after taking it for 2 days he started having blood in his stool again.  He then restopped taking the Xarelto and probably has not had any since June 29.    He has an appointment with Dr. Meek on August 2.    We discussed risk versus benefits of anticoagulation versus stroke risk.  I told him that yes he is at increased risk of stroke by not being on anticoagulation therapy however every time he starts it within 24 to 48 hours he starts bleeding.    He will remain off the Xarelto for now follow-up with Dr. Meek in August and we will adjust medications based on Dr. Meek's recommendations and treatment plan.

## 2021-07-13 NOTE — TELEPHONE ENCOUNTER
Pt states that he has stopped taking the xarelto due to bleeding. She believes that it was on 6/27 or 6/28. They would like for you to contact him and go over recommendations

## 2021-07-13 NOTE — TELEPHONE ENCOUNTER
The patient's wife called today.   Between his recent hospitalization and completing the monitor, Mr. Marquez never received the results of his monitor.  Can you please call and advise of results.   Thank you

## 2021-08-02 ENCOUNTER — OFFICE VISIT (OUTPATIENT)
Dept: GASTROENTEROLOGY | Facility: CLINIC | Age: 71
End: 2021-08-02

## 2021-08-02 VITALS
SYSTOLIC BLOOD PRESSURE: 150 MMHG | BODY MASS INDEX: 32.81 KG/M2 | DIASTOLIC BLOOD PRESSURE: 84 MMHG | WEIGHT: 234.4 LBS | HEIGHT: 71 IN

## 2021-08-02 DIAGNOSIS — Z87.19 HISTORY OF DUODENAL ULCER: ICD-10-CM

## 2021-08-02 DIAGNOSIS — Z86.010 PERSONAL HISTORY OF COLONIC POLYPS: ICD-10-CM

## 2021-08-02 DIAGNOSIS — D68.318 HEMORRHAGIC DISORDER DUE TO CIRCULATING ANTICOAGULANTS (HCC): ICD-10-CM

## 2021-08-02 DIAGNOSIS — R25.2 MUSCLE CRAMPING: ICD-10-CM

## 2021-08-02 DIAGNOSIS — K57.90 DIVERTICULOSIS: ICD-10-CM

## 2021-08-02 DIAGNOSIS — Z79.01 CHRONIC ANTICOAGULATION: Primary | Chronic | ICD-10-CM

## 2021-08-02 DIAGNOSIS — K21.00 GASTROESOPHAGEAL REFLUX DISEASE WITH ESOPHAGITIS WITHOUT HEMORRHAGE: ICD-10-CM

## 2021-08-02 DIAGNOSIS — K22.70 BARRETT'S ESOPHAGUS WITHOUT DYSPLASIA: ICD-10-CM

## 2021-08-02 PROBLEM — Z86.0100 PERSONAL HISTORY OF COLONIC POLYPS: Status: ACTIVE | Noted: 2021-08-02

## 2021-08-02 PROCEDURE — 99215 OFFICE O/P EST HI 40 MIN: CPT | Performed by: NURSE PRACTITIONER

## 2021-08-02 RX ORDER — SUCRALFATE 1 G/1
1 TABLET ORAL 2 TIMES DAILY
Qty: 60 TABLET | Refills: 3 | Status: SHIPPED | OUTPATIENT
Start: 2021-08-02 | End: 2021-12-17

## 2021-08-02 RX ORDER — ASPIRIN 81 MG/1
81 TABLET, CHEWABLE ORAL DAILY
COMMUNITY
End: 2021-08-18

## 2021-08-02 RX ORDER — OMEPRAZOLE 40 MG/1
40 CAPSULE, DELAYED RELEASE ORAL 2 TIMES DAILY
Qty: 180 CAPSULE | Refills: 3 | Status: SHIPPED | OUTPATIENT
Start: 2021-08-02 | End: 2021-08-23 | Stop reason: ALTCHOICE

## 2021-08-02 RX ORDER — CALCIUM CARBONATE 200(500)MG
2 TABLET,CHEWABLE ORAL 2 TIMES DAILY
Qty: 180 TABLET | Refills: 3 | Status: SHIPPED | OUTPATIENT
Start: 2021-08-02 | End: 2023-01-05

## 2021-08-02 NOTE — PROGRESS NOTES
PATIENT INFORMATION  Renato Marquez     - 1950    CHIEF COMPLAINT  Chief Complaint   Patient presents with   • Florian's     s/p EGD 6/3/21   • gastritis       HISTORY OF PRESENT ILLNESS   21 His hemoglobin is stable and again he is not bleeding and he wishes for discharge.  It is recommended that he hold his anticoagulation for 48 additional hours then cautiously restart.  He should not be on any aspirin.  He should follow-up with his outpatient physicians.     DIAGNOSTICS     C-Scope 21  - Non-thrombosed external hemorrhoids found on perianal exam.  - Diverticulosis in the sigmoid colon, in the descending colon and in the ascending colon.  - The examined portion of the ileum was normal.  - Tortuous colon.  - Internal hemorrhoids.  - No specimens collected.    6/3/21 EGD and Colon for dk rd rectal bldg:  EGD was for Bleeding/Anemia Pos Duodenal and gastric ulcers, Florian's Esophagus, Neg HP, Dysplasia   COLON 5/6 adenomas so recall in 3 years (double for Florian's/Polyps) !!    resumed xarelto and asa 10 days post procedure and started bleeding again. He had no symptoms of reflux prior to EGD.      second hosp stopped xarelto and asa and restarted in 48 hrs and bleed again.    f/u with [facility]:  Patient was discharged from the hospital on the  with instructions to restart his Xarelto on .  He states after taking it for 2 days he started having blood in his stool again.  He then restopped taking the Xarelto and probably has not had any since .     He has an appointment with Dr. Meek on .     We discussed risk versus benefits of anticoagulation versus stroke risk.  I told him that yes he is at increased risk of stroke by not being on anticoagulation therapy however every time he starts it within 24 to 48 hours he starts bleeding.     He will remain off the Xarelto for now follow-up with Dr. Meek in August and we will adjust medications based  on Dr. Meek's recommendations and treatment plan.    today no s/s acid reflux, belching, epigastric pain or diarrhe or constipation an d no rectal bleeding since June.      Called his wife on the phone who manages his medications and he has been on sucralfate qid and omeprazole 40mg daily but no AC meds.        REVIEWED PERTINENT RESULTS/ LABS  Lab Results   Component Value Date    CASEREPORT  06/03/2021     Surgical Pathology Report                         Case: [id number]                                  Authorizing Provider:  Luan Meek        Collected:           06/03/2021 03:54 PM                                 MD Miguel                                                                   Ordering Location:     [facility]   Received:            06/03/2021 07:40 PM                                 OR                                                                           Pathologist:           Letitia Bolden MD                                                    Specimens:   1) - Stomach                                                                                        2) - Esophagus, Distal                                                                              3) - Large Intestine, Right / Ascending Colon, polyp x 4 (3 cold snare)                             4) - Large Intestine, Cecum, cold snare                                                             5) - Large Intestine, Rectum                                                               FINALDX  06/03/2021     1. Stomach, Biopsy:     A. Mild active gastritis/gastropathy.   B. Negative for Helicobacter pylori organisms by immunohistochemical stain.   C. No metaplasia, dysplasia nor malignancy identified.    2. Distal Esophagus, Biopsy:   A. Squamoglandular mucosa with intestinal metaplasia consistent with NOVAK'S ESOPHAGUS.    B. Negative for dysplasia.    3. Ascending Colon, Biopsies:   A. Fragments of  sessile serrated lesion and tubular adenoma.   B. Negative for high grade dysplasia.    4. Cecum, Biopsy:   A. Serrated polyp most suggestive of sessile serrated lesion.    5. Rectum, Biopsy:     A. Hyperplastic polyp.    Swm/kds       Lab Results   Component Value Date    HGB 11.8 (L) 06/22/2021    MCV 97.8 (H) 06/21/2021     06/21/2021    ALT 21 06/20/2021    AST 30 06/20/2021    INR 1.63 (H) 06/21/2021    TRIG 62 09/29/2020      No results found.    CURRENT MEDICATIONS    Current Outpatient Medications:   •  aspirin 81 MG chewable tablet, Chew 81 mg Daily., Disp: , Rfl:   •  furosemide (LASIX) 40 MG tablet, Take 1 tablet by mouth Daily., Disp: 30 tablet, Rfl: 0  •  lisinopril (PRINIVIL,ZESTRIL) 10 MG tablet, Take 0.5 tablets by mouth Daily. Taking 5 mg, Disp: , Rfl:   •  Magnesium 200 MG tablet, Take 250 mg by mouth Daily., Disp: , Rfl:   •  metoprolol succinate XL (TOPROL-XL) 50 MG 24 hr tablet, Take 1 tablet by mouth 2 (two) times a day. (Patient taking differently: Take 50 mg by mouth Daily.), Disp: 180 tablet, Rfl: 3  •  pravastatin (PRAVACHOL) 40 MG tablet, Take 1 tablet by mouth Daily., Disp: 90 tablet, Rfl: 1  •  calcium carbonate (Tums) 500 MG chewable tablet, Chew 2 tablets 2 (Two) Times a Day., Disp: 180 tablet, Rfl: 3  •  rivaroxaban (Xarelto) 20 MG tablet, Take 1 tablet by mouth Daily., Disp: 90 tablet, Rfl: 3  •  sucralfate (CARAFATE) 1 g tablet, Take 1 tablet by mouth 4 (Four) Times a Day Before Meals & at Bedtime., Disp: 120 tablet, Rfl: 0    ALLERGIES  Patient has no known allergies.    REVIEW OF SYSTEMS  ROS: 14 point review of systems was performed and all other systems were reviewed and are negative except for documented findings in HPI and today's encounter.   Review of Systems   Constitutional: Negative for activity change, chills, fever and unexpected weight change.   HENT: Negative for congestion.    Eyes: Negative for visual disturbance.   Respiratory: Negative for shortness of  breath.    Cardiovascular: Negative for chest pain and palpitations.   Gastrointestinal: Negative for abdominal pain and blood in stool.   Endocrine: Negative for cold intolerance and heat intolerance.   Genitourinary: Negative for hematuria.   Musculoskeletal: Negative for gait problem.   Skin: Negative for color change.   Allergic/Immunologic: Negative for immunocompromised state.   Neurological: Negative for weakness and light-headedness.   Hematological: Negative for adenopathy.   Psychiatric/Behavioral: Negative for sleep disturbance. The patient is not nervous/anxious.                  ACTIVE PROBLEMS  Patient Active Problem List    Diagnosis    • Gastroesophageal reflux disease with esophagitis without hemorrhage [K21.00]    • 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 [K22.70]    • Personal history of colonic polyps [Z86.010]    • Diverticulosis [K57.90]    • Rectal bleeding [K62.5]    • Hyponatremia [E87.1]    • Elevated brain natriuretic peptide (BNP) level [R79.89]    • Acute on chronic combined systolic and diastolic CHF (congestive heart failure) (CMS/HCC) [I50.43]    • Hemorrhagic disorder due to circulating anticoagulants (CMS/HCC) [D68.318]    • Adenomatous polyp of ascending colon [D12.2]    • GI bleed [K92.2]    • Gastrointestinal hemorrhage associated with anorectal source [K62.5]    • Anemia due to acute blood loss [D62]    • 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]    • Snoring [R06.83]    • Hypersomnia [G47.10]    • Class 1 obesity due to excess calories without serious comorbidity with body mass index (BMI) of 31.0 to 31.9 in adult [E66.09, Z68.31]    • Complex tear of medial meniscus of right knee as current injury [S83.231A]    • Primary osteoarthritis of right knee [M17.11]    • Abnormal MRI, knee [R93.6]    • Chronic pain of both knees [M25.561, M25.562, G89.29]    • Abnormal x-ray of lumbar spine [R93.7]    • Lumbar facet arthropathy [M47.816]    • Hyperkalemia [E87.5]    • Hx of melanoma of skin  [Z85.820]    • History of basal cell carcinoma (BCC) of skin [Z85.828]    • Leg cramps [R25.2]    • DDD (degenerative disc disease), lumbar [M51.36]    • Chronic bilateral low back pain with right-sided sciatica [M54.41, G89.29]    • Chronic anticoagulation [Z79.01]    • CAD (coronary artery disease) [I25.10]    • Carotid arterial disease (CMS/HCC) [I77.9]    • Atrial fibrillation (CMS/HCC) [I48.91]    • Ischemic cardiomyopathy [I25.5]    • Mitral regurgitation [I34.0]    • Hypothyroidism [E03.9]    • HTN (hypertension) [I10]    • Dyslipidemia [E78.5]          PAST MEDICAL HISTORY  Past Medical History:   Diagnosis Date   • CAD (coronary artery disease)    • Cancer (CMS/HCC)     skin cancer   • Carotid arterial disease (CMS/HCC)    • Chronic atrial fibrillation (CMS/HCC)    • Complex sleep apnea syndrome     BiPAP ST   • Hyperlipidemia    • Hypertension    • Ischemic cardiomyopathy    • Mitral regurgitation    • Rectal bleeding          SURGICAL HISTORY  Past Surgical History:   Procedure Laterality Date   • CAROTID ENDARTERECTOMY Right 2009    Dr Lewis   • COLONOSCOPY     • COLONOSCOPY N/A 6/22/2021    Procedure: COLONOSCOPY INTO CECUM AND TI;  Surgeon: Artur Jacobson MD;  [facility];  Service: Gastroenterology;  Laterality: N/A;  PRE: RECTAL BLEEDING  POST: DIVERTICULOSIS, HEMORRHOIDS   • COLONOSCOPY W/ POLYPECTOMY N/A 6/3/2021    Procedure: COLONOSCOPY, polypectomies;  Surgeon: Luan Meek MD;  Location: [facility];  Service: Gastroenterology;  Laterality: N/A;  Diverticulosis  Ascending colon polyp x 4 (3 cold snare)  Cecal polyp (cold snare)  Rectal polyp   • CORONARY ARTERY BYPASS GRAFT  2009    MIMI Frederick to LAD, SVG to OM, SVG Post Desc   • ENDOSCOPY N/A 6/3/2021    Procedure: ESOPHAGOGASTRODUODENOSCOPY with biopsies;  Surgeon: Luan Meek MD;  Location: [facility];  Service: Gastroenterology;  Laterality: N/A;  Reflux  Gastric and duodenal  ulcers  Biopsies: gastric, distal esophagus   • FINGER SURGERY           FAMILY HISTORY  Family History   Problem Relation Age of Onset   • Heart disease Father    • Stroke Father    • Hypertension Father          SOCIAL HISTORY  Social History     Occupational History   • Not on file   Tobacco Use   • Smoking status: Former Smoker   • Smokeless tobacco: Never Used   • Tobacco comment: quit smoking 30 yrs ago   Vaping Use   • Vaping Use: Never assessed   Substance and Sexual Activity   • Alcohol use: Yes     Comment: social    • Drug use: No   • Sexual activity: Not Currently         LAST RESULTS  See also labs reviewed above.   Admission on 06/21/2021, Discharged on 06/22/2021   Component Date Value Ref Range Status   • Glucose 06/21/2021 131* 65 - 99 mg/dL Final   • BUN 06/21/2021 24* 8 - 23 mg/dL Final   • Creatinine 06/21/2021 0.96  0.76 - 1.27 mg/dL Final   • Sodium 06/21/2021 135* 136 - 145 mmol/L Final   • Potassium 06/21/2021 4.8  3.5 - 5.2 mmol/L Final   • Chloride 06/21/2021 101  98 - 107 mmol/L Final   • CO2 06/21/2021 22.9  22.0 - 29.0 mmol/L Final   • Calcium 06/21/2021 8.8  8.6 - 10.5 mg/dL Final   • eGFR Non  Amer 06/21/2021 77  >60 mL/min/1.73 Final   • BUN/Creatinine Ratio 06/21/2021 25.0  7.0 - 25.0 Final   • Anion Gap 06/21/2021 11.1  5.0 - 15.0 mmol/L Final   • WBC 06/21/2021 6.11  3.40 - 10.80 10*3/mm3 Final   • RBC 06/21/2021 4.03* 4.14 - 5.80 10*6/mm3 Final   • Hemoglobin 06/21/2021 12.8* 13.0 - 17.7 g/dL Final   • Hematocrit 06/21/2021 39.4  37.5 - 51.0 % Final   • MCV 06/21/2021 97.8* 79.0 - 97.0 fL Final   • MCH 06/21/2021 31.8  26.6 - 33.0 pg Final   • MCHC 06/21/2021 32.5  31.5 - 35.7 g/dL Final   • RDW 06/21/2021 12.6  12.3 - 15.4 % Final   • RDW-SD 06/21/2021 45.3  37.0 - 54.0 fl Final   • MPV 06/21/2021 10.5  6.0 - 12.0 fL Final   • Platelets 06/21/2021 152  140 - 450 10*3/mm3 Final   • Neutrophil % 06/21/2021 73.6  42.7 - 76.0 % Final   • Lymphocyte % 06/21/2021 15.2* 19.6 -  "45.3 % Final   • Monocyte % 06/21/2021 10.1  5.0 - 12.0 % Final   • Eosinophil % 06/21/2021 0.5  0.3 - 6.2 % Final   • Basophil % 06/21/2021 0.3  0.0 - 1.5 % Final   • Immature Grans % 06/21/2021 0.3  0.0 - 0.5 % Final   • Neutrophils, Absolute 06/21/2021 4.49  1.70 - 7.00 10*3/mm3 Final   • Lymphocytes, Absolute 06/21/2021 0.93  0.70 - 3.10 10*3/mm3 Final   • Monocytes, Absolute 06/21/2021 0.62  0.10 - 0.90 10*3/mm3 Final   • Eosinophils, Absolute 06/21/2021 0.03  0.00 - 0.40 10*3/mm3 Final   • Basophils, Absolute 06/21/2021 0.02  0.00 - 0.20 10*3/mm3 Final   • Immature Grans, Absolute 06/21/2021 0.02  0.00 - 0.05 10*3/mm3 Final   • nRBC 06/21/2021 0.0  0.0 - 0.2 /100 WBC Final   • Protime 06/21/2021 19.1* 11.7 - 14.2 Seconds Final   • INR 06/21/2021 1.63* 0.90 - 1.10 Final   • Hemoglobin 06/21/2021 12.8* 13.0 - 17.7 g/dL Final   • Hematocrit 06/21/2021 39.4  37.5 - 51.0 % Final   • Hemoglobin 06/21/2021 12.3* 13.0 - 17.7 g/dL Final   • Hematocrit 06/21/2021 36.7* 37.5 - 51.0 % Final   • Hemoglobin 06/22/2021 12.0* 13.0 - 17.7 g/dL Final   • Hematocrit 06/22/2021 38.0  37.5 - 51.0 % Final   • TSH 06/22/2021 1.510  0.270 - 4.200 uIU/mL Final   • Hemoglobin 06/22/2021 11.8* 13.0 - 17.7 g/dL Final   • Hematocrit 06/22/2021 36.8* 37.5 - 51.0 % Final     No results found.    PHYSICAL EXAM  Debilities/Disabilities Identified: None  Emotional Behavior: Appropriate  71 y.o. male  Wt Readings from Last 3 Encounters:   08/02/21 106 kg (234 lb 6.4 oz)   06/22/21 110 kg (243 lb)   06/20/21 113 kg (249 lb 11.2 oz)     Ht Readings from Last 1 Encounters:   08/02/21 180.3 cm (71\")     Body mass index is 32.69 kg/m².  Vitals:    08/02/21 0833   BP: 150/84   BP Location: Left arm   Patient Position: Sitting   Cuff Size: Adult   Weight: 106 kg (234 lb 6.4 oz)   Height: 180.3 cm (71\")     Vital signs reviewed.          Physical Exam  Vitals and nursing note reviewed.   Constitutional:       Appearance: He is well-developed.   HENT: "      Head: Normocephalic and atraumatic.   Eyes:      Conjunctiva/sclera: Conjunctivae normal.      Pupils: Pupils are equal, round, and reactive to light.   Cardiovascular:      Rate and Rhythm: Normal rate and regular rhythm.   Pulmonary:      Effort: Pulmonary effort is normal.      Breath sounds: Normal breath sounds.   Abdominal:      General: Bowel sounds are normal. There is no distension.      Palpations: Abdomen is soft. There is no hepatomegaly, splenomegaly or mass.      Tenderness: There is abdominal tenderness in the right upper quadrant.      Hernia: A hernia is present. Hernia is present in the ventral area.      Comments: Duodenal tenderness continues otherwise nontender   Musculoskeletal:         General: Normal range of motion.      Cervical back: Normal range of motion and neck supple.   Lymphadenopathy:      Cervical: No cervical adenopathy.   Skin:     General: Skin is warm and dry.   Neurological:      Mental Status: He is alert and oriented to person, place, and time.   Psychiatric:         Behavior: Behavior normal.           CLINICAL DATA REVIEWED   reviewed previous lab results and integrated with today's visit, reviewed notes from other physicians and/or last GI encounter, reviewed previous endoscopy results and available photos, reviewed surgical pathology results from previous biopsies      ASSESSMENT  Diagnoses and all orders for this visit:    Chronic anticoagulation  -     Ambulatory Referral to Family Practice    Hemorrhagic disorder due to circulating anticoagulants (CMS/HCC)    Gastroesophageal reflux disease with esophagitis without hemorrhage  -     calcium carbonate (Tums) 500 MG chewable tablet; Chew 2 tablets 2 (Two) Times a Day.  -     Ambulatory Referral to Family Practice    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    Personal history of colonic polyps    Diverticulosis    Muscle cramping  -     calcium carbonate (Tums) 500 MG chewable tablet; Chew 2 tablets 2 (Two) Times a  Day.  -     Ambulatory Referral to Family Practice    Other orders  -     aspirin 81 MG chewable tablet; Chew 81 mg Daily.          PLAN  Return in about 4 weeks (around 8/30/2021).     Issues with GI bleeding should be low risk with 2 months of PPI and sucralfate at this point.  I will send a message to SIRIA Huang about restarting his xarelto and will get instructions for what dose to try.  I feel it would safe to give it another trial at this point.  If bleeding resumes then stop immediately.  Will ASK for Advice on this to see if she feels ok with this plan. I will also increase his ppi to twice a day as instructed below.      Discussed the importance of taking Omeprazole/Prilosec, 40mg twice daily before breakfast and dinner permanently due to known risk with long term acid reflux of Florian's esophagus/esophageal cancer., You have also been prescribed Sucralfate/Carafate.  Take 1gm tablet (you may crush, cut, dissolve or swallow whole with a big drink of water) two times a day at least 30 min after your other medication and you can take it right before breakfast and at bedtime.      Also discussed muscle cramping that may be helped by TUMS 2 tabs twice a day.  This would also help with acid reflux as well. If this does not resolve then recommend discussing change of cholesterol medication with cardiology.  Also will give Primary Care referral per his request.      Low Acid Diet Instructions:   Don't eat late, don't eat fried or spicy, and don't eat too much at one time. Avoid alcohol and  tomato based products like pizza, lasagna, spaghetti.  If you want to have these take an over the counter Pepcid or TUMS immediately before you eat them.  Do not eat within 3-4 hrs of bedtime. Limit caffeine and carbonated beverages, if you must have them do not have later in the day.  If reflux is severe elevate the head of the bed. You may also use TUMS, maalox, or mylanta for breakthrough heartburn.    I have  discussed the above plan with the patient.  They verbalize understanding and are in agreement with the plan.  They have been advised to contact the office for any questions, concerns, or changes related to their health.    50 min spent with patient today >50% spent counseling about natural history and expected course of assessed complaint and reviewed treatment options that have been tried and not tried and those currently available. Questions answered.

## 2021-08-02 NOTE — PATIENT INSTRUCTIONS
Issues with GI bleeding should be low risk with 2 months of PPI and sucralfate at this point.  I will send a message to SIRIA Huang about restarting his xarelto and will get instructions for what dose to try.  I feel it would safe to give it another trial at this point.  If bleeding resumes then stop immediately.  Will ASK for Advice on this to see if she feels ok with this plan. I will also increase his ppi to twice a day as instructed below.      Discussed the importance of taking Omeprazole/Prilosec, 40mg twice daily before breakfast and dinner permanently due to known risk with long term acid reflux of Florian's esophagus/esophageal cancer., You have also been prescribed Sucralfate/Carafate.  Take 1gm tablet (you may crush, cut, dissolve or swallow whole with a big drink of water) two times a day at least 30 min after your other medication and you can take it right before breakfast and at bedtime.      Also discussed muscle cramping that may be helped by TUMS 2 tabs twice a day.  This would also help with acid reflux as well. If this does not resolve then recommend discussing change of cholesterol medication with cardiology.  Also will give Primary Care referral per his request.      Low Acid Diet Instructions:   Don't eat late, don't eat fried or spicy, and don't eat too much at one time. Avoid alcohol and  tomato based products like pizza, lasagna, spaghetti.  If you want to have these take an over the counter Pepcid or TUMS immediately before you eat them.  Do not eat within 3-4 hrs of bedtime. Limit caffeine and carbonated beverages, if you must have them do not have later in the day.  If reflux is severe elevate the head of the bed. You may also use TUMS, maalox, or mylanta for breakthrough heartburn.

## 2021-08-03 ENCOUNTER — DOCUMENTATION (OUTPATIENT)
Dept: CARDIOLOGY | Facility: CLINIC | Age: 71
End: 2021-08-03

## 2021-08-03 NOTE — PROGRESS NOTES
Per note from Arlin Varghese, GI, patient has had 2 months of PPI and sulcalfate therapy.  She feels that he would be low risk for bleeding at this point.  I have advised that he start back on Xarelto 20 mg once a day with the largest meal of the day.  Ms. Hayes has spoken to the patient and given him instructions to restart and to immediately stop if he has any more bleeding and to let both cardiology and GI know.

## 2021-08-11 ENCOUNTER — TELEPHONE (OUTPATIENT)
Dept: GASTROENTEROLOGY | Facility: CLINIC | Age: 71
End: 2021-08-11

## 2021-08-11 DIAGNOSIS — K62.5 RECTAL BLEEDING: ICD-10-CM

## 2021-08-11 DIAGNOSIS — K22.70 BARRETT'S ESOPHAGUS WITHOUT DYSPLASIA: ICD-10-CM

## 2021-08-11 DIAGNOSIS — K21.00 GASTROESOPHAGEAL REFLUX DISEASE WITH ESOPHAGITIS WITHOUT HEMORRHAGE: Primary | ICD-10-CM

## 2021-08-11 RX ORDER — FAMOTIDINE 40 MG/1
40 TABLET, FILM COATED ORAL 2 TIMES DAILY
Qty: 180 TABLET | Refills: 3 | Status: SHIPPED | OUTPATIENT
Start: 2021-08-11 | End: 2022-08-30 | Stop reason: SDUPTHER

## 2021-08-11 NOTE — TELEPHONE ENCOUNTER
Yes need to speak SIRIA Huang about the xarelto since he bleeds every time he starts this.  I will send in famotidine 40mg bid to take along with his omeprazole 40mg bid and sucralfate.  I will also order a cbc to recheck his hemoglobin (today or tomorrow) but he needs to stop his xarelto and f/u with Cardiology. ( Im sending these notes to SIRIA Huang as well).     Last office notes:  Issues with GI bleeding should be low risk with 2 months of PPI and sucralfate at this point.  I will send a message to SIRIA Huang about restarting his xarelto and will get instructions for what dose to try.  I feel it would safe to give it another trial at this point.  If bleeding resumes then stop immediately.  Will ASK for Advice on this to see if she feels ok with this plan. I will also increase his ppi to twice a day as instructed below.       Discussed the importance of taking Omeprazole/Prilosec, 40mg twice daily before breakfast and dinner permanently due to known risk with long term acid reflux of Florian's esophagus/esophageal cancer., You have also been prescribed Sucralfate/Carafate.  Take 1gm tablet (you may crush, cut, dissolve or swallow whole with a big drink of water) two times a day at least 30 min after your other medication and you can take it right before breakfast and at bedtime.       Also discussed muscle cramping that may be helped by TUMS 2 tabs twice a day.  This would also help with acid reflux as well. If this does not resolve then recommend discussing change of cholesterol medication with cardiology.  Also will give Primary Care referral per his request.

## 2021-08-11 NOTE — TELEPHONE ENCOUNTER
I called and instructed Renato's wife to stop his Xarelto.  He will wait for instructions for Cardiology on further anticoagulation therapy.  Thank you again for your help with this.  Patient is assymptomatic but it only took 4 days for the bleeding to restart on the xarelto.

## 2021-08-11 NOTE — TELEPHONE ENCOUNTER
Arlin - have patient stop the Xarelto.  Will discuss with Dr. Scott.    Dr. Scott - patient cannot tolerate Xarleto.  He bleeds every time he restarts it and his bleeding stops when he stops the medication.  He has PAD.  His XOVCb6Ipdo score is 2.  What do you suggest?

## 2021-08-11 NOTE — TELEPHONE ENCOUNTER
PT WIFE CALLED REGARDING PT.  STATED WHEN HE TAKES THE XARELTO HE STARTS BLEEDING.  WHEN HE STOPS TAKING THE XARELTO HE STOPS BLEEDING.     WIFE WOULD LIKE A RETURN PHONE CALL ON HOME PHONE  TO DISCUSS PROBLEMS [phone]

## 2021-08-15 DIAGNOSIS — E78.5 DYSLIPIDEMIA: ICD-10-CM

## 2021-08-16 RX ORDER — PRAVASTATIN SODIUM 40 MG
TABLET ORAL
Qty: 90 TABLET | Refills: 0 | OUTPATIENT
Start: 2021-08-16

## 2021-08-17 DIAGNOSIS — E78.5 DYSLIPIDEMIA: ICD-10-CM

## 2021-08-17 NOTE — TELEPHONE ENCOUNTER
Called patient. This particular prescription is managed by another provider so they said an appt was not necessary. They were contacting [facility] to let them know they were sending the wrong prescription.

## 2021-08-17 NOTE — TELEPHONE ENCOUNTER
States it was last filled by Dr. De Leon.  Who follows for his cholesterol and has he had recent labs.  I do not see any in epic.

## 2021-08-17 NOTE — TELEPHONE ENCOUNTER
Rx Refill Note  Requested Prescriptions     Pending Prescriptions Disp Refills    pravastatin (PRAVACHOL) 40 MG tablet 90 tablet 1     Sig: Take 1 tablet by mouth Daily.      Last office visit with prescribing clinician: 6/16/2021      Next office visit with prescribing clinician: 12/29/21      {TIP  Please add Last Relevant Lab Date if appropriate: 4/1/21  {TIP  Is Refill Pharmacy correct?

## 2021-08-18 ENCOUNTER — DOCUMENTATION (OUTPATIENT)
Dept: CARDIOLOGY | Facility: CLINIC | Age: 71
End: 2021-08-18

## 2021-08-18 RX ORDER — PRAVASTATIN SODIUM 40 MG
40 TABLET ORAL DAILY
Qty: 90 TABLET | Refills: 1 | Status: SHIPPED | OUTPATIENT
Start: 2021-08-18 | End: 2022-02-22 | Stop reason: SDUPTHER

## 2021-08-18 RX ORDER — CLOPIDOGREL BISULFATE 75 MG/1
75 TABLET ORAL DAILY
Qty: 30 TABLET | Refills: 11 | Status: SHIPPED | OUTPATIENT
Start: 2021-08-18 | End: 2022-09-15 | Stop reason: SDUPTHER

## 2021-08-18 NOTE — TELEPHONE ENCOUNTER
Dr. Scott - patient cannot tolerate Xarleto.  He bleeds every time he restarts it and his bleeding stops when he stops the medication.  He has PAD.  His VZEQr3Ddat score is 2.  What do you suggest?

## 2021-08-18 NOTE — TELEPHONE ENCOUNTER
Spoke to patient's wife.  I discussed Dr. Pittman's recommendation of stopping ASA and Xarelto and starting Plavix.  He had already stopped bith the ASA and Xarelto.  I have asked her to call with any bleeding or concerns.    He cannot take Xatelto as after 48 hours of medication, he has GI bleeding.  He has been seen and treated by GI and it was originally felt he had adequate healing and mediation supplementation to be able to take the Xarelto, but after restarting with GI approval, he developed GI bleeding again.  When he stops the medication, the bleeding stops as well.

## 2021-08-23 ENCOUNTER — TELEPHONE (OUTPATIENT)
Dept: GASTROENTEROLOGY | Facility: CLINIC | Age: 71
End: 2021-08-23

## 2021-08-23 DIAGNOSIS — K22.70 BARRETT'S ESOPHAGUS WITHOUT DYSPLASIA: ICD-10-CM

## 2021-08-23 DIAGNOSIS — K21.00 GASTROESOPHAGEAL REFLUX DISEASE WITH ESOPHAGITIS WITHOUT HEMORRHAGE: Primary | ICD-10-CM

## 2021-08-23 DIAGNOSIS — Z79.01 CHRONIC ANTICOAGULATION: ICD-10-CM

## 2021-08-23 RX ORDER — PANTOPRAZOLE SODIUM 40 MG/1
40 TABLET, DELAYED RELEASE ORAL 2 TIMES DAILY
Qty: 180 TABLET | Refills: 3 | Status: SHIPPED | OUTPATIENT
Start: 2021-08-23 | End: 2022-10-20 | Stop reason: SDUPTHER

## 2021-08-23 NOTE — TELEPHONE ENCOUNTER
Let him know that he may finish his omeprazole prescription and then switch to pantoprazole 40mg bid instead since this works better with his clopidogrel/plavix.

## 2021-08-23 NOTE — TELEPHONE ENCOUNTER
Please send rx for the protonix to pt pharmacy he wouldn't be due to switch for another 90 days. He starting his plavix today.

## 2021-09-06 DIAGNOSIS — I48.20 CHRONIC ATRIAL FIBRILLATION (HCC): Chronic | ICD-10-CM

## 2021-09-07 ENCOUNTER — HOSPITAL ENCOUNTER (EMERGENCY)
Facility: HOSPITAL | Age: 71
Discharge: HOME | End: 2021-09-07
Attending: EMERGENCY MEDICINE | Admitting: EMERGENCY MEDICINE

## 2021-09-07 ENCOUNTER — TELEPHONE (OUTPATIENT)
Dept: GASTROENTEROLOGY | Facility: CLINIC | Age: 71
End: 2021-09-07

## 2021-09-07 ENCOUNTER — TELEPHONE (OUTPATIENT)
Dept: CARDIOLOGY | Facility: CLINIC | Age: 71
End: 2021-09-07

## 2021-09-07 ENCOUNTER — APPOINTMENT (OUTPATIENT)
Dept: CT IMAGING | Facility: HOSPITAL | Age: 71
End: 2021-09-07

## 2021-09-07 VITALS
WEIGHT: 243.5 LBS | SYSTOLIC BLOOD PRESSURE: 168 MMHG | OXYGEN SATURATION: 93 % | BODY MASS INDEX: 32.98 KG/M2 | TEMPERATURE: 97.6 F | DIASTOLIC BLOOD PRESSURE: 100 MMHG | HEART RATE: 99 BPM | HEIGHT: 72 IN | RESPIRATION RATE: 22 BRPM

## 2021-09-07 DIAGNOSIS — J90 PLEURAL EFFUSION, BILATERAL: ICD-10-CM

## 2021-09-07 DIAGNOSIS — K62.5 RECTAL BLEED: Primary | ICD-10-CM

## 2021-09-07 LAB
ALBUMIN SERPL-MCNC: 3.9 G/DL (ref 3.5–5.2)
ALBUMIN/GLOB SERPL: 1.2 G/DL
ALP SERPL-CCNC: 111 U/L (ref 39–117)
ALT SERPL W P-5'-P-CCNC: 22 U/L (ref 1–41)
ANION GAP SERPL CALCULATED.3IONS-SCNC: 10.6 MMOL/L (ref 5–15)
AST SERPL-CCNC: 35 U/L (ref 1–40)
BASOPHILS # BLD AUTO: 0 10*3/MM3 (ref 0–0.2)
BASOPHILS NFR BLD AUTO: 0 % (ref 0–1.5)
BILIRUB SERPL-MCNC: 1.5 MG/DL (ref 0–1.2)
BUN SERPL-MCNC: 30 MG/DL (ref 8–23)
BUN/CREAT SERPL: 23.6 (ref 7–25)
CALCIUM SPEC-SCNC: 9.3 MG/DL (ref 8.6–10.5)
CHLORIDE SERPL-SCNC: 99 MMOL/L (ref 98–107)
CO2 SERPL-SCNC: 25.4 MMOL/L (ref 22–29)
CREAT SERPL-MCNC: 1.27 MG/DL (ref 0.76–1.27)
D DIMER PPP FEU-MCNC: 2.04 MCGFEU/ML (ref 0–0.46)
DEPRECATED RDW RBC AUTO: 54.6 FL (ref 37–54)
EOSINOPHIL # BLD AUTO: 0.02 10*3/MM3 (ref 0–0.4)
EOSINOPHIL NFR BLD AUTO: 0.3 % (ref 0.3–6.2)
ERYTHROCYTE [DISTWIDTH] IN BLOOD BY AUTOMATED COUNT: 16.3 % (ref 12.3–15.4)
FLUAV RNA RESP QL NAA+PROBE: NOT DETECTED
FLUBV RNA RESP QL NAA+PROBE: NOT DETECTED
GFR SERPL CREATININE-BSD FRML MDRD: 56 ML/MIN/1.73
GLOBULIN UR ELPH-MCNC: 3.2 GM/DL
GLUCOSE SERPL-MCNC: 84 MG/DL (ref 65–99)
HCT VFR BLD AUTO: 39.3 % (ref 37.5–51)
HGB BLD-MCNC: 12.6 G/DL (ref 13–17.7)
IMM GRANULOCYTES # BLD AUTO: 0.02 10*3/MM3 (ref 0–0.05)
IMM GRANULOCYTES NFR BLD AUTO: 0.3 % (ref 0–0.5)
LYMPHOCYTES # BLD AUTO: 0.93 10*3/MM3 (ref 0.7–3.1)
LYMPHOCYTES NFR BLD AUTO: 12.4 % (ref 19.6–45.3)
MCH RBC QN AUTO: 29.5 PG (ref 26.6–33)
MCHC RBC AUTO-ENTMCNC: 32.1 G/DL (ref 31.5–35.7)
MCV RBC AUTO: 92 FL (ref 79–97)
MONOCYTES # BLD AUTO: 1.09 10*3/MM3 (ref 0.1–0.9)
MONOCYTES NFR BLD AUTO: 14.5 % (ref 5–12)
NEUTROPHILS NFR BLD AUTO: 5.45 10*3/MM3 (ref 1.7–7)
NEUTROPHILS NFR BLD AUTO: 72.5 % (ref 42.7–76)
NRBC BLD AUTO-RTO: 0 /100 WBC (ref 0–0.2)
PLATELET # BLD AUTO: 171 10*3/MM3 (ref 140–450)
PMV BLD AUTO: 9.9 FL (ref 6–12)
POTASSIUM SERPL-SCNC: 4.6 MMOL/L (ref 3.5–5.2)
PROT SERPL-MCNC: 7.1 G/DL (ref 6–8.5)
QT INTERVAL: 400 MS
RBC # BLD AUTO: 4.27 10*6/MM3 (ref 4.14–5.8)
SARS-COV-2 RNA RESP QL NAA+PROBE: NOT DETECTED
SODIUM SERPL-SCNC: 135 MMOL/L (ref 136–145)
WBC # BLD AUTO: 7.51 10*3/MM3 (ref 3.4–10.8)

## 2021-09-07 PROCEDURE — 85379 FIBRIN DEGRADATION QUANT: CPT | Performed by: EMERGENCY MEDICINE

## 2021-09-07 PROCEDURE — 93010 ELECTROCARDIOGRAM REPORT: CPT | Performed by: INTERNAL MEDICINE

## 2021-09-07 PROCEDURE — 25010000002 FUROSEMIDE PER 20 MG: Performed by: EMERGENCY MEDICINE

## 2021-09-07 PROCEDURE — 85025 COMPLETE CBC W/AUTO DIFF WBC: CPT | Performed by: EMERGENCY MEDICINE

## 2021-09-07 PROCEDURE — 80053 COMPREHEN METABOLIC PANEL: CPT | Performed by: EMERGENCY MEDICINE

## 2021-09-07 PROCEDURE — 87636 SARSCOV2 & INF A&B AMP PRB: CPT | Performed by: EMERGENCY MEDICINE

## 2021-09-07 PROCEDURE — 71275 CT ANGIOGRAPHY CHEST: CPT

## 2021-09-07 PROCEDURE — 96374 THER/PROPH/DIAG INJ IV PUSH: CPT

## 2021-09-07 PROCEDURE — 99283 EMERGENCY DEPT VISIT LOW MDM: CPT

## 2021-09-07 PROCEDURE — 0 IOPAMIDOL PER 1 ML: Performed by: EMERGENCY MEDICINE

## 2021-09-07 PROCEDURE — 93005 ELECTROCARDIOGRAM TRACING: CPT | Performed by: EMERGENCY MEDICINE

## 2021-09-07 PROCEDURE — 99283 EMERGENCY DEPT VISIT LOW MDM: CPT | Performed by: EMERGENCY MEDICINE

## 2021-09-07 RX ORDER — FUROSEMIDE 40 MG/1
40 TABLET ORAL DAILY
Qty: 30 TABLET | Refills: 0 | Status: SHIPPED | OUTPATIENT
Start: 2021-09-07 | End: 2021-09-20 | Stop reason: SDUPTHER

## 2021-09-07 RX ORDER — FUROSEMIDE 10 MG/ML
40 INJECTION INTRAMUSCULAR; INTRAVENOUS ONCE
Status: COMPLETED | OUTPATIENT
Start: 2021-09-07 | End: 2021-09-07

## 2021-09-07 RX ORDER — METOPROLOL SUCCINATE 50 MG/1
TABLET, EXTENDED RELEASE ORAL
Qty: 90 TABLET | Refills: 0 | OUTPATIENT
Start: 2021-09-07

## 2021-09-07 RX ADMIN — IOPAMIDOL 100 ML: 755 INJECTION, SOLUTION INTRAVENOUS at 15:38

## 2021-09-07 RX ADMIN — FUROSEMIDE 40 MG: 10 INJECTION INTRAMUSCULAR; INTRAVENOUS at 16:05

## 2021-09-07 NOTE — TELEPHONE ENCOUNTER
Wife is calling in for the pt and she stated the pt is having rectal bleeding again.  He is feeling exhausted, SOA and has a cough.  She has placed a call to GI.  I have suggested they follow the recommendations of GI. I have also suggested if he is feeling that bad he should go to the ER.  Wife verbalized understanding  Thanks  Erica Mireles RN  Triage nurse

## 2021-09-07 NOTE — ED PROVIDER NOTES
Subjective     Rectal Bleeding  Quality:  Bright red  Amount:  Scant  Timing:  Intermittent  Chronicity:  Recurrent  Context comment:  H/o gi bleed in past, also today c/o dry cough and fatigue and soa  Similar prior episodes: yes    Relieved by:  Nothing  Worsened by:  Nothing  Ineffective treatments:  None tried  Associated symptoms: no abdominal pain and no fever        Review of Systems   Constitutional: Negative for fever.   Gastrointestinal: Positive for hematochezia. Negative for abdominal pain.   All other systems reviewed and are negative.      Past Medical History:   Diagnosis Date   • CAD (coronary artery disease)    • Cancer (CMS/HCC)     skin cancer   • Carotid arterial disease (CMS/HCC)    • Chronic atrial fibrillation (CMS/HCC)    • Complex sleep apnea syndrome     BiPAP ST   • Hyperlipidemia    • Hypertension    • Ischemic cardiomyopathy    • Mitral regurgitation    • Rectal bleeding        No Known Allergies    Past Surgical History:   Procedure Laterality Date   • CAROTID ENDARTERECTOMY Right 2009    Dr eLwis   • COLONOSCOPY     • COLONOSCOPY N/A 6/22/2021    Procedure: COLONOSCOPY INTO CECUM AND TI;  Surgeon: Artur Jacobson MD;  Location: [facility] ENDOSCOPY;  Service: Gastroenterology;  Laterality: N/A;  PRE: RECTAL BLEEDING  POST: DIVERTICULOSIS, HEMORRHOIDS   • COLONOSCOPY W/ POLYPECTOMY N/A 6/3/2021    Procedure: COLONOSCOPY, polypectomies;  Surgeon: Luan Meek MD;  Location: [facility] OR;  Service: Gastroenterology;  Laterality: N/A;  Diverticulosis  Ascending colon polyp x 4 (3 cold snare)  Cecal polyp (cold snare)  Rectal polyp   • CORONARY ARTERY BYPASS GRAFT  2009    MIMI Frederick to LAD, SVG to OM, SVG Post Desc   • ENDOSCOPY N/A 6/3/2021    Procedure: ESOPHAGOGASTRODUODENOSCOPY with biopsies;  Surgeon: Luan Meek MD;  Location: [facility] OR;  Service: Gastroenterology;  Laterality: N/A;  Reflux  Gastric and duodenal ulcers  Biopsies: gastric, distal  esophagus   • FINGER SURGERY         Family History   Problem Relation Age of Onset   • Heart disease Father    • Stroke Father    • Hypertension Father        Social History     Socioeconomic History   • Marital status:      Spouse name: Not on file   • Number of children: Not on file   • Years of education: Not on file   • Highest education level: Not on file   Tobacco Use   • Smoking status: Former Smoker   • Smokeless tobacco: Never Used   • Tobacco comment: quit smoking 30 yrs ago   Vaping Use   • Vaping Use: Never assessed   Substance and Sexual Activity   • Alcohol use: Yes     Comment: social    • Drug use: No   • Sexual activity: Not Currently           Objective   Physical Exam  Vitals and nursing note reviewed.   Constitutional:       General: He is not in acute distress.     Appearance: Normal appearance. He is not ill-appearing or diaphoretic.   HENT:      Head: Normocephalic and atraumatic.      Nose: Nose normal. No congestion or rhinorrhea.      Mouth/Throat:      Pharynx: No posterior oropharyngeal erythema.   Eyes:      Extraocular Movements: Extraocular movements intact.      Conjunctiva/sclera: Conjunctivae normal.      Pupils: Pupils are equal, round, and reactive to light.   Cardiovascular:      Rate and Rhythm: Normal rate.      Pulses: Normal pulses.      Heart sounds: Normal heart sounds. No murmur heard.        Comments: irreg  Pulmonary:      Effort: Pulmonary effort is normal.      Breath sounds: Normal breath sounds. No wheezing or rhonchi.   Abdominal:      General: Abdomen is flat.      Palpations: Abdomen is soft.      Tenderness: There is no abdominal tenderness. There is no guarding.   Genitourinary:     Rectum: Normal. Guaiac result negative.   Musculoskeletal:         General: No swelling or tenderness. Normal range of motion.      Cervical back: Normal range of motion.   Skin:     General: Skin is warm and dry.      Findings: No bruising or erythema.   Neurological:       General: No focal deficit present.      Mental Status: He is alert and oriented to person, place, and time.      Cranial Nerves: No cranial nerve deficit.   Psychiatric:         Mood and Affect: Mood normal.         Thought Content: Thought content normal.         Judgment: Judgment normal.         Procedures           ED Course  ED Course as of Sep 07 1605   Tue Sep 07, 2021   1456 Ekg by me afib 82, qrs narrow, no st elev    [BC]   1601 Reeval stable exam  Pt says he is out of lasix x2 weeks but can get refill today  Ok with plan to have iv dose here and f/u pulmonology    [BC]      ED Course User Index  [BC] Ehsan Barrera MD                                           MDM    Final diagnoses:   Rectal bleed   Pleural effusion, bilateral       ED Disposition  ED Disposition     ED Disposition Condition Comment    Discharge Stable           Everardo De Leon Jr., DO  [address]  [place] [id number] [phone]    Schedule an appointment as soon as possible for a visit       Sharon Amor APRN  [address]   [address]  [place] [id number] [phone]    Schedule an appointment as soon as possible for a visit in 2 days      [facility] Emergency [facility]  [address]  [place] [address] [phone]    As needed, If symptoms worsen         Medication List      Changed    metoprolol succinate XL 50 MG 24 hr tablet  Commonly known as: TOPROL-XL  Take 1 tablet by mouth 2 (two) times a day.  What changed: when to take this           Where to Get Your Medications      These medications were sent to [id number] - [place] - [address] - [phone]  - [phone] FX  [address] [place] [address]    Phone: [phone]   · furosemide 40 MG tablet          Ehsan Barrera MD  09/07/21 1609

## 2021-09-07 NOTE — TELEPHONE ENCOUNTER
Spoke with pt  1. SOA, when walking across room is out of breath.  2,Rectal bleeding bright red started on Saturday  3. Fatigue    Advised pt to go to ER

## 2021-09-08 ENCOUNTER — PATIENT OUTREACH (OUTPATIENT)
Dept: CASE MANAGEMENT | Facility: OTHER | Age: 71
End: 2021-09-08

## 2021-09-08 NOTE — OUTREACH NOTE
Ambulatory Case Management Note    Patient Outreach  Talked with patient's wife as  is not available. Discussed 9/7/21 ED visit regarding .rectal bleed and pleural effusion. Wife  states patient is  compliant with ED recommendations and states symptoms have improved. Patient has 9/9/21 cardiology appointment and will call for earlier GI appointment. Wife states improvement regarding SOB. Wife states patient uses CPAP for sleeping but still has difficulty sleeping. He has no difficulty with chest pain or appetite.   Patient lives with spouse; independent with ADL's; meal preparation; transportation and ambulates without assistive device . Patient compliant with medications and medical appointments.   Reviewed ED AVS recommendations; education provided; COVID 19 precautions; 24/7 Nurse Line Telephone number; ACM contact information; Advance Directives; My Chart; gaps in care; AWV  and Case Management services.  Patient verbalized understanding and states to appreciate phone call.  No further questions or concerns voiced at this time.   General & Health Literacy Assessment    Questions/Answers      Most Recent Value   Assessment Completed With  Spouse or Significant Other   Living Arrangement  Spouse   Type of Residence  Private Residence   Home Care Services  No   Equiptment Used at Home  -- [CPAP]   Other Issues  Hearing Impairment [Wears hearing aids]   Bed or Wheelchair Confined  No   Difficulty Keeping Appointments  No        Care Evaluation    Questions/Answers      Most Recent Value   Annual Wellness Visit:   Patient Has Completed   Care Gaps Addressed  Other (See Comment), Pneumonia Vaccine, Colon Cancer Screening [2nd COVID 19 vaccine completed]   Colon Cancer Screening Type  Colonoscopy   Colonoscopy Status  Up to Date (< 10 yrs)   Colon Cancer Screening Completion at [facility] or Other  [facility]   Pneumonia Vaccine Status  Up to Date   Other Patient Education/Resources   24/7 [facility] Nurse  Call Line, Advanced Care Planning, Xochitl   24/7 Nurse Call Line Education Method  Verbal   ACP Education Method  Verbal [Has information]   MyChart Education Method  Verbal [Active]   Advanced Directives:  -- [Patieint has information]   Medication Adherence  Medications understood      SDOH updated and reviewed with the patient during this program:     Financial Resource Strain: Low Risk    • Difficulty of Paying Living Expenses: Not hard at all       Food Insecurity: No Food Insecurity   • Worried About Running Out of Food in the Last Year: Never true   • Ran Out of Food in the Last Year: Never true       Transportation Needs: No Transportation Needs   • Lack of Transportation (Medical): No   • Lack of Transportation (Non-Medical): No     Anabell Balderas RN  Ambulatory Case Management    9/8/2021, 11:52 EDT

## 2021-09-09 ENCOUNTER — OFFICE VISIT (OUTPATIENT)
Dept: CARDIOLOGY | Facility: CLINIC | Age: 71
End: 2021-09-09

## 2021-09-09 VITALS
SYSTOLIC BLOOD PRESSURE: 128 MMHG | HEIGHT: 72 IN | WEIGHT: 228 LBS | BODY MASS INDEX: 30.88 KG/M2 | DIASTOLIC BLOOD PRESSURE: 70 MMHG | HEART RATE: 94 BPM | RESPIRATION RATE: 16 BRPM | OXYGEN SATURATION: 97 %

## 2021-09-09 DIAGNOSIS — I25.10 CORONARY ARTERY DISEASE INVOLVING NATIVE CORONARY ARTERY OF NATIVE HEART WITHOUT ANGINA PECTORIS: Chronic | ICD-10-CM

## 2021-09-09 DIAGNOSIS — I25.5 ISCHEMIC CARDIOMYOPATHY: Chronic | ICD-10-CM

## 2021-09-09 DIAGNOSIS — I10 ESSENTIAL HYPERTENSION: ICD-10-CM

## 2021-09-09 DIAGNOSIS — I65.21 STENOSIS OF RIGHT CAROTID ARTERY: Chronic | ICD-10-CM

## 2021-09-09 DIAGNOSIS — I50.43 ACUTE ON CHRONIC COMBINED SYSTOLIC AND DIASTOLIC CHF (CONGESTIVE HEART FAILURE) (HCC): Primary | ICD-10-CM

## 2021-09-09 DIAGNOSIS — E78.5 DYSLIPIDEMIA: ICD-10-CM

## 2021-09-09 DIAGNOSIS — I34.0 NONRHEUMATIC MITRAL VALVE REGURGITATION: Chronic | ICD-10-CM

## 2021-09-09 DIAGNOSIS — I48.91 ATRIAL FIBRILLATION, UNSPECIFIED TYPE (HCC): ICD-10-CM

## 2021-09-09 PROCEDURE — 99214 OFFICE O/P EST MOD 30 MIN: CPT | Performed by: NURSE PRACTITIONER

## 2021-09-09 PROCEDURE — 93000 ELECTROCARDIOGRAM COMPLETE: CPT | Performed by: NURSE PRACTITIONER

## 2021-09-09 NOTE — PROGRESS NOTES
Date of Office Visit: 2021  Encounter Provider: SIRIA Womack  Place of Service: [facility] CARDIOLOGY  Patient Name: Renato Marquez  :1950  Primary Cardiologist: Dr. Scott    CC:  Hospital follow up     Dear      HPI: Renato Marquez is a pleasant 71 y.o. male who presents 2021 for cardiac follow up.  I reviewed his past medical records including notes, labs and testing in preparation for today's visit.    He has a history of chronic atrial fibrillation. He has a history of CAD with prior CABG. This was performed in  by Dr. Jeet Bhagat. He had a three-vessel CABG with LIMA to the LAD, SVG to the obtuse marginal, and SVG to the posterior descending coronary arteries. He also has a history of cerebrovascular disease with a right carotid endarterectomy in . The carotid endarterectomy was performed at the same time by Dr. Lewis.He had an ejection fraction of 37% on his stress test that was performed in 2016.  That showed no ischemia.  We discussed cardioversion with him, but he was having no symptoms and elected to remain on his current medical regimen. He had a stress test in  that was normal with no ischemia.  An echo in 2018 showed an EF of 50% with mild mitral valve, mild pulmonic valve and mild tricuspid valve regurgitation.     I saw him in 2020. He states 2 years ago he was over 40 pounds.  He states he has gradually gained it back and is now almost back to where he originally was at 244.  He has noticed that his clothes do not fit as well he does not feel as well and he is having some shortness of breath with exertion that was not previous there.  He states over the last 3 weeks he has felt some palpitations and shortness of air with exertion.  He has had some intermittent lower extremity edema in his feet.  He also noticed swelling in his knees and he had back pain.  He had testing and then was sent to Dr. Luis in  [facility]th[facility].  He has arthritis in his back and bilateral knees.  He has been taking diclofenac that had helped quite a bit.  He also stated that after starting the diclofenac after couple weeks he felt the swelling back in his knees and down in his feet and ankles.  He has not had any dizziness, chest pain or chest pressure.  He continues to work full-time and states he just cannot do the things he wants to get done in the day.  He does have fatigue.  He also is having leg cramps and cramps in his hands.  When he saw Dr. Scott at the beginning of the year, he was instructed to stop his simvastatin.  This did help his leg cramping.  He has since been started on Pravachol and was doing well.  He does not know if it is the Pravachol that is causing the leg and hand cramps or if he is having intermittent issues with dehydration.  He is trying to be very conscientious and drink plenty of water.  He knows he has gained weight and he is committed to getting back down to around 200 which is where he feels good.  He also has not been quite as active because he states he just does not have the energy.     Echo  1/2021 - Interpretation Summary  · The study is technically difficult for diagnosis.  · Calculated left ventricular EF = 45.7% Estimated left ventricular EF = 46% Estimated left ventricular EF was in agreement with the calculated left ventricular EF. Left ventricular systolic function is low normal. The left ventricular cavity is mildly dilated. Left ventricular wall thickness is consistent with moderate concentric hypertrophy. There is left ventricular global hypokinesis noted. Left ventricular diastolic function was indeterminate.  · The right ventricular cavity is mildly dilated. Normal right ventricular wall thickness noted. Mildly reduced right ventricular systolic function noted.  · The left atrial cavity is moderately dilated  · The right atrial cavity is moderately dilated.  · No aortic valve regurgitation or  "stenosis is present. The aortic valve is grossly normal in structure. There is mild annular calcification  · Moderate mitral annular calcification is present. Mild to moderate mitral valve regurgitation is present. The mitral valve regurgitation may be underestimated on the study as apical views are suboptimal No significant mitral valve stenosis is present         1/25/21 Stress test  Interpretation Summary  · Myocardial perfusion imaging indicates a medium-sized infarct located in the inferior wall with no significant ischemia noted.  · Left ventricular ejection fraction is mildly reduced. (Calculated EF = 40%).          He presented to the ED at [facility] on 6/2/2021 with  blood in his stool.  He reported the blood seemed to be intermittent, but would turn the bowel \"red\".  He had no associated nausea or abdominal cramping.  Over the prior few days, it seemed to be happening every time he moved his bowels.  He does note some constipation and hard stools.   This has happened to him previously after eating \"beets\".  He has had beets last week, but it doesn't appear to be related to this.  He stated he used ibuprofen on occasion for his OA.  Was admitted and seen by GI who performed an endoscopy and colonoscopy.These test showed reflux esophagitis and duodenal ulcer.  He was discharged home on 6/3/2021 with instructions to hold his Xarelto for 10 days.  He was told to stop his aspirin.  His lisinopril was decreased to 5 mg daily and his Lasix was stopped. Since I saw him last, he has been diagnosed with complex sleep apnea and strted on BiPAP.  He states he is feeling less tired and fatigued.     I saw him 6/16/2021 in follow-up.  He states he has lower extremity edema that has worsened since stopping his Lasix.  He also has some mild shortness of breath.  He feels that his abdomen is mildly distended.  He denies any palpitations, dizziness or lightheadedness.  He is not had any chest pain or chest pressure.  " He has minimal fatigue.  His blood pressure is controlled.  His EKG shows bigeminy with a heart rate of 118.  He has not had any more rectal bleeding, dark or tarry stools.    6/21/20212021-Holter-nterpretation Summary  · An abnormal monitor study.  · Atrial fibrillation is present throughout  · Average heart beat 93 bpm, range during A. fib  bpm, frequent episodes of heart rate greater than 100 are seen.  · Frequent ventricular ectopy is seen  · 1 episode of wide-complex tachycardia that appears to be consistent with ventricular tachycardia is present at 1:25 PM, 29 beats duration, 170 bpm     He was hospitalized from June 20-22 with rectal bleeding and dyspnea.  He did not have further bleeding during his hospital stay.  He did have a colonoscopy that showed: Non thrombosed external hemorrhoids found on perianal exam.  Diverticulosis of the sigmoid colon, in the descending colon and in the ascending colon.  The examined portion of the ileum was normal.  Tortuous colon.  Internal hemorrhoids.  His hemoglobin was stable.  He was recommended that he hold his anticoagulation for 48 hours and then restart cautiously.  He was not to take any aspirin.    He restarted his Xarelto on June 25 and after taking it for 2 days he started having blood in his stool again.  He then restopped taking the Xarelto and called our office.  He had an appointment due with Dr. Meek on the second.  We did discuss the risk versus benefit of being off anticoagulation and his stroke risk.    He did follow-up with GI and I received a note from the NP stating she felt it was safe for him to resume his Xarelto 20 mg as he had had 2 months of PPI and sulcrafate therapy.  He did restart it and about a week later he had rectal bleeding again.  I discussed with Dr. Scott.  He stated to have him stop the Xarelto.  He was not taking aspirin at this point either.  Instructed to start Plavix as he could not tolerate oral anticoagulation.  I did  discuss with the patient that this is not guideline directed medical therapy for A. fib stroke prevention but since he could not tolerate oral anticoagulation that this was our option at this point.  He voiced understanding.     He then called our office on 9/7/2021 with complaints of fatigue and shortness of breath.  He was instructed to go to the emergency department which he did.  He was found to have small to moderate right pleural effusion and small left pleural effusion.  Was also noted that he had not had his Lasix for 2 weeks.  CMP showed normal sodium and potassium.  Creatinine 1.27.  CBC with stable hemoglobin hematocrit at 12.6/39.3.  His D-dimer was elevated at 2.04.  A CT chest was ordered that showed:    FINDINGS:   There is small-to-moderate right pleural effusion and a small left pleural effusion. Nodularity dependently. There is mild interstitial prominence which may represent minimal edema. There are no focal infiltrates. The heart is enlarged. Pulmonary  arteries are adequately opacified. There is no CT evidence of pulmonary embolus. Aorta is normal in size. There are sternotomy wires present. Upper abdominal images show small amount ascites   IMPRESSION:  There is no CT evidence of pulmonary embolus    He was given IV Lasix and instructed to restart his oral Lasix at home.  He was then discharged and instructed to follow-up in our office.    He states his shortness of breath is improving.  He still has some mild lower extremity edema but is much improved.  He still has some mild abdominal distention/firmness but states it is much better.  He denies any chest pain or chest pressure.  He occasionally will feel some palpitations but cannot feel his heart in a dysrhythmia.  He states his fatigue is also improving.  His blood pressures well controlled.  He remains in rate controlled atrial fibrillation.  He is taking his medications as instructed.  We did go over this more than once how important was  to be compliant with medications.  He does use his CPAP at night.  He states he will occasionally have a little blood in his stool but has not had any for the past 2 to 3 days.  Past Medical History:   Diagnosis Date   • CAD (coronary artery disease)    • Cancer (CMS/HCC)     skin cancer   • Carotid arterial disease (CMS/HCC)    • Chronic atrial fibrillation (CMS/HCC)    • Complex sleep apnea syndrome     BiPAP ST   • Hyperlipidemia    • Hypertension    • Ischemic cardiomyopathy    • Mitral regurgitation    • Rectal bleeding        Past Surgical History:   Procedure Laterality Date   • CAROTID ENDARTERECTOMY Right 2009    Dr Lewis   • COLONOSCOPY     • COLONOSCOPY N/A 6/22/2021    Procedure: COLONOSCOPY INTO CECUM AND TI;  Surgeon: Artur Jacobson MD;  Location: [facility] ENDOSCOPY;  Service: Gastroenterology;  Laterality: N/A;  PRE: RECTAL BLEEDING  POST: DIVERTICULOSIS, HEMORRHOIDS   • COLONOSCOPY W/ POLYPECTOMY N/A 6/3/2021    Procedure: COLONOSCOPY, polypectomies;  Surgeon: Luan Meek MD;  Location: [facility] OR;  Service: Gastroenterology;  Laterality: N/A;  Diverticulosis  Ascending colon polyp x 4 (3 cold snare)  Cecal polyp (cold snare)  Rectal polyp   • CORONARY ARTERY BYPASS GRAFT  2009    MIMI Frederick to LAD, SVG to OM, SVG Post Desc   • ENDOSCOPY N/A 6/3/2021    Procedure: ESOPHAGOGASTRODUODENOSCOPY with biopsies;  Surgeon: Luan Meek MD;  Location: [facility] OR;  Service: Gastroenterology;  Laterality: N/A;  Reflux  Gastric and duodenal ulcers  Biopsies: gastric, distal esophagus   • FINGER SURGERY         Social History     Socioeconomic History   • Marital status:      Spouse name: Not on file   • Number of children: Not on file   • Years of education: Not on file   • Highest education level: Not on file   Tobacco Use   • Smoking status: Former Smoker   • Smokeless tobacco: Never Used   • Tobacco comment: quit smoking 30 yrs ago   Vaping Use   • Vaping Use:  Never assessed   Substance and Sexual Activity   • Alcohol use: Yes     Comment: social    • Drug use: No   • Sexual activity: Not Currently       Family History   Problem Relation Age of Onset   • Heart disease Father    • Stroke Father    • Hypertension Father        The following portion of the patient's history were reviewed and updated as appropriate: past medical history, past surgical history, past social history, past family history, allergies, current medications, and problem list.    Review of Systems   Constitutional: Positive for malaise/fatigue (improving). Negative for diaphoresis and fever.   HENT: Negative for congestion, hearing loss, hoarse voice, nosebleeds and sore throat.    Eyes: Negative for photophobia, vision loss in left eye, vision loss in right eye and visual disturbance.   Cardiovascular: Positive for leg swelling (improving). Negative for chest pain, dyspnea on exertion, irregular heartbeat, near-syncope, orthopnea, palpitations, paroxysmal nocturnal dyspnea and syncope.   Respiratory: Positive for shortness of breath (improving). Negative for cough, hemoptysis, sleep disturbances due to breathing, snoring, sputum production and wheezing.    Endocrine: Negative for cold intolerance, heat intolerance, polydipsia, polyphagia and polyuria.   Hematologic/Lymphatic: Negative for bleeding problem. Does not bruise/bleed easily.   Skin: Negative for color change, dry skin, poor wound healing, rash and suspicious lesions.   Musculoskeletal: Negative for arthritis, back pain, falls, gout, joint pain, joint swelling, muscle cramps, muscle weakness and myalgias.   Gastrointestinal: Negative for bloating, abdominal pain, constipation, diarrhea, dysphagia, melena, nausea and vomiting.   Neurological: Negative for excessive daytime sleepiness, dizziness, headaches, light-headedness, loss of balance, numbness, paresthesias, seizures, vertigo and weakness.   Psychiatric/Behavioral: Negative for  "depression, memory loss and substance abuse. The patient is not nervous/anxious.        No Known Allergies      Current Outpatient Medications:   •  calcium carbonate (Tums) 500 MG chewable tablet, Chew 2 tablets 2 (Two) Times a Day., Disp: 180 tablet, Rfl: 3  •  clopidogrel (PLAVIX) 75 MG tablet, Take 1 tablet by mouth Daily., Disp: 30 tablet, Rfl: 11  •  famotidine (PEPCID) 40 MG tablet, Take 1 tablet by mouth 2 (Two) Times a Day. With lunch and at bedtime, Disp: 180 tablet, Rfl: 3  •  furosemide (LASIX) 40 MG tablet, Take 1 tablet by mouth Daily., Disp: 30 tablet, Rfl: 0  •  lisinopril (PRINIVIL,ZESTRIL) 10 MG tablet, Take 0.5 tablets by mouth Daily. Taking 5 mg, Disp: , Rfl:   •  Magnesium 200 MG tablet, Take 250 mg by mouth Daily., Disp: , Rfl:   •  metoprolol succinate XL (TOPROL-XL) 50 MG 24 hr tablet, Take 1 tablet by mouth 2 (two) times a day. (Patient taking differently: Take 50 mg by mouth Daily.), Disp: 180 tablet, Rfl: 3  •  pantoprazole (PROTONIX) 40 MG EC tablet, Take 1 tablet by mouth 2 (Two) Times a Day., Disp: 180 tablet, Rfl: 3  •  pravastatin (PRAVACHOL) 40 MG tablet, Take 1 tablet by mouth Daily., Disp: 90 tablet, Rfl: 1  •  sucralfate (CARAFATE) 1 g tablet, Take 1 tablet by mouth 2 (two) times a day., Disp: 60 tablet, Rfl: 3        Objective:     Vitals:    09/09/21 1502   BP: 128/70   Pulse: 94   Resp: 16   SpO2: 97%   Weight: 103 kg (228 lb)   Height: 182.9 cm (72\")     Body mass index is 30.92 kg/m².      Vitals reviewed.   Constitutional:       General: Not in acute distress.     Appearance: Healthy appearance. Well-developed.   Eyes:      General:         Right eye: No discharge.         Left eye: No discharge.      Conjunctiva/sclera: Conjunctivae normal.   HENT:      Head: Normocephalic and atraumatic.      Right Ear: External ear normal.      Left Ear: External ear normal.      Nose: Nose normal.   Neck:      Thyroid: No thyromegaly.      Vascular: No JVD.      Trachea: No tracheal " deviation.      Lymphadenopathy: No cervical adenopathy.   Pulmonary:      Effort: Pulmonary effort is normal. No respiratory distress.      Breath sounds: Normal breath sounds. No wheezing. No rales.   Chest:      Chest wall: Not tender to palpatation.   Cardiovascular:      Normal rate. Irregularly irregular rhythm.      No gallop.   Pulses:     Intact distal pulses.   Edema:     Peripheral edema present.     Ankle: bilateral 1+ edema of the ankle.     Feet: bilateral 1+ edema of the feet.  Abdominal:      General: There is no distension.      Palpations: Abdomen is soft.      Tenderness: There is no abdominal tenderness.   Musculoskeletal: Normal range of motion.         General: No tenderness or deformity.      Cervical back: Normal range of motion and neck supple. Skin:     General: Skin is warm and dry.      Findings: No erythema or rash.   Neurological:      Mental Status: Alert and oriented to person, place, and time.      Coordination: Coordination normal.   Psychiatric:         Attention and Perception: Attention normal.         Mood and Affect: Mood normal.         Speech: Speech normal.         Behavior: Behavior normal.         Thought Content: Thought content normal.         Cognition and Memory: Cognition normal.         Judgment: Judgment normal.               ECG 12 Lead    Date/Time: 9/9/2021 4:03 PM  Performed by: Sharon Amor APRN  Authorized by: Sharon Amor APRN   Comparison: compared with previous ECG from 9/7/2021  Similar to previous ECG  Rhythm: atrial fibrillation  Rate: normal  Conduction: left posterior fascicular block  ST Depression: I, II, III, aVF, V4, V5 and V6  T inversion: I, II, V5 and V6  QRS axis: right (borderline right axis deviation)    Clinical impression: abnormal EKG              Assessment:       Diagnosis Plan   1. Acute on chronic combined systolic and diastolic CHF (congestive heart failure) (CMS/[facility])     2. Ischemic cardiomyopathy     3. Coronary artery disease  involving native coronary artery of native heart without angina pectoris     4. Atrial fibrillation, unspecified type (CMS/HCC)     5. Essential hypertension     6. Nonrheumatic mitral valve regurgitation     7. Dyslipidemia     8. Stenosis of right carotid artery            Plan:        1.  Coronary Artery Disease  Assessment  • Denies angina   Plan  • Lifestyle modifications discussed include adhering to a heart healthy diet, avoidance of tobacco products, maintenance of a healthy weight, medication compliance, regular exercise and regular monitoring of cholesterol and blood pressure.  Actively trying to los weight.   Subjective - Objective  • There is a history of previous coronary artery bypass graft  Aspirin was stopped secondary to GI bleed 6/2021     2.  Atrial Fibrillation and Atrial Flutter  Assessment  • The patient has permanent atrial fibrillation.Xarelto and ASA stopped for recurrent GI bleed.  Now on Plavix  • This is non-valvular in etiology  • The patient's CHADS2-VASc score is 2  • A [id number] score of 2 or more is considered a high risk for a thromboembolic event  Plan  • Continue in atrial fibrillation with rate control  • Continue Plavix for antithrombotic therapy, bleeding issues discussed.  Understand this is not guideline directed medical therapy and he is intolerant to oral AC.  • Continue beta blocker for rate control     3.  Cardiomyopathy -  echo from  1/2021 shows estimated EF 46% with left ventricular global hypokinesis.     4.  Sleep apnea- now on BiPAP.  States compliance     5.  GI bleed - EGD and colonoscopy 6/3/2021 which revealed duodenal ulcer and reflex gastritis.  Colonoscopy from 6/22/2021 without active bleed.  Follows with GI.     6.  Acute on chronic CHF.-He was seen in the ER on 9/7/2021 with shortness of breath and found to have bilateral pleural effusions right greater than left.  He had been out of his Lasix for 2 weeks.  He was given IV Lasix and instructed to  restart his home dose.  He was up about 15 to 16 pounds.  He is about at his normal dry weight of 226-228 pounds.  He still has some mild lower extremity edema.  His breathing is much improved.    No changes today  Keep appointment with Dr. Scott for December    As always, it has been a pleasure to participate in your patient's care. Thank you.       Sincerely,       SIRIA Womack      Current Outpatient Medications:   •  calcium carbonate (Tums) 500 MG chewable tablet, Chew 2 tablets 2 (Two) Times a Day., Disp: 180 tablet, Rfl: 3  •  clopidogrel (PLAVIX) 75 MG tablet, Take 1 tablet by mouth Daily., Disp: 30 tablet, Rfl: 11  •  famotidine (PEPCID) 40 MG tablet, Take 1 tablet by mouth 2 (Two) Times a Day. With lunch and at bedtime, Disp: 180 tablet, Rfl: 3  •  furosemide (LASIX) 40 MG tablet, Take 1 tablet by mouth Daily., Disp: 30 tablet, Rfl: 0  •  lisinopril (PRINIVIL,ZESTRIL) 10 MG tablet, Take 0.5 tablets by mouth Daily. Taking 5 mg, Disp: , Rfl:   •  Magnesium 200 MG tablet, Take 250 mg by mouth Daily., Disp: , Rfl:   •  metoprolol succinate XL (TOPROL-XL) 50 MG 24 hr tablet, Take 1 tablet by mouth 2 (two) times a day. (Patient taking differently: Take 50 mg by mouth Daily.), Disp: 180 tablet, Rfl: 3  •  pantoprazole (PROTONIX) 40 MG EC tablet, Take 1 tablet by mouth 2 (Two) Times a Day., Disp: 180 tablet, Rfl: 3  •  pravastatin (PRAVACHOL) 40 MG tablet, Take 1 tablet by mouth Daily., Disp: 90 tablet, Rfl: 1  •  sucralfate (CARAFATE) 1 g tablet, Take 1 tablet by mouth 2 (two) times a day., Disp: 60 tablet, Rfl: 3    Dictated utilizing Dragon dictation

## 2021-09-18 DIAGNOSIS — I10 ESSENTIAL HYPERTENSION: ICD-10-CM

## 2021-09-20 DIAGNOSIS — I10 ESSENTIAL HYPERTENSION: ICD-10-CM

## 2021-09-20 RX ORDER — LISINOPRIL 5 MG/1
TABLET ORAL
Qty: 90 TABLET | Refills: 0 | OUTPATIENT
Start: 2021-09-20

## 2021-09-20 NOTE — TELEPHONE ENCOUNTER
Rx Refill Note  Requested Prescriptions      No prescriptions requested or ordered in this encounter      Last office visit with prescribing clinician: 9/9/2021      Next office visit with prescribing clinician: Visit date not found            Nena Kirk MA  09/20/21, 16:22 EDT

## 2021-09-21 RX ORDER — FUROSEMIDE 40 MG/1
40 TABLET ORAL DAILY
Qty: 30 TABLET | Refills: 4 | Status: SHIPPED | OUTPATIENT
Start: 2021-09-21 | End: 2022-03-08

## 2021-09-21 RX ORDER — LISINOPRIL 10 MG/1
5 TABLET ORAL DAILY
Qty: 45 TABLET | Refills: 3 | Status: SHIPPED | OUTPATIENT
Start: 2021-09-21 | End: 2022-09-15 | Stop reason: SDUPTHER

## 2021-09-25 DIAGNOSIS — I10 ESSENTIAL HYPERTENSION: ICD-10-CM

## 2021-09-27 RX ORDER — LISINOPRIL 5 MG/1
TABLET ORAL
Qty: 90 TABLET | Refills: 0 | OUTPATIENT
Start: 2021-09-27

## 2021-11-13 DIAGNOSIS — I10 ESSENTIAL HYPERTENSION: ICD-10-CM

## 2021-11-15 ENCOUNTER — TELEPHONE (OUTPATIENT)
Dept: GASTROENTEROLOGY | Facility: CLINIC | Age: 71
End: 2021-11-15

## 2021-11-15 RX ORDER — LISINOPRIL 5 MG/1
TABLET ORAL
Qty: 90 TABLET | Refills: 0 | OUTPATIENT
Start: 2021-11-15

## 2021-11-15 NOTE — TELEPHONE ENCOUNTER
Patient is calling to follow-up on a medication that he thought he was only supposed to take for 1 month. (doesn't know the name of the medication.) he was supposed to discuss it at his follow-up appointment that keeps getting rescheduled. Please call him to discuss.

## 2021-11-18 ENCOUNTER — OFFICE VISIT (OUTPATIENT)
Dept: GASTROENTEROLOGY | Facility: CLINIC | Age: 71
End: 2021-11-18

## 2021-11-18 VITALS
SYSTOLIC BLOOD PRESSURE: 116 MMHG | WEIGHT: 226 LBS | HEIGHT: 72 IN | DIASTOLIC BLOOD PRESSURE: 60 MMHG | BODY MASS INDEX: 30.61 KG/M2

## 2021-11-18 DIAGNOSIS — I48.91 ATRIAL FIBRILLATION, UNSPECIFIED TYPE (HCC): Primary | ICD-10-CM

## 2021-11-18 DIAGNOSIS — K62.5 RECTAL BLEEDING: ICD-10-CM

## 2021-11-18 DIAGNOSIS — Z86.010 PERSONAL HISTORY OF COLONIC POLYPS: ICD-10-CM

## 2021-11-18 DIAGNOSIS — K22.70 BARRETT'S ESOPHAGUS WITHOUT DYSPLASIA: ICD-10-CM

## 2021-11-18 PROCEDURE — 99213 OFFICE O/P EST LOW 20 MIN: CPT | Performed by: INTERNAL MEDICINE

## 2021-11-18 NOTE — PROGRESS NOTES
PATIENT INFORMATION  Renato Marquez       - 1950    CHIEF COMPLAINT  Chief Complaint   Patient presents with   • Florian's   • Heartburn   • Diverticulosis       HISTORY OF PRESENT ILLNESS  Her for rectal bleeding but his last ER visit his HGB is stable and he does move daily and has HC cream to chadd when the bleeding recurs and he had a double endsocpy lat dain and 18 days out had some bleeding and was rescoped in [facility] with a negative exam- Diverticulosis and Hemorrhoids    Recently doing better and is on BID PPI and BID H2RA but will make his Sucralfate/Tums PRN      REVIEWED PERTINENT RESULTS/ LABS  Lab Results   Component Value Date    CASEREPORT  2021     Surgical Pathology Report                         Case: [id number]                                  Authorizing Provider:  Luan Meek        Collected:           2021 03:54 PM                                 MD Miguel                                                                   Ordering Location:     [facility]   Received:            2021 07:40 PM                                 OR                                                                           Pathologist:           Letitia Bolden MD                                                    Specimens:   1) - Stomach                                                                                        2) - Esophagus, Distal                                                                              3) - Large Intestine, Right / Ascending Colon, polyp x 4 (3 cold snare)                             4) - Large Intestine, Cecum, cold snare                                                             5) - Large Intestine, Rectum                                                               FINALDX  2021     1. Stomach, Biopsy:     A. Mild active gastritis/gastropathy.   B. Negative for Helicobacter pylori organisms by immunohistochemical  stain.   C. No metaplasia, dysplasia nor malignancy identified.    2. Distal Esophagus, Biopsy:   A. Squamoglandular mucosa with intestinal metaplasia consistent with NOVAK'S ESOPHAGUS.    B. Negative for dysplasia.    3. Ascending Colon, Biopsies:   A. Fragments of sessile serrated lesion and tubular adenoma.   B. Negative for high grade dysplasia.    4. Cecum, Biopsy:   A. Serrated polyp most suggestive of sessile serrated lesion.    5. Rectum, Biopsy:     A. Hyperplastic polyp.    Swm/kds       Lab Results   Component Value Date    HGB 12.6 (L) 09/07/2021    MCV 92.0 09/07/2021     09/07/2021    ALT 22 09/07/2021    AST 35 09/07/2021    INR 1.63 (H) 06/21/2021    TRIG 62 09/29/2020      No results found.    REVIEW OF SYSTEMS  Review of Systems   Constitutional: Negative for activity change, chills, fever and unexpected weight change.   HENT: Negative for congestion.    Eyes: Negative for visual disturbance.   Respiratory: Negative for shortness of breath.    Cardiovascular: Negative for chest pain and palpitations.   Gastrointestinal: Negative for abdominal pain and blood in stool.   Endocrine: Negative for cold intolerance and heat intolerance.   Genitourinary: Negative for hematuria.   Musculoskeletal: Negative for gait problem.   Skin: Negative for color change.   Allergic/Immunologic: Negative for immunocompromised state.   Neurological: Negative for weakness and light-headedness.   Hematological: Negative for adenopathy.   Psychiatric/Behavioral: Negative for sleep disturbance. The patient is not nervous/anxious.          ACTIVE PROBLEMS  Patient Active Problem List    Diagnosis    • Gastroesophageal reflux disease with esophagitis without hemorrhage [K21.00]    • Novak's esophagus without dysplasia [K22.70]    • Personal history of colonic polyps [Z86.010]    • Diverticulosis [K57.90]    • Rectal bleeding [K62.5]    • Hyponatremia [E87.1]    • Elevated brain natriuretic peptide (BNP) level  [R79.89]    • Acute on chronic combined systolic and diastolic CHF (congestive heart failure) (HCC) [I50.43]    • Hemorrhagic disorder due to circulating anticoagulants (HCC) [D68.318]    • Adenomatous polyp of ascending colon [D12.2]    • GI bleed [K92.2]    • Gastrointestinal hemorrhage associated with anorectal source [K62.5]    • Anemia due to acute blood loss [D62]    • 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]    • Snoring [R06.83]    • Hypersomnia [G47.10]    • Class 1 obesity due to excess calories without serious comorbidity with body mass index (BMI) of 31.0 to 31.9 in adult [E66.09, Z68.31]    • Complex tear of medial meniscus of right knee as current injury [S83.231A]    • Primary osteoarthritis of right knee [M17.11]    • Abnormal MRI, knee [R93.6]    • Chronic pain of both knees [M25.561, M25.562, G89.29]    • Abnormal x-ray of lumbar spine [R93.7]    • Lumbar facet arthropathy [M47.816]    • Hyperkalemia [E87.5]    • Hx of melanoma of skin [Z85.820]    • History of basal cell carcinoma (BCC) of skin [Z85.828]    • Leg cramps [R25.2]    • DDD (degenerative disc disease), lumbar [M51.36]    • Chronic bilateral low back pain with right-sided sciatica [M54.41, G89.29]    • Chronic anticoagulation [Z79.01]    • CAD (coronary artery disease) [I25.10]    • Carotid arterial disease (HCC) [I77.9]    • Atrial fibrillation (HCC) [I48.91]    • Ischemic cardiomyopathy [I25.5]    • Mitral regurgitation [I34.0]    • Hypothyroidism [E03.9]    • HTN (hypertension) [I10]    • Dyslipidemia [E78.5]          PAST MEDICAL HISTORY  Past Medical History:   Diagnosis Date   • CAD (coronary artery disease)    • Cancer (HCC)     skin cancer   • Carotid arterial disease (HCC)    • Chronic atrial fibrillation (HCC)    • Complex sleep apnea syndrome     BiPAP ST   • Hyperlipidemia    • Hypertension    • Ischemic cardiomyopathy    • Mitral regurgitation    • Rectal bleeding          SURGICAL HISTORY  Past Surgical History:   Procedure  Laterality Date   • CAROTID ENDARTERECTOMY Right 2009    Dr Lewis   • COLONOSCOPY     • COLONOSCOPY N/A 6/22/2021    Procedure: COLONOSCOPY INTO CECUM AND TI;  Surgeon: Artur Jacobson MD;  Location: [facility] ENDOSCOPY;  Service: Gastroenterology;  Laterality: N/A;  PRE: RECTAL BLEEDING  POST: DIVERTICULOSIS, HEMORRHOIDS   • COLONOSCOPY W/ POLYPECTOMY N/A 6/3/2021    Procedure: COLONOSCOPY, polypectomies;  Surgeon: Luan Meek MD;  Location: [facility] OR;  Service: Gastroenterology;  Laterality: N/A;  Diverticulosis  Ascending colon polyp x 4 (3 cold snare)  Cecal polyp (cold snare)  Rectal polyp   • CORONARY ARTERY BYPASS GRAFT  2009    MIMI Frederick to LAD, SVG to OM, SVG Post Desc   • ENDOSCOPY N/A 6/3/2021    Procedure: ESOPHAGOGASTRODUODENOSCOPY with biopsies;  Surgeon: Luan Meek MD;  Location: [facility] OR;  Service: Gastroenterology;  Laterality: N/A;  Reflux  Gastric and duodenal ulcers  Biopsies: gastric, distal esophagus   • FINGER SURGERY           FAMILY HISTORY  Family History   Problem Relation Age of Onset   • Heart disease Father    • Stroke Father    • Hypertension Father          SOCIAL HISTORY  Social History     Occupational History   • Not on file   Tobacco Use   • Smoking status: Former Smoker   • Smokeless tobacco: Never Used   • Tobacco comment: quit smoking 30 yrs ago   Vaping Use   • Vaping Use: Never used   Substance and Sexual Activity   • Alcohol use: Yes     Comment: social    • Drug use: No   • Sexual activity: Not Currently         CURRENT MEDICATIONS    Current Outpatient Medications:   •  calcium carbonate (Tums) 500 MG chewable tablet, Chew 2 tablets 2 (Two) Times a Day., Disp: 180 tablet, Rfl: 3  •  clopidogrel (PLAVIX) 75 MG tablet, Take 1 tablet by mouth Daily., Disp: 30 tablet, Rfl: 11  •  famotidine (PEPCID) 40 MG tablet, Take 1 tablet by mouth 2 (Two) Times a Day. With lunch and at bedtime, Disp: 180 tablet, Rfl: 3  •  furosemide (LASIX) 40  "MG tablet, Take 1 tablet by mouth Daily., Disp: 30 tablet, Rfl: 4  •  lisinopril (PRINIVIL,ZESTRIL) 10 MG tablet, Take 0.5 tablets by mouth Daily. Taking 5 mg, Disp: 45 tablet, Rfl: 3  •  Magnesium 200 MG tablet, Take 250 mg by mouth Daily., Disp: , Rfl:   •  metoprolol succinate XL (TOPROL-XL) 50 MG 24 hr tablet, Take 1 tablet by mouth 2 (two) times a day. (Patient taking differently: Take 50 mg by mouth Daily.), Disp: 180 tablet, Rfl: 3  •  pantoprazole (PROTONIX) 40 MG EC tablet, Take 1 tablet by mouth 2 (Two) Times a Day., Disp: 180 tablet, Rfl: 3  •  pravastatin (PRAVACHOL) 40 MG tablet, Take 1 tablet by mouth Daily., Disp: 90 tablet, Rfl: 1  •  sucralfate (CARAFATE) 1 g tablet, Take 1 tablet by mouth 2 (two) times a day., Disp: 60 tablet, Rfl: 3    ALLERGIES  Patient has no known allergies.    VITALS  Vitals:    11/18/21 0959   BP: 116/60   BP Location: Left arm   Patient Position: Sitting   Cuff Size: Large Adult   Weight: 103 kg (226 lb)   Height: 182.9 cm (72\")       PHYSICAL EXAM  Debilities/Disabilities Identified: None  Emotional Behavior: Appropriate  Wt Readings from Last 3 Encounters:   11/18/21 103 kg (226 lb)   09/09/21 103 kg (228 lb)   09/07/21 110 kg (243 lb 8 oz)     Ht Readings from Last 1 Encounters:   11/18/21 182.9 cm (72\")     Body mass index is 30.65 kg/m².  Physical Exam  Constitutional:       Appearance: He is well-developed. He is not diaphoretic.   HENT:      Head: Normocephalic and atraumatic.   Eyes:      General: No scleral icterus.     Conjunctiva/sclera: Conjunctivae normal.      Pupils: Pupils are equal, round, and reactive to light.   Neck:      Thyroid: No thyromegaly.   Cardiovascular:      Rate and Rhythm: Normal rate and regular rhythm.      Heart sounds: Normal heart sounds. No murmur heard.  No gallop.    Pulmonary:      Effort: Pulmonary effort is normal.      Breath sounds: Normal breath sounds. No wheezing or rales.   Abdominal:      General: Bowel sounds are normal. " There is no distension or abdominal bruit.      Palpations: Abdomen is soft. There is no shifting dullness, fluid wave or mass.      Tenderness: There is no abdominal tenderness. There is no guarding. Negative signs include Wiggins's sign.      Hernia: There is no hernia in the ventral area.   Musculoskeletal:         General: Normal range of motion.      Cervical back: Normal range of motion and neck supple.   Lymphadenopathy:      Cervical: No cervical adenopathy.   Skin:     General: Skin is warm and dry.      Findings: No erythema or rash.   Neurological:      Mental Status: He is alert and oriented to person, place, and time.   Psychiatric:         Mood and Affect: Mood normal.         Behavior: Behavior normal.         CLINICAL DATA REVIEWED   reviewed previous lab results and integrated with today's visit, reviewed notes from other physicians and/or last GI encounter, reviewed previous endoscopy results and available photos, reviewed surgical pathology results from previous biopsies    ASSESSMENT  Diagnoses and all orders for this visit:    Atrial fibrillation, unspecified type (HCC)    Rectal bleeding    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    Personal history of colonic polyps          PLAN  Continue same PPI and H2RA and if doing well in 6 months will see for his EGD/Colon in 2024    Return in about 6 months (around 5/18/2022).    I have discussed the above plan with the patient.  They verbalize understanding and are in agreement with the plan.  They have been advised to contact the office for any questions, concerns, or changes related to their health.

## 2021-12-03 ENCOUNTER — TELEPHONE (OUTPATIENT)
Dept: GASTROENTEROLOGY | Facility: CLINIC | Age: 71
End: 2021-12-03

## 2021-12-03 NOTE — TELEPHONE ENCOUNTER
Pt wife called stated that they need clarification on pt meds and how much he is supposed to take.  They messed up his pill box.

## 2021-12-17 DIAGNOSIS — K21.00 GASTROESOPHAGEAL REFLUX DISEASE WITH ESOPHAGITIS WITHOUT HEMORRHAGE: ICD-10-CM

## 2021-12-17 DIAGNOSIS — K22.70 BARRETT'S ESOPHAGUS WITHOUT DYSPLASIA: ICD-10-CM

## 2021-12-17 RX ORDER — SUCRALFATE 1 G/1
TABLET ORAL
Qty: 60 TABLET | Refills: 0 | Status: SHIPPED | OUTPATIENT
Start: 2021-12-17 | End: 2022-01-19 | Stop reason: SDUPTHER

## 2021-12-29 ENCOUNTER — OFFICE VISIT (OUTPATIENT)
Dept: CARDIOLOGY | Facility: CLINIC | Age: 71
End: 2021-12-29

## 2021-12-29 VITALS
HEIGHT: 72 IN | DIASTOLIC BLOOD PRESSURE: 64 MMHG | SYSTOLIC BLOOD PRESSURE: 120 MMHG | HEART RATE: 80 BPM | WEIGHT: 225.2 LBS | BODY MASS INDEX: 30.5 KG/M2

## 2021-12-29 DIAGNOSIS — I65.21 STENOSIS OF RIGHT CAROTID ARTERY: Chronic | ICD-10-CM

## 2021-12-29 DIAGNOSIS — Z79.01 CHRONIC ANTICOAGULATION: Chronic | ICD-10-CM

## 2021-12-29 DIAGNOSIS — I25.10 CORONARY ARTERY DISEASE INVOLVING NATIVE CORONARY ARTERY OF NATIVE HEART WITHOUT ANGINA PECTORIS: Primary | Chronic | ICD-10-CM

## 2021-12-29 DIAGNOSIS — I34.0 NONRHEUMATIC MITRAL VALVE REGURGITATION: Chronic | ICD-10-CM

## 2021-12-29 DIAGNOSIS — I25.5 ISCHEMIC CARDIOMYOPATHY: Chronic | ICD-10-CM

## 2021-12-29 PROCEDURE — 93000 ELECTROCARDIOGRAM COMPLETE: CPT | Performed by: INTERNAL MEDICINE

## 2021-12-29 PROCEDURE — 99214 OFFICE O/P EST MOD 30 MIN: CPT | Performed by: INTERNAL MEDICINE

## 2021-12-29 NOTE — PROGRESS NOTES
Subjective:     Encounter Date: 12/29/21        Patient ID: Renato Marquez is a 71 y.o. male.    Chief Complaint: CAD  History of Present Illness    Had the pleasure of seeing this patient in the office today.  He comes in for 1 year follow-up.     He has a history of chronic atrial fibrillation. He has a history of CAD with prior CABG. This was performed in 2009 by Dr. Jeet Bhagat. He had a three-vessel CABG with LIMA to the LAD, SVG to the obtuse marginal, and SVG to the posterior descending coronary arteries. He also has a history of cerebrovascular disease with a right carotid endarterectomy in 2009. The carotid endarterectomy was performed at the same time by Dr. Lewis.He had an ejection fraction of 37% on his stress test that was performed in January 2016.  That showed no ischemia.  We discussed cardioversion with him, but he was having no symptoms and elected to remain on his current medical regimen.    He currently is not established with a primary, he is looking for 1.    He denies any chest pain or chest discomfort, no shortness of breath.  He had a cough in September and had Covid testing at that time that was normal.    He is on CPAP at night.    Echo 1/2018:  Interpretation Summary     · Calculated EF = 50%.  · Left ventricular systolic function is normal.  · Left ventricular diastolic dysfunction (grade III) consistent with reversible restrictive pattern.  · Mild mitral valve regurgitation is present  · Mild pulmonic valve regurgitation is present.  · Mild tricuspid valve regurgitation is present.  · Estimated right ventricular systolic pressure from tricuspid regurgitation is normal (<35 mmHg).          He had a stress test in 2016 that was normal with no ischemia.      The following portions of the patient's history were reviewed and updated as appropriate: allergies, current medications, past family history, past medical history, past social history, past surgical history and problem  list.    Past Medical History:   Diagnosis Date   • CAD (coronary artery disease)    • Cancer (HCC)     skin cancer   • Carotid arterial disease (HCC)    • Chronic atrial fibrillation (HCC)    • Complex sleep apnea syndrome     BiPAP ST   • Hyperlipidemia    • Hypertension    • Ischemic cardiomyopathy    • Mitral regurgitation    • Rectal bleeding        Past Surgical History:   Procedure Laterality Date   • CAROTID ENDARTERECTOMY Right 2009    Dr Lewis   • COLONOSCOPY     • COLONOSCOPY N/A 2021    Procedure: COLONOSCOPY INTO CECUM AND TI;  Surgeon: Artur Jacobson MD;  Location: [facility] ENDOSCOPY;  Service: Gastroenterology;  Laterality: N/A;  PRE: RECTAL BLEEDING  POST: DIVERTICULOSIS, HEMORRHOIDS   • COLONOSCOPY W/ POLYPECTOMY N/A 6/3/2021    Procedure: COLONOSCOPY, polypectomies;  Surgeon: Luan Meek MD;  Location: [facility] OR;  Service: Gastroenterology;  Laterality: N/A;  Diverticulosis  Ascending colon polyp x 4 (3 cold snare)  Cecal polyp (cold snare)  Rectal polyp   • CORONARY ARTERY BYPASS GRAFT      MIMI Frederick to LAD, SVG to OM, SVG Post Desc   • ENDOSCOPY N/A 6/3/2021    Procedure: ESOPHAGOGASTRODUODENOSCOPY with biopsies;  Surgeon: Luan Meek MD;  Location: [facility] OR;  Service: Gastroenterology;  Laterality: N/A;  Reflux  Gastric and duodenal ulcers  Biopsies: gastric, distal esophagus   • FINGER SURGERY         Social History     Socioeconomic History   • Marital status:    Tobacco Use   • Smoking status: Former Smoker     Quit date: 1991     Years since quittin.0   • Smokeless tobacco: Never Used   Vaping Use   • Vaping Use: Never used   Substance and Sexual Activity   • Alcohol use: Yes     Alcohol/week: 2.0 standard drinks     Types: 2 Shots of liquor per week     Comment: NIGHTLY   • Drug use: No   • Sexual activity: Not Currently           ECG 12 Lead    Date/Time: 2021 10:44 AM  Performed by: Mauricio Scott III  "MD  Authorized by: Mauricio Scott III, MD   Comparison: compared with previous ECG   Similar to previous ECG  Rhythm: atrial fibrillation  Rate: normal  Conduction: conduction normal  ST Segments: ST segments normal  T Waves: T waves normal  QRS axis: normal  Other: no other findings    Clinical impression: abnormal EKG               Objective:     Vitals:    12/29/21 1022   BP: 120/64   BP Location: Right arm   Pulse: 80   Weight: 102 kg (225 lb 3.2 oz)   Height: 182.9 cm (72\")         Physical Exam  Constitutional:       General: He is not in acute distress.     Appearance: He is well-developed. He is not diaphoretic.   HENT:      Head: Normocephalic and atraumatic.      Nose: Nose normal.   Eyes:      General:         Right eye: No discharge.         Left eye: No discharge.      Conjunctiva/sclera: Conjunctivae normal.      Pupils: Pupils are equal, round, and reactive to light.   Neck:      Thyroid: No thyromegaly.      Trachea: No tracheal deviation.   Cardiovascular:      Rate and Rhythm: Normal rate. Rhythm irregularly irregular.      Pulses: Normal pulses.      Heart sounds: S1 normal and S2 normal. Murmur heard.   High-pitched blowing holosystolic murmur is present with a grade of 1/6 at the apex.  No S3 sounds.    Pulmonary:      Effort: Pulmonary effort is normal. No respiratory distress.      Breath sounds: Normal breath sounds. No stridor.   Chest:      Chest wall: No tenderness.   Abdominal:      General: Bowel sounds are normal. There is no distension.      Palpations: Abdomen is soft. There is no mass.      Tenderness: There is no abdominal tenderness. There is no guarding or rebound.   Musculoskeletal:         General: No tenderness or deformity. Normal range of motion.      Cervical back: Normal range of motion and neck supple.   Lymphadenopathy:      Cervical: No cervical adenopathy.   Skin:     General: Skin is warm and dry.      Findings: No erythema or rash.   Neurological:      Mental Status: " He is alert and oriented to person, place, and time.      Deep Tendon Reflexes: Reflexes are normal and symmetric.   Psychiatric:         Thought Content: Thought content normal.         Lab Review:             Lab Results   Component Value Date    GLUCOSE 84 09/07/2021    BUN 30 (H) 09/07/2021    CREATININE 1.27 09/07/2021    EGFRIFNONA 56 (L) 09/07/2021    EGFRIFAFRI 77 09/29/2020    BCR 23.6 09/07/2021    K 4.6 09/07/2021    CO2 25.4 09/07/2021    CALCIUM 9.3 09/07/2021    PROTENTOTREF 6.2 09/29/2020    ALBUMIN 3.90 09/07/2021    LABIL2 2.4 09/29/2020    AST 35 09/07/2021    ALT 22 09/07/2021               Assessment:          Diagnosis Plan   1. Coronary artery disease involving native coronary artery of native heart without angina pectoris  ECG 12 Lead   2. Ischemic cardiomyopathy  ECG 12 Lead   3. Stenosis of right carotid artery  ECG 12 Lead   4. Nonrheumatic mitral valve regurgitation  ECG 12 Lead   5. Chronic anticoagulation  ECG 12 Lead          Plan:       Coronary Artery Disease  Assessment  • The patient has no angina    Plan  • Lifestyle modifications discussed include adhering to a heart healthy diet, avoidance of tobacco products, maintenance of a healthy weight, medication compliance, regular exercise and regular monitoring of cholesterol and blood pressure    Subjective - Objective  • There is a history of previous coronary artery bypass graft  • Current antiplatelet therapy includes aspirin 81 mg    Atrial Fibrillation and Atrial Flutter  Assessment  • The patient has permanent atrial fibrillation  • This is non-valvular in etiology  • The patient's CHADS2-VASc score is 2  • A [id number] score of 2 or more is considered a high risk for a thromboembolic event    Plan  • Continue in atrial fibrillation with rate control  • Continue rivaroxaban for antithrombotic therapy, bleeding issues discussed  • Continue beta blocker for rate control        Thank you very much for allowing us to participate in  the care of this pleasant patient.  Please don't hesitate to call if I can be of assistance in any way.      Current Outpatient Medications:   •  calcium carbonate (Tums) 500 MG chewable tablet, Chew 2 tablets 2 (Two) Times a Day., Disp: 180 tablet, Rfl: 3  •  clopidogrel (PLAVIX) 75 MG tablet, Take 1 tablet by mouth Daily., Disp: 30 tablet, Rfl: 11  •  famotidine (PEPCID) 40 MG tablet, Take 1 tablet by mouth 2 (Two) Times a Day. With lunch and at bedtime, Disp: 180 tablet, Rfl: 3  •  lisinopril (PRINIVIL,ZESTRIL) 10 MG tablet, Take 0.5 tablets by mouth Daily. Taking 5 mg, Disp: 45 tablet, Rfl: 3  •  Magnesium 200 MG tablet, Take 250 mg by mouth Daily., Disp: , Rfl:   •  metoprolol succinate XL (TOPROL-XL) 50 MG 24 hr tablet, Take 1 tablet by mouth 2 (two) times a day. (Patient taking differently: Take 50 mg by mouth 2 (Two) Times a Day.), Disp: 180 tablet, Rfl: 3  •  pantoprazole (PROTONIX) 40 MG EC tablet, Take 1 tablet by mouth 2 (Two) Times a Day., Disp: 180 tablet, Rfl: 3  •  pravastatin (PRAVACHOL) 40 MG tablet, Take 1 tablet by mouth Daily., Disp: 90 tablet, Rfl: 1  •  sucralfate (CARAFATE) 1 g tablet, Take 1 tablet by mouth twice daily, Disp: 60 tablet, Rfl: 0  •  furosemide (LASIX) 40 MG tablet, Take 1 tablet by mouth Daily., Disp: 30 tablet, Rfl: 4

## 2022-01-19 DIAGNOSIS — K21.00 GASTROESOPHAGEAL REFLUX DISEASE WITH ESOPHAGITIS WITHOUT HEMORRHAGE: ICD-10-CM

## 2022-01-19 DIAGNOSIS — K22.70 BARRETT'S ESOPHAGUS WITHOUT DYSPLASIA: ICD-10-CM

## 2022-01-19 RX ORDER — SUCRALFATE 1 G/1
1 TABLET ORAL 2 TIMES DAILY
Qty: 60 TABLET | Refills: 4 | Status: SHIPPED | OUTPATIENT
Start: 2022-01-19 | End: 2022-01-25 | Stop reason: SDUPTHER

## 2022-01-25 ENCOUNTER — TELEPHONE (OUTPATIENT)
Dept: GASTROENTEROLOGY | Facility: CLINIC | Age: 72
End: 2022-01-25

## 2022-01-25 DIAGNOSIS — K22.70 BARRETT'S ESOPHAGUS WITHOUT DYSPLASIA: ICD-10-CM

## 2022-01-25 DIAGNOSIS — K21.00 GASTROESOPHAGEAL REFLUX DISEASE WITH ESOPHAGITIS WITHOUT HEMORRHAGE: ICD-10-CM

## 2022-01-25 RX ORDER — SUCRALFATE 1 G/1
1 TABLET ORAL 2 TIMES DAILY
Qty: 60 TABLET | Refills: 4 | Status: SHIPPED | OUTPATIENT
Start: 2022-01-25 | End: 2022-04-20 | Stop reason: SDUPTHER

## 2022-02-22 ENCOUNTER — TELEPHONE (OUTPATIENT)
Dept: CARDIOLOGY | Facility: CLINIC | Age: 72
End: 2022-02-22

## 2022-02-22 DIAGNOSIS — E78.5 DYSLIPIDEMIA: ICD-10-CM

## 2022-02-22 RX ORDER — PRAVASTATIN SODIUM 40 MG
40 TABLET ORAL DAILY
Qty: 90 TABLET | Refills: 3 | Status: SHIPPED | OUTPATIENT
Start: 2022-02-22 | End: 2022-11-23

## 2022-02-22 NOTE — TELEPHONE ENCOUNTER
The patient's spouse phoned stating they had changed pharmacies to the [facility] in [place].   The patient needs a new Rx with refills for Pravastatin 40 mg; the usual amount is #90 with refills.   Please advise.   Thank you .

## 2022-03-08 RX ORDER — FUROSEMIDE 40 MG/1
TABLET ORAL
Qty: 30 TABLET | Refills: 4 | Status: SHIPPED | OUTPATIENT
Start: 2022-03-08 | End: 2022-08-08

## 2022-03-08 NOTE — TELEPHONE ENCOUNTER
Rx Refill Note  Requested Prescriptions     Pending Prescriptions Disp Refills   • furosemide (LASIX) 40 MG tablet [Pharmacy Med Name: FUROSEMIDE 40MG TABLETS] 30 tablet 4     Sig: TAKE 1 TABLET BY MOUTH ONCE DAILY      Last office visit with prescribing clinician: 12/29/2021  TRACIE   Next office visit with prescribing clinician: 1/5/2023  TRACIE Kirk MA  03/08/22, 12:19 EST

## 2022-04-20 DIAGNOSIS — K22.70 BARRETT'S ESOPHAGUS WITHOUT DYSPLASIA: ICD-10-CM

## 2022-04-20 DIAGNOSIS — J06.9 URI WITH COUGH AND CONGESTION: ICD-10-CM

## 2022-04-20 DIAGNOSIS — K21.00 GASTROESOPHAGEAL REFLUX DISEASE WITH ESOPHAGITIS WITHOUT HEMORRHAGE: ICD-10-CM

## 2022-04-20 RX ORDER — SUCRALFATE 1 G/1
1 TABLET ORAL 2 TIMES DAILY
Qty: 180 TABLET | Refills: 3 | Status: SHIPPED | OUTPATIENT
Start: 2022-04-20

## 2022-04-29 ENCOUNTER — OFFICE VISIT (OUTPATIENT)
Dept: SLEEP MEDICINE | Facility: HOSPITAL | Age: 72
End: 2022-04-29

## 2022-04-29 VITALS
SYSTOLIC BLOOD PRESSURE: 127 MMHG | DIASTOLIC BLOOD PRESSURE: 65 MMHG | HEART RATE: 44 BPM | HEIGHT: 72 IN | BODY MASS INDEX: 30.75 KG/M2 | WEIGHT: 227 LBS

## 2022-04-29 DIAGNOSIS — E66.09 CLASS 1 OBESITY DUE TO EXCESS CALORIES WITHOUT SERIOUS COMORBIDITY WITH BODY MASS INDEX (BMI) OF 31.0 TO 31.9 IN ADULT: ICD-10-CM

## 2022-04-29 DIAGNOSIS — G47.31 COMPLEX SLEEP APNEA SYNDROME: Primary | ICD-10-CM

## 2022-04-29 PROBLEM — R06.83 SNORING: Status: RESOLVED | Noted: 2021-01-22 | Resolved: 2022-04-29

## 2022-04-29 PROBLEM — G47.10 HYPERSOMNIA: Status: RESOLVED | Noted: 2021-01-22 | Resolved: 2022-04-29

## 2022-04-29 PROCEDURE — 99213 OFFICE O/P EST LOW 20 MIN: CPT | Performed by: INTERNAL MEDICINE

## 2022-04-29 PROCEDURE — G0463 HOSPITAL OUTPT CLINIC VISIT: HCPCS

## 2022-05-16 ENCOUNTER — OFFICE VISIT (OUTPATIENT)
Dept: GASTROENTEROLOGY | Facility: CLINIC | Age: 72
End: 2022-05-16

## 2022-05-16 VITALS
DIASTOLIC BLOOD PRESSURE: 82 MMHG | BODY MASS INDEX: 31.26 KG/M2 | HEIGHT: 72 IN | WEIGHT: 230.8 LBS | SYSTOLIC BLOOD PRESSURE: 138 MMHG

## 2022-05-16 DIAGNOSIS — K57.90 DIVERTICULOSIS: ICD-10-CM

## 2022-05-16 DIAGNOSIS — D12.2 ADENOMATOUS POLYP OF ASCENDING COLON: ICD-10-CM

## 2022-05-16 DIAGNOSIS — K22.70 BARRETT'S ESOPHAGUS WITHOUT DYSPLASIA: ICD-10-CM

## 2022-05-16 DIAGNOSIS — K62.5 GASTROINTESTINAL HEMORRHAGE ASSOCIATED WITH ANORECTAL SOURCE: Primary | ICD-10-CM

## 2022-05-16 PROCEDURE — 99213 OFFICE O/P EST LOW 20 MIN: CPT | Performed by: INTERNAL MEDICINE

## 2022-05-16 NOTE — PROGRESS NOTES
PATIENT INFORMATION  Renato Marquez       - 1950    CHIEF COMPLAINT  Chief Complaint   Patient presents with   • Florian's   • Rectal Bleeding       HISTORY OF PRESENT ILLNESS  Is sleeping with a CPAP for the last year and improved onall measurable categories and no complaints    Complains of urinary frequency and CPAP wakes him up.    Moving bowels regularly and only 4 small blood episodes ( not caused by Beets) and is flo on 1/2 Baby ASA nad Plavix presently      REVIEWED PERTINENT RESULTS/ LABS  Lab Results   Component Value Date    CASEREPORT  2021     Surgical Pathology Report                         Case: [id number]                                  Authorizing Provider:  Luan Meek        Collected:           2021 03:54 PM                                 MD Miguel                                                                   Ordering Location:     [facility]   Received:            2021 07:40 PM                                 OR                                                                           Pathologist:           Lettiia Bolden MD                                                    Specimens:   1) - Stomach                                                                                        2) - Esophagus, Distal                                                                              3) - Large Intestine, Right / Ascending Colon, polyp x 4 (3 cold snare)                             4) - Large Intestine, Cecum, cold snare                                                             5) - Large Intestine, Rectum                                                               FINALDX  2021     1. Stomach, Biopsy:     A. Mild active gastritis/gastropathy.   B. Negative for Helicobacter pylori organisms by immunohistochemical stain.   C. No metaplasia, dysplasia nor malignancy identified.    2. Distal Esophagus, Biopsy:   A.  Squamoglandular mucosa with intestinal metaplasia consistent with NOVAK'S ESOPHAGUS.    B. Negative for dysplasia.    3. Ascending Colon, Biopsies:   A. Fragments of sessile serrated lesion and tubular adenoma.   B. Negative for high grade dysplasia.    4. Cecum, Biopsy:   A. Serrated polyp most suggestive of sessile serrated lesion.    5. Rectum, Biopsy:     A. Hyperplastic polyp.    Swm/kds       Lab Results   Component Value Date    HGB 12.6 (L) 09/07/2021    MCV 92.0 09/07/2021     09/07/2021    ALT 22 09/07/2021    AST 35 09/07/2021    INR 1.63 (H) 06/21/2021    TRIG 62 09/29/2020      No results found.    REVIEW OF SYSTEMS  Review of Systems   Constitutional: Negative for activity change, chills, fever and unexpected weight change.   HENT: Negative for congestion.    Eyes: Negative for visual disturbance.   Respiratory: Negative for shortness of breath.    Cardiovascular: Negative for chest pain and palpitations.   Gastrointestinal: Positive for blood in stool. Negative for abdominal pain.   Endocrine: Negative for cold intolerance and heat intolerance.   Genitourinary: Negative for hematuria.   Musculoskeletal: Negative for gait problem.   Skin: Negative for color change.   Allergic/Immunologic: Negative for immunocompromised state.   Neurological: Negative for weakness and light-headedness.   Hematological: Negative for adenopathy.   Psychiatric/Behavioral: Negative for sleep disturbance. The patient is not nervous/anxious.          ACTIVE PROBLEMS  Patient Active Problem List    Diagnosis    • Gastroesophageal reflux disease with esophagitis without hemorrhage [K21.00]    • Novak's esophagus without dysplasia [K22.70]    • Personal history of colonic polyps [Z86.010]    • Diverticulosis [K57.90]    • Rectal bleeding [K62.5]    • Hyponatremia [E87.1]    • Elevated brain natriuretic peptide (BNP) level [R79.89]    • Acute on chronic combined systolic and diastolic CHF (congestive heart failure)  (HCC) [I50.43]    • Hemorrhagic disorder due to circulating anticoagulants (HCC) [D68.318]    • Adenomatous polyp of ascending colon [D12.2]    • GI bleed [K92.2]    • Gastrointestinal hemorrhage associated with anorectal source [K62.5]    • Anemia due to acute blood loss [D62]    • 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]    • Class 1 obesity due to excess calories without serious comorbidity with body mass index (BMI) of 31.0 to 31.9 in adult [E66.09, Z68.31]    • Complex tear of medial meniscus of right knee as current injury [S83.231A]    • Primary osteoarthritis of right knee [M17.11]    • Abnormal MRI, knee [R93.6]    • Chronic pain of both knees [M25.561, M25.562, G89.29]    • Abnormal x-ray of lumbar spine [R93.7]    • Lumbar facet arthropathy [M47.816]    • Hyperkalemia [E87.5]    • Hx of melanoma of skin [Z85.820]    • History of basal cell carcinoma (BCC) of skin [Z85.828]    • Leg cramps [R25.2]    • DDD (degenerative disc disease), lumbar [M51.36]    • Chronic bilateral low back pain with right-sided sciatica [M54.41, G89.29]    • Chronic anticoagulation [Z79.01]    • CAD (coronary artery disease) [I25.10]    • Carotid arterial disease (HCC) [I77.9]    • Atrial fibrillation (HCC) [I48.91]    • Ischemic cardiomyopathy [I25.5]    • Mitral regurgitation [I34.0]    • Hypothyroidism [E03.9]    • HTN (hypertension) [I10]    • Dyslipidemia [E78.5]          PAST MEDICAL HISTORY  Past Medical History:   Diagnosis Date   • CAD (coronary artery disease)    • Cancer (HCC)     skin cancer   • Carotid arterial disease (HCC)    • Chronic atrial fibrillation (HCC)    • Complex sleep apnea syndrome     BiPAP ST   • Hyperlipidemia    • Hypertension    • Ischemic cardiomyopathy    • Mitral regurgitation    • Rectal bleeding          SURGICAL HISTORY  Past Surgical History:   Procedure Laterality Date   • CAROTID ENDARTERECTOMY Right 2009    Dr Lewis   • COLONOSCOPY     • COLONOSCOPY N/A 6/22/2021    Procedure: COLONOSCOPY  INTO CECUM AND TI;  Surgeon: Artur Jacobson MD;  Location:  [facility];  Service: Gastroenterology;  Laterality: N/A;  PRE: RECTAL BLEEDING  POST: DIVERTICULOSIS, HEMORRHOIDS   • COLONOSCOPY W/ POLYPECTOMY N/A 6/3/2021    Procedure: COLONOSCOPY, polypectomies;  Surgeon: Luan Meek MD;  Location: [facility] OR;  Service: Gastroenterology;  Laterality: N/A;  Diverticulosis  Ascending colon polyp x 4 (3 cold snare)  Cecal polyp (cold snare)  Rectal polyp   • CORONARY ARTERY BYPASS GRAFT      MIMI Frederick to LAD, SVG to OM, SVG Post Desc   • ENDOSCOPY N/A 6/3/2021    Procedure: ESOPHAGOGASTRODUODENOSCOPY with biopsies;  Surgeon: Luan Meek MD;  Location: [facility] OR;  Service: Gastroenterology;  Laterality: N/A;  Reflux  Gastric and duodenal ulcers  Biopsies: gastric, distal esophagus   • FINGER SURGERY           FAMILY HISTORY  Family History   Problem Relation Age of Onset   • Heart disease Father    • Stroke Father    • Hypertension Father          SOCIAL HISTORY  Social History     Occupational History   • Not on file   Tobacco Use   • Smoking status: Former Smoker     Quit date: 1991     Years since quittin.4   • Smokeless tobacco: Never Used   Vaping Use   • Vaping Use: Never used   Substance and Sexual Activity   • Alcohol use: Yes     Alcohol/week: 2.0 standard drinks     Types: 2 Shots of liquor per week     Comment: NIGHTLY   • Drug use: No   • Sexual activity: Not Currently         CURRENT MEDICATIONS    Current Outpatient Medications:   •  calcium carbonate (Tums) 500 MG chewable tablet, Chew 2 tablets 2 (Two) Times a Day., Disp: 180 tablet, Rfl: 3  •  clopidogrel (PLAVIX) 75 MG tablet, Take 1 tablet by mouth Daily., Disp: 30 tablet, Rfl: 11  •  famotidine (PEPCID) 40 MG tablet, Take 1 tablet by mouth 2 (Two) Times a Day. With lunch and at bedtime, Disp: 180 tablet, Rfl: 3  •  furosemide (LASIX) 40 MG tablet, TAKE 1 TABLET BY MOUTH ONCE DAILY, Disp:  "30 tablet, Rfl: 4  •  lisinopril (PRINIVIL,ZESTRIL) 10 MG tablet, Take 0.5 tablets by mouth Daily. Taking 5 mg, Disp: 45 tablet, Rfl: 3  •  Magnesium 200 MG tablet, Take 250 mg by mouth Daily., Disp: , Rfl:   •  metoprolol succinate XL (TOPROL-XL) 50 MG 24 hr tablet, Take 1 tablet by mouth 2 (two) times a day. (Patient taking differently: Take 50 mg by mouth 2 (Two) Times a Day.), Disp: 180 tablet, Rfl: 3  •  pantoprazole (PROTONIX) 40 MG EC tablet, Take 1 tablet by mouth 2 (Two) Times a Day., Disp: 180 tablet, Rfl: 3  •  pravastatin (PRAVACHOL) 40 MG tablet, Take 1 tablet by mouth Daily., Disp: 90 tablet, Rfl: 3  •  sucralfate (CARAFATE) 1 g tablet, Take 1 tablet by mouth 2 (Two) Times a Day., Disp: 180 tablet, Rfl: 3    ALLERGIES  Patient has no known allergies.    VITALS  Vitals:    05/16/22 1612   BP: 138/82   BP Location: Left arm   Patient Position: Sitting   Cuff Size: Large Adult   Weight: 105 kg (230 lb 12.8 oz)   Height: 182.9 cm (72\")       PHYSICAL EXAM  Debilities/Disabilities Identified: None  Emotional Behavior: Appropriate  Wt Readings from Last 3 Encounters:   05/16/22 105 kg (230 lb 12.8 oz)   04/29/22 103 kg (227 lb)   01/10/22 102 kg (225 lb)     Ht Readings from Last 1 Encounters:   05/16/22 182.9 cm (72\")     Body mass index is 31.3 kg/m².  Physical Exam  Constitutional:       Appearance: He is well-developed. He is not diaphoretic.   HENT:      Head: Normocephalic and atraumatic.   Eyes:      General: No scleral icterus.     Conjunctiva/sclera: Conjunctivae normal.      Pupils: Pupils are equal, round, and reactive to light.   Neck:      Thyroid: No thyromegaly.   Cardiovascular:      Rate and Rhythm: Normal rate and regular rhythm.      Heart sounds: Normal heart sounds. No murmur heard.    No gallop.   Pulmonary:      Effort: Pulmonary effort is normal.      Breath sounds: Normal breath sounds. No wheezing or rales.   Abdominal:      General: Bowel sounds are normal. There is no distension " or abdominal bruit.      Palpations: Abdomen is soft. There is no shifting dullness, fluid wave or mass.      Tenderness: There is no abdominal tenderness. There is no guarding. Negative signs include Wiggins's sign.      Hernia: There is no hernia in the ventral area.   Musculoskeletal:         General: Normal range of motion.      Cervical back: Normal range of motion and neck supple.   Lymphadenopathy:      Cervical: No cervical adenopathy.   Skin:     General: Skin is warm and dry.      Findings: No erythema or rash.   Neurological:      Mental Status: He is alert and oriented to person, place, and time.   Psychiatric:         Mood and Affect: Mood normal.         Behavior: Behavior normal.         CLINICAL DATA REVIEWED   reviewed previous lab results and integrated with today's visit, reviewed notes from other physicians and/or last GI encounter, reviewed previous endoscopy results and available photos, reviewed surgical pathology results from previous biopsies    ASSESSMENT  Diagnoses and all orders for this visit:    Gastrointestinal hemorrhage associated with anorectal source    Adenomatous polyp of ascending colon    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    Diverticulosis          PLAN  [facility] recall for Double endoscopy in 6/2024  Return in about 1 year (around 5/16/2023).    I have discussed the above plan with the patient.  They verbalize understanding and are in agreement with the plan.  They have been advised to contact the office for any questions, concerns, or changes related to their health.

## 2022-07-04 ENCOUNTER — HOSPITAL ENCOUNTER (EMERGENCY)
Facility: HOSPITAL | Age: 72
Discharge: HOME | End: 2022-07-04
Attending: EMERGENCY MEDICINE | Admitting: EMERGENCY MEDICINE

## 2022-07-04 ENCOUNTER — APPOINTMENT (OUTPATIENT)
Dept: GENERAL RADIOLOGY | Facility: HOSPITAL | Age: 72
End: 2022-07-04

## 2022-07-04 VITALS
HEIGHT: 72 IN | OXYGEN SATURATION: 90 % | BODY MASS INDEX: 30.48 KG/M2 | HEART RATE: 77 BPM | RESPIRATION RATE: 16 BRPM | SYSTOLIC BLOOD PRESSURE: 151 MMHG | TEMPERATURE: 100.7 F | WEIGHT: 225 LBS | DIASTOLIC BLOOD PRESSURE: 81 MMHG

## 2022-07-04 DIAGNOSIS — J06.9 VIRAL URI WITH COUGH: Primary | ICD-10-CM

## 2022-07-04 DIAGNOSIS — R50.9 FEVER, UNSPECIFIED FEVER CAUSE: ICD-10-CM

## 2022-07-04 DIAGNOSIS — R41.0 TRANSIENT CONFUSION: ICD-10-CM

## 2022-07-04 DIAGNOSIS — D64.9 CHRONIC ANEMIA: ICD-10-CM

## 2022-07-04 LAB
ALBUMIN SERPL-MCNC: 4.1 G/DL (ref 3.5–5.2)
ALBUMIN/GLOB SERPL: 2.2 G/DL
ALP SERPL-CCNC: 71 U/L (ref 39–117)
ALT SERPL W P-5'-P-CCNC: 22 U/L (ref 1–41)
ANION GAP SERPL CALCULATED.3IONS-SCNC: 11 MMOL/L (ref 5–15)
AST SERPL-CCNC: 25 U/L (ref 1–40)
B PARAPERT DNA SPEC QL NAA+PROBE: NOT DETECTED
B PERT DNA SPEC QL NAA+PROBE: NOT DETECTED
BACTERIA UR QL AUTO: NORMAL /HPF
BASOPHILS # BLD AUTO: 0.02 10*3/MM3 (ref 0–0.2)
BASOPHILS NFR BLD AUTO: 0.2 % (ref 0–1.5)
BILIRUB SERPL-MCNC: 1.4 MG/DL (ref 0–1.2)
BILIRUB UR QL STRIP: NEGATIVE
BUN SERPL-MCNC: 35 MG/DL (ref 8–23)
BUN/CREAT SERPL: 35 (ref 7–25)
C PNEUM DNA NPH QL NAA+NON-PROBE: NOT DETECTED
CALCIUM SPEC-SCNC: 8.7 MG/DL (ref 8.6–10.5)
CHLORIDE SERPL-SCNC: 99 MMOL/L (ref 98–107)
CLARITY UR: CLEAR
CO2 SERPL-SCNC: 24 MMOL/L (ref 22–29)
COLOR UR: YELLOW
CREAT SERPL-MCNC: 1 MG/DL (ref 0.76–1.27)
D-LACTATE SERPL-SCNC: 1 MMOL/L (ref 0.5–2)
DEPRECATED RDW RBC AUTO: 47 FL (ref 37–54)
EGFRCR SERPLBLD CKD-EPI 2021: 79 ML/MIN/1.73
EOSINOPHIL # BLD AUTO: 0 10*3/MM3 (ref 0–0.4)
EOSINOPHIL NFR BLD AUTO: 0 % (ref 0.3–6.2)
ERYTHROCYTE [DISTWIDTH] IN BLOOD BY AUTOMATED COUNT: 14.1 % (ref 12.3–15.4)
FLUAV SUBTYP SPEC NAA+PROBE: NOT DETECTED
FLUBV RNA ISLT QL NAA+PROBE: NOT DETECTED
GLOBULIN UR ELPH-MCNC: 1.9 GM/DL
GLUCOSE SERPL-MCNC: 112 MG/DL (ref 65–99)
GLUCOSE UR STRIP-MCNC: NEGATIVE MG/DL
HADV DNA SPEC NAA+PROBE: NOT DETECTED
HCOV 229E RNA SPEC QL NAA+PROBE: NOT DETECTED
HCOV HKU1 RNA SPEC QL NAA+PROBE: NOT DETECTED
HCOV NL63 RNA SPEC QL NAA+PROBE: NOT DETECTED
HCOV OC43 RNA SPEC QL NAA+PROBE: NOT DETECTED
HCT VFR BLD AUTO: 35.9 % (ref 37.5–51)
HGB BLD-MCNC: 12 G/DL (ref 13–17.7)
HGB UR QL STRIP.AUTO: NEGATIVE
HMPV RNA NPH QL NAA+NON-PROBE: NOT DETECTED
HPIV1 RNA ISLT QL NAA+PROBE: NOT DETECTED
HPIV2 RNA SPEC QL NAA+PROBE: NOT DETECTED
HPIV3 RNA NPH QL NAA+PROBE: NOT DETECTED
HPIV4 P GENE NPH QL NAA+PROBE: NOT DETECTED
HYALINE CASTS UR QL AUTO: NORMAL /LPF
KETONES UR QL STRIP: NEGATIVE
LEUKOCYTE ESTERASE UR QL STRIP.AUTO: NEGATIVE
LYMPHOCYTES # BLD AUTO: 0.69 10*3/MM3 (ref 0.7–3.1)
LYMPHOCYTES NFR BLD AUTO: 7.7 % (ref 19.6–45.3)
M PNEUMO IGG SER IA-ACNC: NOT DETECTED
MCH RBC QN AUTO: 31.4 PG (ref 26.6–33)
MCHC RBC AUTO-ENTMCNC: 33.4 G/DL (ref 31.5–35.7)
MCV RBC AUTO: 94 FL (ref 79–97)
MONOCYTES # BLD AUTO: 0.93 10*3/MM3 (ref 0.1–0.9)
MONOCYTES NFR BLD AUTO: 10.4 % (ref 5–12)
NEUTROPHILS NFR BLD AUTO: 7.25 10*3/MM3 (ref 1.7–7)
NEUTROPHILS NFR BLD AUTO: 81.3 % (ref 42.7–76)
NITRITE UR QL STRIP: NEGATIVE
PH UR STRIP.AUTO: 5.5 [PH] (ref 5–8)
PLATELET # BLD AUTO: 141 10*3/MM3 (ref 140–450)
PMV BLD AUTO: 9.9 FL (ref 6–12)
POTASSIUM SERPL-SCNC: 4.6 MMOL/L (ref 3.5–5.2)
PROCALCITONIN SERPL-MCNC: 0.13 NG/ML (ref 0–0.25)
PROT SERPL-MCNC: 6 G/DL (ref 6–8.5)
PROT UR QL STRIP: ABNORMAL
RBC # BLD AUTO: 3.82 10*6/MM3 (ref 4.14–5.8)
RBC # UR STRIP: NORMAL /HPF
REF LAB TEST METHOD: NORMAL
RHINOVIRUS RNA SPEC NAA+PROBE: NOT DETECTED
RSV RNA NPH QL NAA+NON-PROBE: NOT DETECTED
SARS-COV-2 RNA NPH QL NAA+NON-PROBE: NOT DETECTED
SODIUM SERPL-SCNC: 134 MMOL/L (ref 136–145)
SP GR UR STRIP: 1.02 (ref 1–1.03)
SQUAMOUS #/AREA URNS HPF: NORMAL /HPF
UROBILINOGEN UR QL STRIP: ABNORMAL
WBC # UR STRIP: NORMAL /HPF
WBC NRBC COR # BLD: 8.93 10*3/MM3 (ref 3.4–10.8)

## 2022-07-04 PROCEDURE — 83605 ASSAY OF LACTIC ACID: CPT | Performed by: EMERGENCY MEDICINE

## 2022-07-04 PROCEDURE — 81001 URINALYSIS AUTO W/SCOPE: CPT | Performed by: EMERGENCY MEDICINE

## 2022-07-04 PROCEDURE — 85025 COMPLETE CBC W/AUTO DIFF WBC: CPT | Performed by: EMERGENCY MEDICINE

## 2022-07-04 PROCEDURE — 71045 X-RAY EXAM CHEST 1 VIEW: CPT

## 2022-07-04 PROCEDURE — 36415 COLL VENOUS BLD VENIPUNCTURE: CPT

## 2022-07-04 PROCEDURE — 99283 EMERGENCY DEPT VISIT LOW MDM: CPT

## 2022-07-04 PROCEDURE — 0202U NFCT DS 22 TRGT SARS-COV-2: CPT | Performed by: EMERGENCY MEDICINE

## 2022-07-04 PROCEDURE — 96361 HYDRATE IV INFUSION ADD-ON: CPT

## 2022-07-04 PROCEDURE — 80053 COMPREHEN METABOLIC PANEL: CPT | Performed by: EMERGENCY MEDICINE

## 2022-07-04 PROCEDURE — 99284 EMERGENCY DEPT VISIT MOD MDM: CPT

## 2022-07-04 PROCEDURE — 96360 HYDRATION IV INFUSION INIT: CPT

## 2022-07-04 PROCEDURE — 87040 BLOOD CULTURE FOR BACTERIA: CPT | Performed by: EMERGENCY MEDICINE

## 2022-07-04 PROCEDURE — 84145 PROCALCITONIN (PCT): CPT | Performed by: EMERGENCY MEDICINE

## 2022-07-04 RX ORDER — ACETAMINOPHEN 500 MG
1000 TABLET ORAL ONCE
Status: COMPLETED | OUTPATIENT
Start: 2022-07-04 | End: 2022-07-04

## 2022-07-04 RX ADMIN — SODIUM CHLORIDE 500 ML: 9 INJECTION, SOLUTION INTRAVENOUS at 09:19

## 2022-07-04 RX ADMIN — ACETAMINOPHEN 1000 MG: 500 TABLET ORAL at 09:19

## 2022-07-04 NOTE — ED TRIAGE NOTES
Pt has not been sleeping weel x 3 days.  Dx c uri on Friday at [facility].  Wife reports confusion    Patient was placed in face mask during first look triage.  Patient was wearing a face mask throughout encounter.  I wore personal protective equipment throughout the encounter.  Hand hygiene was performed before and after patient encounter.

## 2022-07-04 NOTE — ED PROVIDER NOTES
EMERGENCY DEPARTMENT ENCOUNTER  I wore full protective equipment throughout this patient encounter including a N95 mask, eye shield, gown and gloves. Hand hygiene was performed before donning protective equipment and after removal when leaving the room.    Room Number:  38/38  Date of encounter:  7/4/2022  PCP: Provider, No Known    HPI:  Context: Renato Marquez is a 72 y.o. male who presents to the ED c/o chief complaint of cough and confusion.  Patient reports he has been having a nonproductive cough for the last week and a half.  Patient denies any loss of sense of smell or taste, no shortness of breath, no nausea vomiting or diarrhea.  Patient denies any fevers but is febrile here, denies any shakes chills or night sweats.  Patient reports that he was seen at urgent care center on Friday, diagnosed with upper respiratory infection, started on amoxicillin as well as Medrol Dosepak.  Patient reports he has been taking medicine since, denies any missed doses.  Family reports that patient had transient episodes of confusion yesterday as well as this morning.  Patient denies any confusion at present.  Patient denies any facial droop, no difficulty with speech, no unilateral weakness or numbness.  Patient does endorse generalized weakness.  Patient denies any recent sick contacts, has been vaccinated against COVID-19, denies any known COVID exposures.  Patient denies abdominal pain.  No dysuria, no increased urinary frequency or urgency, has had darker colored urine as well as some urinary hesitancy.    MEDICAL HISTORY REVIEW  Reviewed in EPIC    PAST MEDICAL HISTORY  Active Ambulatory Problems     Diagnosis Date Noted   • CAD (coronary artery disease)    • Carotid arterial disease (HCC)    • Atrial fibrillation (HCC)    • Ischemic cardiomyopathy    • Mitral regurgitation    • Chronic anticoagulation 03/23/2017   • Hypothyroidism 03/14/2013   • HTN (hypertension) 03/14/2013   • Dyslipidemia 03/14/2013   • Hx of  melanoma of skin 09/29/2020   • History of basal cell carcinoma (BCC) of skin 09/29/2020   • Leg cramps 09/29/2020   • DDD (degenerative disc disease), lumbar 09/29/2020   • Chronic bilateral low back pain with right-sided sciatica 09/29/2020   • Hyperkalemia 09/30/2020   • Abnormal x-ray of lumbar spine 10/02/2020   • Lumbar facet arthropathy 10/02/2020   • Chronic pain of both knees 10/26/2020   • Abnormal MRI, knee 11/17/2020   • Complex tear of medial meniscus of right knee as current injury 11/19/2020   • Primary osteoarthritis of right knee 11/19/2020   • Class 1 obesity due to excess calories without serious comorbidity with body mass index (BMI) of 31.0 to 31.9 in adult 01/22/2021   • Complex sleep apnea syndrome 04/02/2021   • GI bleed 06/02/2021   • Gastrointestinal hemorrhage associated with anorectal source 06/02/2021   • Anemia due to acute blood loss 06/02/2021   • Adenomatous polyp of ascending colon 06/08/2021   • Rectal bleeding 06/21/2021   • Hyponatremia 06/21/2021   • Elevated brain natriuretic peptide (BNP) level 06/21/2021   • Acute on chronic combined systolic and diastolic CHF (congestive heart failure) (HCC) 06/21/2021   • Hemorrhagic disorder due to circulating anticoagulants (HCC) 06/21/2021   • Gastroesophageal reflux disease with esophagitis without hemorrhage 08/02/2021   • 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 08/02/2021   • Personal history of colonic polyps 08/02/2021   • Diverticulosis 08/02/2021     Resolved Ambulatory Problems     Diagnosis Date Noted   • Observed sleep apnea 01/22/2021   • Snoring 01/22/2021   • Hypersomnia 01/22/2021     Past Medical History:   Diagnosis Date   • Cancer (HCC)    • Chronic atrial fibrillation (HCC)    • Hyperlipidemia    • Hypertension        PAST SURGICAL HISTORY  Past Surgical History:   Procedure Laterality Date   • CAROTID ENDARTERECTOMY Right 2009    Dr Lewis   • COLONOSCOPY     • COLONOSCOPY N/A 6/22/2021    Procedure: COLONOSCOPY INTO  CECUM AND TI;  Surgeon: Artur Jacobson MD;  Location: [facility];  Service: Gastroenterology;  Laterality: N/A;  PRE: RECTAL BLEEDING  POST: DIVERTICULOSIS, HEMORRHOIDS   • COLONOSCOPY W/ POLYPECTOMY N/A 6/3/2021    Procedure: COLONOSCOPY, polypectomies;  Surgeon: Luan Meek MD;  Location: [facility] OR;  Service: Gastroenterology;  Laterality: N/A;  Diverticulosis  Ascending colon polyp x 4 (3 cold snare)  Cecal polyp (cold snare)  Rectal polyp   • CORONARY ARTERY BYPASS GRAFT      MIMI Frederick to LAD, SVG to OM, SVG Post Desc   • ENDOSCOPY N/A 6/3/2021    Procedure: ESOPHAGOGASTRODUODENOSCOPY with biopsies;  Surgeon: Luan Meek MD;  Location: [facility] OR;  Service: Gastroenterology;  Laterality: N/A;  Reflux  Gastric and duodenal ulcers  Biopsies: gastric, distal esophagus   • FINGER SURGERY         FAMILY HISTORY  Family History   Problem Relation Age of Onset   • Heart disease Father    • Stroke Father    • Hypertension Father        SOCIAL HISTORY  Social History     Socioeconomic History   • Marital status:    Tobacco Use   • Smoking status: Former Smoker     Quit date: 1991     Years since quittin.5   • Smokeless tobacco: Never Used   Vaping Use   • Vaping Use: Never used   Substance and Sexual Activity   • Alcohol use: Yes     Alcohol/week: 2.0 standard drinks     Types: 2 Shots of liquor per week     Comment: NIGHTLY   • Drug use: No   • Sexual activity: Not Currently       ALLERGIES  Patient has no known allergies.    The patient's allergies have been reviewed    REVIEW OF SYSTEMS  All systems reviewed and negative except for those discussed in HPI.     PHYSICAL EXAM  I have reviewed the triage vital signs and nursing notes.  ED Triage Vitals [22 0844]   Temp Heart Rate Resp BP SpO2   (!) 100.7 °F (38.2 °C) 102 16 -- 95 %      Temp src Heart Rate Source Patient Position BP Location FiO2 (%)   Tympanic Monitor -- -- --       General: No  acute distress.  HENT: NCAT, PERRL, Nares patent.  Eyes: no scleral icterus.  Neck: trachea midline, no ROM limitations.  CV: regular rhythm, regular rate.  Respiratory: normal effort, CTAB.  Abdomen: soft, nondistended, NTTP, no rebound tenderness, no guarding or rigidity.  Musculoskeletal: no deformity.  Neuro: Alert and oriented, no facial droop, speech clear, no dysarthria or aphasia, moves all extremities well, sensation intact light touch all extremities, no focal deficits  Skin: warm, dry.    LAB RESULTS  Recent Results (from the past 24 hour(s))   Comprehensive Metabolic Panel    Collection Time: 07/04/22  9:13 AM    Specimen: Blood   Result Value Ref Range    Glucose 112 (H) 65 - 99 mg/dL    BUN 35 (H) 8 - 23 mg/dL    Creatinine 1.00 0.76 - 1.27 mg/dL    Sodium 134 (L) 136 - 145 mmol/L    Potassium 4.6 3.5 - 5.2 mmol/L    Chloride 99 98 - 107 mmol/L    CO2 24.0 22.0 - 29.0 mmol/L    Calcium 8.7 8.6 - 10.5 mg/dL    Total Protein 6.0 6.0 - 8.5 g/dL    Albumin 4.10 3.50 - 5.20 g/dL    ALT (SGPT) 22 1 - 41 U/L    AST (SGOT) 25 1 - 40 U/L    Alkaline Phosphatase 71 39 - 117 U/L    Total Bilirubin 1.4 (H) 0.0 - 1.2 mg/dL    Globulin 1.9 gm/dL    A/G Ratio 2.2 g/dL    BUN/Creatinine Ratio 35.0 (H) 7.0 - 25.0    Anion Gap 11.0 5.0 - 15.0 mmol/L    eGFR 79.0 >60.0 mL/min/1.73   Lactic Acid, Plasma    Collection Time: 07/04/22  9:13 AM    Specimen: Blood   Result Value Ref Range    Lactate 1.0 0.5 - 2.0 mmol/L   Procalcitonin    Collection Time: 07/04/22  9:13 AM    Specimen: Blood   Result Value Ref Range    Procalcitonin 0.13 0.00 - 0.25 ng/mL   CBC Auto Differential    Collection Time: 07/04/22  9:13 AM    Specimen: Blood   Result Value Ref Range    WBC 8.93 3.40 - 10.80 10*3/mm3    RBC 3.82 (L) 4.14 - 5.80 10*6/mm3    Hemoglobin 12.0 (L) 13.0 - 17.7 g/dL    Hematocrit 35.9 (L) 37.5 - 51.0 %    MCV 94.0 79.0 - 97.0 fL    MCH 31.4 26.6 - 33.0 pg    MCHC 33.4 31.5 - 35.7 g/dL    RDW 14.1 12.3 - 15.4 %    RDW-SD 47.0  37.0 - 54.0 fl    MPV 9.9 6.0 - 12.0 fL    Platelets 141 140 - 450 10*3/mm3    Neutrophil % 81.3 (H) 42.7 - 76.0 %    Lymphocyte % 7.7 (L) 19.6 - 45.3 %    Monocyte % 10.4 5.0 - 12.0 %    Eosinophil % 0.0 (L) 0.3 - 6.2 %    Basophil % 0.2 0.0 - 1.5 %    Neutrophils, Absolute 7.25 (H) 1.70 - 7.00 10*3/mm3    Lymphocytes, Absolute 0.69 (L) 0.70 - 3.10 10*3/mm3    Monocytes, Absolute 0.93 (H) 0.10 - 0.90 10*3/mm3    Eosinophils, Absolute 0.00 0.00 - 0.40 10*3/mm3    Basophils, Absolute 0.02 0.00 - 0.20 10*3/mm3   Respiratory Panel PCR w/COVID-19(SARS-CoV-2) ESTHELA/GUILLE/PRADEEP/PAD/COR/MAD/JOSE L In-House, NP Swab in [facility], 3-4 HR TAT - Swab, Nasopharynx    Collection Time: 07/04/22 10:20 AM    Specimen: Nasopharynx; Swab   Result Value Ref Range    ADENOVIRUS, PCR Not Detected Not Detected    Coronavirus 229E Not Detected Not Detected    Coronavirus HKU1 Not Detected Not Detected    Coronavirus NL63 Not Detected Not Detected    Coronavirus OC43 Not Detected Not Detected    COVID19 Not Detected Not Detected - Ref. Range    Human Metapneumovirus Not Detected Not Detected    Human Rhinovirus/Enterovirus Not Detected Not Detected    Influenza A PCR Not Detected Not Detected    Influenza B PCR Not Detected Not Detected    Parainfluenza Virus 1 Not Detected Not Detected    Parainfluenza Virus 2 Not Detected Not Detected    Parainfluenza Virus 3 Not Detected Not Detected    Parainfluenza Virus 4 Not Detected Not Detected    RSV, PCR Not Detected Not Detected    Bordetella pertussis pcr Not Detected Not Detected    Bordetella parapertussis PCR Not Detected Not Detected    Chlamydophila pneumoniae PCR Not Detected Not Detected    Mycoplasma pneumo by PCR Not Detected Not Detected   Urinalysis With Microscopic If Indicated (No Culture) - Urine, Catheter    Collection Time: 07/04/22 10:58 AM    Specimen: Urine, Catheter   Result Value Ref Range    Color, UA Yellow Yellow, Straw    Appearance, UA Clear Clear    pH, UA 5.5 5.0 - 8.0     Specific Gravity, UA 1.022 1.005 - 1.030    Glucose, UA Negative Negative    Ketones, UA Negative Negative    Bilirubin, UA Negative Negative    Blood, UA Negative Negative    Protein, UA 30 mg/dL (1+) (A) Negative    Leuk Esterase, UA Negative Negative    Nitrite, UA Negative Negative    Urobilinogen, UA 1.0 E.U./dL 0.2 - 1.0 E.U./dL   Urinalysis, Microscopic Only - Urine, Catheter    Collection Time: 07/04/22 10:58 AM    Specimen: Urine, Catheter   Result Value Ref Range    RBC, UA 0-2 None Seen, 0-2 /HPF    WBC, UA 0-2 None Seen, 0-2 /HPF    Bacteria, UA None Seen None Seen /HPF    Squamous Epithelial Cells, UA 0-2 None Seen, 0-2 /HPF    Hyaline Casts, UA None Seen None Seen /LPF    Methodology Automated Microscopy        I ordered the above labs and reviewed the results.    RADIOLOGY  XR Chest 1 View    Result Date: 7/4/2022  CHEST SINGLE VIEW  HISTORY: Hypertension, mitral regurgitation.  COMPARISON: None.  FINDINGS: Sternotomy wires, coronary artery markers are present. There is cardiomegaly with pulmonary vascular engorgement. Mild linear opacity is present in the right lung base most likely due to atelectasis or scarring. There are no previous studies for comparison.      1. Cardiomegaly with pulmonary vascular engorgement. Previous median sternotomy. 2. Linear opacity right lung base most likely due to atelectasis or scarring. There are no previous studies for comparison.  This report was finalized on 7/4/2022 10:14 AM by Dr. Bossman Ace M.D.        I ordered the above noted radiological studies. I reviewed the images and results. I agree with the radiologist interpretation.    PROCEDURES  Procedures    MEDICATIONS GIVEN IN ER  Medications   acetaminophen (TYLENOL) tablet 1,000 mg (1,000 mg Oral Given 7/4/22 0919)   sodium chloride 0.9 % bolus 500 mL (500 mL Intravenous New Bag 7/4/22 0919)       PROGRESS, DATA ANALYSIS, CONSULTS, AND MEDICAL DECISION MAKING  A complete history and physical exam have  been performed.  All available laboratory and imaging results have been reviewed by myself prior to disposition.    MDM  After the initial H&P, I discussed pertinent information from history and physical exam with patient/family.  Discussed differential diagnosis.  Discussed plan for ED evaluation/workup/treatment.  All questions answered.  Patient/family is agreeable with plan.  ED Course as of 07/04/22 1137   Mon Jul 04, 2022   1135 Patient has mild fever here, initially tachycardic, tachycardia resolved.  Patient has no leukocytosis, procalcitonin normal, lactic acid normal.  Chest x-ray shows no sign of pneumonia, urinalysis negative.  RVP is negative, patient is negative for COVID-19, negative for flu.  Patient is well-appearing, symptoms likely viral in nature.  Patient appears appropriate for discharge with outpatient follow-up.  Discussed symptomatic treatment, discussed need for close follow-up with primary care physician.  Given extensive discussion return precautions, discharging. [JG]      ED Course User Index  [JG] Reji Torres MD       AS OF 11:37 EDT VITALS:    BP - 151/81  HR - 89  TEMP - (!) 100.7 °F (38.2 °C) (Tympanic)  O2 SATS - 95%    DIAGNOSIS  Final diagnoses:   Viral URI with cough   Fever, unspecified fever cause   Transient confusion   Chronic anemia         DISPOSITION  DISCHARGE    Patient discharged in stable condition.    Reviewed implications of results, diagnosis, meds, responsibility to follow up, warning signs and symptoms of possible worsening, potential complications and reasons to return to ER.    Patient/Family voiced understanding of above instructions.    Discussed plan for discharge, as there is no emergent indication for admission. Patient referred to primary care provider for BP management due to today's BP. Pt/family is agreeable and understands need for follow up and repeat testing.  Pt is aware that discharge does not mean that nothing is wrong but it indicates no  emergency is present that requires admission and they must continue care with follow-up as given below or physician of their choice.     FOLLOW-UP  Your PCP    Schedule an appointment as soon as possible for a visit in 2 days  even if well    PATIENT CONNECTION - [place] [address]  [phone]    if you are unable to follow up with your PCP         Medication List      Changed    metoprolol succinate XL 50 MG 24 hr tablet  Commonly known as: TOPROL-XL  Take 1 tablet by mouth 2 (two) times a day.  What changed: when to take this             Reji Torres MD  07/04/22 2932

## 2022-07-07 ENCOUNTER — APPOINTMENT (OUTPATIENT)
Dept: SLEEP MEDICINE | Facility: HOSPITAL | Age: 72
End: 2022-07-07

## 2022-07-09 LAB
BACTERIA SPEC AEROBE CULT: NORMAL
BACTERIA SPEC AEROBE CULT: NORMAL

## 2022-07-14 ENCOUNTER — OFFICE VISIT (OUTPATIENT)
Dept: SLEEP MEDICINE | Facility: HOSPITAL | Age: 72
End: 2022-07-14

## 2022-07-14 VITALS
HEIGHT: 72 IN | BODY MASS INDEX: 30.07 KG/M2 | WEIGHT: 222 LBS | OXYGEN SATURATION: 99 % | DIASTOLIC BLOOD PRESSURE: 87 MMHG | HEART RATE: 71 BPM | SYSTOLIC BLOOD PRESSURE: 149 MMHG

## 2022-07-14 DIAGNOSIS — G47.31 COMPLEX SLEEP APNEA SYNDROME: Primary | ICD-10-CM

## 2022-07-14 PROCEDURE — 99213 OFFICE O/P EST LOW 20 MIN: CPT | Performed by: INTERNAL MEDICINE

## 2022-07-14 PROCEDURE — G0463 HOSPITAL OUTPT CLINIC VISIT: HCPCS

## 2022-07-14 NOTE — PROGRESS NOTES
"  [facility]  [address]  [address] [place] [address]  Phone   Fax       SLEEP CLINIC FOLLOW UP PROGRESS NOTE.    Renato Marquez  1950  72 y.o.  male      PCP: Provider, No Known      Date of visit: 7/14/2022    Chief Complaint   Patient presents with   • Sleep Apnea       HPI:  This is a 72 y.o. years old patient who has a history of complex sleep apnea is here for  the initial compliance follow-up.  Sleep apnea is severe in severity with a AHI of 70/h with central apneas 43/hr. Patient is using positive airway pressure therapy with BiPAP ST and the symptoms of snoring, non-restorative sleep and daytime excessive sleepiness have improved significantly on the therapy. Normally goes to bed at 12 midnight and wakes up at 5:30 AM.  The patient wakes up 3 time(s) during the night and has no problem going back to sleep.  Feels refreshed after waking up.  Patient also denies headaches and nasal congestion.   Patient feels lot better but he also is using the BiPAP ST when he is watching television sitting in a recliner.  He is slowly transitioning into sleep.  2 weeks ago he had a some upper respiratory tract infection and he has been sleepy since then but the infection.    Mediactions and allergies are reviewed by me and documented in the encounter.     SOCIAL ( habits pertaining to sleep medicine)  History of smoking:No   History of alcohol use: 10 per week  Caffeine use: 2    REVIEW OF SYSTEMS:   [place] Sleepiness Scale :Total score: 18   Nsaal congestion:No   Dry mouth/nose:No   Post nasal drip; No   Acid reflux/Heartburn:No   Abd bloating:No   Morining headache:No   Anxiety:No   Depression:No     PHYSICAL EXAMINATION:  CONSTITUTIONAL:  Vitals:    07/14/22 1405   BP: 149/87   Pulse: 71   SpO2: 99%   Weight: 101 kg (222 lb)   Height: 182.9 cm (72\")    Body mass index is 30.11 kg/m².   NOSE: nasal passages are clear, no nasal polyps, septum in the " midline.  THROAT: throat is clear, oral airway Mallampati class 3  RESP SYSTEM: Breath sounds are normal, no wheezes or crackles  CARDIOVASULAR: Heart rate is regular without murmur. No edema      Data reviewed:  The Smart card downloaded on 7/14/2022 has been reviewed independently by me for compliance and discussed the data with the patient.   Compliance; 100%  > 4 hr use, 93%  Average use of the device 3 hours and 45 minutes per night  Residual AHI: 8/hr  mask type: Nasal mask  DME: [facility]         ASSESSMENT AND PLAN:  · Complex sleep apnea .  It includes both central sleep apnea and obstructive sleep apnea and is on BiPAP ST.  The symptoms of sleep apnea have improved with the device and the treatment.  Patient's compliance with the device is excellent for treatment of sleep apnea.  I have independently reviewed the smart card down load and discussed with the patient the download data and encouarged the patient to continue to use the device.The residual AHI is acceptable. The device is benefiting the patient and the device is medically necessary.  Without proper control of sleep apnea and good compliance there is a increased risk for hypertension, diabetes mellitus and nonrestorative sleep with hypersomnia which can increase risk for motor vehicle accidents.  Untreated sleep apnea is also a risk factor for development of atrial fibrillation, pulmonary hypertension and stroke. The patient is also instructed to get the supplies from the [employer] and and change them on a regular basis.  A prescription for supplies has been sent to the [employer] company.  I have also discussed the good sleep hygiene habits and adequate amount of sleep needed for good health.  · Obesity, class 1 with BMI is Body mass index is 30.11 kg/m².. I have discuss the relationship between the weight and sleep apnea. The benefit of weight loss in reducing severity of sleep apnea was discussed. Discussed diet and exercise with the patient  to archive ideal BMI.  · Return in about 1 year (around 7/14/2023) for Annual visit with smartcard download. . Patient's questions were answered.        Jett Mcguire MD  Sleep Medicine  Medical Director for [facility]  7/14/2022

## 2022-08-08 RX ORDER — FUROSEMIDE 40 MG/1
TABLET ORAL
Qty: 90 TABLET | Refills: 2 | Status: SHIPPED | OUTPATIENT
Start: 2022-08-08 | End: 2022-11-08

## 2022-08-08 NOTE — TELEPHONE ENCOUNTER
Rx Refill Note  Requested Prescriptions     Pending Prescriptions Disp Refills   • furosemide (LASIX) 40 MG tablet [Pharmacy Med Name: FUROSEMIDE 40MG TABLETS] 90 tablet      Sig: TAKE 1 TABLET BY MOUTH EVERY DAY      Last office visit with prescribing clinician: 12/29/2021  TRACIE      Next office visit with prescribing clinician: 1/5/2023 TRACIE Kirk MA  08/08/22, 09:09 EDT

## 2022-08-30 DIAGNOSIS — K21.00 GASTROESOPHAGEAL REFLUX DISEASE WITH ESOPHAGITIS WITHOUT HEMORRHAGE: ICD-10-CM

## 2022-08-30 DIAGNOSIS — K22.70 BARRETT'S ESOPHAGUS WITHOUT DYSPLASIA: ICD-10-CM

## 2022-08-30 RX ORDER — FAMOTIDINE 40 MG/1
40 TABLET, FILM COATED ORAL 2 TIMES DAILY
Qty: 180 TABLET | Refills: 3 | Status: SHIPPED | OUTPATIENT
Start: 2022-08-30

## 2022-09-15 DIAGNOSIS — I10 ESSENTIAL HYPERTENSION: ICD-10-CM

## 2022-09-15 RX ORDER — CLOPIDOGREL BISULFATE 75 MG/1
75 TABLET ORAL DAILY
Qty: 90 TABLET | Refills: 3 | Status: SHIPPED | OUTPATIENT
Start: 2022-09-15

## 2022-09-15 RX ORDER — LISINOPRIL 5 MG/1
5 TABLET ORAL DAILY
Qty: 90 TABLET | Refills: 3 | Status: SHIPPED | OUTPATIENT
Start: 2022-09-15 | End: 2023-01-18

## 2022-09-15 NOTE — TELEPHONE ENCOUNTER
Rx Refill Note  Requested Prescriptions      No prescriptions requested or ordered in this encounter      Last office visit with prescribing clinician: 12/29/2021      Next office visit with prescribing clinician: 1/5/2023            Nena Kirk MA  09/15/22, 09:45 EDT

## 2022-10-13 DIAGNOSIS — I48.20 CHRONIC ATRIAL FIBRILLATION: Chronic | ICD-10-CM

## 2022-10-13 NOTE — TELEPHONE ENCOUNTER
Rx Refill Note  Requested Prescriptions     Pending Prescriptions Disp Refills   • metoprolol succinate XL (TOPROL-XL) 50 MG 24 hr tablet 180 tablet 3     Sig: Take 1 tablet by mouth 2 (Two) Times a Day.      Last office visit with prescribing clinician: 12/29/2021      Next office visit with prescribing clinician: 1/5/2023            Nena Kirk MA  10/13/22, 09:21 EDT

## 2022-10-14 RX ORDER — METOPROLOL SUCCINATE 50 MG/1
50 TABLET, EXTENDED RELEASE ORAL 2 TIMES DAILY
Qty: 180 TABLET | Refills: 3 | Status: SHIPPED | OUTPATIENT
Start: 2022-10-14

## 2022-10-14 NOTE — TELEPHONE ENCOUNTER
Patient wife called and stated that the patient is out of his medication and would like a refill.    [place]

## 2022-10-20 DIAGNOSIS — Z79.01 CHRONIC ANTICOAGULATION: ICD-10-CM

## 2022-10-20 DIAGNOSIS — K22.70 BARRETT'S ESOPHAGUS WITHOUT DYSPLASIA: ICD-10-CM

## 2022-10-20 DIAGNOSIS — K21.00 GASTROESOPHAGEAL REFLUX DISEASE WITH ESOPHAGITIS WITHOUT HEMORRHAGE: ICD-10-CM

## 2022-10-20 RX ORDER — PANTOPRAZOLE SODIUM 40 MG/1
40 TABLET, DELAYED RELEASE ORAL 2 TIMES DAILY
Qty: 180 TABLET | Refills: 2 | Status: SHIPPED | OUTPATIENT
Start: 2022-10-20

## 2022-11-07 ENCOUNTER — TELEPHONE (OUTPATIENT)
Dept: CARDIOLOGY | Facility: CLINIC | Age: 72
End: 2022-11-07

## 2022-11-07 NOTE — TELEPHONE ENCOUNTER
Patient's wife called and stated that for the past couple of weeks the patient has had more SOA on exertion. They have also noticed that his legs are beginning to swell now too. They were requesting an appointment. He was last seen in December of last year so I have went ahead and scheduled him to be seen with you tomorrow since Dr. Scott does not have any availability. They use to be seen in [place], but now the live in [place] and are requesting to be seen here.     Nataliya Rivas RN  Triage [facility]

## 2022-11-08 ENCOUNTER — TELEPHONE (OUTPATIENT)
Dept: CARDIOLOGY | Facility: CLINIC | Age: 72
End: 2022-11-08

## 2022-11-08 ENCOUNTER — OFFICE VISIT (OUTPATIENT)
Dept: CARDIOLOGY | Facility: CLINIC | Age: 72
End: 2022-11-08

## 2022-11-08 ENCOUNTER — HOSPITAL ENCOUNTER (OUTPATIENT)
Dept: CARDIOLOGY | Facility: HOSPITAL | Age: 72
Discharge: HOME | End: 2022-11-08
Admitting: NURSE PRACTITIONER

## 2022-11-08 VITALS
DIASTOLIC BLOOD PRESSURE: 78 MMHG | HEART RATE: 80 BPM | HEIGHT: 72 IN | SYSTOLIC BLOOD PRESSURE: 128 MMHG | BODY MASS INDEX: 31.97 KG/M2 | WEIGHT: 236 LBS

## 2022-11-08 DIAGNOSIS — I48.21 PERMANENT ATRIAL FIBRILLATION: ICD-10-CM

## 2022-11-08 DIAGNOSIS — M79.89 LEG SWELLING: ICD-10-CM

## 2022-11-08 DIAGNOSIS — R06.09 DYSPNEA ON EXERTION: ICD-10-CM

## 2022-11-08 DIAGNOSIS — I50.32 CHRONIC DIASTOLIC CONGESTIVE HEART FAILURE: ICD-10-CM

## 2022-11-08 DIAGNOSIS — I25.5 ISCHEMIC CARDIOMYOPATHY: Chronic | ICD-10-CM

## 2022-11-08 DIAGNOSIS — I34.0 NONRHEUMATIC MITRAL VALVE REGURGITATION: Chronic | ICD-10-CM

## 2022-11-08 DIAGNOSIS — I25.10 CORONARY ARTERY DISEASE INVOLVING NATIVE CORONARY ARTERY OF NATIVE HEART WITHOUT ANGINA PECTORIS: Primary | Chronic | ICD-10-CM

## 2022-11-08 DIAGNOSIS — E78.5 DYSLIPIDEMIA: ICD-10-CM

## 2022-11-08 DIAGNOSIS — G47.31 COMPLEX SLEEP APNEA SYNDROME: ICD-10-CM

## 2022-11-08 DIAGNOSIS — I65.21 STENOSIS OF RIGHT CAROTID ARTERY: Chronic | ICD-10-CM

## 2022-11-08 DIAGNOSIS — I10 PRIMARY HYPERTENSION: ICD-10-CM

## 2022-11-08 DIAGNOSIS — I49.3 PVC (PREMATURE VENTRICULAR CONTRACTION): ICD-10-CM

## 2022-11-08 LAB
ALBUMIN SERPL-MCNC: 3.9 G/DL (ref 3.5–5.2)
ALBUMIN/GLOB SERPL: 1.6 G/DL
ALP SERPL-CCNC: 105 U/L (ref 39–117)
ALT SERPL W P-5'-P-CCNC: 19 U/L (ref 1–41)
ANION GAP SERPL CALCULATED.3IONS-SCNC: 12.9 MMOL/L (ref 5–15)
AST SERPL-CCNC: 34 U/L (ref 1–40)
BASOPHILS # BLD AUTO: 0.01 10*3/MM3 (ref 0–0.2)
BASOPHILS NFR BLD AUTO: 0.2 % (ref 0–1.5)
BILIRUB SERPL-MCNC: 1.1 MG/DL (ref 0–1.2)
BUN SERPL-MCNC: 29 MG/DL (ref 8–23)
BUN/CREAT SERPL: 19.9 (ref 7–25)
CALCIUM SPEC-SCNC: 9.7 MG/DL (ref 8.6–10.5)
CHLORIDE SERPL-SCNC: 101 MMOL/L (ref 98–107)
CO2 SERPL-SCNC: 26.1 MMOL/L (ref 22–29)
CREAT SERPL-MCNC: 1.46 MG/DL (ref 0.76–1.27)
DEPRECATED RDW RBC AUTO: 45 FL (ref 37–54)
EGFRCR SERPLBLD CKD-EPI 2021: 50.2 ML/MIN/1.73
EOSINOPHIL # BLD AUTO: 0.04 10*3/MM3 (ref 0–0.4)
EOSINOPHIL NFR BLD AUTO: 0.7 % (ref 0.3–6.2)
ERYTHROCYTE [DISTWIDTH] IN BLOOD BY AUTOMATED COUNT: 13 % (ref 12.3–15.4)
GLOBULIN UR ELPH-MCNC: 2.4 GM/DL
GLUCOSE SERPL-MCNC: 103 MG/DL (ref 65–99)
HCT VFR BLD AUTO: 37.1 % (ref 37.5–51)
HGB BLD-MCNC: 12.2 G/DL (ref 13–17.7)
IMM GRANULOCYTES # BLD AUTO: 0.02 10*3/MM3 (ref 0–0.05)
IMM GRANULOCYTES NFR BLD AUTO: 0.3 % (ref 0–0.5)
LYMPHOCYTES # BLD AUTO: 0.94 10*3/MM3 (ref 0.7–3.1)
LYMPHOCYTES NFR BLD AUTO: 15.7 % (ref 19.6–45.3)
MAGNESIUM SERPL-MCNC: 2.4 MG/DL (ref 1.6–2.4)
MCH RBC QN AUTO: 31.4 PG (ref 26.6–33)
MCHC RBC AUTO-ENTMCNC: 32.9 G/DL (ref 31.5–35.7)
MCV RBC AUTO: 95.6 FL (ref 79–97)
MONOCYTES # BLD AUTO: 0.7 10*3/MM3 (ref 0.1–0.9)
MONOCYTES NFR BLD AUTO: 11.7 % (ref 5–12)
NEUTROPHILS NFR BLD AUTO: 4.27 10*3/MM3 (ref 1.7–7)
NEUTROPHILS NFR BLD AUTO: 71.4 % (ref 42.7–76)
NRBC BLD AUTO-RTO: 0 /100 WBC (ref 0–0.2)
NT-PROBNP SERPL-MCNC: 4945 PG/ML (ref 0–900)
PLATELET # BLD AUTO: 143 10*3/MM3 (ref 140–450)
PMV BLD AUTO: 10.2 FL (ref 6–12)
POTASSIUM SERPL-SCNC: 4.5 MMOL/L (ref 3.5–5.2)
PROT SERPL-MCNC: 6.3 G/DL (ref 6–8.5)
RBC # BLD AUTO: 3.88 10*6/MM3 (ref 4.14–5.8)
SODIUM SERPL-SCNC: 140 MMOL/L (ref 136–145)
WBC NRBC COR # BLD: 5.98 10*3/MM3 (ref 3.4–10.8)

## 2022-11-08 PROCEDURE — 36415 COLL VENOUS BLD VENIPUNCTURE: CPT

## 2022-11-08 PROCEDURE — 83735 ASSAY OF MAGNESIUM: CPT | Performed by: NURSE PRACTITIONER

## 2022-11-08 PROCEDURE — 80053 COMPREHEN METABOLIC PANEL: CPT | Performed by: NURSE PRACTITIONER

## 2022-11-08 PROCEDURE — 96374 THER/PROPH/DIAG INJ IV PUSH: CPT

## 2022-11-08 PROCEDURE — 85025 COMPLETE CBC W/AUTO DIFF WBC: CPT | Performed by: NURSE PRACTITIONER

## 2022-11-08 PROCEDURE — 93000 ELECTROCARDIOGRAM COMPLETE: CPT | Performed by: NURSE PRACTITIONER

## 2022-11-08 PROCEDURE — 99214 OFFICE O/P EST MOD 30 MIN: CPT | Performed by: NURSE PRACTITIONER

## 2022-11-08 PROCEDURE — 83880 ASSAY OF NATRIURETIC PEPTIDE: CPT | Performed by: NURSE PRACTITIONER

## 2022-11-08 PROCEDURE — 25010000002 FUROSEMIDE PER 20 MG: Performed by: NURSE PRACTITIONER

## 2022-11-08 RX ORDER — FUROSEMIDE 40 MG/1
80 TABLET ORAL DAILY
Qty: 90 TABLET | Refills: 2
Start: 2022-11-08 | End: 2022-11-23

## 2022-11-08 RX ORDER — FUROSEMIDE 10 MG/ML
40 INJECTION INTRAMUSCULAR; INTRAVENOUS ONCE
Status: COMPLETED | OUTPATIENT
Start: 2022-11-08 | End: 2022-11-08

## 2022-11-08 RX ADMIN — FUROSEMIDE 40 MG: 10 INJECTION, SOLUTION INTRAMUSCULAR; INTRAVENOUS at 15:30

## 2022-11-08 NOTE — PROGRESS NOTES
[facility] CARDIOLOGY  [address]  [facility] [address]  [facility] [address]  Phone: [phone]      Patient Name: Renato Marquez  :1950  Age: 72 y.o.  Primary Cardiologist: Mauricio Scott MD  Encounter Provider:  SIRIA Moreland      Chief Complaint     Chief Complaint: No chief complaint on file.      SUBJECTIVE     History of Present Illness:  Renato Marquez is a 72 y.o.  male whose medical history includes hypertension, hyperlipidemia, KATIE.  He is followed in our office by Dr. Scott for atrial fibrillation and coronary artery disease.     22 Follow-up:  He is here for early follow-up and I am seeing him for the first time today.  For the past 2 to 3 weeks he has been having more dyspnea with exertion with walking, going up stairs and has also had upper extremity weakness.  He has had more leg swelling and restless leg symptoms.  He feels bloated and also has a dry cough.  His weight is up 14 pounds since last visit.  His appetite is good but he states he does not add salt to his food.  He has orthopnea without CPAP but he is compliant with CPAP nightly.  His blood pressure at home is similar to today's reading.  He had a very sharp pinpoint area of pain on the left chest that lasted a few seconds last week.  He has not had no further episodes of chest pain since that time.  He denies palpitations or syncope.    Below is a summary of pertinent cardiology findings:  • He has history of chronic atrial fibrillation.  He is asymptomatic with atrial fibrillation and declined cardioversion.  • He has history of CAD with prior CABG performed in  by Dr. Jeet Bhagat.  He had CABG with LIMA to LAD SVG to OM, and SVG to the posterior descending coronary arteries.  • He has history of cerebrovascular disease with right carotid endarterectomy in  performed by Dr. Lewis.  • 2016 stress test showed EF 37% and no evidence of ischemia.  • 2018  echocardiogram showed EF 50%, grade 3 LV diastolic dysfunction consistent with reversible restrictive pattern, mild mitral valve regurgitation, mild pulmonic valve regurgitation, mild tricuspid valve regurgitation, and normal RVSP.    Past Medical History:   Diagnosis Date   • CAD (coronary artery disease)    • Cancer (HCC)    • Carotid arterial disease (HCC)    • Chronic atrial fibrillation (HCC)    • Complex sleep apnea syndrome    • Hyperlipidemia    • Hypertension    • Ischemic cardiomyopathy    • Mitral regurgitation    • Rectal bleeding        Past Surgical History:  •  has a past surgical history that includes Finger surgery; Coronary artery bypass graft (); Carotid endarterectomy (Right, ); Colonoscopy; Esophagogastroduodenoscopy (N/A, 6/3/2021); Colonoscopy w/ polypectomy (N/A, 6/3/2021); and Colonoscopy (N/A, 2021).     Social History     Socioeconomic History   • Marital status:    Tobacco Use   • Smoking status: Former     Types: Cigarettes     Quit date: 1991     Years since quittin.8   • Smokeless tobacco: Never   Vaping Use   • Vaping Use: Never used   Substance and Sexual Activity   • Alcohol use: Yes     Alcohol/week: 2.0 standard drinks     Types: 2 Shots of liquor per week     Comment: NIGHTLY   • Drug use: No   • Sexual activity: Not Currently         Review of Systems     Review of Systems   Cardiovascular: Positive for chest pain, dyspnea on exertion and leg swelling. Negative for claudication, cyanosis, irregular heartbeat, near-syncope, orthopnea, palpitations, paroxysmal nocturnal dyspnea and syncope.   Musculoskeletal: Positive for muscle weakness.   Gastrointestinal: Positive for bloating.       Medications     Allergies as of 2022   • (No Known Allergies)       Current Outpatient Medications on File Prior to Visit   Medication Sig   • amoxicillin (AMOXIL) 875 MG tablet 1 pill q12   • calcium carbonate (Tums) 500 MG chewable tablet Chew 2 tablets 2  "(Two) Times a Day.   • clopidogrel (PLAVIX) 75 MG tablet Take 1 tablet by mouth Daily.   • famotidine (PEPCID) 40 MG tablet Take 1 tablet by mouth 2 (Two) Times a Day. With lunch and at bedtime   • lisinopril (PRINIVIL,ZESTRIL) 5 MG tablet Take 1 tablet by mouth Daily. Taking 5 mg   • Magnesium 200 MG tablet Take 250 mg by mouth Daily.   • metoprolol succinate XL (TOPROL-XL) 50 MG 24 hr tablet Take 1 tablet by mouth 2 (Two) Times a Day.   • pantoprazole (PROTONIX) 40 MG EC tablet Take 1 tablet by mouth 2 (Two) Times a Day.   • pravastatin (PRAVACHOL) 40 MG tablet Take 1 tablet by mouth Daily.   • sucralfate (CARAFATE) 1 g tablet Take 1 tablet by mouth 2 (Two) Times a Day.     No current facility-administered medications on file prior to visit.          OBJECTIVE     Vital Signs:   /78   Pulse 80   Ht 182.9 cm (72\")   Wt 107 kg (236 lb)   BMI 32.01 kg/m²       Weight:  Wt Readings from Last 3 Encounters:   11/08/22 107 kg (236 lb)   07/14/22 101 kg (222 lb)   07/04/22 102 kg (225 lb)     Body mass index is 32.01 kg/m².      Physical Exam     Physical Exam  Constitutional:       General: He is not in acute distress.  HENT:      Head: Normocephalic and atraumatic.      Mouth/Throat:      Mouth: Mucous membranes are moist.   Eyes:      General: No scleral icterus.     Extraocular Movements: Extraocular movements intact.      Conjunctiva/sclera: Conjunctivae normal.      Pupils: Pupils are equal, round, and reactive to light.   Cardiovascular:      Rate and Rhythm: Normal rate and regular rhythm.      Pulses: Normal pulses.      Heart sounds: S1 normal and S2 normal.   Pulmonary:      Effort: No respiratory distress.      Breath sounds: Normal breath sounds. No wheezing, rhonchi or rales.   Abdominal:      General: Bowel sounds are normal. There is distension.      Palpations: Abdomen is soft.      Tenderness: There is no abdominal tenderness.   Musculoskeletal:         General: Normal range of motion.      " Cervical back: Normal range of motion and neck supple.      Right lower leg: No edema.      Left lower leg: No edema.   Skin:     General: Skin is warm and dry.      Coloration: Skin is not jaundiced.   Neurological:      Mental Status: He is alert and oriented to person, place, and time.   Psychiatric:         Mood and Affect: Mood normal.         Reviewed Data     Result Review :  The following data was reviewed by SIRIA Moreland on 11/11/22:  • Labs 07/04/2022:  cr 1.0, K 4.6, Tbil 1.4, otherwise unremarkable CMP, Hgb 12.0, Plt 141      ECG 12 Lead    Date/Time: 11/8/2022 2:53 PM  Performed by: Ladan Zapata APRN  Authorized by: Ladan Zapata APRN   Comparison: compared with previous ECG from 12/29/2021  Similar to previous ECG  Rhythm: atrial fibrillation  Ectopy: unifocal PVCs and bigeminy  Rate: normal  BPM: 80  T inversion: II, III, aVF, V5, V6 and V4  Other findings: T wave abnormality    Clinical impression: abnormal EKG            Assessment and Plan        Assessment and Plan     Assessment:  1. Coronary artery disease involving native coronary artery of native heart without angina pectoris    2. Chronic diastolic congestive heart failure (HCC)    3. Permanent atrial fibrillation (HCC)    4. PVC (premature ventricular contraction)    5. Ischemic cardiomyopathy    6. Nonrheumatic mitral valve regurgitation    7. Primary hypertension    8. Dyslipidemia    9. Stenosis of right carotid artery    10. Complex sleep apnea syndrome    11. Leg swelling    12. Dyspnea on exertion         1. Coronary artery disease: He has history of CAD with prior CABG with LIMA to LAD, SVG to OM, and SVG to the posterior descending coronary arteries in 2009 by Dr. Jeet Bhagat.  January 2016 stress study showed no evidence of ischemia.  He denies chest pain at this time though he did have a very brief episode of sharp, atypical chest pain last week.  His medical therapy includes 75 mg  Plavix, 40 mg pravastatin, 50 mg metoprolol succinate twice daily.  2. Chronic diastolic heart failure: EF on January 2016 stress test was 35%.  January 2018 echocardiogram showed EF 50%, grade 3 LV diastolic dysfunction consistent with reversible pattern.  He describes symptoms of acute on chronic heart failure and his abdomen is distended today.  3. Ischemic cardiomyopathy: January 2016 stress test showed EF 35% and no evidence of ischemia.  EF had improved to 50% on January 2018 echocardiogram.  His medical therapy also includes ACE inhibitor.  4. Permanent atrial fibrillation: His [id number] score is 4 and he is anticoagulated with Xarelto.  Heart rate is controlled with 50 mg metoprolol succinate twice daily.  5. Valvularregurgitation: Mild mitral, pulmonic, and tricuspid valve regurgitation noted on January 2018 echocardiogram.  He describes symptoms of acute heart failure today.  6. Hypertension: Controlled on current regimen.  7. Dyslipidemia: He is treated with 80 mg pravastatin daily.  8. Right carotid artery stenosis: He had right carotid endarterectomy in 2009 by Dr. Lewis.  9. Sleep apnea: He is compliant with CPAP.  10. Leg swelling: This is been worsening over the last few weeks though minimal today.  His abdomen is swollen.  11. Dyspnea with exertion: This is been worsening over the last 2 to 3 weeks.      Plan:  1. I will update labs today.  I will also arrange for him to receive 40 mg IV Lasix in the [facility] today.  2. I will increase his Lasix to 80 mg daily.  3. I will recheck an echocardiogram to assess EF and to assess valvular function.  4. I will see him back in 1 week.      Follow Up:  Return in about 1 week (around 11/15/2022) for Follow-up with SIRIA Mayer.  Orders Placed This Encounter   Procedures   • Comprehensive Metabolic Panel   • Magnesium   • proBNP   • ECG 12 Lead   • Adult Transthoracic Echo Complete W/ Cont if Necessary Per Protocol   • CBC & Differential      New  Medications Ordered This Visit   Medications   • furosemide (LASIX) injection 40 mg   • furosemide (LASIX) 40 MG tablet     Sig: Take 2 tablets by mouth Daily.     Dispense:  90 tablet     Refill:  2     **Patient requests 90 days supply**         Thank you the opportunity to participate in this patient's care.    SIRIA Oshea    This office note has been dictated.

## 2022-11-08 NOTE — TELEPHONE ENCOUNTER
I saw him today and he has too much fluid on board. His creatinine is up a bit; I think this is due to volume. I left him a message to increase lasix to 80 mg once daily or 40 mg in the morning and 40 mg 8 hours later. He can choose the dosing schedule that works best for him.     Can you make sure he got the message? I'll recheck labs when I see him next week.    Thanks!  SIRIA Mayer

## 2022-11-09 NOTE — TELEPHONE ENCOUNTER
Called and spoke with patient and went over instructions for lasix from MM, patient verbalizes understanding and agrees with the plan.  I let him know MM plans to recheck labs next week when she sees him.    Tracy Ramos RN  [facility] Triage  11/09/22 09:14 EST

## 2022-11-11 ENCOUNTER — TELEPHONE (OUTPATIENT)
Dept: CARDIOLOGY | Facility: CLINIC | Age: 72
End: 2022-11-11

## 2022-11-11 NOTE — TELEPHONE ENCOUNTER
I called the patient and he read off all of his medications to me and he is NOT taking xarelto (rivaroxaban).  He is not sure why he is not taking it.  I asked him if he has had issues with bleeding, and he said yes.  He said I need to call his wife for more info.    I called wife, Roxane, at [phone].  She states the patient has had issues with GI bleeds.  They took him off xarelto, everything had resolved; they went to restart it, and he began bleeding.  His GI MD said xarelto is making him bleed.      Per [facility] review he was hospitalized 6/2021 for GI bleeding, looks like the xarelto was continued on his DC paperwork.    Dr. Scott note 12/21/21:      Looks like he saw GI MD, Dr. Meek, on 5/16/22, here is a snip of their note:    I have looked through the chart quite a bit, I cannot find officially when it was stopped.      Tracy Ramos RN  [facility] Triage  11/11/22 13:09 EST

## 2022-11-17 ENCOUNTER — TELEPHONE (OUTPATIENT)
Dept: CARDIOLOGY | Facility: CLINIC | Age: 72
End: 2022-11-17

## 2022-11-17 ENCOUNTER — HOSPITAL ENCOUNTER (OUTPATIENT)
Dept: CARDIOLOGY | Facility: HOSPITAL | Age: 72
Discharge: HOME | End: 2022-11-17
Admitting: NURSE PRACTITIONER

## 2022-11-17 ENCOUNTER — OFFICE VISIT (OUTPATIENT)
Dept: CARDIOLOGY | Facility: CLINIC | Age: 72
End: 2022-11-17

## 2022-11-17 VITALS
HEIGHT: 72 IN | DIASTOLIC BLOOD PRESSURE: 68 MMHG | WEIGHT: 226 LBS | BODY MASS INDEX: 30.61 KG/M2 | SYSTOLIC BLOOD PRESSURE: 104 MMHG | OXYGEN SATURATION: 98 % | HEART RATE: 61 BPM

## 2022-11-17 VITALS
HEART RATE: 56 BPM | DIASTOLIC BLOOD PRESSURE: 60 MMHG | WEIGHT: 226 LBS | BODY MASS INDEX: 30.61 KG/M2 | HEIGHT: 72 IN | OXYGEN SATURATION: 93 % | SYSTOLIC BLOOD PRESSURE: 90 MMHG

## 2022-11-17 DIAGNOSIS — I10 PRIMARY HYPERTENSION: ICD-10-CM

## 2022-11-17 DIAGNOSIS — M79.89 LEG SWELLING: ICD-10-CM

## 2022-11-17 DIAGNOSIS — E78.5 DYSLIPIDEMIA: ICD-10-CM

## 2022-11-17 DIAGNOSIS — G47.31 COMPLEX SLEEP APNEA SYNDROME: ICD-10-CM

## 2022-11-17 DIAGNOSIS — I25.5 ISCHEMIC CARDIOMYOPATHY: Chronic | ICD-10-CM

## 2022-11-17 DIAGNOSIS — I38 VALVULAR HEART DISEASE: ICD-10-CM

## 2022-11-17 DIAGNOSIS — I50.32 CHRONIC DIASTOLIC CONGESTIVE HEART FAILURE: ICD-10-CM

## 2022-11-17 DIAGNOSIS — K62.5 GASTROINTESTINAL HEMORRHAGE ASSOCIATED WITH ANORECTAL SOURCE: ICD-10-CM

## 2022-11-17 DIAGNOSIS — I25.10 CORONARY ARTERY DISEASE INVOLVING NATIVE CORONARY ARTERY OF NATIVE HEART WITHOUT ANGINA PECTORIS: Primary | Chronic | ICD-10-CM

## 2022-11-17 DIAGNOSIS — R06.09 DYSPNEA ON EXERTION: ICD-10-CM

## 2022-11-17 DIAGNOSIS — I48.21 PERMANENT ATRIAL FIBRILLATION: ICD-10-CM

## 2022-11-17 DIAGNOSIS — I50.43 ACUTE ON CHRONIC COMBINED SYSTOLIC AND DIASTOLIC CHF (CONGESTIVE HEART FAILURE): ICD-10-CM

## 2022-11-17 DIAGNOSIS — I65.21 STENOSIS OF RIGHT CAROTID ARTERY: Chronic | ICD-10-CM

## 2022-11-17 LAB
AORTIC ARCH: 2.5 CM
ASCENDING AORTA: 3.6 CM
BH CV ECHO MEAS - ACS: 2.7 CM
BH CV ECHO MEAS - AO MAX PG: 4.8 MMHG
BH CV ECHO MEAS - AO MEAN PG: 2.32 MMHG
BH CV ECHO MEAS - AO ROOT DIAM: 3.3 CM
BH CV ECHO MEAS - AO V2 MAX: 109.9 CM/SEC
BH CV ECHO MEAS - AO V2 VTI: 21.6 CM
BH CV ECHO MEAS - AVA(I,D): 4.2 CM2
BH CV ECHO MEAS - EDV(CUBED): 124 ML
BH CV ECHO MEAS - EDV(MOD-SP2): 211 ML
BH CV ECHO MEAS - EDV(MOD-SP4): 179 ML
BH CV ECHO MEAS - EF(MOD-BP): 43.4 %
BH CV ECHO MEAS - EF(MOD-SP2): 47.4 %
BH CV ECHO MEAS - EF(MOD-SP4): 42.5 %
BH CV ECHO MEAS - ESV(CUBED): 39.8 ML
BH CV ECHO MEAS - ESV(MOD-SP2): 111 ML
BH CV ECHO MEAS - ESV(MOD-SP4): 103 ML
BH CV ECHO MEAS - FS: 31.5 %
BH CV ECHO MEAS - IVS/LVPW: 0.98 CM
BH CV ECHO MEAS - IVSD: 1.5 CM
BH CV ECHO MEAS - LAT PEAK E' VEL: 8.1 CM/SEC
BH CV ECHO MEAS - LV DIASTOLIC VOL/BSA (35-75): 79.8 CM2
BH CV ECHO MEAS - LV MASS(C)D: 325.2 GRAMS
BH CV ECHO MEAS - LV MAX PG: 3.1 MMHG
BH CV ECHO MEAS - LV MEAN PG: 1.96 MMHG
BH CV ECHO MEAS - LV SYSTOLIC VOL/BSA (12-30): 45.9 CM2
BH CV ECHO MEAS - LV V1 MAX: 88.1 CM/SEC
BH CV ECHO MEAS - LV V1 VTI: 19.6 CM
BH CV ECHO MEAS - LVIDD: 5 CM
BH CV ECHO MEAS - LVIDS: 3.4 CM
BH CV ECHO MEAS - LVOT AREA: 4.6 CM2
BH CV ECHO MEAS - LVOT DIAM: 2.42 CM
BH CV ECHO MEAS - LVPWD: 1.52 CM
BH CV ECHO MEAS - MED PEAK E' VEL: 5.1 CM/SEC
BH CV ECHO MEAS - MV DEC SLOPE: 525.7 CM/SEC2
BH CV ECHO MEAS - MV DEC TIME: 0.23 MSEC
BH CV ECHO MEAS - MV E MAX VEL: 89.7 CM/SEC
BH CV ECHO MEAS - MV MAX PG: 5 MMHG
BH CV ECHO MEAS - MV MEAN PG: 2.24 MMHG
BH CV ECHO MEAS - MV P1/2T: 59 MSEC
BH CV ECHO MEAS - MV V2 VTI: 18 CM
BH CV ECHO MEAS - MVA(P1/2T): 3.7 CM2
BH CV ECHO MEAS - MVA(VTI): 5 CM2
BH CV ECHO MEAS - PA ACC TIME: 0.09 SEC
BH CV ECHO MEAS - PA PR(ACCEL): 39.4 MMHG
BH CV ECHO MEAS - PA V2 MAX: 74.7 CM/SEC
BH CV ECHO MEAS - PULM DIAS VEL: 59.9 CM/SEC
BH CV ECHO MEAS - PULM S/D: 0.44
BH CV ECHO MEAS - PULM SYS VEL: 26.5 CM/SEC
BH CV ECHO MEAS - QP/QS: 0.91
BH CV ECHO MEAS - RAP SYSTOLE: 15 MMHG
BH CV ECHO MEAS - RV MAX PG: 2.25 MMHG
BH CV ECHO MEAS - RV V1 MAX: 75 CM/SEC
BH CV ECHO MEAS - RV V1 VTI: 16.1 CM
BH CV ECHO MEAS - RVOT DIAM: 2.6 CM
BH CV ECHO MEAS - RVSP: 49.7 MMHG
BH CV ECHO MEAS - SI(MOD-SP2): 44.6 ML/M2
BH CV ECHO MEAS - SI(MOD-SP4): 33.9 ML/M2
BH CV ECHO MEAS - SUP REN AO DIAM: 2.4 CM
BH CV ECHO MEAS - SV(LVOT): 90.4 ML
BH CV ECHO MEAS - SV(MOD-SP2): 100 ML
BH CV ECHO MEAS - SV(MOD-SP4): 76 ML
BH CV ECHO MEAS - SV(RVOT): 82.5 ML
BH CV ECHO MEAS - TAPSE (>1.6): 1.59 CM
BH CV ECHO MEAS - TR MAX PG: 34.7 MMHG
BH CV ECHO MEAS - TR MAX VEL: 294.5 CM/SEC
BH CV ECHO MEASUREMENTS AVERAGE E/E' RATIO: 13.59
BH CV XLRA - RV BASE: 3.1 CM
BH CV XLRA - RV LENGTH: 8 CM
BH CV XLRA - RV MID: 3 CM
BH CV XLRA - TDI S': 10.3 CM/SEC
LEFT ATRIUM VOLUME INDEX: 55.8 ML/M2
MAXIMAL PREDICTED HEART RATE: 146 BPM
SINUS: 3.4 CM
STJ: 3.3 CM
STRESS TARGET HR: 124 BPM

## 2022-11-17 PROCEDURE — 93306 TTE W/DOPPLER COMPLETE: CPT | Performed by: INTERNAL MEDICINE

## 2022-11-17 PROCEDURE — 93000 ELECTROCARDIOGRAM COMPLETE: CPT | Performed by: NURSE PRACTITIONER

## 2022-11-17 PROCEDURE — 93306 TTE W/DOPPLER COMPLETE: CPT

## 2022-11-17 PROCEDURE — 99214 OFFICE O/P EST MOD 30 MIN: CPT | Performed by: NURSE PRACTITIONER

## 2022-11-17 PROCEDURE — 25010000002 PERFLUTREN (DEFINITY) 8.476 MG IN SODIUM CHLORIDE (PF) 0.9 % 10 ML INJECTION: Performed by: NURSE PRACTITIONER

## 2022-11-17 RX ADMIN — PERFLUTREN 1.5 ML: 6.52 INJECTION, SUSPENSION INTRAVENOUS at 14:19

## 2022-11-17 NOTE — PROGRESS NOTES
[facility] CARDIOLOGY  [address]  [facility] [address]  [facility] [address]  Phone: [phone]      Patient Name: Renato Marquez  :1950  Age: 72 y.o.  Primary Cardiologist: Mauricio Scott MD  Encounter Provider:  SIRIA Moreladn      Chief Complaint     Chief Complaint: No chief complaint on file.      SUBJECTIVE     History of Present Illness:  Renato Marquez is a 72 y.o.  male whose medical history includes hypertension, hyperlipidemia, KATIE.  He is followed in our office by Dr. Scott for atrial fibrillation and coronary artery disease.     Follow-up:  He is here for early follow-up of medication changes.  At last visit I arrange for him to have IV Lasix and increase his Lasix to 80 mg daily.  He is feeling much better.  His weight is down 10 pounds.  His breathing is comfortable and he has no orthopnea.  His abdomen is less swollen.  He has orthopnea without CPAP but he is compliant with CPAP nightly.  He denies chest pain, orthopnea, palpitations, or syncope.  His anticoagulant was discontinued by GI following his last GI bleed. He occasionally notices blood in his stool.    Below is a summary of pertinent cardiology findings:  • He has history of chronic atrial fibrillation.  He is asymptomatic with atrial fibrillation and declined cardioversion.  • He has history of CAD with prior CABG performed in  by Dr. Jeet Bhagat.  He had CABG with LIMA to LAD SVG to OM, and SVG to the posterior descending coronary arteries.  • He has history of cerebrovascular disease with right carotid endarterectomy in  performed by Dr. Lewis.  • 2016 stress test showed EF 37% and no evidence of ischemia.  • 2018 echocardiogram showed EF 50%, grade 3 LV diastolic dysfunction consistent with reversible restrictive pattern, mild mitral valve regurgitation, mild pulmonic valve regurgitation, mild tricuspid valve regurgitation, and normal RVSP.  • 2022  echocardiogram shows EF 41 to 45%, moderate LV concentric hypertrophy, LV diastolic function was indeterminate, right atrial cavity is severely dilated, calcification of the aortic valve, moderate pulmonary hypertension, and moderate pulmonic valve regurgitation.    Past Medical History:   Diagnosis Date   • CAD (coronary artery disease)    • Cancer (HCC)    • Carotid arterial disease (HCC)    • Chronic atrial fibrillation (HCC)    • Complex sleep apnea syndrome    • Hyperlipidemia    • Hypertension    • Ischemic cardiomyopathy    • Mitral regurgitation    • Rectal bleeding        Past Surgical History:  •  has a past surgical history that includes Finger surgery; Coronary artery bypass graft (); Carotid endarterectomy (Right, ); Colonoscopy; Esophagogastroduodenoscopy (N/A, 6/3/2021); Colonoscopy w/ polypectomy (N/A, 6/3/2021); and Colonoscopy (N/A, 2021).     Social History     Socioeconomic History   • Marital status:    Tobacco Use   • Smoking status: Former     Types: Cigarettes     Quit date: 1991     Years since quittin.9   • Smokeless tobacco: Never   Vaping Use   • Vaping Use: Never used   Substance and Sexual Activity   • Alcohol use: Yes     Alcohol/week: 2.0 standard drinks     Types: 2 Shots of liquor per week     Comment: NIGHTLY   • Drug use: No   • Sexual activity: Not Currently         Review of Systems     Review of Systems   Cardiovascular: Positive for dyspnea on exertion. Negative for chest pain, claudication, cyanosis, irregular heartbeat, leg swelling, near-syncope, orthopnea, palpitations, paroxysmal nocturnal dyspnea and syncope.   Musculoskeletal: Positive for muscle weakness.   Gastrointestinal: Positive for hematochezia. Negative for bloating.       Medications     Allergies as of 2022   • (No Known Allergies)       Current Outpatient Medications on File Prior to Visit   Medication Sig   • amoxicillin (AMOXIL) 875 MG tablet 1 pill q12   • calcium  "carbonate (Tums) 500 MG chewable tablet Chew 2 tablets 2 (Two) Times a Day.   • clopidogrel (PLAVIX) 75 MG tablet Take 1 tablet by mouth Daily.   • famotidine (PEPCID) 40 MG tablet Take 1 tablet by mouth 2 (Two) Times a Day. With lunch and at bedtime   • lisinopril (PRINIVIL,ZESTRIL) 5 MG tablet Take 1 tablet by mouth Daily. Taking 5 mg   • Magnesium 200 MG tablet Take 250 mg by mouth Daily.   • metoprolol succinate XL (TOPROL-XL) 50 MG 24 hr tablet Take 1 tablet by mouth 2 (Two) Times a Day.   • pantoprazole (PROTONIX) 40 MG EC tablet Take 1 tablet by mouth 2 (Two) Times a Day.   • sucralfate (CARAFATE) 1 g tablet Take 1 tablet by mouth 2 (Two) Times a Day.   • [DISCONTINUED] furosemide (LASIX) 40 MG tablet Take 2 tablets by mouth Daily.   • [DISCONTINUED] pravastatin (PRAVACHOL) 40 MG tablet Take 1 tablet by mouth Daily.     No current facility-administered medications on file prior to visit.          OBJECTIVE     Vital Signs:   /68   Pulse 61   Ht 182.9 cm (72\")   Wt 103 kg (226 lb)   SpO2 98%   BMI 30.65 kg/m²       Weight:  Wt Readings from Last 3 Encounters:   11/17/22 103 kg (226 lb)   11/17/22 103 kg (226 lb)   11/08/22 107 kg (236 lb)     Body mass index is 30.65 kg/m².      Physical Exam     Physical Exam  Constitutional:       General: He is not in acute distress.  HENT:      Head: Normocephalic and atraumatic.      Mouth/Throat:      Mouth: Mucous membranes are moist.   Eyes:      General: No scleral icterus.     Extraocular Movements: Extraocular movements intact.      Conjunctiva/sclera: Conjunctivae normal.      Pupils: Pupils are equal, round, and reactive to light.   Cardiovascular:      Rate and Rhythm: Normal rate. Rhythm irregular.      Pulses: Normal pulses.      Heart sounds: S1 normal and S2 normal.   Pulmonary:      Effort: No respiratory distress.      Breath sounds: Normal breath sounds. No wheezing, rhonchi or rales.   Abdominal:      General: Bowel sounds are normal. There is " no distension.      Palpations: Abdomen is soft.      Tenderness: There is no abdominal tenderness.   Musculoskeletal:         General: Normal range of motion.      Cervical back: Normal range of motion and neck supple.      Right lower leg: No edema.      Left lower leg: No edema.   Skin:     General: Skin is warm and dry.      Coloration: Skin is not jaundiced.   Neurological:      Mental Status: He is alert and oriented to person, place, and time.   Psychiatric:         Mood and Affect: Mood normal.         Reviewed Data     Result Review :  The following data was reviewed by SIRIA Moreland on 11/23/22:  • Labs 07/04/2022:  cr 1.0, K 4.6, Tbil 1.4, otherwise unremarkable CMP, Hgb 12.0, Plt 141      ECG 12 Lead    Date/Time: 11/17/2022 2:56 PM  Performed by: Ladan Zapata APRN  Authorized by: Ladan Zapata APRN   Comparison: compared with previous ECG from 11/8/2022  Similar to previous ECG  Rhythm: atrial fibrillation  Ectopy: multifocal PVCs  Rate: normal  BPM: 84  ST Depression: II  T inversion: V6 and V5  T flattening: III and V4    Clinical impression: abnormal EKG            Assessment and Plan        Assessment and Plan     Assessment:  1. Coronary artery disease involving native coronary artery of native heart without angina pectoris    2. Acute on chronic combined systolic and diastolic CHF (congestive heart failure) (HCC)    3. Ischemic cardiomyopathy    4. Permanent atrial fibrillation (HCC)    5. Valvular heart disease    6. Primary hypertension    7. Dyslipidemia    8. Stenosis of right carotid artery    9. Complex sleep apnea syndrome    10. Leg swelling    11. Dyspnea on exertion    12. Gastrointestinal hemorrhage associated with anorectal source         1. Coronary artery disease: He has history of CAD with prior CABG with LIMA to LAD, SVG to OM, and SVG to the posterior descending coronary arteries in 2009 by Dr. Jeet Bhagat.  January 2016 stress study  showed no evidence of ischemia.  He denies chest pain at this time though he did have a very brief episode of sharp, atypical chest pain last week.  His medical therapy includes 75 mg Plavix, 40 mg pravastatin, 50 mg metoprolol succinate twice daily. EKG is stable and he's had no more chest pain.  2. Chronic diastolic heart failure: EF on January 2016 stress test was 35%.  January 2018 echocardiogram showed EF 50%, grade 3 LV diastolic dysfunction consistent with reversible pattern.  November 2022 echocardiogram shows EF 41 to 45% and moderate pulmonary hypertension.  Symptoms are improved on higher dose of lasix; his weight is down.   3. Ischemic cardiomyopathy: January 2016 stress test showed EF 35% and no evidence of ischemia.  EF had improved to 50% on January 2018 echocardiogram.  November 2022 echocardiogram shows EF 41 to 45% and moderate pulmonary hypertension.  His medical therapy also includes ACE inhibitor. He is better-compensated today.   4. Permanent atrial fibrillation: His [id number] score is 4 and he has been off Xarelto since his last GI bleed.  Heart rate is controlled with 50 mg metoprolol succinate twice daily.  5. Valvular regurgitation: Mild mitral, pulmonic, and tricuspid valve regurgitation noted on January 2018 echocardiogram.  No murmur today.  6. Hypertension: Controlled on current regimen.  7. Dyslipidemia: He is treated with 80 mg pravastatin daily.  8. Right carotid artery stenosis: He had right carotid endarterectomy in 2009 by Dr. Lewis.  9. Sleep apnea: He is compliant with CPAP.  10. Leg swelling: Improved.  11. Dyspnea with exertion: Improved.      Plan:  1. I will update labs today to reassess his renal function.  2. I will start 5 mg apixaban BID.  3. I will refer him to the structural heart team for evaluation of possible Watchman procedure.  4. I will recheck an echocardiogram to assess EF and to assess valvular function.  5. He will keep his November 2022 appointment with  Dr. Scott.       Follow Up:  No follow-ups on file.  Orders Placed This Encounter   Procedures   • Basic Metabolic Panel   • Magnesium   • proBNP   • Ambulatory Referral to Cardiology   • ECG 12 Lead   • SCANNED EKG      New Medications Ordered This Visit   Medications   • apixaban (ELIQUIS) 5 MG tablet tablet     Sig: Take 1 tablet by mouth 2 (Two) Times a Day.     Dispense:  60 tablet     Refill:  11         Thank you the opportunity to participate in this patient's care.    SIRIA Oshea    This office note has been dictated.

## 2022-11-18 DIAGNOSIS — E78.5 DYSLIPIDEMIA: ICD-10-CM

## 2022-11-21 DIAGNOSIS — I25.10 CORONARY ARTERY DISEASE INVOLVING NATIVE CORONARY ARTERY OF NATIVE HEART WITHOUT ANGINA PECTORIS: Primary | ICD-10-CM

## 2022-11-23 ENCOUNTER — TELEPHONE (OUTPATIENT)
Dept: CARDIOLOGY | Facility: CLINIC | Age: 72
End: 2022-11-23

## 2022-11-23 ENCOUNTER — LAB (OUTPATIENT)
Dept: LAB | Facility: HOSPITAL | Age: 72
End: 2022-11-23

## 2022-11-23 DIAGNOSIS — I25.10 CORONARY ARTERY DISEASE INVOLVING NATIVE CORONARY ARTERY OF NATIVE HEART WITHOUT ANGINA PECTORIS: Chronic | ICD-10-CM

## 2022-11-23 DIAGNOSIS — M79.89 LEG SWELLING: ICD-10-CM

## 2022-11-23 DIAGNOSIS — R06.09 DYSPNEA ON EXERTION: ICD-10-CM

## 2022-11-23 LAB
MAGNESIUM SERPL-MCNC: 2.1 MG/DL (ref 1.6–2.4)
NT-PROBNP SERPL-MCNC: 2473 PG/ML (ref 0–900)

## 2022-11-23 PROCEDURE — 80061 LIPID PANEL: CPT | Performed by: INTERNAL MEDICINE

## 2022-11-23 PROCEDURE — 83880 ASSAY OF NATRIURETIC PEPTIDE: CPT

## 2022-11-23 PROCEDURE — 80053 COMPREHEN METABOLIC PANEL: CPT | Performed by: INTERNAL MEDICINE

## 2022-11-23 PROCEDURE — 83735 ASSAY OF MAGNESIUM: CPT

## 2022-11-23 RX ORDER — FUROSEMIDE 40 MG/1
TABLET ORAL
Qty: 90 TABLET | Refills: 2
Start: 2022-11-23 | End: 2022-12-07

## 2022-11-23 RX ORDER — PRAVASTATIN SODIUM 40 MG
40 TABLET ORAL DAILY
Qty: 90 TABLET | Refills: 3 | Status: SHIPPED | OUTPATIENT
Start: 2022-11-23

## 2022-11-23 NOTE — TELEPHONE ENCOUNTER
His renal function is a little elevated. Please have him only take the lasix 80 mg on Mon, Wed, Fri. He can take 40 mg on all other days.     Thanks!  SIRIA Mayer

## 2022-11-23 NOTE — TELEPHONE ENCOUNTER
Notified pt. He verbalized understanding.    Thank you,    Chayo Vasques, RN  Triage [facility]  11/23/22 12:31 EST

## 2022-12-07 ENCOUNTER — TELEPHONE (OUTPATIENT)
Dept: CARDIOLOGY | Facility: CLINIC | Age: 72
End: 2022-12-07

## 2022-12-07 DIAGNOSIS — I50.32 CHRONIC DIASTOLIC CONGESTIVE HEART FAILURE: Primary | ICD-10-CM

## 2022-12-07 RX ORDER — FUROSEMIDE 80 MG
TABLET ORAL
Qty: 180 TABLET | Refills: 3
Start: 2022-12-07 | End: 2022-12-29 | Stop reason: SDUPTHER

## 2022-12-12 ENCOUNTER — OFFICE VISIT (OUTPATIENT)
Dept: CARDIOLOGY | Facility: CLINIC | Age: 72
End: 2022-12-12

## 2022-12-12 VITALS
WEIGHT: 223.2 LBS | SYSTOLIC BLOOD PRESSURE: 90 MMHG | DIASTOLIC BLOOD PRESSURE: 70 MMHG | BODY MASS INDEX: 30.23 KG/M2 | HEIGHT: 72 IN | HEART RATE: 65 BPM

## 2022-12-12 DIAGNOSIS — I48.21 PERMANENT ATRIAL FIBRILLATION: Primary | ICD-10-CM

## 2022-12-12 PROCEDURE — 99215 OFFICE O/P EST HI 40 MIN: CPT | Performed by: INTERNAL MEDICINE

## 2022-12-12 PROCEDURE — G2212 PROLONG OUTPT/OFFICE VIS: HCPCS | Performed by: INTERNAL MEDICINE

## 2022-12-12 NOTE — PROGRESS NOTES
[facility] New Patient Office Note     Encounter Date:22  Patient:Renato Marquez  :1950  MRN:[id number]    Referring Provider: Ladan Rendon and Mauricio Scott MD    Consulted for: Evaluation for left atrial occlusion (Watchman)    Chief Complaint:   Chief Complaint   Patient presents with   • Watchman Consult       History of Presenting Illness:      Mr. Marquez is a 72 y.o. gentleman with past medical history notable for permanent atrial fibrillation, Carotid stenosis s/p CEA, coronary artery disease with prior CABG, chronic systolic heart failure, KATIE on cpap at night, pulmonary hypertension, and prior GI bleeding who presents to our office for an initial evaluation for consideration for left atrial appendage occlusion with Watchman due to history of GI bleeding in the past requiring discontinuation of his anticoagulation and concern for future bleeding events.    He had been on chronic anticoagulation for many years dating back to his CABG in .  He was previously was on coumadin but changed to Xalerto a few years back due to ease of use.  He did have a bleeding ulcer in 2021 which was thought to be related to NSAID use.  After holding anticoagulation he was tested back on anticoagulation about 10 days later but still had recurrent bleeding and since then has been off of a blood thinner.  Fortunately he has not had a stroke while off anticoagulation.  He was recently started back on Elqiuis and referred to us for consideration for LAAO.  Since starting Eliquis he had noticed a rash and not sure if it was related to the drug.      Review of Systems:  Review of Systems   Constitutional: Positive for malaise/fatigue.   HENT: Negative.    Eyes: Negative.    Cardiovascular: Positive for dyspnea on exertion and leg swelling.   Respiratory: Positive for shortness of breath.    Endocrine: Negative.    Hematologic/Lymphatic: Negative.    Skin: Negative.     Musculoskeletal: Negative.    Gastrointestinal: Negative.    Genitourinary: Negative.    Neurological: Negative.    Psychiatric/Behavioral: Negative.    Allergic/Immunologic: Negative.        Current Outpatient Medications on File Prior to Visit   Medication Sig Dispense Refill   • apixaban (ELIQUIS) 5 MG tablet tablet Take 1 tablet by mouth 2 (Two) Times a Day. 60 tablet 11   • calcium carbonate (Tums) 500 MG chewable tablet Chew 2 tablets 2 (Two) Times a Day. 180 tablet 3   • clopidogrel (PLAVIX) 75 MG tablet Take 1 tablet by mouth Daily. 90 tablet 3   • famotidine (PEPCID) 40 MG tablet Take 1 tablet by mouth 2 (Two) Times a Day. With lunch and at bedtime 180 tablet 3   • furosemide (LASIX) 80 MG tablet 80 mg on Mon, Wed, Fri. 40 mg all other days. 180 tablet 3   • lisinopril (PRINIVIL,ZESTRIL) 5 MG tablet Take 1 tablet by mouth Daily. Taking 5 mg 90 tablet 3   • Magnesium 200 MG tablet Take 250 mg by mouth Daily.     • metoprolol succinate XL (TOPROL-XL) 50 MG 24 hr tablet Take 1 tablet by mouth 2 (Two) Times a Day. 180 tablet 3   • pantoprazole (PROTONIX) 40 MG EC tablet Take 1 tablet by mouth 2 (Two) Times a Day. 180 tablet 2   • pravastatin (PRAVACHOL) 40 MG tablet TAKE 1 TABLET BY MOUTH DAILY 90 tablet 3   • sucralfate (CARAFATE) 1 g tablet Take 1 tablet by mouth 2 (Two) Times a Day. 180 tablet 3   • amoxicillin (AMOXIL) 875 MG tablet 1 pill q12 20 tablet 0     No current facility-administered medications on file prior to visit.       No Known Allergies    Past Medical History:   Diagnosis Date   • CAD (coronary artery disease)    • Cancer (HCC)     skin cancer   • Carotid arterial disease (HCC)    • Chronic atrial fibrillation (HCC)    • Complex sleep apnea syndrome     BiPAP ST   • Hyperlipidemia    • Hypertension    • Ischemic cardiomyopathy    • Mitral regurgitation    • Rectal bleeding        Past Surgical History:   Procedure Laterality Date   • CAROTID ENDARTERECTOMY Right 2009    Dr Leiws   •  "COLONOSCOPY     • COLONOSCOPY N/A 2021    Procedure: COLONOSCOPY INTO CECUM AND TI;  Surgeon: Artur Jacobson MD;  Location: [facility] ENDOSCOPY;  Service: Gastroenterology;  Laterality: N/A;  PRE: RECTAL BLEEDING  POST: DIVERTICULOSIS, HEMORRHOIDS   • COLONOSCOPY W/ POLYPECTOMY N/A 6/3/2021    Procedure: COLONOSCOPY, polypectomies;  Surgeon: Luan Meek MD;  Location: [facility] OR;  Service: Gastroenterology;  Laterality: N/A;  Diverticulosis  Ascending colon polyp x 4 (3 cold snare)  Cecal polyp (cold snare)  Rectal polyp   • CORONARY ARTERY BYPASS GRAFT      MIMI Frederick to LAD, SVG to OM, SVG Post Desc   • ENDOSCOPY N/A 6/3/2021    Procedure: ESOPHAGOGASTRODUODENOSCOPY with biopsies;  Surgeon: Luan Meek MD;  Location: [facility] OR;  Service: Gastroenterology;  Laterality: N/A;  Reflux  Gastric and duodenal ulcers  Biopsies: gastric, distal esophagus   • FINGER SURGERY         Social History     Socioeconomic History   • Marital status:    Tobacco Use   • Smoking status: Former     Types: Cigarettes     Quit date: 1991     Years since quittin.9   • Smokeless tobacco: Never   Vaping Use   • Vaping Use: Never used   Substance and Sexual Activity   • Alcohol use: Yes     Alcohol/week: 2.0 standard drinks     Types: 2 Shots of liquor per week     Comment: NIGHTLY/caffeine use   • Drug use: No   • Sexual activity: Not Currently       Family History   Problem Relation Age of Onset   • Heart disease Father    • Stroke Father    • Hypertension Father        The following portions of the patient's history were reviewed and updated as appropriate: allergies, current medications, past family history, past medical history, past social history, past surgical history and problem list.       Objective:       Vitals:    22 1259   BP: 90/70   BP Location: Left arm   Patient Position: Sitting   Pulse: 65   Weight: 101 kg (223 lb 3.2 oz)   Height: 182.9 cm (72\") "       Body mass index is 30.27 kg/m².    Physical Exam:  Constitutional: Well appearing, Well-developed, No acute distress   HENT: Oropharynx clear and membrane moist  Eyes: Normal conjunctiva, no sclera icterus.  Neck: Supple, no carotid bruit bilaterally.  Cardiovascular: Irregularly irregular rate and rhythm, No Murmur, Trace bilateral lower extremity edema.  Pulmonary: Normal respiratory effort, Normal lung sounds, no wheezing.  Neurological: Alert and orient x 3.   Skin: Warm, dry, no ecchymosis, mild macular diffuse rash.  Psych: Appropriate mood and affect. Normal judgment and insight.      Lab Results   Component Value Date     11/23/2022     11/08/2022    K 4.3 11/23/2022    K 4.5 11/08/2022    CL 99 11/23/2022     11/08/2022    CO2 28.0 11/23/2022    CO2 26.1 11/08/2022    BUN 36 (H) 11/23/2022    BUN 29 (H) 11/08/2022    CREATININE 1.60 (H) 11/23/2022    CREATININE 1.46 (H) 11/08/2022    EGFRIFNONA 56 (L) 09/07/2021    EGFRIFNONA 77 06/21/2021    EGFRIFAFRI 77 09/29/2020    GLUCOSE 135 (H) 11/23/2022    GLUCOSE 103 (H) 11/08/2022    CALCIUM 9.2 11/23/2022    CALCIUM 9.7 11/08/2022    PROTENTOTREF 6.2 09/29/2020    ALBUMIN 4.40 11/23/2022    ALBUMIN 3.90 11/08/2022    BILITOT 1.2 11/23/2022    BILITOT 1.1 11/08/2022    AST 31 11/23/2022    AST 34 11/08/2022    ALT 16 11/23/2022    ALT 19 11/08/2022     Lab Results   Component Value Date    WBC 5.98 11/08/2022    WBC 8.93 07/04/2022    HGB 12.2 (L) 11/08/2022    HGB 12.0 (L) 07/04/2022    HCT 37.1 (L) 11/08/2022    HCT 35.9 (L) 07/04/2022    MCV 95.6 11/08/2022    MCV 94.0 07/04/2022     11/08/2022     07/04/2022     Lab Results   Component Value Date    CHOL 156 11/23/2022    CHOL 154 01/29/2019    TRIG 117 11/23/2022    TRIG 62 09/29/2020    HDL 50 11/23/2022    HDL 71 (H) 09/29/2020    LDL 85 11/23/2022    LDL 79 09/29/2020     Lab Results   Component Value Date    PROBNP 2,473.0 (H) 11/23/2022    PROBNP 4,945.0 (H)  11/08/2022     Lab Results   Component Value Date    TROPONINT <0.010 06/20/2021     Lab Results   Component Value Date    TSH 1.510 06/22/2021    TSH 1.260 09/29/2020       Echocardiogram 11/17/2022 with images reviewed by myself:  · Left ventricular ejection fraction appears to be 41 - 45%.  · Left ventricular wall thickness is consistent with moderate concentric hypertrophy.  · Left ventricular diastolic function was indeterminate.  · The right atrial cavity is severely  dilated.  · There is calcification of the aortic valve.  · Estimated right ventricular systolic pressure from tricuspid regurgitation is moderately elevated (45-55 mmHg). Calculated right ventricular systolic pressure from tricuspid regurgitation is 50 mmHg.  · Moderate pulmonic valve regurgitation is present.    Holter Monitor 6/21/2021:  · An abnormal monitor study.  · Atrial fibrillation is present throughout  · Average heart beat 93 bpm, range during A. fib  bpm, frequent episodes of heart rate greater than 100 are seen.  · Frequent ventricular ectopy is seen  · 1 episode of wide-complex tachycardia that appears to be consistent with ventricular tachycardia is present at 1:25 PM, 29 beats duration, 170 bpm          Assessment:          Diagnosis Plan   1. Permanent atrial fibrillation (HCC)               Plan:       Mr. Marquez is a 72 y.o. gentleman with past medical history notable for permanent atrial fibrillation, Carotid stenosis s/p CEA, coronary artery disease with prior CABG, chronic systolic heart failure, KATIE on cpap at night, pulmonary hypertension, and prior GI bleeding who presents to our office for an initial evaluation for consideration for left atrial appendage occlusion with Watchman due to history of GI bleeding in the past requiring discontinuation of his anticoagulation and concern for future bleeding events. Based on his CHADVASC and HASBLED scores I do think consideration LAAO would be appropriate to avoid future  bleeding risk events.  I did explain the work up, procedure, and follow up needed as part of the procedure.  We also discussed monitoring him on his current anticoagulation trial.  They are interested but would like to think and review information given to them first.   I will reach out to them at the end of the week to discuss further and if interested in proceeding further.  His Cr is a little borderline but would be suitable for CTA for screening his appendage but he does have labs this Wed and will follow up on those results.      Permanent Atrial Fibrillation:  · CHADVASC score 4  · HASBLED Score 5  · Would be a good candidate for consideration for LAAO based on stroke risk and bleeding risk to avoid complications for long term anticoagulation use while still addressing his elevated stroke risk.  · Patient and wife would like to think about the procedure further and will reach out to them at the end of the week.  · Labs planned for Wed and if CrCl stable or hopefully improved could consider Cardiac CTA for appendage anatomy screeening and work up.  · Recent rash raises concern for possible drug rash will notify primary cardiology team if they would like to consider alternative agent such as going back to Xalerto.     Follow up:  Will discuss again with patient and family later this week if interested in further work up for LAAO.    Thank you for allowing me to participate in the care of Renato Marquez. Feel free to contact me directly with any further questions or concerns.    Dustin Neal MD  [facility]  12/12/22  20:38 EST        I spent 62 minutes caring for Renato on this date of service. This time includes time spent by me in the following activities: preparing for the visit, reviewing tests, obtaining and/or reviewing a separately obtained history, performing a medically appropriate examination and/or evaluation, counseling and educating the patient/family/caregiver, referring and  communicating with other health care professionals, documenting information in the medical record and independently interpreting results and communicating that information with the patient/family/caregiver

## 2022-12-13 ENCOUNTER — TELEPHONE (OUTPATIENT)
Dept: CARDIOLOGY | Facility: CLINIC | Age: 72
End: 2022-12-13

## 2022-12-13 NOTE — TELEPHONE ENCOUNTER
Dr. Neal noted rash when he saw patient yesterday. Does he feel this is related to Eliquis? Does he want to try going back on Xarelto?    Thanks!  SIRIA Mayer

## 2022-12-13 NOTE — TELEPHONE ENCOUNTER
----- Message from Dustin Neal MD sent at 12/12/2022  8:39 PM EST -----  Regarding: Possible drug rash  Thank you for sending Mr. Marquez to me for LAAO evaluation.  He and his wife seem interested but would like to review information given to them today and think about it.  I will reach out to them later this week to reassess interest in further work up.  I did want to let you know it seems like he might be developing a possible drug rash to Eliquis as skin is a little red and itching.  Might be worth going back to Xarelto although I know he bleed on this in the past.      Thanks  Dustin

## 2022-12-13 NOTE — TELEPHONE ENCOUNTER
I tried to call Renato Marquez but there was no answer.  Left a voicemail asking patient to call back.  Will continue to try to reach pt.    Thank you,    Evelia Salmon RN  Triage [facility] [place]  12/13/22 14:17 EST

## 2022-12-14 ENCOUNTER — TELEPHONE (OUTPATIENT)
Dept: CARDIOLOGY | Facility: CLINIC | Age: 72
End: 2022-12-14

## 2022-12-14 ENCOUNTER — LAB (OUTPATIENT)
Dept: LAB | Facility: HOSPITAL | Age: 72
End: 2022-12-14

## 2022-12-14 DIAGNOSIS — I50.32 CHRONIC DIASTOLIC CONGESTIVE HEART FAILURE: ICD-10-CM

## 2022-12-14 LAB
ANION GAP SERPL CALCULATED.3IONS-SCNC: 9 MMOL/L (ref 5–15)
BUN SERPL-MCNC: 36 MG/DL (ref 8–23)
BUN/CREAT SERPL: 30 (ref 7–25)
CALCIUM SPEC-SCNC: 9.6 MG/DL (ref 8.6–10.5)
CHLORIDE SERPL-SCNC: 97 MMOL/L (ref 98–107)
CO2 SERPL-SCNC: 30 MMOL/L (ref 22–29)
CREAT SERPL-MCNC: 1.2 MG/DL (ref 0.76–1.27)
EGFRCR SERPLBLD CKD-EPI 2021: 63.5 ML/MIN/1.73
GLUCOSE SERPL-MCNC: 120 MG/DL (ref 65–99)
MAGNESIUM SERPL-MCNC: 2.4 MG/DL (ref 1.6–2.4)
POTASSIUM SERPL-SCNC: 4.2 MMOL/L (ref 3.5–5.2)
SODIUM SERPL-SCNC: 136 MMOL/L (ref 136–145)

## 2022-12-14 PROCEDURE — 83735 ASSAY OF MAGNESIUM: CPT

## 2022-12-14 PROCEDURE — 80048 BASIC METABOLIC PNL TOTAL CA: CPT

## 2022-12-14 PROCEDURE — 36415 COLL VENOUS BLD VENIPUNCTURE: CPT

## 2022-12-14 NOTE — TELEPHONE ENCOUNTER
I spoke with Renato Marquez and updated pt on refill by provider.  I informed pt that we have added Eliquis as an allergy for him and to remember this in the future, with the reaction of rash and itching.  Pt verbalized understanding.    Thank you,    Evelia Salmon RN  Triage [facility]  12/14/22 12:23 EST

## 2022-12-14 NOTE — TELEPHONE ENCOUNTER
His labs look better. How does he feel on 80 mg MWF and 40 mg all other days?    Thanks!  SIRIA Mayer

## 2022-12-14 NOTE — TELEPHONE ENCOUNTER
I spoke with pt and he is now having a rash with itching.  He reports he does believe it is related to the Eliquis.      Pt is agreeable to switching back to Xarelto.  He tells me he probably needs a refill on medication.  I confirmed that the [facility] on file on [facility] is the preferred pharmacy.

## 2022-12-19 ENCOUNTER — TELEPHONE (OUTPATIENT)
Dept: CARDIOLOGY | Facility: CLINIC | Age: 72
End: 2022-12-19

## 2022-12-19 NOTE — TELEPHONE ENCOUNTER
Call patient regarding watchman.  We met last Monday and they wanted to think about it some more before making a decision regarding neck steps as well as to think of any further questions.  Family is coming to town for holidays they really have not had time to do any of that and thus are not really ready to make a decision at this point.  Son leaves Wednesday there and I think about it after that.  I have asked him to call our office if they have any specific questions and can answer them once they have them available.  They also have an appointment with their primary cardiologist Dr. Scott on 1/5/2022 and can discuss further with them but would be happy to further work-up for watchman or answer any further questions as need be pending their decision

## 2022-12-20 ENCOUNTER — HOSPITAL ENCOUNTER (OUTPATIENT)
Facility: HOSPITAL | Age: 72
Setting detail: OBSERVATION
Discharge: HOME | End: 2022-12-21
Attending: EMERGENCY MEDICINE | Admitting: EMERGENCY MEDICINE

## 2022-12-20 ENCOUNTER — TELEPHONE (OUTPATIENT)
Dept: CARDIOLOGY | Facility: CLINIC | Age: 72
End: 2022-12-20

## 2022-12-20 DIAGNOSIS — K62.5 RECTAL BLEEDING: Primary | ICD-10-CM

## 2022-12-20 LAB
ABO GROUP BLD: NORMAL
ALBUMIN SERPL-MCNC: 3.9 G/DL (ref 3.5–5.2)
ALBUMIN/GLOB SERPL: 1.6 G/DL
ALP SERPL-CCNC: 103 U/L (ref 39–117)
ALT SERPL W P-5'-P-CCNC: 14 U/L (ref 1–41)
ANION GAP SERPL CALCULATED.3IONS-SCNC: 7.3 MMOL/L (ref 5–15)
AST SERPL-CCNC: 26 U/L (ref 1–40)
BASOPHILS # BLD AUTO: 0.02 10*3/MM3 (ref 0–0.2)
BASOPHILS NFR BLD AUTO: 0.2 % (ref 0–1.5)
BILIRUB SERPL-MCNC: 1 MG/DL (ref 0–1.2)
BLD GP AB SCN SERPL QL: NEGATIVE
BUN SERPL-MCNC: 46 MG/DL (ref 8–23)
BUN/CREAT SERPL: 36.8 (ref 7–25)
CALCIUM SPEC-SCNC: 8.9 MG/DL (ref 8.6–10.5)
CHLORIDE SERPL-SCNC: 103 MMOL/L (ref 98–107)
CO2 SERPL-SCNC: 27.7 MMOL/L (ref 22–29)
CREAT SERPL-MCNC: 1.25 MG/DL (ref 0.76–1.27)
DEPRECATED RDW RBC AUTO: 48.2 FL (ref 37–54)
EGFRCR SERPLBLD CKD-EPI 2021: 60.4 ML/MIN/1.73
EOSINOPHIL # BLD AUTO: 0.09 10*3/MM3 (ref 0–0.4)
EOSINOPHIL NFR BLD AUTO: 1.1 % (ref 0.3–6.2)
ERYTHROCYTE [DISTWIDTH] IN BLOOD BY AUTOMATED COUNT: 13.4 % (ref 12.3–15.4)
GLOBULIN UR ELPH-MCNC: 2.4 GM/DL
GLUCOSE SERPL-MCNC: 114 MG/DL (ref 65–99)
HCT VFR BLD AUTO: 35.4 % (ref 37.5–51)
HGB BLD-MCNC: 11.3 G/DL (ref 13–17.7)
IMM GRANULOCYTES # BLD AUTO: 0.02 10*3/MM3 (ref 0–0.05)
IMM GRANULOCYTES NFR BLD AUTO: 0.2 % (ref 0–0.5)
LYMPHOCYTES # BLD AUTO: 0.87 10*3/MM3 (ref 0.7–3.1)
LYMPHOCYTES NFR BLD AUTO: 10.7 % (ref 19.6–45.3)
MCH RBC QN AUTO: 31.3 PG (ref 26.6–33)
MCHC RBC AUTO-ENTMCNC: 31.9 G/DL (ref 31.5–35.7)
MCV RBC AUTO: 98.1 FL (ref 79–97)
MONOCYTES # BLD AUTO: 0.94 10*3/MM3 (ref 0.1–0.9)
MONOCYTES NFR BLD AUTO: 11.6 % (ref 5–12)
NEUTROPHILS NFR BLD AUTO: 6.16 10*3/MM3 (ref 1.7–7)
NEUTROPHILS NFR BLD AUTO: 76.2 % (ref 42.7–76)
NRBC BLD AUTO-RTO: 0 /100 WBC (ref 0–0.2)
PLATELET # BLD AUTO: 110 10*3/MM3 (ref 140–450)
PMV BLD AUTO: 10.7 FL (ref 6–12)
POTASSIUM SERPL-SCNC: 4.5 MMOL/L (ref 3.5–5.2)
PROT SERPL-MCNC: 6.3 G/DL (ref 6–8.5)
RBC # BLD AUTO: 3.61 10*6/MM3 (ref 4.14–5.8)
RH BLD: POSITIVE
SODIUM SERPL-SCNC: 138 MMOL/L (ref 136–145)
T&S EXPIRATION DATE: NORMAL
WBC NRBC COR # BLD: 8.1 10*3/MM3 (ref 3.4–10.8)

## 2022-12-20 PROCEDURE — 86850 RBC ANTIBODY SCREEN: CPT | Performed by: EMERGENCY MEDICINE

## 2022-12-20 PROCEDURE — 86901 BLOOD TYPING SEROLOGIC RH(D): CPT | Performed by: EMERGENCY MEDICINE

## 2022-12-20 PROCEDURE — G0378 HOSPITAL OBSERVATION PER HR: HCPCS

## 2022-12-20 PROCEDURE — 36415 COLL VENOUS BLD VENIPUNCTURE: CPT

## 2022-12-20 PROCEDURE — 86900 BLOOD TYPING SEROLOGIC ABO: CPT | Performed by: EMERGENCY MEDICINE

## 2022-12-20 PROCEDURE — 99284 EMERGENCY DEPT VISIT MOD MDM: CPT

## 2022-12-20 PROCEDURE — 80053 COMPREHEN METABOLIC PANEL: CPT | Performed by: EMERGENCY MEDICINE

## 2022-12-20 PROCEDURE — 85025 COMPLETE CBC W/AUTO DIFF WBC: CPT | Performed by: EMERGENCY MEDICINE

## 2022-12-20 RX ORDER — NITROGLYCERIN 0.4 MG/1
0.4 TABLET SUBLINGUAL
Status: DISCONTINUED | OUTPATIENT
Start: 2022-12-20 | End: 2022-12-21 | Stop reason: HOSPADM

## 2022-12-20 RX ORDER — CLOPIDOGREL BISULFATE 75 MG/1
75 TABLET ORAL DAILY
Status: DISCONTINUED | OUTPATIENT
Start: 2022-12-21 | End: 2022-12-21 | Stop reason: HOSPADM

## 2022-12-20 RX ORDER — SODIUM CHLORIDE 0.9 % (FLUSH) 0.9 %
10 SYRINGE (ML) INJECTION AS NEEDED
Status: DISCONTINUED | OUTPATIENT
Start: 2022-12-20 | End: 2022-12-21 | Stop reason: HOSPADM

## 2022-12-20 RX ORDER — METOPROLOL SUCCINATE 50 MG/1
50 TABLET, EXTENDED RELEASE ORAL 2 TIMES DAILY
Status: DISCONTINUED | OUTPATIENT
Start: 2022-12-20 | End: 2022-12-21 | Stop reason: HOSPADM

## 2022-12-20 RX ORDER — PRAVASTATIN SODIUM 40 MG
40 TABLET ORAL DAILY
Status: DISCONTINUED | OUTPATIENT
Start: 2022-12-21 | End: 2022-12-21 | Stop reason: HOSPADM

## 2022-12-20 RX ORDER — FUROSEMIDE 40 MG/1
80 TABLET ORAL DAILY
Status: DISCONTINUED | OUTPATIENT
Start: 2022-12-21 | End: 2022-12-21 | Stop reason: HOSPADM

## 2022-12-20 RX ORDER — LISINOPRIL 10 MG/1
5 TABLET ORAL DAILY
Status: DISCONTINUED | OUTPATIENT
Start: 2022-12-21 | End: 2022-12-21 | Stop reason: HOSPADM

## 2022-12-20 RX ORDER — SODIUM CHLORIDE 9 MG/ML
40 INJECTION, SOLUTION INTRAVENOUS AS NEEDED
Status: DISCONTINUED | OUTPATIENT
Start: 2022-12-20 | End: 2022-12-21 | Stop reason: HOSPADM

## 2022-12-20 RX ORDER — SODIUM CHLORIDE 0.9 % (FLUSH) 0.9 %
10 SYRINGE (ML) INJECTION EVERY 12 HOURS SCHEDULED
Status: DISCONTINUED | OUTPATIENT
Start: 2022-12-20 | End: 2022-12-21 | Stop reason: HOSPADM

## 2022-12-20 RX ORDER — SUCRALFATE 1 G/1
1 TABLET ORAL 2 TIMES DAILY
Status: DISCONTINUED | OUTPATIENT
Start: 2022-12-20 | End: 2022-12-21 | Stop reason: HOSPADM

## 2022-12-20 RX ORDER — ONDANSETRON 2 MG/ML
4 INJECTION INTRAMUSCULAR; INTRAVENOUS EVERY 6 HOURS PRN
Status: DISCONTINUED | OUTPATIENT
Start: 2022-12-20 | End: 2022-12-21 | Stop reason: HOSPADM

## 2022-12-20 RX ORDER — PANTOPRAZOLE SODIUM 40 MG/1
40 TABLET, DELAYED RELEASE ORAL 2 TIMES DAILY
Status: DISCONTINUED | OUTPATIENT
Start: 2022-12-20 | End: 2022-12-21 | Stop reason: HOSPADM

## 2022-12-20 RX ADMIN — SUCRALFATE 1 G: 1 TABLET ORAL at 22:43

## 2022-12-20 RX ADMIN — Medication 10 ML: at 22:43

## 2022-12-20 RX ADMIN — PANTOPRAZOLE SODIUM 40 MG: 40 TABLET, DELAYED RELEASE ORAL at 22:43

## 2022-12-20 RX ADMIN — METOPROLOL SUCCINATE 50 MG: 50 TABLET, FILM COATED, EXTENDED RELEASE ORAL at 22:43

## 2022-12-20 NOTE — ED NOTES
Pt arrived by Pv reports rectal bleeding, pt is on blood thinners.     Patient was placed in face mask during first look triage.  Patient was wearing a face mask throughout encounter.  I wore personal protective equipment throughout the encounter.  Hand hygiene was performed before and after patient encounter.

## 2022-12-20 NOTE — ED TRIAGE NOTES
Pt currently on xarelto, pt states he has been off and on the medication due to having intermittent rectal bleeding. Pt states this morning he noticed blood in his stool.

## 2022-12-20 NOTE — ED PROVIDER NOTES
EMERGENCY DEPARTMENT ENCOUNTER    Room Number:  107/1  Date seen:  12/20/2022  PCP: Provider, No Known  Historian: Patient      HPI:  Chief Complaint: Rectal bleeding  A complete HPI/ROS/PMH/PSH/SH/FH are unobtainable due to: Nothing  Context: Renato Marquez is a 72 y.o. male who presents to the ED c/o 3 episodes of bright red blood per rectum that occurred since this morning.  Patient reports that he has not had any significant stool, just passing bright red blood on 2 occasions today.  He denies abdominal pain.  No chest pain or shortness of breath.  No lightheadedness.  Patient is on both Xarelto and Plavix.  He has a history of permanent atrial fibrillation.  He has had recurrent GI bleeding in the past.  He is being evaluated for a possible watchman device by cardiology.        PAST MEDICAL HISTORY  Active Ambulatory Problems     Diagnosis Date Noted   • CAD (coronary artery disease)    • Carotid arterial disease (HCC)    • Permanent atrial fibrillation (HCC)    • Ischemic cardiomyopathy    • Mitral regurgitation    • Chronic anticoagulation 03/23/2017   • Hypothyroidism 03/14/2013   • HTN (hypertension) 03/14/2013   • Dyslipidemia 03/14/2013   • Hx of melanoma of skin 09/29/2020   • History of basal cell carcinoma (BCC) of skin 09/29/2020   • Leg cramps 09/29/2020   • DDD (degenerative disc disease), lumbar 09/29/2020   • Chronic bilateral low back pain with right-sided sciatica 09/29/2020   • Hyperkalemia 09/30/2020   • Abnormal x-ray of lumbar spine 10/02/2020   • Lumbar facet arthropathy 10/02/2020   • Chronic pain of both knees 10/26/2020   • Abnormal MRI, knee 11/17/2020   • Complex tear of medial meniscus of right knee as current injury 11/19/2020   • Primary osteoarthritis of right knee 11/19/2020   • Class 1 obesity due to excess calories without serious comorbidity with body mass index (BMI) of 31.0 to 31.9 in adult 01/22/2021   • Complex sleep apnea syndrome 04/02/2021   • GI bleed 06/02/2021   •  Gastrointestinal hemorrhage associated with anorectal source 06/02/2021   • Anemia due to acute blood loss 06/02/2021   • Adenomatous polyp of ascending colon 06/08/2021   • Rectal bleeding 06/21/2021   • Hyponatremia 06/21/2021   • Elevated brain natriuretic peptide (BNP) level 06/21/2021   • Acute on chronic combined systolic and diastolic CHF (congestive heart failure) (HCC) 06/21/2021   • Hemorrhagic disorder due to circulating anticoagulants (HCC) 06/21/2021   • Gastroesophageal reflux disease with esophagitis without hemorrhage 08/02/2021   • 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 08/02/2021   • Personal history of colonic polyps 08/02/2021   • Diverticulosis 08/02/2021     Resolved Ambulatory Problems     Diagnosis Date Noted   • Observed sleep apnea 01/22/2021   • Snoring 01/22/2021   • Hypersomnia 01/22/2021     Past Medical History:   Diagnosis Date   • Cancer (HCC)    • Chronic atrial fibrillation (HCC)    • Hyperlipidemia    • Hypertension          PAST SURGICAL HISTORY  Past Surgical History:   Procedure Laterality Date   • CAROTID ENDARTERECTOMY Right 2009    Dr Lewis   • COLONOSCOPY     • COLONOSCOPY N/A 6/22/2021    Procedure: COLONOSCOPY INTO CECUM AND TI;  Surgeon: Artur Jacobson MD;  Location: [facility] ENDOSCOPY;  Service: Gastroenterology;  Laterality: N/A;  PRE: RECTAL BLEEDING  POST: DIVERTICULOSIS, HEMORRHOIDS   • COLONOSCOPY W/ POLYPECTOMY N/A 6/3/2021    Procedure: COLONOSCOPY, polypectomies;  Surgeon: Luan Meek MD;  Location: [facility] OR;  Service: Gastroenterology;  Laterality: N/A;  Diverticulosis  Ascending colon polyp x 4 (3 cold snare)  Cecal polyp (cold snare)  Rectal polyp   • CORONARY ARTERY BYPASS GRAFT  2009    MIMI Frederick to LAD, SVG to OM, SVG Post Desc   • ENDOSCOPY N/A 6/3/2021    Procedure: ESOPHAGOGASTRODUODENOSCOPY with biopsies;  Surgeon: Luan Meek MD;  Location: [facility];  Service: Gastroenterology;  Laterality: N/A;   Reflux  Gastric and duodenal ulcers  Biopsies: gastric, distal esophagus   • FINGER SURGERY           FAMILY HISTORY  Family History   Problem Relation Age of Onset   • Heart disease Father    • Stroke Father    • Hypertension Father          SOCIAL HISTORY  Social History     Socioeconomic History   • Marital status:    Tobacco Use   • Smoking status: Former     Types: Cigarettes     Quit date: 1991     Years since quittin.9   • Smokeless tobacco: Never   Vaping Use   • Vaping Use: Never used   Substance and Sexual Activity   • Alcohol use: Yes     Alcohol/week: 2.0 standard drinks     Types: 2 Shots of liquor per week     Comment: NIGHTLY/caffeine use   • Drug use: No   • Sexual activity: Not Currently         ALLERGIES  Apixaban        REVIEW OF SYSTEMS  Review of Systems   Review of all 14 systems is negative other than stated in the HPI above.      PHYSICAL EXAM  ED Triage Vitals   Temp Heart Rate Resp BP SpO2   22 1640 22 1638 22 1638 22 1639 22 1638   96 °F (35.6 °C) 52 18 150/79 96 %      Temp src Heart Rate Source Patient Position BP Location FiO2 (%)   22 1640 22 1638 22 1639 22 1639 --   Tympanic Monitor Standing Right arm        Physical Exam      GENERAL: Awake and alert, no acute distress  HENT: nares patent  EYES: no scleral icterus, EOMI  CV: regular rhythm, normal rate  RESPIRATORY: normal effort  ABDOMEN: soft, nondistended, nontender throughout.  Small external hemorrhoids on rectal examination with no active bleeding.  MUSCULOSKELETAL: no deformity  NEURO: alert, moves all extremities, follows commands  PSYCH:  calm, cooperative  SKIN: warm, dry    Vital signs and nursing notes reviewed.          LAB RESULTS  Recent Results (from the past 24 hour(s))   Type & Screen    Collection Time: 22  5:39 PM    Specimen: Blood   Result Value Ref Range    ABO Type A     RH type Positive     Antibody Screen Negative     T&S  Expiration Date 12/23/2022 11:59:59 PM    CBC Auto Differential    Collection Time: 12/20/22  5:39 PM    Specimen: Blood   Result Value Ref Range    WBC 8.10 3.40 - 10.80 10*3/mm3    RBC 3.61 (L) 4.14 - 5.80 10*6/mm3    Hemoglobin 11.3 (L) 13.0 - 17.7 g/dL    Hematocrit 35.4 (L) 37.5 - 51.0 %    MCV 98.1 (H) 79.0 - 97.0 fL    MCH 31.3 26.6 - 33.0 pg    MCHC 31.9 31.5 - 35.7 g/dL    RDW 13.4 12.3 - 15.4 %    RDW-SD 48.2 37.0 - 54.0 fl    MPV 10.7 6.0 - 12.0 fL    Platelets 110 (L) 140 - 450 10*3/mm3    Neutrophil % 76.2 (H) 42.7 - 76.0 %    Lymphocyte % 10.7 (L) 19.6 - 45.3 %    Monocyte % 11.6 5.0 - 12.0 %    Eosinophil % 1.1 0.3 - 6.2 %    Basophil % 0.2 0.0 - 1.5 %    Immature Grans % 0.2 0.0 - 0.5 %    Neutrophils, Absolute 6.16 1.70 - 7.00 10*3/mm3    Lymphocytes, Absolute 0.87 0.70 - 3.10 10*3/mm3    Monocytes, Absolute 0.94 (H) 0.10 - 0.90 10*3/mm3    Eosinophils, Absolute 0.09 0.00 - 0.40 10*3/mm3    Basophils, Absolute 0.02 0.00 - 0.20 10*3/mm3    Immature Grans, Absolute 0.02 0.00 - 0.05 10*3/mm3    nRBC 0.0 0.0 - 0.2 /100 WBC   Comprehensive Metabolic Panel    Collection Time: 12/20/22  7:54 PM    Specimen: Blood   Result Value Ref Range    Glucose 114 (H) 65 - 99 mg/dL    BUN 46 (H) 8 - 23 mg/dL    Creatinine 1.25 0.76 - 1.27 mg/dL    Sodium 138 136 - 145 mmol/L    Potassium 4.5 3.5 - 5.2 mmol/L    Chloride 103 98 - 107 mmol/L    CO2 27.7 22.0 - 29.0 mmol/L    Calcium 8.9 8.6 - 10.5 mg/dL    Total Protein 6.3 6.0 - 8.5 g/dL    Albumin 3.90 3.50 - 5.20 g/dL    ALT (SGPT) 14 1 - 41 U/L    AST (SGOT) 26 1 - 40 U/L    Alkaline Phosphatase 103 39 - 117 U/L    Total Bilirubin 1.0 0.0 - 1.2 mg/dL    Globulin 2.4 gm/dL    A/G Ratio 1.6 g/dL    BUN/Creatinine Ratio 36.8 (H) 7.0 - 25.0    Anion Gap 7.3 5.0 - 15.0 mmol/L    eGFR 60.4 >60.0 mL/min/1.73       Ordered the above labs and reviewed the results.        RADIOLOGY  No Radiology Exams Resulted Within Past 24 Hours    Ordered the above noted radiological  studies. Reviewed by me in PACS.            PROCEDURES  Procedures            MEDICATIONS GIVEN IN ER  Medications   sodium chloride 0.9 % flush 10 mL (has no administration in time range)   sodium chloride 0.9 % flush 10 mL (has no administration in time range)   sodium chloride 0.9 % flush 10 mL (has no administration in time range)   sodium chloride 0.9 % infusion 40 mL (has no administration in time range)   nitroglycerin (NITROSTAT) SL tablet 0.4 mg (has no administration in time range)   ondansetron (ZOFRAN) injection 4 mg (has no administration in time range)                   MEDICAL DECISION MAKING, PROGRESS, and CONSULTS    All labs have been independently reviewed by me.  All radiology studies have been reviewed by me and I have also reviewed the radiology report.   EKG's independently viewed and interpreted by me.  Discussion below represents my analysis of pertinent findings related to patient's condition, differential diagnosis, treatment plan and final disposition.      Additional sources:    - External (non-ED) record review: Prior colonoscopy and EGD reports reviewed    - Chronic or social conditions impacting care: Patient has chronic atrial fibrillation requiring anticoagulation for stroke prophylaxis.  He also has a history of diverticulosis, hemorrhoids, gastritis, duodenitis.    - Shared decision making: Patient informed of plan for admission for continued monitoring of his hemoglobin, possible EGD or colonoscopy if his hemoglobin drops or bleeding persist.      Orders placed during this visit:  Orders Placed This Encounter   Procedures   • CT Abdomen With & Without Contrast   • Comprehensive Metabolic Panel   • CBC Auto Differential   • Potassium   • Magnesium   • Troponin   • Blood Gas, Arterial -   • Hemoglobin   • NPO Diet NPO Type: Strict NPO   • Pulse Oximetry, Continuous   • Monitor Blood Pressure   • Vital Signs   • Intake & Output   • Weigh Patient   • Oral Care   • Vital Signs   •  Strict Intake & Output   • Daily Weights   • Telemetry - Maintain IV Access   • Continuous Cardiac Monitoring   • Telemetry - Pulse Oximetry   • May Be Off Telemetry for Tests   • Notify Provider if ACLS Protocol Activated   • Notify Provider of Gross GI Bleeding   • Verify Informed Consent for Blood Product Administration   • Code Status and Medical Interventions:   • Gastroenterology Consult   • Inpatient Gastroenterology Consult   • Oxygen Therapy- Nasal Cannula; Titrate for SPO2: 90% - 95%   • ECG 12 Lead Other; Acute Chest Pain or Dysrhythmia   • Type & Screen   • Insert Peripheral IV   • Insert Peripheral IV   • Initiate ED Observation Status   • CBC & Differential           Differential diagnosis:    Internal hemorrhoids  Diverticulosis  Colonic polyp  Colonic AVM  Brisk upper GI bleed        Independent interpretation of labs, radiology studies, and discussions with consultants:  ED Course as of 12/20/22 2118   Tue Dec 20, 2022   1812 Hemoglobin(!): 11.3 [JR]   1812 WBC: 8.10 [JR]   1812 Hgb 12.2 one month prior   [JR]   1847 Patient medical record review: I reviewed recent cardiology office visit.  Patient has a history of permanent atrial fibrillation.  He is currently on Xarelto for anticoagulation.  He was recently on Eliquis but that was switched to Xarelto due to development of a rash when taking Eliquis.  Patient is being evaluated for a possible left atrial appendage occlusion so that he can come off anticoagulation.  Colonoscopy reviewed from 6/22/2021 and that showed diverticulosis, nonthrombosed external hemorrhoids, small internal hemorrhoids.  Patient also had EGD performed 6/3/2021 that was arranged that showed duodenal ulcers, esophagitis, multiple gastric ulcers.  He has been taking NSAIDs in the time. [JR]   1949 Discussed with Hui in the ED observation unit who agrees to admit for monitoring of patient's hemoglobin and for any additional bleeding. [JR]      ED Course User Index  [JR]  Riky Slaughter MD             I wore an N95 mask, face shield, and gloves during this patient encounter.  Patient also wearing a surgical mask.  Hand hygeine performed before and after seeing the patient.    DIAGNOSIS  Bright red blood per rectum  Chronic anticoagulation  Diverticulosis  Internal hemorrhoids  Permanent atrial fibrillation      DISPOSITION  ADMIT            Latest Documented Vital Signs:  As of 21:18 EST  BP- 143/81 HR- 82 Temp- 97.4 °F (36.3 °C) (Oral) O2 sat- 96%              --    Please note that portions of this were completed with a voice recognition program.       Note Disclaimer: At [facility], we believe that sharing information builds trust and better relationships. You are receiving this note because you are receiving care at [facility] or recently visited. It is possible you will see health information before a provider has talked with you about it. This kind of information can be easy to misunderstand. To help you fully understand what it means for your health, we urge you to discuss this note with your provider.           Riky Slaughter MD  12/20/22 2092

## 2022-12-20 NOTE — TELEPHONE ENCOUNTER
After speaking with SIRIA Mayer, she stated that the patient needs to head to the ER.  I have called and spoke with the patient wife and let her know that the patient needs to go to the ER.  She verbalized understanding.    Bay  
Patient started Xarelto last 12/14/22 and today he has started bleeding from his rectum.  She stated that he has minor nicks and he bleeds really bad.  She stated that he was on Eliquis and had to stop due to itching.    She also stated that he is retaining fluid.  He is taking 40 mg daily of the Furosemide.  He has gained 8 pound in the past couple of days.    Please advise.    CB: [phone]    Thanks,  Bay  
Patient wife called and stated that the patient is having issues with bleeding and retaining fluid.  She is wanting to know what he needs to do.  Please advise.    CB: [phone]    Thanks.  Bay  
none

## 2022-12-20 NOTE — ED NOTES
Pt states he started having rectal bleeding this AM. Pt states the blood is bright red and he has had two episodes of the bleeding this morning. Pt is on blood thinners. Pt states this has happened before in the past. Pt denies any other symptoms at this time.

## 2022-12-21 ENCOUNTER — READMISSION MANAGEMENT (OUTPATIENT)
Dept: CALL CENTER | Facility: HOSPITAL | Age: 72
End: 2022-12-21

## 2022-12-21 ENCOUNTER — TELEPHONE (OUTPATIENT)
Dept: CARDIOLOGY | Facility: CLINIC | Age: 72
End: 2022-12-21

## 2022-12-21 ENCOUNTER — APPOINTMENT (OUTPATIENT)
Dept: CT IMAGING | Facility: HOSPITAL | Age: 72
End: 2022-12-21

## 2022-12-21 VITALS
SYSTOLIC BLOOD PRESSURE: 112 MMHG | HEART RATE: 75 BPM | HEIGHT: 72 IN | OXYGEN SATURATION: 96 % | DIASTOLIC BLOOD PRESSURE: 70 MMHG | BODY MASS INDEX: 31.14 KG/M2 | RESPIRATION RATE: 16 BRPM | WEIGHT: 229.94 LBS | TEMPERATURE: 98.4 F

## 2022-12-21 PROBLEM — I50.21 ACUTE SYSTOLIC HEART FAILURE: Status: ACTIVE | Noted: 2022-12-21

## 2022-12-21 PROBLEM — I50.22 CHRONIC SYSTOLIC HEART FAILURE: Status: ACTIVE | Noted: 2022-12-21

## 2022-12-21 PROBLEM — I50.23 ACUTE ON CHRONIC SYSTOLIC HEART FAILURE: Status: ACTIVE | Noted: 2022-12-21

## 2022-12-21 LAB
ANION GAP SERPL CALCULATED.3IONS-SCNC: 7.5 MMOL/L (ref 5–15)
BASOPHILS # BLD AUTO: 0 10*3/MM3 (ref 0–0.2)
BASOPHILS NFR BLD AUTO: 0 % (ref 0–1.5)
BUN SERPL-MCNC: 35 MG/DL (ref 8–23)
BUN/CREAT SERPL: 33.3 (ref 7–25)
CALCIUM SPEC-SCNC: 9 MG/DL (ref 8.6–10.5)
CHLORIDE SERPL-SCNC: 103 MMOL/L (ref 98–107)
CO2 SERPL-SCNC: 26.5 MMOL/L (ref 22–29)
CREAT SERPL-MCNC: 1.05 MG/DL (ref 0.76–1.27)
DEPRECATED RDW RBC AUTO: 45.6 FL (ref 37–54)
EGFRCR SERPLBLD CKD-EPI 2021: 74.5 ML/MIN/1.73
EOSINOPHIL # BLD AUTO: 0.06 10*3/MM3 (ref 0–0.4)
EOSINOPHIL NFR BLD AUTO: 1.1 % (ref 0.3–6.2)
ERYTHROCYTE [DISTWIDTH] IN BLOOD BY AUTOMATED COUNT: 13.2 % (ref 12.3–15.4)
GLUCOSE SERPL-MCNC: 123 MG/DL (ref 65–99)
HCT VFR BLD AUTO: 34.5 % (ref 37.5–51)
HGB BLD-MCNC: 10.9 G/DL (ref 13–17.7)
HGB BLD-MCNC: 11.4 G/DL (ref 13–17.7)
LYMPHOCYTES # BLD AUTO: 0.74 10*3/MM3 (ref 0.7–3.1)
LYMPHOCYTES NFR BLD AUTO: 13.3 % (ref 19.6–45.3)
MCH RBC QN AUTO: 30.6 PG (ref 26.6–33)
MCHC RBC AUTO-ENTMCNC: 33 G/DL (ref 31.5–35.7)
MCV RBC AUTO: 92.5 FL (ref 79–97)
MONOCYTES # BLD AUTO: 0.64 10*3/MM3 (ref 0.1–0.9)
MONOCYTES NFR BLD AUTO: 11.5 % (ref 5–12)
NEUTROPHILS NFR BLD AUTO: 4.1 10*3/MM3 (ref 1.7–7)
NEUTROPHILS NFR BLD AUTO: 73.7 % (ref 42.7–76)
PLATELET # BLD AUTO: 108 10*3/MM3 (ref 140–450)
PMV BLD AUTO: 10.7 FL (ref 6–12)
POTASSIUM SERPL-SCNC: 5.7 MMOL/L (ref 3.5–5.2)
RBC # BLD AUTO: 3.73 10*6/MM3 (ref 4.14–5.8)
SODIUM SERPL-SCNC: 137 MMOL/L (ref 136–145)
WBC NRBC COR # BLD: 5.56 10*3/MM3 (ref 3.4–10.8)

## 2022-12-21 PROCEDURE — G0378 HOSPITAL OBSERVATION PER HR: HCPCS

## 2022-12-21 PROCEDURE — 99214 OFFICE O/P EST MOD 30 MIN: CPT | Performed by: INTERNAL MEDICINE

## 2022-12-21 PROCEDURE — 80048 BASIC METABOLIC PNL TOTAL CA: CPT | Performed by: PHYSICIAN ASSISTANT

## 2022-12-21 PROCEDURE — 74177 CT ABD & PELVIS W/CONTRAST: CPT

## 2022-12-21 PROCEDURE — 25010000002 IOPAMIDOL 61 % SOLUTION: Performed by: EMERGENCY MEDICINE

## 2022-12-21 PROCEDURE — 85025 COMPLETE CBC W/AUTO DIFF WBC: CPT | Performed by: PHYSICIAN ASSISTANT

## 2022-12-21 PROCEDURE — 85018 HEMOGLOBIN: CPT | Performed by: NURSE PRACTITIONER

## 2022-12-21 RX ORDER — SODIUM CHLORIDE, SODIUM LACTATE, POTASSIUM CHLORIDE, CALCIUM CHLORIDE 600; 310; 30; 20 MG/100ML; MG/100ML; MG/100ML; MG/100ML
100 INJECTION, SOLUTION INTRAVENOUS CONTINUOUS
Status: DISCONTINUED | OUTPATIENT
Start: 2022-12-21 | End: 2022-12-21 | Stop reason: HOSPADM

## 2022-12-21 RX ORDER — HYDROCORTISONE ACETATE 25 MG/1
25 SUPPOSITORY RECTAL 2 TIMES DAILY
Qty: 14 SUPPOSITORY | Refills: 0 | Status: SHIPPED | OUTPATIENT
Start: 2022-12-21 | End: 2022-12-28

## 2022-12-21 RX ADMIN — METOPROLOL SUCCINATE 50 MG: 50 TABLET, FILM COATED, EXTENDED RELEASE ORAL at 08:51

## 2022-12-21 RX ADMIN — PRAVASTATIN SODIUM 40 MG: 40 TABLET ORAL at 08:51

## 2022-12-21 RX ADMIN — LISINOPRIL 5 MG: 10 TABLET ORAL at 08:51

## 2022-12-21 RX ADMIN — IOPAMIDOL 85 ML: 612 INJECTION, SOLUTION INTRAVENOUS at 10:09

## 2022-12-21 RX ADMIN — PANTOPRAZOLE SODIUM 40 MG: 40 TABLET, DELAYED RELEASE ORAL at 08:51

## 2022-12-21 RX ADMIN — CLOPIDOGREL 75 MG: 75 TABLET, FILM COATED ORAL at 08:51

## 2022-12-21 RX ADMIN — SUCRALFATE 1 G: 1 TABLET ORAL at 08:51

## 2022-12-21 RX ADMIN — SODIUM CHLORIDE, POTASSIUM CHLORIDE, SODIUM LACTATE AND CALCIUM CHLORIDE 100 ML/HR: 600; 310; 30; 20 INJECTION, SOLUTION INTRAVENOUS at 01:58

## 2022-12-21 RX ADMIN — Medication 10 ML: at 08:51

## 2022-12-21 RX ADMIN — FUROSEMIDE 80 MG: 40 TABLET ORAL at 08:51

## 2022-12-21 NOTE — TELEPHONE ENCOUNTER
Pts wife Lvm stating that she would like to talk to you because her   is in [facility]  And she said she needs to know what is going on because she said that no one is doing any thing for him . Thanks  KL

## 2022-12-21 NOTE — ED NOTES
.Nursing report ED to floor  Renato Marquez  72 y.o.  male    HPI :   Chief Complaint   Patient presents with    Rectal Bleeding       Admitting doctor:   Norm LIN MD    Admitting diagnosis:   There were no encounter diagnoses.    Code status:   Current Code Status       Date Active Code Status Order ID Comments User Context       12/20/2022 1955 CPR (Attempt to Resuscitate) [id number]  Felicitas Vaughn APRN ED        Question Answer    Code Status (Patient has no pulse and is not breathing) CPR (Attempt to Resuscitate)    Medical Interventions (Patient has pulse or is breathing) Full Support    Level Of Support Discussed With Patient                    Allergies:   Apixaban    Isolation:   No active isolations    Intake and Output  No intake or output data in the 24 hours ending 12/20/22 2018    Weight:       12/20/22  1638   Weight: 103 kg (228 lb)       Most recent vitals:   Vitals:    12/20/22 1942 12/20/22 2002 12/20/22 2017 12/20/22 2017   BP:  154/82     BP Location:       Patient Position:       Pulse: 81 93 86    Resp:    18   Temp:    97.5 °F (36.4 °C)   TempSrc:    Tympanic   SpO2: 96%  93%    Weight:       Height:           Active LDAs/IV Access:   Lines, Drains & Airways       Active LDAs       Name Placement date Placement time Site Days    Peripheral IV 12/20/22 1738 Right Hand 12/20/22 1738  Hand  less than 1                    Labs (abnormal labs have a star):   Labs Reviewed   CBC WITH AUTO DIFFERENTIAL - Abnormal; Notable for the following components:       Result Value    RBC 3.61 (*)     Hemoglobin 11.3 (*)     Hematocrit 35.4 (*)     MCV 98.1 (*)     Platelets 110 (*)     Neutrophil % 76.2 (*)     Lymphocyte % 10.7 (*)     Monocytes, Absolute 0.94 (*)     All other components within normal limits   COMPREHENSIVE METABOLIC PANEL   HEMOGLOBIN   TYPE AND SCREEN   CBC AND DIFFERENTIAL    Narrative:     The following orders were created for panel order CBC & Differential.  Procedure                                Abnormality         Status                     ---------                               -----------         ------                     CBC Auto Differential[[id number]]        Abnormal            Final result                 Please view results for these tests on the individual orders.       EKG:   No orders to display       Meds given in ED:   Medications   sodium chloride 0.9 % flush 10 mL (has no administration in time range)   sodium chloride 0.9 % flush 10 mL (has no administration in time range)   sodium chloride 0.9 % flush 10 mL (has no administration in time range)   sodium chloride 0.9 % infusion 40 mL (has no administration in time range)   nitroglycerin (NITROSTAT) SL tablet 0.4 mg (has no administration in time range)   ondansetron (ZOFRAN) injection 4 mg (has no administration in time range)       Imaging results:  No radiology results for the last day    Ambulatory status:   - Up ad dorothy     Social issues:   Social History     Socioeconomic History    Marital status:    Tobacco Use    Smoking status: Former     Types: Cigarettes     Quit date: 1991     Years since quittin.9    Smokeless tobacco: Never   Vaping Use    Vaping Use: Never used   Substance and Sexual Activity    Alcohol use: Yes     Alcohol/week: 2.0 standard drinks     Types: 2 Shots of liquor per week     Comment: NIGHTLY/caffeine use    Drug use: No    Sexual activity: Not Currently       NIH Stroke Scale:         Nahomi Das RN  22 20:18 EST

## 2022-12-21 NOTE — DISCHARGE INSTRUCTIONS
Resume your Xarelto tomorrow.  Use hydrocortisone suppositories for 1 week.  Return if bleeding recurs.  Establish follow up with primary care provider and follow up with Dr. Meek. Return to the emergency department with worsening symptoms, uncontrolled pain, inability to tolerate oral liquids, fever greater than 101°F not controlled by Tylenol or as needed with emergent concerns.

## 2022-12-21 NOTE — TELEPHONE ENCOUNTER
I advised for him to go to ER this week for rectal bleeding on Xarelto; he's had previous GI bleeding on Xarelto and it was stopped temporarily. He never resumed it, however. We discussed resuming AC when I saw him and agreed to try Eliquis. I also referred him to Dr. Neal for evaluation of Watchman device. Dr. Neal noted that Mr. Marquez was having a red, itchy rash to Eliquis. Eliquis was stopped and Xarelto resumed last week.  And Mrs. Marquez were going to think about whether to proceed with Watchman.    I reviewed his hospital records. It was felt that his rectal bleeding was due to internal and external hemorrhoids and no endoscopy was performed. His liver did appear cirrhotic on CT and he had thrombocytopenia; he was recommended to stop/reduce his alcohol intake.     He's been holding his Xarelto. Should I have him resume it at a lower dose?    Thanks!  SIRIA Mayer

## 2022-12-21 NOTE — PROGRESS NOTES
MD ATTESTATION NOTE    The CRIS and I have discussed this patient's history, physical exam, and treatment plan.  I have reviewed the documentation and personally had a face to face interaction with the patient. I affirm the documentation and agree with the treatment and plan.  The attached note describes my personal findings.      I provided a substantive portion of the care of the patient.  I personally performed the physical exam in its entirety, and below are my findings.  For this patient encounter, the patient wore surgical mask, I wore full protective PPE including N95 and eye protection.      Brief HPI: This patient is a 72-year-old male with history of coronary artery disease and paroxysmal atrial fibrillation currently taking Xarelto and Plavix admitted to the observation unit today for several episodes of bright red blood per rectum.  He denies any associated abdominal pain, chest pain, or fevers and chills.  He has had this previously.  Currently, he reports that his symptoms have fully resolved and he has had normal bowel movements over the last 10 to 12 hours.    PHYSICAL EXAM  ED Triage Vitals   Temp Heart Rate Resp BP SpO2   12/20/22 1640 12/20/22 1638 12/20/22 1638 12/20/22 1639 12/20/22 1638   96 °F (35.6 °C) 52 18 150/79 96 %      Temp src Heart Rate Source Patient Position BP Location FiO2 (%)   12/20/22 1640 12/20/22 1638 12/20/22 1639 12/20/22 1639 --   Tympanic Monitor Standing Right arm          GENERAL: Resting comfortably and in no acute distress, nontoxic appearing  HENT: nares patent  EYES: no scleral icterus  CV: regular rhythm, normal rate, no M/R/G  RESPIRATORY: normal effort, lungs clear bilateral  ABDOMEN: soft, nontender, no rebound or guarding  MUSCULOSKELETAL: no deformity, no edema  NEURO: alert, moves all extremities, follows commands  PSYCH:  calm, cooperative  SKIN: warm, dry    Vital signs and nursing notes reviewed.        Plan: The patient's hemoglobin has dropped slightly,  but remained stable at greater than 10.  He is currently symptom-free but we are awaiting GI assessment this morning in consultation.  We will reassess following.

## 2022-12-21 NOTE — PLAN OF CARE
Goal Outcome Evaluation:  Plan of Care Reviewed With: patient      Pt has been evaluated by GI. Pt has not had any episodes of rectal bleeding since he was admitted. Pt alert and orient times 4, NAD, up ad dorothy, afib on the monitor, VSS. Pt has been given discharge instructions and all questions answered.   Progress: improving

## 2022-12-21 NOTE — H&P
.   [facility]   HISTORY AND PHYSICAL    Patient Name: Renato Marquez  : 1950  MRN: [id number]  Primary Care Physician:  Provider, No Known  Date of admission: 2022    Subjective   Subjective     Chief Complaint: Rectal bleeding    HPI:    Renato Marquez is a 72 y.o. male with past medical history including but not limited to CAD, A. fib, CHF, pulmonary hypertension, Florian's esophagus, sleep apnea, hypertension, hyperlipidemia, and ischemic cardiomyopathy presents to [facility] with 3 episodes of bright red blood rectal per rectum since early morning.  Patient reports that small amount of stool that is mixed with bright red blood that is similar to previous episodes of rectal bleeding in the past.  Reports he had several episodes of rectal bleeding in the past and follows with Dr. Luan Meek gastroenterology.  Patient reports history of A. fib and anticoagulated with Xarelto that is managed by Dr. Scott with cardiology.  Patient denies abdominal pain, nausea, or vomiting.  Reports abdominal distention for the past 10 days.  Denies lightheadedness or dizziness.  Denies chest pain, palpitation, shortness of breath.  Denies cough, chills, fever, or lower extremities edema.      Initial evaluation at the ED included glucose 114, BUN 46, BUN/creatinine ratio 36.8, WBC 8.1, hemoglobin 11.3, and platelets 110.    Review of Systems   All systems were reviewed and negative except for: The mentioned above in HPI    Personal History     Past Medical History:   Diagnosis Date   • CAD (coronary artery disease)    • Cancer (HCC)     skin cancer   • Carotid arterial disease (HCC)    • Chronic atrial fibrillation (HCC)    • Complex sleep apnea syndrome     BiPAP ST   • Hyperlipidemia    • Hypertension    • Ischemic cardiomyopathy    • Mitral regurgitation    • Rectal bleeding        Past Surgical History:   Procedure Laterality Date   • CAROTID ENDARTERECTOMY Right     Dr Lewis    • COLONOSCOPY     • COLONOSCOPY N/A 6/22/2021    Procedure: COLONOSCOPY INTO CECUM AND TI;  Surgeon: Artur Jacobson MD;  Location:  [facility];  Service: Gastroenterology;  Laterality: N/A;  PRE: RECTAL BLEEDING  POST: DIVERTICULOSIS, HEMORRHOIDS   • COLONOSCOPY W/ POLYPECTOMY N/A 6/3/2021    Procedure: COLONOSCOPY, polypectomies;  Surgeon: Luan Meek MD;  Location:  [facility];  Service: Gastroenterology;  Laterality: N/A;  Diverticulosis  Ascending colon polyp x 4 (3 cold snare)  Cecal polyp (cold snare)  Rectal polyp   • CORONARY ARTERY BYPASS GRAFT  2009    MIMI Frederick to LAD, SVG to OM, SVG Post Desc   • ENDOSCOPY N/A 6/3/2021    Procedure: ESOPHAGOGASTRODUODENOSCOPY with biopsies;  Surgeon: Luan Meek MD;  Location: [facility] OR;  Service: Gastroenterology;  Laterality: N/A;  Reflux  Gastric and duodenal ulcers  Biopsies: gastric, distal esophagus   • FINGER SURGERY         Family History: family history includes Heart disease in his father; Hypertension in his father; Stroke in his father. Otherwise pertinent FHx was reviewed and not pertinent to current issue.    Social History:  reports that he quit smoking about 30 years ago. His smoking use included cigarettes. He has never used smokeless tobacco. He reports current alcohol use of about 2.0 standard drinks per week. He reports that he does not use drugs.    Home Medications:  Magnesium, amoxicillin, calcium carbonate, clopidogrel, famotidine, furosemide, lisinopril, metoprolol succinate XL, pantoprazole, pravastatin, rivaroxaban, and sucralfate    Allergies:  Allergies   Allergen Reactions   • Apixaban Rash       Objective   Objective     Vitals:   Temp:  [96 °F (35.6 °C)-97.5 °F (36.4 °C)] 97.4 °F (36.3 °C)  Heart Rate:  [52-93] 82  Resp:  [18] 18  BP: (110-154)/(69-83) 143/81  Flow (L/min):  [3] 3  Physical Exam    Constitutional: Awake, alert, in no acute distress   Eyes: PERRLA, sclerae anicteric, no  conjunctival injection   HENT: NCAT, mucous membranes moist   Neck: Supple, no thyromegaly, no lymphadenopathy, trachea midline   Respiratory: Clear to auscultation bilaterally, nonlabored respirations    Cardiovascular: RRR, no murmurs, rubs, or gallops, palpable pedal pulses bilaterally   Gastrointestinal: Positive bowel sounds, distended, soft, nontender   Musculoskeletal: No bilateral ankle edema, no clubbing or cyanosis to extremities   Psychiatric: Appropriate affect, cooperative   Neurologic: Oriented x 3, strength symmetric in all extremities, Cranial Nerves grossly intact to confrontation, speech clear   Skin: No rashes     Result Review    Result Review:  I have personally reviewed the results from the time of this admission to 12/20/2022 20:53 EST and agree with these findings:  []  Laboratory list / accordion  []  Microbiology  []  Radiology  []  EKG/Telemetry   []  Cardiology/Vascular   []  Pathology  []  Old records  []  Other:  Most notable findings include:       Assessment & Plan   Assessment / Plan     Brief Patient Summary:  Renato Marquez is a 72 y.o. male who was seen and evaluated at the ED for rectal bleeding.  Patient has been admitted to the observation unit for further evaluation and GI consult.    Active Hospital Problems:  Active Hospital Problems    Diagnosis    • **Rectal bleeding      Plan:   Rectal bleeding  -GI consult  -H&H every 6 hours  -N.p.o. after midnight  -Hold Xarelto    CAD  -Without any evidence of acute exacerbation  -History of prior CABG  -Continue metoprolol, pravastatin, and Plavix  -Telemetry monitoring    CHF  -Continue Lasix  -BNP pending  -Echo on 11/2022 shows EF of 41-45% with moderately elevated RVS pressure from tricuspid regurgitation 45-55    A. Fib  -Hold Xarelto due to rectal bleeding  -Cardiac monitoring    Hypertension hyperlipidemia  -Continue home medication    Sleep apnea  -O2 at 2 L nasal cannula  -Continuous pulse oximetry      DVT  prophylaxis:  No DVT prophylaxis order currently exists.    CODE STATUS:    Level Of Support Discussed With: Patient  Code Status (Patient has no pulse and is not breathing): CPR (Attempt to Resuscitate)  Medical Interventions (Patient has pulse or is breathing): Full Support    Admission Status:  I believe this patient meets observation status.    I wore an face mask, eye protection, and gloves during this patient encounter. Patient also wearing a surgical mask. Hand hygeine performed before and after seeing the patient.    Electronically signed by SIRIA Moreno, 12/20/22, 8:53 PM EST.

## 2022-12-21 NOTE — CONSULTS
Chief Complaint   Patient presents with   • Rectal Bleeding       Renato Marquez is a 72 y.o. male who presents with hematochezia    HPI  Mr. Marquez is a 72 yoM w h/o CAD s/p CABG, Afib on Xarelto and Plavix, ICM, HLD, prior carotid endarterectomy who presents with hematochezia.   He has intermittent hematochezia every few months.  He reports 2 episodes yesterday of painless rectal bleeding.  This has since subsided and he is now having brown BMs.  Hb stable.  He otherwise feels well although he reports occasionally feeling like he retains water.  He is following with Cardiology Dr. Scott.  He was changed to Eliquis from Xarelto last month but reports he had itching and rash and was then placed back on Xarelto about 1 week ago.    CT abd/pelvis on admission notable for nodular appearing liver, bilateral pleural effusions, choleltihiasis no mention of linda dil, normal spleen size, and mild inflammatory change/minimal hemorrhage in the left hemipelvis.  I revewed with radiology and they did not think the mild inflammatory change/hemorrhage was acute or required further attention.    Past Medical History:   Diagnosis Date   • CAD (coronary artery disease)    • Cancer (HCC)     skin cancer   • Carotid arterial disease (HCC)    • Chronic atrial fibrillation (HCC)    • Complex sleep apnea syndrome     BiPAP ST   • Hyperlipidemia    • Hypertension    • Ischemic cardiomyopathy    • Mitral regurgitation    • Rectal bleeding        Past Surgical History:   Procedure Laterality Date   • CAROTID ENDARTERECTOMY Right 2009    Dr Lewis   • COLONOSCOPY     • COLONOSCOPY N/A 6/22/2021    Procedure: COLONOSCOPY INTO CECUM AND TI;  Surgeon: Artur Jacobson MD;  Location: [facility];  Service: Gastroenterology;  Laterality: N/A;  PRE: RECTAL BLEEDING  POST: DIVERTICULOSIS, HEMORRHOIDS   • COLONOSCOPY W/ POLYPECTOMY N/A 6/3/2021    Procedure: COLONOSCOPY, polypectomies;  Surgeon: Luan Meek MD;   Location:  [facility];  Service: Gastroenterology;  Laterality: N/A;  Diverticulosis  Ascending colon polyp x 4 (3 cold snare)  Cecal polyp (cold snare)  Rectal polyp   • CORONARY ARTERY BYPASS GRAFT  2009    MIMI Frederick to LAD, SVG to OM, SVG Post Desc   • ENDOSCOPY N/A 6/3/2021    Procedure: ESOPHAGOGASTRODUODENOSCOPY with biopsies;  Surgeon: Luan Meek MD;  Location:  [facility] OR;  Service: Gastroenterology;  Laterality: N/A;  Reflux  Gastric and duodenal ulcers  Biopsies: gastric, distal esophagus   • FINGER SURGERY           Current Facility-Administered Medications:   •  clopidogrel (PLAVIX) tablet 75 mg, 75 mg, Oral, Daily, Qadah, Abdalhakim, APRN, 75 mg at 12/21/22 0851  •  furosemide (LASIX) tablet 80 mg, 80 mg, Oral, Daily, Qadah, Abdalhakim, APRN, 80 mg at 12/21/22 0851  •  lactated ringers infusion, 100 mL/hr, Intravenous, Continuous, Qadah, Abdalhakim, APRN, Last Rate: 100 mL/hr at 12/21/22 0158, 100 mL/hr at 12/21/22 0158  •  lisinopril (PRINIVIL,ZESTRIL) tablet 5 mg, 5 mg, Oral, Daily, Qadah, Abdalhakim, APRN, 5 mg at 12/21/22 0851  •  metoprolol succinate XL (TOPROL-XL) 24 hr tablet 50 mg, 50 mg, Oral, BID, Qadah, Abdalhakim, APRN, 50 mg at 12/21/22 0851  •  nitroglycerin (NITROSTAT) SL tablet 0.4 mg, 0.4 mg, Sublingual, Q5 Min PRN, Qadah, Abdalhakim, APRN  •  ondansetron (ZOFRAN) injection 4 mg, 4 mg, Intravenous, Q6H PRN, Qadah, Abdalhakim, APRN  •  pantoprazole (PROTONIX) EC tablet 40 mg, 40 mg, Oral, BID, Qadah, Abdalhakim, APRN, 40 mg at 12/21/22 0851  •  pravastatin (PRAVACHOL) tablet 40 mg, 40 mg, Oral, Daily, Qadah, Abdalhakim, APRN, 40 mg at 12/21/22 0851  •  [COMPLETED] Insert Peripheral IV, , , Once **AND** sodium chloride 0.9 % flush 10 mL, 10 mL, Intravenous, PRN, Riky Slaughter MD  •  sodium chloride 0.9 % flush 10 mL, 10 mL, Intravenous, Q12H, Felicitas Vaughn APRN, 10 mL at 12/21/22 0851  •  sodium chloride 0.9 % flush 10 mL, 10 mL, Intravenous, PRN, Johana,  SIRIA Polk  •  sodium chloride 0.9 % infusion 40 mL, 40 mL, Intravenous, PRN, Felicitas Vaughn APRN  •  sucralfate (CARAFATE) tablet 1 g, 1 g, Oral, BID, Felicitas Vaughn APRN, 1 g at 22 0851    Allergies   Allergen Reactions   • Apixaban Rash       Social History     Socioeconomic History   • Marital status:    Tobacco Use   • Smoking status: Former     Types: Cigarettes     Quit date: 1991     Years since quittin.0   • Smokeless tobacco: Never   Vaping Use   • Vaping Use: Never used   Substance and Sexual Activity   • Alcohol use: Yes     Alcohol/week: 2.0 standard drinks     Types: 2 Shots of liquor per week     Comment: NIGHTLY/caffeine use   • Drug use: No   • Sexual activity: Not Currently       Family History   Problem Relation Age of Onset   • Heart disease Father    • Stroke Father    • Hypertension Father        Review of Systems   Constitutional: Negative for chills and fever.   Respiratory: Negative for cough and shortness of breath.    Gastrointestinal: Positive for blood in stool. Negative for abdominal distention, abdominal pain, nausea and vomiting.   Skin: Negative for color change and rash.   All other systems reviewed and are negative.      Vitals:    22 1514   BP: 112/70   Pulse: 75   Resp: 16   Temp: 98.4 °F (36.9 °C)   SpO2: 96%       Physical Exam     General: patient awake, alert and cooperative   Eyes: Normal lids and lashes, no scleral icterus   Neck: supple, normal ROM   Skin: warm and dry, not jaundiced   Cardiovascular: regular rate, irregular rhythm, well perfused extremities   Pulm: Equal expansion bilaterally, no increased WOB   Abdomen: soft, non-tender, nondistended;    Extremities: no rash or edema              Neuro: A&O, no obvious sign of focal deficit   Psychiatric: Normal mood and behavior; memory intact    CT Abdomen Pelvis With Contrast    Result Date: 2022  1. Small bilateral pleural effusions with minimal right basilar  atelectasis. 2. There is mild fatty infiltration and the liver appears somewhat small and irregular in contour. Please correlate for possible cirrhosis. No focal hepatic lesions are seen. 3. Minimal cholelithiasis. 4. No abnormalities are seen in the bowel. 5. Minimal inflammatory change/hemorrhage in the left hemipelvis with no large focal hematomas seen. This appears separate from the bowel.  Radiation dose reduction techniques were utilized, including automated exposure control and exposure modulation based on body size.  This report was finalized on 12/21/2022 12:37 PM by Dr. Rishi Salcido M.D.       EGD and colonoscopy on 6/3/2021 by Dr. Alvaro Meek.  The EGD showed 2 superficial duodenal ulcers in the bulb with scattered small erosions/ulcers in the stomach.  The patient also had reflux esophagitis.  Dr. Meek had said he did not think that the patient had Florian's esophagus based on how the distal esophagus looked.  Pathology from the EGD did show no evidence of Helicobacter pylori.  Biopsies of the distal esophagus were consistent with Florian's esophagus but no dysplasia.  The colonoscopy also done 6/3/2021 by Dr. Alvaro Meek did show diverticulosis and about 6 small colon polyps that were removed.  In the ascending colon there was a 6 mm polyp that was cold biopsy removed.  In the cecum there was a 7 to 8 mm polyp that was cold snared.  In the ascending colon there were 3 other sessile polyps removed with cold snare in the rectum there was a sessile 8 mm polyp that was cold biopsy removed.  Pathology showed that the rectal polyp was hyperplastic.  The other polyps were serrated adenomas and tubular adenomas.      Colonoscopy 6/22/2021 for hematochezia with extenral and internal hemorrhoids, diverticulosis, normal TI    Impression:  · Hematochezia  · Internal and external Hemorrhoids  · Diverticulosis  · Barretts Esophagus without Dysplasia  · Afib on Xaretlo  · CAD on Plavix    · ICM  · Small, irregular contour on CT imaging of Liver  · Thrombocytopenia  · Regular EtOH Use    Plan:  - Bleeding was transient and suspect given intermittent episodes is related to internal and external hemorrhoids, Hb stable  - Would give 7 day Rx anusol, may resume AC/Plavix in 24-48 hours  - Discussed EtOH use and liver changes on CT with thrombocytopenia, this can be further assessed on outpatient basis   -  Okay with discharge from GI standpoint but discussed worrisome signs/symptoms with patient that would necessitate return for reevaluation  - Follow up with PCP next week  - Follow up with Dr. Meek's office in 1-2 months      Reynaldo Ramos MD

## 2022-12-21 NOTE — PLAN OF CARE
Goal Outcome Evaluation:            Patient admitted for rectal bleeding after starting Xarelto for the control of his AFIB. No current bleeding over the evening. Vss. Will continue to monitor for any changes.

## 2022-12-21 NOTE — PROGRESS NOTES
ED OBSERVATION PROGRESS/DISCHARGE SUMMARY    Date of Admission: 12/20/2022   LOS: 0 days   PCP: Provider, No Known     Subjective  Patient has any recurrent rectal bleeding overnight or today.     Hospital Outcome: 72-year-old male admitted to the observation unit for further evaluation of rectal bleeding.  Patient is anticoagulated on Xarelto for A. fib and he is also on Plavix.  Hemoglobin 11.3, 10.9,11.4.  Patient had CT abdomen pelvis which showed nodular appearing liver, bilateral pleural effusions, cholelithiasis, and mild inflammatory change/minimal hemorrhage in the left hemipelvis.  Patient was seen by GI this afternoon.  Discussed with Dr. Ramos.  He reviewed imaging with radiologist who did not feel mild inflammatory change/hemorrhage was acute and did not require any further attention.  Dr. Ramos suspects bleeding is related to internal and external hemorrhoids.  Patient will be discharged home with Anusol for 7 days.  He will establish care with a primary care provider and follow-up with Dr. Meek in 1 to 2 months.  Patient is also following closely with cardiology to discuss watchman procedure.    Usual return to ER precautions discussed with patient who expresses understanding and is in agreement with plan.    ROS:  General: no fevers, chills  Respiratory: no cough, dyspnea  Cardiovascular: no chest pain, palpitations  Abdomen: No abdominal pain, nausea, vomiting, or diarrhea  Neurologic: No focal weakness    Objective   Physical Exam:  I have reviewed the vital signs.  Temp:  [96 °F (35.6 °C)-97.6 °F (36.4 °C)] 97.6 °F (36.4 °C)  Heart Rate:  [52-93] 64  Resp:  [18] 18  BP: (110-154)/(69-88) 126/88  General Appearance:    Alert, cooperative, no distress  Head:    Normocephalic, atraumatic  Eyes:    Sclerae anicteric  Neck:   Supple, no mass  Lungs: Clear to auscultation bilaterally, respirations unlabored  Heart: Regular rate and rhythm, S1 and S2 normal, no murmur, rub or gallop  Abdomen:   Soft, non-tender, bowel sounds active, nondistended  Extremities: No clubbing, cyanosis, or edema to lower extremities  Pulses:  2+ and symmetric in distal lower extremities  Skin: No rashes   Neurologic: Oriented x3, Normal strength to extremities    Results Review:    I have reviewed the labs, radiology results and diagnostic studies.    Results from last 7 days   Lab Units 12/21/22  0157 12/20/22  1739   WBC 10*3/mm3  --  8.10   HEMOGLOBIN g/dL 10.9* 11.3*   HEMATOCRIT %  --  35.4*   PLATELETS 10*3/mm3  --  110*     Results from last 7 days   Lab Units 12/20/22  1954 12/14/22  1124   SODIUM mmol/L 138 136   POTASSIUM mmol/L 4.5 4.2   CHLORIDE mmol/L 103 97*   CO2 mmol/L 27.7 30.0*   BUN mg/dL 46* 36*   CREATININE mg/dL 1.25 1.20   CALCIUM mg/dL 8.9 9.6   BILIRUBIN mg/dL 1.0  --    ALK PHOS U/L 103  --    ALT (SGPT) U/L 14  --    AST (SGOT) U/L 26  --    GLUCOSE mg/dL 114* 120*     Imaging Results (Last 24 Hours)     ** No results found for the last 24 hours. **          I have reviewed the medications.  ---------------------------------------------------------------------------------------------  Assessment & Plan   Assessment/Problem List    Rectal bleeding      Plan:    Rectal bleeding  -H&H every 6 hours, hgb stable  -GI consulted, discussed with Dr. Ramos, patient can be discharged home with Anusol for 7 days and can resume Xarelto tomorrow  -Follow-up with Dr. Meek  -Establish care with PCP, resources provided     CAD  -History of prior CABG  -Continue metoprolol, pravastatin, and Plavix  -Telemetry monitoring     CHF  -Without any evidence of acute exacerbation  -Continue Lasix  -BNP pending  -Echo on 11/2022 shows EF of 41-45% with moderately elevated RVS pressure from tricuspid regurgitation 45-55     A. Fib  -Resume Xarelto tomorrow.   -Cardiac monitoring     Hypertension hyperlipidemia  -Continue home medication     Sleep apnea  -O2 at 2 L nasal cannula  -Continuous pulse oximetry    Disposition:  Home    Follow-up after Discharge: PCP, Dr. Meek    This note will serve as discharge summary    LYNDSEY Griffith 12/21/22 07:24 EST

## 2022-12-21 NOTE — CASE MANAGEMENT/SOCIAL WORK
Discharge Planning Assessment  [facility]     Patient Name: Renato Marquez  MRN: [id number]  Today's Date: 12/21/2022    Admit Date: 12/20/2022    Plan: Plans to return home at d/c-EMANUEL Pack RN   Discharge Needs Assessment     Row Name 12/21/22 0936       Living Environment    People in Home spouse    Name(s) of People in Home Evelin    Current Living Arrangements home    Primary Care Provided by self    Provides Primary Care For no one    Family Caregiver if Needed spouse    Family Caregiver Names Susaane    Quality of Family Relationships supportive    Able to Return to Prior Arrangements yes       Resource/Environmental Concerns    Resource/Environmental Concerns none       Transition Planning    Patient/Family Anticipates Transition to home with family    Patient/Family Anticipated Services at Transition none    Transportation Anticipated car, drives self       Discharge Needs Assessment    Equipment Currently Used at Home cpap    Concerns to be Addressed no discharge needs identified    Anticipated Changes Related to Illness none    Equipment Needed After Discharge none    Provided Post Acute Provider List? N/A    Provided Post Acute Provider Quality & Resource List? N/A               Discharge Plan     Row Name 12/21/22 9337       Plan    Plan Plans to return home at d/c-EMANUEL Pack RN    Patient/Family in Agreement with Plan yes    Provided Post Acute Provider List? N/A    Provided Post Acute Provider Quality & Resource List? N/A    Plan Comments Spoke w/ patient at bedside w/ his permission. Introduced self and explained role of CCP. Info on facesheet verified. No PCP-with his agreement, gave/explained [facility] list/liasion info. Lives in three story home w/ wife Evelin and is able to maneuver steps and around home w/o difficulty. Independent w/ ADLs. uses CPAP. denies need for any DME or community resources at d/c, at this time. Uses [facility] on  and is able to  and pay  for meds. Will return home at d/c, will drive self, and wife can assist as needed. CM will continue to follow-EMANUEL Pack RN              Continued Care and Services - Admitted Since 12/20/2022    Coordination has not been started for this encounter.          Demographic Summary     Row Name 12/21/22 0935       General Information    Admission Type observation    Arrived From emergency department    Required Notices Provided Observation Status Notice    Referral Source admission list    Reason for Consult discharge planning    Preferred Language English               Functional Status     Row Name 12/21/22 0935       Functional Status    Usual Activity Tolerance good    Current Activity Tolerance good       Functional Status, IADL    Medications independent       Mental Status    General Appearance WDL WDL       Mental Status Summary    Recent Changes in Mental Status/Cognitive Functioning no changes               Psychosocial     Row Name 12/21/22 0936       Behavior WDL    Behavior WDL WDL       Emotion Mood WDL    Emotion/Mood/Affect WDL WDL       Speech WDL    Speech WDL WDL       Perceptual State WDL    Perceptual State WDL WDL       Thought Process WDL    Thought Process WDL WDL       Intellectual Performance WDL    Intellectual Performance WDL WDL    Level of Consciousness Alert               Abuse/Neglect    No documentation.                Legal    No documentation.                Substance Abuse    No documentation.                Patient Forms    No documentation.                   Clau Pack RN

## 2022-12-22 NOTE — OUTREACH NOTE
Prep Survey    Flowsheet Row Responses   [facility] facility patient discharged from? [facility]   Is LACE score < 7 ? No   Eligibility Readm Mgmt   Discharge diagnosis rectal bleed   Does the patient have one of the following disease processes/diagnoses(primary or secondary)? Other   Does the patient have Home health ordered? No   Is there a DME ordered? No   Prep survey completed? Yes          SOY EDGE - Registered Nurse

## 2022-12-23 ENCOUNTER — TELEPHONE (OUTPATIENT)
Dept: CARDIOLOGY | Facility: CLINIC | Age: 72
End: 2022-12-23

## 2022-12-23 NOTE — TELEPHONE ENCOUNTER
----- Message from Mauricio Scott III, MD sent at 12/23/2022  1:56 PM EST -----  Please let Mr. Marquez know that GI states that he can resume his Xarelto  ----- Message -----  From: Reynaldo Ramos MD  Sent: 12/23/2022   1:55 PM EST  To: Mauricio Scott III, MD    I told him I would be okay with him resuming anticoagulation and antiplatelet in 24-48 hours when I saw him as he had no further episodes of bleeding and told the primary team and I put it in my note with plan for anusol suppositories and close follow up with PCP and his GI doctor, Dr. Meek.  Please let me know if there is anything else I can do to help.    ----- Message -----  From: Mauricio Scott III, MD  Sent: 12/23/2022   1:13 PM EST  To: Reynaldo Ramos MD    This patient just called our office asking for directions on when he should start his Xarelto again.  We did not see him when he was hospitalized, I just reviewed your note, and I wanted to see what you would recommend in terms of resumption of his Xarelto, thanks

## 2022-12-27 ENCOUNTER — READMISSION MANAGEMENT (OUTPATIENT)
Dept: CALL CENTER | Facility: HOSPITAL | Age: 72
End: 2022-12-27

## 2022-12-27 NOTE — OUTREACH NOTE
Medical Week 1 Survey    Flowsheet Row Responses   [facility] patient discharged from? [place]   Does the patient have one of the following disease processes/diagnoses(primary or secondary)? Other   Week 1 attempt successful? Yes   Call start time 1139   Call end time 1141   Discharge diagnosis rectal bleed   Meds reviewed with patient/caregiver? Yes   Does the patient have all medications ordered at discharge? No   What is keeping the patient from filling the prescriptions? Script on hold per patient   Nursing Interventions No intervention needed   Does the patient have a primary care provider?  No   PCP Nursing Intervention Provided number to obtain PCP   Has the patient kept scheduled appointments due by today? N/A   Has home health visited the patient within 72 hours of discharge? N/A   Psychosocial issues? No   Did the patient receive a copy of their discharge instructions? Yes   What is the patient's perception of their health status since discharge? Improving   Is the patient/caregiver able to teach back signs and symptoms related to disease process for when to call PCP? Yes   Is the patient/caregiver able to teach back signs and symptoms related to disease process for when to call 911? Yes   Is the patient/caregiver able to teach back the hierarchy of who to call/visit for symptoms/problems? PCP, Specialist, Home health nurse, Urgent Care, ED, 911 Yes   If the patient is a current smoker, are they able to teach back resources for cessation? Not a smoker   Week 1 call completed? Yes          ALEX WETZEL - Licensed Nurse

## 2022-12-29 RX ORDER — FUROSEMIDE 80 MG
TABLET ORAL
Qty: 180 TABLET | Refills: 1 | Status: SHIPPED | OUTPATIENT
Start: 2022-12-29 | End: 2023-01-16

## 2023-01-05 ENCOUNTER — OFFICE VISIT (OUTPATIENT)
Dept: CARDIOLOGY | Facility: CLINIC | Age: 73
End: 2023-01-05
Payer: MEDICARE

## 2023-01-05 VITALS
SYSTOLIC BLOOD PRESSURE: 112 MMHG | BODY MASS INDEX: 31.42 KG/M2 | HEIGHT: 72 IN | DIASTOLIC BLOOD PRESSURE: 80 MMHG | WEIGHT: 232 LBS | HEART RATE: 61 BPM

## 2023-01-05 DIAGNOSIS — I10 PRIMARY HYPERTENSION: ICD-10-CM

## 2023-01-05 DIAGNOSIS — I34.0 NONRHEUMATIC MITRAL VALVE REGURGITATION: Chronic | ICD-10-CM

## 2023-01-05 DIAGNOSIS — I50.43 ACUTE ON CHRONIC COMBINED SYSTOLIC AND DIASTOLIC CHF (CONGESTIVE HEART FAILURE): Primary | ICD-10-CM

## 2023-01-05 DIAGNOSIS — I25.5 ISCHEMIC CARDIOMYOPATHY: Chronic | ICD-10-CM

## 2023-01-05 DIAGNOSIS — I25.10 CORONARY ARTERY DISEASE INVOLVING NATIVE CORONARY ARTERY OF NATIVE HEART WITHOUT ANGINA PECTORIS: ICD-10-CM

## 2023-01-05 PROCEDURE — 99214 OFFICE O/P EST MOD 30 MIN: CPT | Performed by: INTERNAL MEDICINE

## 2023-01-05 NOTE — PROGRESS NOTES
Subjective:     Encounter Date: 01/05/23        Patient ID: Renato Marquez is a 72 y.o. male.    Chief Complaint: CAD  Atrial Fibrillation  Past medical history includes atrial fibrillation.       Had the pleasure of seeing this patient in the office today.  He comes in for follow-up.    He has been having recurring issues with congestive heart failure.  Medical therapy has been adjusted.  He follows his weight daily.  He really tries to watch his diet.  He has been compliant with medical therapy.  He just notes that he starts retaining fluid, gaining weight, developing lower extremity edema, and getting more short of breath.  He is starting note that his weight is going up again.    Additionally he has been having recurring rectal bleeding.  He is scheduled to see GI again soon.  He has been seen by Dr. Neal for consideration of a watchman and that evaluation is undergoing.     He has a history of chronic atrial fibrillation. He has a history of CAD with prior CABG. This was performed in 2009 by Dr. Jeet Bhagat. He had a three-vessel CABG with LIMA to the LAD, SVG to the obtuse marginal, and SVG to the posterior descending coronary arteries. He also has a history of cerebrovascular disease with a right carotid endarterectomy in 2009. The carotid endarterectomy was performed at the same time by Dr. Lewis.He had an ejection fraction of 37% on his stress test that was performed in January 2016.  That showed no ischemia.  We discussed cardioversion with him, but he was having no symptoms and elected to remain on his current medical regimen.    He is on CPAP at night.    The following portions of the patient's history were reviewed and updated as appropriate: allergies, current medications, past family history, past medical history, past social history, past surgical history and problem list.    Past Medical History:   Diagnosis Date   • Abnormal ECG    • CAD (coronary artery disease)    • Cancer (HCC)      skin cancer   • Carotid arterial disease (HCC)    • Chronic atrial fibrillation (HCC)    • Complex sleep apnea syndrome     BiPAP ST   • Hyperlipidemia    • Hypertension    • Ischemic cardiomyopathy    • Mitral regurgitation    • Rectal bleeding        Past Surgical History:   Procedure Laterality Date   • CAROTID ENDARTERECTOMY Right 2009    Dr Lewis   • COLONOSCOPY     • COLONOSCOPY N/A 2021    Procedure: COLONOSCOPY INTO CECUM AND TI;  Surgeon: Artur Jacobson MD;  Location: [facility] ENDOSCOPY;  Service: Gastroenterology;  Laterality: N/A;  PRE: RECTAL BLEEDING  POST: DIVERTICULOSIS, HEMORRHOIDS   • COLONOSCOPY W/ POLYPECTOMY N/A 6/3/2021    Procedure: COLONOSCOPY, polypectomies;  Surgeon: Luan Meek MD;  Location: [facility] OR;  Service: Gastroenterology;  Laterality: N/A;  Diverticulosis  Ascending colon polyp x 4 (3 cold snare)  Cecal polyp (cold snare)  Rectal polyp   • CORONARY ARTERY BYPASS GRAFT      MIMI Frederick to LAD, SVG to OM, SVG Post Desc   • ENDOSCOPY N/A 6/3/2021    Procedure: ESOPHAGOGASTRODUODENOSCOPY with biopsies;  Surgeon: Luan Meek MD;  Location: [facility] OR;  Service: Gastroenterology;  Laterality: N/A;  Reflux  Gastric and duodenal ulcers  Biopsies: gastric, distal esophagus   • FINGER SURGERY         Social History     Socioeconomic History   • Marital status:    Tobacco Use   • Smoking status: Former     Types: Cigarettes, Cigars     Quit date: 1991     Years since quittin.0   • Smokeless tobacco: Never   Vaping Use   • Vaping Use: Never used   Substance and Sexual Activity   • Alcohol use: Yes     Alcohol/week: 2.0 standard drinks     Types: 2 Shots of liquor per week     Comment: NIGHTLY/caffeine use   • Drug use: No   • Sexual activity: Not Currently         Procedures       Objective:     Vitals:    23 1413   BP: 112/80   Pulse: 61   Weight: 105 kg (232 lb)   Height: 182.9 cm (72\")         Physical  Exam  Constitutional:       General: He is not in acute distress.     Appearance: He is well-developed. He is not diaphoretic.   HENT:      Head: Normocephalic and atraumatic.      Nose: Nose normal.   Eyes:      General:         Right eye: No discharge.         Left eye: No discharge.      Conjunctiva/sclera: Conjunctivae normal.      Pupils: Pupils are equal, round, and reactive to light.   Neck:      Thyroid: No thyromegaly.      Trachea: No tracheal deviation.   Cardiovascular:      Rate and Rhythm: Normal rate. Rhythm irregularly irregular.      Pulses: Normal pulses.      Heart sounds: S1 normal and S2 normal. Murmur heard.   High-pitched blowing holosystolic murmur is present with a grade of 1/6 at the apex.    No S3 sounds.   Pulmonary:      Effort: Pulmonary effort is normal. No respiratory distress.      Breath sounds: Normal breath sounds. No stridor.   Chest:      Chest wall: No tenderness.   Abdominal:      General: Bowel sounds are normal. There is no distension.      Palpations: Abdomen is soft. There is no mass.      Tenderness: There is no abdominal tenderness. There is no guarding or rebound.   Musculoskeletal:         General: No tenderness or deformity. Normal range of motion.      Cervical back: Normal range of motion and neck supple.   Lymphadenopathy:      Cervical: No cervical adenopathy.   Skin:     General: Skin is warm and dry.      Findings: No erythema or rash.   Neurological:      Mental Status: He is alert and oriented to person, place, and time.      Deep Tendon Reflexes: Reflexes are normal and symmetric.   Psychiatric:         Thought Content: Thought content normal.         Lab Review:             Lab Results   Component Value Date    GLUCOSE 123 (H) 12/21/2022    BUN 35 (H) 12/21/2022    CREATININE 1.05 12/21/2022    EGFRIFNONA 56 (L) 09/07/2021    EGFRIFAFRI 77 09/29/2020    BCR 33.3 (H) 12/21/2022    K 5.7 (H) 12/21/2022    CO2 26.5 12/21/2022    CALCIUM 9.0 12/21/2022     PROTENTOTREF 6.2 09/29/2020    ALBUMIN 3.90 12/20/2022    LABIL2 2.4 09/29/2020    AST 26 12/20/2022    ALT 14 12/20/2022       Results for orders placed during the hospital encounter of 11/17/22    Adult Transthoracic Echo Complete W/ Cont if Necessary Per Protocol    Interpretation Summary  •  Left ventricular ejection fraction appears to be 41 - 45%.  •  Left ventricular wall thickness is consistent with moderate concentric hypertrophy.  •  Left ventricular diastolic function was indeterminate.  •  The right atrial cavity is severely  dilated.  •  There is calcification of the aortic valve.  •  Estimated right ventricular systolic pressure from tricuspid regurgitation is moderately elevated (45-55 mmHg). Calculated right ventricular systolic pressure from tricuspid regurgitation is 50 mmHg.  •  Moderate pulmonic valve regurgitation is present.    CHADS-VASc Risk Assessment            3 Total Score    1 CHF    1 Hypertension    1 Age 65-74        Criteria that do not apply:    Age >/= 75    DM    PRIOR STROKE/TIA/THROMBO    Vascular Disease    Sex: Female                Assessment:          Diagnosis Plan   1. Acute on chronic combined systolic and diastolic CHF (congestive heart failure) (HCC)        2. Primary hypertension        3. Nonrheumatic mitral valve regurgitation        4. Ischemic cardiomyopathy        5. Coronary artery disease involving native coronary artery of native heart without angina pectoris               Plan:       1. Coronary artery disease: He has history of CAD with prior CABG with LIMA to LAD, SVG to OM, and SVG to the posterior descending coronary arteries in 2009 by Dr. Jeet Bhagat.    Stress test January 2021 showed no ischemia  2. Acute on chronic systolic and diastolic combined heart failure: We will titrate guideline directed medical therapy.  I will stop the lisinopril, he will then start Entresto 36 hours later, and then I will see him back in 2 weeks.  I gave him samples of  Entresto 24/26 to take twice daily.  We have room for upward titration and agents that are not yet employed.  He also sounds like he is having issues with furosemide, and torsemide may be a better diuretic. November 2022 echocardiogram shows EF 41 to 45% and moderate pulmonary hypertension.    3. Ischemic cardiomyopathy: Stress test January 2021 showed no ischemia, we may need to repeat that. November 2022 echocardiogram shows EF 41 to 45% and moderate pulmonary hypertension.  His medical therapy also includes ACE inhibitor. He is better-compensated today.   4. Permanent atrial fibrillation: His [id number] score is 4 and he has been off Xarelto since his last GI bleed.  Heart rate is controlled with 50 mg metoprolol succinate twice daily.  5. Valvular heart disease, continue to follow, no significant valvular issues on the most recent echocardiogram  6. Hypertension: Controlled on current regimen.  7. Dyslipidemia: He is treated with 80 mg pravastatin daily.  8. Right carotid artery stenosis: He had right carotid endarterectomy in 2009 by Dr. Lewis.  9. Sleep apnea: He is compliant with CPAP.      Thank you very much for allowing us to participate in the care of this pleasant patient.  Please don't hesitate to call if I can be of assistance in any way.      Current Outpatient Medications:   •  clopidogrel (PLAVIX) 75 MG tablet, Take 1 tablet by mouth Daily., Disp: 90 tablet, Rfl: 3  •  famotidine (PEPCID) 40 MG tablet, Take 1 tablet by mouth 2 (Two) Times a Day. With lunch and at bedtime, Disp: 180 tablet, Rfl: 3  •  furosemide (LASIX) 80 MG tablet, 80 mg on Mon, Wed, Fri. 40 mg all other days., Disp: 180 tablet, Rfl: 1  •  lisinopril (PRINIVIL,ZESTRIL) 5 MG tablet, Take 1 tablet by mouth Daily. Taking 5 mg, Disp: 90 tablet, Rfl: 3  •  Magnesium 200 MG tablet, Take 250 mg by mouth Daily., Disp: , Rfl:   •  metoprolol succinate XL (TOPROL-XL) 50 MG 24 hr tablet, Take 1 tablet by mouth 2 (Two) Times a Day., Disp:  180 tablet, Rfl: 3  •  pantoprazole (PROTONIX) 40 MG EC tablet, Take 1 tablet by mouth 2 (Two) Times a Day., Disp: 180 tablet, Rfl: 2  •  pravastatin (PRAVACHOL) 40 MG tablet, TAKE 1 TABLET BY MOUTH DAILY, Disp: 90 tablet, Rfl: 3  •  rivaroxaban (Xarelto) 20 MG tablet, Take 1 tablet by mouth Daily., Disp: 30 tablet, Rfl: 11  •  sucralfate (CARAFATE) 1 g tablet, Take 1 tablet by mouth 2 (Two) Times a Day., Disp: 180 tablet, Rfl: 3

## 2023-01-09 ENCOUNTER — TELEPHONE (OUTPATIENT)
Dept: CARDIOLOGY | Facility: CLINIC | Age: 73
End: 2023-01-09
Payer: MEDICARE

## 2023-01-09 DIAGNOSIS — Z79.01 CHRONIC ANTICOAGULATION: Primary | ICD-10-CM

## 2023-01-09 DIAGNOSIS — I48.19 OTHER PERSISTENT ATRIAL FIBRILLATION: ICD-10-CM

## 2023-01-09 NOTE — TELEPHONE ENCOUNTER
Called and talked with patient.  He wanted to think about watchman a little bit further over the holidays.  We rediscussed options again today.  He did have another episode of bleeding which he stopped his blood thinners for a few days and had improvement.  At this juncture patient is interested in proceeding forward with watchman to avoid the need for long-term anticoagulation we will work on further work-up with cardiac CTA to define left atrial appendage and help size plan for our procedure.

## 2023-01-16 ENCOUNTER — TELEPHONE (OUTPATIENT)
Dept: CARDIOLOGY | Facility: CLINIC | Age: 73
End: 2023-01-16
Payer: MEDICARE

## 2023-01-16 RX ORDER — TORSEMIDE 20 MG/1
40 TABLET ORAL DAILY
Qty: 60 TABLET | Refills: 5 | Status: SHIPPED | OUTPATIENT
Start: 2023-01-16

## 2023-01-16 NOTE — TELEPHONE ENCOUNTER
Dr. Scott,    Do you still prefer he just take the entire dose (40 mg) at once in AM? Or do you want him to split it up?    Tracy Ramos RN  [facility] Triage  01/16/23 13:49 EST

## 2023-01-16 NOTE — TELEPHONE ENCOUNTER
Patient's wife, Roxane, is calling to report 4-5 days of increasing leg swelling and now a dry, hacking cough.  Also some fatigue.  She feels like he is retaining some fluid.  He also had dizziness last night which he does not normally have.  He is not dizzy this morning.  Denies SOA, denies fever.  Weights had been trending up last week but went down a bit over the weekend.  She said his weights have been staying between 227-230 lbs.  BP this am 114/58 HR 69.    Patient had some bleeding over the weekend.  She reports he has had issues with GI bleeding on xarelto and hemorrhoids.  They held blood thinner Saturday.  Bleeding has resolved.  Per chart review, appears pt is being evaluated for Watchman Device.    Current meds reviewed with wife:  Lasix 40 mg daily, but 80 mg on M,W,F.    xarelto 20 mg daily  Metoprolol xl 50 mg bid  plavix 75 mg daily  entresto 24/26 mg BID (of lisinopril)    Educated patient on changing positions from lying to sitting to standing slowly.      Last office visit with TRACIE 1/5, and he has scheduled follow up 1/19.    Recommendations?    Thanks!!    Tracy Ramos RN  [facility] Triage  01/16/23 08:56 EST

## 2023-01-16 NOTE — TELEPHONE ENCOUNTER
Called and spoke with patient over the phone and gave him specific medication instructions in this message.  I have also asked that he call us and update us on how he is feeling Thursday.  He verbalizes understanding.  He wants me to send him a MyChart with the changes too.    Tracy Ramos RN  [facility] Triage  01/16/23 14:00 EST

## 2023-01-16 NOTE — TELEPHONE ENCOUNTER
Notice from pharmacy that torsemide 40mg is not an active dosage they can dispense.     They are requesting this be sent in as torsemide 20mg BID. // HG

## 2023-01-16 NOTE — TELEPHONE ENCOUNTER
Nevermind, I see the order.  Thanks!!    Tracy Ramos RN  [facility] Triage  01/16/23 13:50 EST

## 2023-01-18 ENCOUNTER — OFFICE VISIT (OUTPATIENT)
Dept: GASTROENTEROLOGY | Facility: CLINIC | Age: 73
End: 2023-01-18
Payer: MEDICARE

## 2023-01-18 VITALS
BODY MASS INDEX: 31.4 KG/M2 | DIASTOLIC BLOOD PRESSURE: 60 MMHG | WEIGHT: 231.8 LBS | HEIGHT: 72 IN | SYSTOLIC BLOOD PRESSURE: 108 MMHG

## 2023-01-18 DIAGNOSIS — K22.70 BARRETT'S ESOPHAGUS WITHOUT DYSPLASIA: ICD-10-CM

## 2023-01-18 DIAGNOSIS — Z86.010 PERSONAL HISTORY OF COLONIC POLYPS: ICD-10-CM

## 2023-01-18 DIAGNOSIS — K62.5 RECTAL BLEEDING: ICD-10-CM

## 2023-01-18 DIAGNOSIS — Z79.01 CHRONIC ANTICOAGULATION: Primary | Chronic | ICD-10-CM

## 2023-01-18 DIAGNOSIS — K57.90 DIVERTICULOSIS: ICD-10-CM

## 2023-01-18 DIAGNOSIS — K21.00 GASTROESOPHAGEAL REFLUX DISEASE WITH ESOPHAGITIS WITHOUT HEMORRHAGE: ICD-10-CM

## 2023-01-18 DIAGNOSIS — D64.9 ANEMIA, UNSPECIFIED TYPE: ICD-10-CM

## 2023-01-18 PROCEDURE — 99214 OFFICE O/P EST MOD 30 MIN: CPT

## 2023-01-18 NOTE — PROGRESS NOTES
PATIENT INFORMATION  Renato Marquez       - 1950    CHIEF COMPLAINT  Chief Complaint   Patient presents with   • Rectal Bleeding       HISTORY OF PRESENT ILLNESS    Here today for follow-up for rectal bleeding. At LOV had 4 small bloody BMs. On  baby ASA and plavix. Seen in ER 2022. Anemia persistent and mild, BUN normal. Dr. Ramos GI evaluated patient and felt bleeding was related to hemorrhoids and discharged with anusol, but did not  and prep H cream helps. Stops xarelto for a day and it stops. Is having BM every day, can skip 1 day. BM is sometimes hard, maybe one out of 3. Usually done after 1. Not taking fiber. Bleeding is frequent. Even bumping skin can lead to prolonged bleeding.    6/3/21 EGD and colon for bleeding/anemia, positive for duodenal and gastric ulcers, with barretts esophagus, negative for HP or dysplasia. 5 of 6 colon adenomas, tics and hemorrhoids. Repeated 21 for bleed and no colonic source found.  Recall for double due 2024.     Awaiting scheduling for Watchmen procedure. Water pill, drinking about 32 oz daily.      REVIEWED PERTINENT RESULTS/ LABS  Lab Results   Component Value Date    CASEREPORT  2021     Surgical Pathology Report                         Case: [id number]                                  Authorizing Provider:  Luan Meek        Collected:           2021 03:54 PM                                 MD Miguel                                                                   Ordering Location:     [facility]   Received:            2021 07:40 PM                                 OR                                                                           Pathologist:           Letitia Bolden MD                                                    Specimens:   1) - Stomach                                                                                        2) - Esophagus, Distal                                                                               3) - Large Intestine, Right / Ascending Colon, polyp x 4 (3 cold snare)                             4) - Large Intestine, Cecum, cold snare                                                             5) - Large Intestine, Rectum                                                               FINALDX  06/03/2021     1. Stomach, Biopsy:     A. Mild active gastritis/gastropathy.   B. Negative for Helicobacter pylori organisms by immunohistochemical stain.   C. No metaplasia, dysplasia nor malignancy identified.    2. Distal Esophagus, Biopsy:   A. Squamoglandular mucosa with intestinal metaplasia consistent with NOVAK'S ESOPHAGUS.    B. Negative for dysplasia.    3. Ascending Colon, Biopsies:   A. Fragments of sessile serrated lesion and tubular adenoma.   B. Negative for high grade dysplasia.    4. Cecum, Biopsy:   A. Serrated polyp most suggestive of sessile serrated lesion.    5. Rectum, Biopsy:     A. Hyperplastic polyp.    Swm/kds       Lab Results   Component Value Date    HGB 11.4 (L) 12/21/2022    MCV 92.5 12/21/2022     (L) 12/21/2022    ALT 14 12/20/2022    AST 26 12/20/2022    INR 1.63 (H) 06/21/2021    TRIG 117 11/23/2022      CT Abdomen Pelvis With Contrast    Result Date: 12/21/2022  Narrative: CT OF THE ABDOMEN AND PELVIS WITH CONTRAST 12/21/2022  HISTORY: Lower GI bleed.  Spiral images were obtained from the lung bases to the symphysis pubis after intravenous contrast. No oral contrast was given.  There are small bilateral pleural effusions. There is some minimal atelectasis of the right lung base.  There is mild fatty infiltration of the liver and the liver appears somewhat small and slightly irregular in contour. Please correlate for possible cirrhosis.  No focal hepatic lesions are seen. At least 2 tiny gallstones appear to be present on image 58. No gallbladder inflammatory changes are seen. Spleen is not enlarged.  Pancreas, adrenals and  kidneys appear unremarkable. There is aortoiliac calcification. No bowel wall thickening or bowel dilatation is seen. Urinary bladder and prostate gland appear normal.  There is some mild inflammatory change/minimal hemorrhage in the left hemipelvis such as on images 104 through 121.  Tiny umbilical hernia containing fat is seen.      Impression: 1. Small bilateral pleural effusions with minimal right basilar atelectasis. 2. There is mild fatty infiltration and the liver appears somewhat small and irregular in contour. Please correlate for possible cirrhosis. No focal hepatic lesions are seen. 3. Minimal cholelithiasis. 4. No abnormalities are seen in the bowel. 5. Minimal inflammatory change/hemorrhage in the left hemipelvis with no large focal hematomas seen. This appears separate from the bowel.  Radiation dose reduction techniques were utilized, including automated exposure control and exposure modulation based on body size.  This report was finalized on 12/21/2022 12:37 PM by Dr. Rishi Salcido M.D.        REVIEW OF SYSTEMS  Review of Systems   Constitutional: Positive for fatigue.   HENT: Negative.    Eyes: Negative.    Respiratory: Positive for cough.    Cardiovascular: Positive for leg swelling.   Gastrointestinal: Positive for anal bleeding.   Endocrine: Negative.    Genitourinary: Positive for difficulty urinating and frequency.   Musculoskeletal: Negative.    Skin: Positive for rash.   Allergic/Immunologic: Negative.    Neurological: Positive for dizziness.   Hematological: Bruises/bleeds easily.   Psychiatric/Behavioral: Negative.          ACTIVE PROBLEMS  Patient Active Problem List    Diagnosis    • Acute systolic heart failure (CMS/HCC) [I50.21]    • Acute on chronic systolic heart failure (CMS/HCC) [I50.23]    • Chronic systolic heart failure (CMS/HCC) [I50.22]    • Gastroesophageal reflux disease with esophagitis without hemorrhage [K21.00]    • Florain's esophagus without dysplasia [K22.70]    •  Personal history of colonic polyps [Z86.010]    • Diverticulosis [K57.90]    • Rectal bleeding [K62.5]    • Hyponatremia [E87.1]    • Elevated brain natriuretic peptide (BNP) level [R79.89]    • Acute on chronic combined systolic and diastolic CHF (congestive heart failure) (HCC) [I50.43]    • Hemorrhagic disorder due to circulating anticoagulants (HCC) [D68.318]    • Adenomatous polyp of ascending colon [D12.2]    • GI bleed [K92.2]    • Gastrointestinal hemorrhage associated with anorectal source [K62.5]    • Anemia due to acute blood loss [D62]    • 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]    • Class 1 obesity due to excess calories without serious comorbidity with body mass index (BMI) of 31.0 to 31.9 in adult [E66.09, Z68.31]    • Complex tear of medial meniscus of right knee as current injury [S83.231A]    • Primary osteoarthritis of right knee [M17.11]    • Abnormal MRI, knee [R93.6]    • Chronic pain of both knees [M25.561, M25.562, G89.29]    • Abnormal x-ray of lumbar spine [R93.7]    • Lumbar facet arthropathy [M47.816]    • Hyperkalemia [E87.5]    • Hx of melanoma of skin [Z85.820]    • History of basal cell carcinoma (BCC) of skin [Z85.828]    • Leg cramps [R25.2]    • DDD (degenerative disc disease), lumbar [M51.36]    • Chronic bilateral low back pain with right-sided sciatica [M54.41, G89.29]    • Chronic anticoagulation [Z79.01]    • Coronary artery disease involving native coronary artery of native heart without angina pectoris [I25.10]    • Carotid arterial disease (HCC) [I77.9]    • Permanent atrial fibrillation (HCC) [I48.21]    • Ischemic cardiomyopathy [I25.5]    • Mitral regurgitation [I34.0]    • Hypothyroidism [E03.9]    • HTN (hypertension) [I10]    • Dyslipidemia [E78.5]          PAST MEDICAL HISTORY  Past Medical History:   Diagnosis Date   • Abnormal ECG    • CAD (coronary artery disease)    • Cancer (HCC)     skin cancer   • Carotid arterial disease (HCC)    • Chronic atrial fibrillation  (HCC)    • Complex sleep apnea syndrome     BiPAP ST   • Hyperlipidemia    • Hypertension    • Ischemic cardiomyopathy    • Mitral regurgitation    • Rectal bleeding          SURGICAL HISTORY  Past Surgical History:   Procedure Laterality Date   • CAROTID ENDARTERECTOMY Right 2009    Dr Lewis   • COLONOSCOPY     • COLONOSCOPY N/A 2021    Procedure: COLONOSCOPY INTO CECUM AND TI;  Surgeon: Artur Jacobson MD;  Location: [facility] ENDOSCOPY;  Service: Gastroenterology;  Laterality: N/A;  PRE: RECTAL BLEEDING  POST: DIVERTICULOSIS, HEMORRHOIDS   • COLONOSCOPY W/ POLYPECTOMY N/A 6/3/2021    Procedure: COLONOSCOPY, polypectomies;  Surgeon: Luan Meek MD;  Location: [facility] OR;  Service: Gastroenterology;  Laterality: N/A;  Diverticulosis  Ascending colon polyp x 4 (3 cold snare)  Cecal polyp (cold snare)  Rectal polyp   • CORONARY ARTERY BYPASS GRAFT      MIMI Frederick to LAD, SVG to OM, SVG Post Desc   • ENDOSCOPY N/A 6/3/2021    Procedure: ESOPHAGOGASTRODUODENOSCOPY with biopsies;  Surgeon: Luan Meek MD;  Location: [facility] OR;  Service: Gastroenterology;  Laterality: N/A;  Reflux  Gastric and duodenal ulcers  Biopsies: gastric, distal esophagus   • FINGER SURGERY           FAMILY HISTORY  Family History   Problem Relation Age of Onset   • Heart disease Father    • Stroke Father    • Hypertension Father          SOCIAL HISTORY  Social History     Occupational History   • Not on file   Tobacco Use   • Smoking status: Former     Types: Cigarettes, Cigars     Quit date: 1991     Years since quittin.0   • Smokeless tobacco: Never   Vaping Use   • Vaping Use: Never used   Substance and Sexual Activity   • Alcohol use: Yes     Alcohol/week: 2.0 standard drinks     Types: 2 Shots of liquor per week     Comment: NIGHTLY/caffeine use   • Drug use: No   • Sexual activity: Not Currently         CURRENT MEDICATIONS    Current Outpatient Medications:   •  clopidogrel  "(PLAVIX) 75 MG tablet, Take 1 tablet by mouth Daily., Disp: 90 tablet, Rfl: 3  •  famotidine (PEPCID) 40 MG tablet, Take 1 tablet by mouth 2 (Two) Times a Day. With lunch and at bedtime, Disp: 180 tablet, Rfl: 3  •  Magnesium 200 MG tablet, Take 250 mg by mouth Daily., Disp: , Rfl:   •  metoprolol succinate XL (TOPROL-XL) 50 MG 24 hr tablet, Take 1 tablet by mouth 2 (Two) Times a Day., Disp: 180 tablet, Rfl: 3  •  pantoprazole (PROTONIX) 40 MG EC tablet, Take 1 tablet by mouth 2 (Two) Times a Day., Disp: 180 tablet, Rfl: 2  •  pravastatin (PRAVACHOL) 40 MG tablet, TAKE 1 TABLET BY MOUTH DAILY, Disp: 90 tablet, Rfl: 3  •  rivaroxaban (Xarelto) 20 MG tablet, Take 1 tablet by mouth Daily., Disp: 30 tablet, Rfl: 11  •  Sacubitril-Valsartan (ENTRESTO PO), Take  by mouth., Disp: , Rfl:   •  sucralfate (CARAFATE) 1 g tablet, Take 1 tablet by mouth 2 (Two) Times a Day., Disp: 180 tablet, Rfl: 3  •  torsemide (DEMADEX) 20 MG tablet, Take 2 tablets by mouth Daily., Disp: 60 tablet, Rfl: 5    ALLERGIES  Apixaban    VITALS  Vitals:    01/18/23 1428   BP: 108/60   BP Location: Left arm   Patient Position: Sitting   Cuff Size: Adult   Weight: 105 kg (231 lb 12.8 oz)   Height: 182.9 cm (72.01\")       PHYSICAL EXAM  Debilities/Disabilities Identified: None  Emotional Behavior: Appropriate  Wt Readings from Last 3 Encounters:   01/18/23 105 kg (231 lb 12.8 oz)   01/05/23 105 kg (232 lb)   12/21/22 104 kg (229 lb 15 oz)     Ht Readings from Last 1 Encounters:   01/18/23 182.9 cm (72.01\")     Body mass index is 31.43 kg/m².  Physical Exam  Constitutional:       General: He is not in acute distress.     Appearance: Normal appearance. He is not ill-appearing.   HENT:      Head: Normocephalic and atraumatic.      Mouth/Throat:      Mouth: Mucous membranes are moist.      Pharynx: No posterior oropharyngeal erythema.   Eyes:      General: No scleral icterus.  Cardiovascular:      Rate and Rhythm: Normal rate and regular rhythm.      Heart " sounds: Normal heart sounds.   Pulmonary:      Effort: Pulmonary effort is normal.      Breath sounds: Normal breath sounds.   Abdominal:      General: Abdomen is flat. Bowel sounds are normal. There is no distension.      Palpations: Abdomen is soft. There is no mass.      Tenderness: There is no abdominal tenderness. There is no guarding or rebound. Negative signs include Wiggins's sign.      Hernia: No hernia is present.   Musculoskeletal:      Cervical back: Neck supple.   Skin:     General: Skin is warm.      Capillary Refill: Capillary refill takes less than 2 seconds.   Neurological:      General: No focal deficit present.      Mental Status: He is alert and oriented to person, place, and time.   Psychiatric:         Mood and Affect: Mood normal.         Behavior: Behavior normal.         Thought Content: Thought content normal.         Judgment: Judgment normal.         CLINICAL DATA REVIEWED   reviewed previous lab results and integrated with today's visit, reviewed notes from other physicians and/or last GI encounter, reviewed previous endoscopy results and available photos, reviewed surgical pathology results from previous biopsies    ASSESSMENT  Diagnoses and all orders for this visit:    Chronic anticoagulation    Personal history of colonic polyps    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    Rectal bleeding  -     CBC (No Diff)  -     Iron Profile  -     Ferritin    Diverticulosis    Gastroesophageal reflux disease with esophagitis without hemorrhage    Anemia, unspecified type  -     CBC (No Diff)  -     Iron Profile  -     Ferritin    Other orders  -     Sacubitril-Valsartan (ENTRESTO PO); Take  by mouth.          PLAN    Start daily fiber  Orders for iron in, ok to wait after upcoming cardiology appt  Does not want to try suppositories at this time, but will call back if bleeding does not stop  Discussed importance of decreasing time off AC    Return in about 3 months (around 4/18/2023).    I have  discussed the above plan with the patient.  They verbalize understanding and are in agreement with the plan.  They have been advised to contact the office for any questions, concerns, or changes related to their health.

## 2023-01-18 NOTE — PATIENT INSTRUCTIONS
10 g fiber daily every morning (Water soluble, ex benefiber clear)  Water daily 64 oz, exercise daily

## 2023-02-01 ENCOUNTER — OFFICE VISIT (OUTPATIENT)
Dept: CARDIOLOGY | Facility: CLINIC | Age: 73
End: 2023-02-01
Payer: MEDICARE

## 2023-02-01 VITALS
OXYGEN SATURATION: 94 % | WEIGHT: 227.6 LBS | SYSTOLIC BLOOD PRESSURE: 120 MMHG | HEART RATE: 93 BPM | DIASTOLIC BLOOD PRESSURE: 60 MMHG | HEIGHT: 72 IN | BODY MASS INDEX: 30.83 KG/M2

## 2023-02-01 DIAGNOSIS — Z79.01 CHRONIC ANTICOAGULATION: Chronic | ICD-10-CM

## 2023-02-01 DIAGNOSIS — I34.0 NONRHEUMATIC MITRAL VALVE REGURGITATION: Chronic | ICD-10-CM

## 2023-02-01 DIAGNOSIS — I50.22 CHRONIC SYSTOLIC HEART FAILURE: ICD-10-CM

## 2023-02-01 DIAGNOSIS — I25.5 ISCHEMIC CARDIOMYOPATHY: Primary | Chronic | ICD-10-CM

## 2023-02-01 DIAGNOSIS — I65.21 STENOSIS OF RIGHT CAROTID ARTERY: Chronic | ICD-10-CM

## 2023-02-01 DIAGNOSIS — I25.10 CORONARY ARTERY DISEASE INVOLVING NATIVE CORONARY ARTERY OF NATIVE HEART WITHOUT ANGINA PECTORIS: ICD-10-CM

## 2023-02-01 PROCEDURE — 99214 OFFICE O/P EST MOD 30 MIN: CPT | Performed by: INTERNAL MEDICINE

## 2023-02-01 RX ORDER — SACUBITRIL AND VALSARTAN 49; 51 MG/1; MG/1
1 TABLET, FILM COATED ORAL 2 TIMES DAILY
Qty: 180 TABLET | Refills: 3 | Status: SHIPPED | OUTPATIENT
Start: 2023-02-01

## 2023-02-01 NOTE — PROGRESS NOTES
Subjective:     Encounter Date: 02/01/23        Patient ID: Renato Marquez is a 72 y.o. male.    Chief Complaint: CAD  Atrial Fibrillation  Past medical history includes atrial fibrillation.       Had the pleasure of seeing this patient in the office today.  He comes in for follow-up.    He has been having recurring issues with congestive heart failure.  Medical therapy has been adjusted.  He follows his weight daily.  He really tries to watch his diet.  He has been compliant with medical therapy.  He had been having recurrent weight gain and fluid retention.  We switched him to Entresto, changed him to torsemide, and schedule follow-up today.    He says he is doing better.  Weights been stable, he has not been having recurrent fluid retention.  A little tough to tell from a symptom standpoint how he is doing because he came down with bronchitis in the meantime and that is complicated his assessment.    Additionally he has been having recurring rectal bleeding.  He is scheduled to see GI again soon.  He has been seen by Dr. Neal for consideration of a watchman and that evaluation is undergoing.  He is scheduled to have an CT scan soon for evaluation for that.     He has a history of chronic atrial fibrillation. He has a history of CAD with prior CABG. This was performed in 2009 by Dr. Jeet Bhagat. He had a three-vessel CABG with LIMA to the LAD, SVG to the obtuse marginal, and SVG to the posterior descending coronary arteries. He also has a history of cerebrovascular disease with a right carotid endarterectomy in 2009. The carotid endarterectomy was performed at the same time by Dr. Lewis.He had an ejection fraction of 37% on his stress test that was performed in January 2016.  That showed no ischemia.  We discussed cardioversion with him, but he was having no symptoms and elected to remain on his current medical regimen.    He is on CPAP at night.    The following portions of the patient's history were  reviewed and updated as appropriate: allergies, current medications, past family history, past medical history, past social history, past surgical history and problem list.    Past Medical History:   Diagnosis Date   • Abnormal ECG    • CAD (coronary artery disease)    • Cancer (HCC)     skin cancer   • Carotid arterial disease (HCC)    • Chronic atrial fibrillation (HCC)    • Complex sleep apnea syndrome     BiPAP ST   • Hyperlipidemia    • Hypertension    • Ischemic cardiomyopathy    • Mitral regurgitation    • Rectal bleeding        Past Surgical History:   Procedure Laterality Date   • CAROTID ENDARTERECTOMY Right     Dr Lewis   • COLONOSCOPY     • COLONOSCOPY N/A 2021    Procedure: COLONOSCOPY INTO CECUM AND TI;  Surgeon: Artur Jacobson MD;  Location: [facility] END[facility];  Service: Gastroenterology;  Laterality: N/A;  PRE: RECTAL BLEEDING  POST: DIVERTICULOSIS, HEMORRHOIDS   • COLONOSCOPY W/ POLYPECTOMY N/A 6/3/2021    Procedure: COLONOSCOPY, polypectomies;  Surgeon: Luan Meek MD;  Location:  [facility] OR;  Service: Gastroenterology;  Laterality: N/A;  Diverticulosis  Ascending colon polyp x 4 (3 cold snare)  Cecal polyp (cold snare)  Rectal polyp   • CORONARY ARTERY BYPASS GRAFT      MIMI Frederick to LAD, SVG to OM, SVG Post Desc   • ENDOSCOPY N/A 6/3/2021    Procedure: ESOPHAGOGASTRODUODENOSCOPY with biopsies;  Surgeon: Luan Meek MD;  Location: [facility] OR;  Service: Gastroenterology;  Laterality: N/A;  Reflux  Gastric and duodenal ulcers  Biopsies: gastric, distal esophagus   • FINGER SURGERY         Social History     Socioeconomic History   • Marital status:    Tobacco Use   • Smoking status: Former     Types: Cigarettes, Cigars     Quit date: 1991     Years since quittin.1   • Smokeless tobacco: Never   Vaping Use   • Vaping Use: Never used   Substance and Sexual Activity   • Alcohol use: Yes     Alcohol/week: 2.0 standard drinks      "Types: 2 Shots of liquor per week     Comment: NIGHTLY/caffeine use   • Drug use: No   • Sexual activity: Not Currently         Procedures       Objective:     Vitals:    02/01/23 1325   BP: 120/60   BP Location: Right arm   Pulse: 93   SpO2: 94%   Weight: 103 kg (227 lb 9.6 oz)   Height: 182.9 cm (72\")         Physical Exam  Constitutional:       General: He is not in acute distress.     Appearance: He is well-developed. He is not diaphoretic.   HENT:      Head: Normocephalic and atraumatic.      Nose: Nose normal.   Eyes:      General:         Right eye: No discharge.         Left eye: No discharge.      Conjunctiva/sclera: Conjunctivae normal.      Pupils: Pupils are equal, round, and reactive to light.   Neck:      Thyroid: No thyromegaly.      Trachea: No tracheal deviation.   Cardiovascular:      Rate and Rhythm: Normal rate. Rhythm irregularly irregular.      Pulses: Normal pulses.      Heart sounds: S1 normal and S2 normal. Murmur heard.   High-pitched blowing holosystolic murmur is present with a grade of 1/6 at the apex.    No S3 sounds.   Pulmonary:      Effort: Pulmonary effort is normal. No respiratory distress.      Breath sounds: Normal breath sounds. No stridor.   Chest:      Chest wall: No tenderness.   Abdominal:      General: Bowel sounds are normal. There is no distension.      Palpations: Abdomen is soft. There is no mass.      Tenderness: There is no abdominal tenderness. There is no guarding or rebound.   Musculoskeletal:         General: No tenderness or deformity. Normal range of motion.      Cervical back: Normal range of motion and neck supple.   Lymphadenopathy:      Cervical: No cervical adenopathy.   Skin:     General: Skin is warm and dry.      Findings: No erythema or rash.   Neurological:      Mental Status: He is alert and oriented to person, place, and time.      Deep Tendon Reflexes: Reflexes are normal and symmetric.   Psychiatric:         Thought Content: Thought content normal. "         Lab Review:             Lab Results   Component Value Date    GLUCOSE 123 (H) 12/21/2022    BUN 35 (H) 12/21/2022    CREATININE 1.05 12/21/2022    EGFRIFNONA 56 (L) 09/07/2021    EGFRIFAFRI 77 09/29/2020    BCR 33.3 (H) 12/21/2022    K 5.7 (H) 12/21/2022    CO2 26.5 12/21/2022    CALCIUM 9.0 12/21/2022    PROTENTOTREF 6.2 09/29/2020    ALBUMIN 3.90 12/20/2022    LABIL2 2.4 09/29/2020    AST 26 12/20/2022    ALT 14 12/20/2022       Results for orders placed during the hospital encounter of 11/17/22    Adult Transthoracic Echo Complete W/ Cont if Necessary Per Protocol    Interpretation Summary  •  Left ventricular ejection fraction appears to be 41 - 45%.  •  Left ventricular wall thickness is consistent with moderate concentric hypertrophy.  •  Left ventricular diastolic function was indeterminate.  •  The right atrial cavity is severely  dilated.  •  There is calcification of the aortic valve.  •  Estimated right ventricular systolic pressure from tricuspid regurgitation is moderately elevated (45-55 mmHg). Calculated right ventricular systolic pressure from tricuspid regurgitation is 50 mmHg.  •  Moderate pulmonic valve regurgitation is present.    CHADS-VASc Risk Assessment            3 Total Score    1 CHF    1 Hypertension    1 Age 65-74        Criteria that do not apply:    Age >/= 75    DM    PRIOR STROKE/TIA/THROMBO    Vascular Disease    Sex: Female                Assessment:          Diagnosis Plan   1. Ischemic cardiomyopathy  sacubitril-valsartan (Entresto) 49-51 MG tablet      2. Nonrheumatic mitral valve regurgitation  sacubitril-valsartan (Entresto) 49-51 MG tablet      3. Stenosis of right carotid artery  sacubitril-valsartan (Entresto) 49-51 MG tablet      4. Coronary artery disease involving native coronary artery of native heart without angina pectoris  sacubitril-valsartan (Entresto) 49-51 MG tablet      5. Chronic anticoagulation  sacubitril-valsartan (Entresto) 49-51 MG tablet      6.  Chronic systolic heart failure (CMS/[facility])  sacubitril-valsartan (Entresto) 49-51 MG tablet             Plan:       1. Coronary artery disease: He has history of CAD with prior CABG with LIMA to LAD, SVG to OM, and SVG to the posterior descending coronary arteries in 2009 by Dr. Jeet Bhagat.    Stress test January 2021 showed no ischemia  2. Acute on chronic systolic and diastolic combined heart failure: We will titrate guideline directed medical therapy.  Continue torsemide, is doing well with that.  Uptitrate Entresto to 49/51 twice daily.  I have asked him call me in 2 weeks, we will see him in 2 months November 2022 echocardiogram shows EF 41 to 45% and moderate pulmonary hypertension.    3. Ischemic cardiomyopathy: Stress test January 2021 showed no ischemia, we may need to repeat that. November 2022 echocardiogram shows EF 41 to 45% and moderate pulmonary hypertension.    4. Permanent atrial fibrillation: His [id number] score is 4 and he has been off Xarelto since his last GI bleed.  Heart rate is controlled with 50 mg metoprolol succinate twice daily.  5. Valvular heart disease, continue to follow, no significant valvular issues on the most recent echocardiogram  6. Hypertension: Controlled on current regimen.  7. Dyslipidemia: He is treated with 80 mg pravastatin daily.  8. Right carotid artery stenosis: He had right carotid endarterectomy in 2009 by Dr. Lewis.  9. Sleep apnea: He is compliant with CPAP.    ADDENDUM- shared decision making-    As noted previously, the patient and I have discussed options to decrease the risk of cardiac thromboembolic complications given his [id number] score of 4 and have reviewed the shared decision making tool for oral anticoagulation in atrial fibrillation.  After discussion, the patient has decided to pursue left atrial appendage occlusion device implantation.  He is a suitable candidate for short-term anticoagulation therapy, but unable to take long-term  anticoagulation secondary to history of GI bleeding.    Thank you very much for allowing us to participate in the care of this pleasant patient.  Please don't hesitate to call if I can be of assistance in any way.      Current Outpatient Medications:   •  benzonatate (TESSALON) 100 MG capsule, Take 1 capsule by mouth 3 (Three) Times a Day As Needed for Cough., Disp: 30 capsule, Rfl: 0  •  clopidogrel (PLAVIX) 75 MG tablet, Take 1 tablet by mouth Daily., Disp: 90 tablet, Rfl: 3  •  famotidine (PEPCID) 40 MG tablet, Take 1 tablet by mouth 2 (Two) Times a Day. With lunch and at bedtime, Disp: 180 tablet, Rfl: 3  •  Magnesium 200 MG tablet, Take 250 mg by mouth Daily., Disp: , Rfl:   •  metoprolol succinate XL (TOPROL-XL) 50 MG 24 hr tablet, Take 1 tablet by mouth 2 (Two) Times a Day., Disp: 180 tablet, Rfl: 3  •  pantoprazole (PROTONIX) 40 MG EC tablet, Take 1 tablet by mouth 2 (Two) Times a Day., Disp: 180 tablet, Rfl: 2  •  pravastatin (PRAVACHOL) 40 MG tablet, TAKE 1 TABLET BY MOUTH DAILY, Disp: 90 tablet, Rfl: 3  •  rivaroxaban (Xarelto) 20 MG tablet, Take 1 tablet by mouth Daily., Disp: 30 tablet, Rfl: 11  •  sucralfate (CARAFATE) 1 g tablet, Take 1 tablet by mouth 2 (Two) Times a Day., Disp: 180 tablet, Rfl: 3  •  torsemide (DEMADEX) 20 MG tablet, Take 2 tablets by mouth Daily., Disp: 60 tablet, Rfl: 5  •  sacubitril-valsartan (Entresto) 49-51 MG tablet, Take 1 tablet by mouth 2 (Two) Times a Day., Disp: 180 tablet, Rfl: 3

## 2023-02-02 ENCOUNTER — HOSPITAL ENCOUNTER (OUTPATIENT)
Dept: CT IMAGING | Facility: HOSPITAL | Age: 73
Discharge: HOME | End: 2023-02-02
Admitting: INTERNAL MEDICINE
Payer: MEDICARE

## 2023-02-02 VITALS
RESPIRATION RATE: 16 BRPM | OXYGEN SATURATION: 96 % | SYSTOLIC BLOOD PRESSURE: 109 MMHG | HEART RATE: 58 BPM | TEMPERATURE: 97.8 F | DIASTOLIC BLOOD PRESSURE: 56 MMHG

## 2023-02-02 DIAGNOSIS — Z79.01 CHRONIC ANTICOAGULATION: ICD-10-CM

## 2023-02-02 DIAGNOSIS — I48.19 OTHER PERSISTENT ATRIAL FIBRILLATION: ICD-10-CM

## 2023-02-02 LAB
CREAT BLDA-MCNC: 1.7 MG/DL (ref 0.6–1.3)
QT INTERVAL: 437 MS

## 2023-02-02 PROCEDURE — 93010 ELECTROCARDIOGRAM REPORT: CPT | Performed by: INTERNAL MEDICINE

## 2023-02-02 PROCEDURE — 96374 THER/PROPH/DIAG INJ IV PUSH: CPT

## 2023-02-02 PROCEDURE — 82565 ASSAY OF CREATININE: CPT

## 2023-02-02 PROCEDURE — 93005 ELECTROCARDIOGRAM TRACING: CPT | Performed by: INTERNAL MEDICINE

## 2023-02-02 RX ORDER — METOPROLOL TARTRATE 5 MG/5ML
5 INJECTION INTRAVENOUS
Status: COMPLETED | OUTPATIENT
Start: 2023-02-02 | End: 2023-02-02

## 2023-02-02 RX ADMIN — METOPROLOL TARTRATE 5 MG: 1 INJECTION, SOLUTION INTRAVENOUS at 15:03

## 2023-02-02 RX ADMIN — METOPROLOL TARTRATE 5 MG: 1 INJECTION, SOLUTION INTRAVENOUS at 14:49

## 2023-02-02 RX ADMIN — METOPROLOL TARTRATE 5 MG: 1 INJECTION, SOLUTION INTRAVENOUS at 15:15

## 2023-02-02 NOTE — NURSING NOTE
Second call placed to [facility] [place] and spoke to LADI Stokes. She spoke to Dr. Alexander and we are not doing the CTA due to not getting the HR down. Pt has history of a fib.  Patient to follow up with Cardiologist with understanding voiced.

## 2023-02-02 NOTE — NURSING NOTE
Nataliya cardiology RN notified of patient's HR of 80 and BP of 110/64. Awaiting call back from office.

## 2023-02-02 NOTE — NURSING NOTE
Cardiology LADI nA made aware of patient's HR going in 110's on the table and throwing PVC'S. Requested a call back from Dr Alexander. Awaiting call back.

## 2023-02-03 ENCOUNTER — TELEPHONE (OUTPATIENT)
Dept: CARDIOLOGY | Facility: CLINIC | Age: 73
End: 2023-02-03
Payer: MEDICARE

## 2023-02-03 NOTE — TELEPHONE ENCOUNTER
Late-entry charting.  Received call from Aleyda on 2/2 at 4:29pm.    Received call from Aleyda in radiology.  Renato Marquez currently in office for CTA.  Patient received 3 doses of IV metoprolol and HR was 58.  However, when patient placed on to table to scan, HR went up to 110.  EKG done showed patient in afib with HR 70's-80's.      Discussed with Dr. Alexander: will have to abort scan today; have nurse notify ordering provider that test was not completed and also request patient discuss with ordering provider.    Reviewed recommendations with Aleyda and she verbalized understanding.    Thank you,  Divya SAWYER RN  Triage Nurse [facility]

## 2023-02-07 DIAGNOSIS — K62.5 GASTROINTESTINAL HEMORRHAGE ASSOCIATED WITH ANORECTAL SOURCE: ICD-10-CM

## 2023-02-07 DIAGNOSIS — I48.91 ATRIAL FIBRILLATION, UNSPECIFIED TYPE: ICD-10-CM

## 2023-02-07 DIAGNOSIS — I25.5 ISCHEMIC CARDIOMYOPATHY: Primary | ICD-10-CM

## 2023-02-07 DIAGNOSIS — I48.19 OTHER PERSISTENT ATRIAL FIBRILLATION: ICD-10-CM

## 2023-02-07 DIAGNOSIS — Z79.01 CHRONIC ANTICOAGULATION: ICD-10-CM

## 2023-02-14 ENCOUNTER — HOSPITAL ENCOUNTER (OUTPATIENT)
Dept: CT IMAGING | Facility: HOSPITAL | Age: 73
Discharge: HOME | End: 2023-02-14
Admitting: INTERNAL MEDICINE
Payer: MEDICARE

## 2023-02-14 DIAGNOSIS — I48.91 ATRIAL FIBRILLATION, UNSPECIFIED TYPE: ICD-10-CM

## 2023-02-14 DIAGNOSIS — I48.19 OTHER PERSISTENT ATRIAL FIBRILLATION: ICD-10-CM

## 2023-02-14 DIAGNOSIS — Z79.01 CHRONIC ANTICOAGULATION: ICD-10-CM

## 2023-02-14 DIAGNOSIS — K62.5 GASTROINTESTINAL HEMORRHAGE ASSOCIATED WITH ANORECTAL SOURCE: ICD-10-CM

## 2023-02-14 DIAGNOSIS — I25.5 ISCHEMIC CARDIOMYOPATHY: ICD-10-CM

## 2023-02-14 PROCEDURE — 75572 CT HRT W/3D IMAGE: CPT | Performed by: STUDENT IN AN ORGANIZED HEALTH CARE EDUCATION/TRAINING PROGRAM

## 2023-02-14 PROCEDURE — 0 IOPAMIDOL PER 1 ML: Performed by: INTERNAL MEDICINE

## 2023-02-14 PROCEDURE — 75572 CT HRT W/3D IMAGE: CPT

## 2023-02-14 RX ADMIN — IOPAMIDOL 100 ML: 755 INJECTION, SOLUTION INTRAVENOUS at 12:12

## 2023-02-20 ENCOUNTER — TELEPHONE (OUTPATIENT)
Dept: CARDIOLOGY | Facility: CLINIC | Age: 73
End: 2023-02-20

## 2023-02-20 NOTE — TELEPHONE ENCOUNTER
Caller: Evelin Marquez    Relationship: Emergency Contact    Best call back number: [phone]    PATIENTS WIFE CALLED IN REQUESTING CT RESULTS AND TO DISCUSS FURTHER DETAILS IF PATIENT NEEDS A WATCHMAN

## 2023-02-21 ENCOUNTER — DOCUMENTATION (OUTPATIENT)
Dept: CARDIOLOGY | Facility: CLINIC | Age: 73
End: 2023-02-21
Payer: MEDICARE

## 2023-02-21 NOTE — PROGRESS NOTES
Cardiac CTA with morphology and left atrial appendage assessment  February 14, 2023  Reason for the exam: Watchman candidacy assessment     There is biatrial enlargement.  The right ventricle is normal in size.  The pulmonary artery is normal in size.  There are 4 pulmonary veins which enter the left atrium in their expected location.  The left atrial appendage was visualized and is without thrombus.  The intra-atrial septum appeared to be intact.  The left ventricle is normal in size.  There was no evidence of a left ventricular thrombus.  The intraventricular septum appeared to be intact.     The left atrial appendage was evaluated in anticipation for watchman device implantation:      FORREST Ostium size:  29 mm x 30 mm  Depth:  29 mm  Overall morphological appearance:  [facility]     Left atrial appendage dimensions saved and exported to PACS system.     Conclusions:  1. Left atrial appendage dimensions as above.  Left atrial appendage was visualized and is free of thrombus.  2. Image sequences to be made available for 3D mapping software for upcoming watchman device implantation  3. There is a patent saphenous vein graft originating from the ascending aorta that runs in very close approximation to the lateral wall of the left atrial appendage as it courses to the obtuse marginal branch.     Sly Pedraza MD  11/15/22

## 2023-02-22 NOTE — TELEPHONE ENCOUNTER
Did talk with patient regarding CT scan results meeting with [facility] and [facility] next Tuesday to finalize plans for initiating program and will start scheduling patients at that time.

## 2023-02-22 NOTE — TELEPHONE ENCOUNTER
Caller: Evelin Marquez    Relationship: Emergency Contact    Best call back number: [phone]    What is the best time to reach you: ANYTIME    Who are you requesting to speak with (clinical staff, provider,  specific staff member): CLINICAL    What was the call regarding: PT WIFE CIARAN REPORTS THAT THEY HAVE TRIED CALLING TO GET RESULTS AND HAVENT HEARD ANYTHING IN 48 HOURS - THEY ARE WANTING TO MAKE SURE EVERYTHING IS OKAY BEFORE MAKING ANY PLANS FOR TRAVEL -     Do you require a callback:  YES PLEASE

## 2023-03-17 DIAGNOSIS — K62.5 GASTROINTESTINAL HEMORRHAGE ASSOCIATED WITH ANORECTAL SOURCE: ICD-10-CM

## 2023-03-17 DIAGNOSIS — Z01.818 PREOP TESTING: ICD-10-CM

## 2023-03-17 DIAGNOSIS — I48.21 PERMANENT ATRIAL FIBRILLATION: Primary | ICD-10-CM

## 2023-03-17 DIAGNOSIS — Z79.01 CHRONIC ANTICOAGULATION: ICD-10-CM

## 2023-04-11 ENCOUNTER — OFFICE VISIT (OUTPATIENT)
Dept: CARDIOLOGY | Facility: CLINIC | Age: 73
End: 2023-04-11
Payer: MEDICARE

## 2023-04-11 VITALS
WEIGHT: 227.6 LBS | BODY MASS INDEX: 30.83 KG/M2 | OXYGEN SATURATION: 97 % | DIASTOLIC BLOOD PRESSURE: 60 MMHG | HEIGHT: 72 IN | HEART RATE: 73 BPM | SYSTOLIC BLOOD PRESSURE: 100 MMHG

## 2023-04-11 DIAGNOSIS — I50.22 CHRONIC SYSTOLIC HEART FAILURE: ICD-10-CM

## 2023-04-11 DIAGNOSIS — I73.9 PVD (PERIPHERAL VASCULAR DISEASE) WITH CLAUDICATION: Primary | ICD-10-CM

## 2023-04-11 DIAGNOSIS — I25.10 CORONARY ARTERY DISEASE INVOLVING NATIVE CORONARY ARTERY OF NATIVE HEART WITHOUT ANGINA PECTORIS: Chronic | ICD-10-CM

## 2023-04-11 DIAGNOSIS — I34.0 NONRHEUMATIC MITRAL VALVE REGURGITATION: Chronic | ICD-10-CM

## 2023-04-11 DIAGNOSIS — Z79.01 CHRONIC ANTICOAGULATION: Chronic | ICD-10-CM

## 2023-04-11 DIAGNOSIS — I65.21 STENOSIS OF RIGHT CAROTID ARTERY: Chronic | ICD-10-CM

## 2023-04-11 DIAGNOSIS — I25.5 ISCHEMIC CARDIOMYOPATHY: Chronic | ICD-10-CM

## 2023-04-11 DIAGNOSIS — K62.5 GASTROINTESTINAL HEMORRHAGE ASSOCIATED WITH ANORECTAL SOURCE: ICD-10-CM

## 2023-04-11 DIAGNOSIS — I48.21 PERMANENT ATRIAL FIBRILLATION: ICD-10-CM

## 2023-04-11 PROCEDURE — 99214 OFFICE O/P EST MOD 30 MIN: CPT | Performed by: INTERNAL MEDICINE

## 2023-04-11 PROCEDURE — 3078F DIAST BP <80 MM HG: CPT | Performed by: INTERNAL MEDICINE

## 2023-04-11 PROCEDURE — 3074F SYST BP LT 130 MM HG: CPT | Performed by: INTERNAL MEDICINE

## 2023-04-11 RX ORDER — FLUOROMETHOLONE 0.1 %
SUSPENSION, DROPS(FINAL DOSAGE FORM)(ML) OPHTHALMIC (EYE) 3 TIMES DAILY
COMMUNITY
Start: 2023-03-01

## 2023-04-11 RX ORDER — CYPROHEPTADINE HYDROCHLORIDE 4 MG/1
4 TABLET ORAL
Status: ON HOLD | COMMUNITY
Start: 2023-03-23 | End: 2023-04-19

## 2023-04-11 NOTE — H&P (VIEW-ONLY)
Subjective:     Encounter Date: 04/11/23        Patient ID: Renato Marquez is a 72 y.o. male.    Chief Complaint: CAD  Atrial Fibrillation  Past medical history includes atrial fibrillation.       Had the pleasure of seeing this patient in the office today.  He comes in for follow-up.    Overall he has been doing well from a cardiac standpoint.  No chest pain or chest discomfort, no shortness of breath.  No lower extremity edema.  He is scheduled to have his Watchman device soon.  His only complaint is pain in his legs after he is playing golf.     He has a history of chronic atrial fibrillation. He has a history of CAD with prior CABG. This was performed in 2009 by Dr. Jeet Bhagat. He had a three-vessel CABG with LIMA to the LAD, SVG to the obtuse marginal, and SVG to the posterior descending coronary arteries. He also has a history of cerebrovascular disease with a right carotid endarterectomy in 2009. The carotid endarterectomy was performed at the same time by Dr. Lewis.He had an ejection fraction of 37% on his stress test that was performed in January 2016.  That showed no ischemia.  We discussed cardioversion with him, but he was having no symptoms and elected to remain on his current medical regimen.    He is on CPAP at night.    The following portions of the patient's history were reviewed and updated as appropriate: allergies, current medications, past family history, past medical history, past social history, past surgical history and problem list.    Past Medical History:   Diagnosis Date   • Abnormal ECG    • CAD (coronary artery disease)    • Cancer     skin cancer   • Carotid arterial disease    • Chronic atrial fibrillation    • Complex sleep apnea syndrome     BiPAP ST   • Hyperlipidemia    • Hypertension    • Ischemic cardiomyopathy    • Mitral regurgitation    • Rectal bleeding        Past Surgical History:   Procedure Laterality Date   • CAROTID ENDARTERECTOMY Right 2009    Dr Lewis   •  "COLONOSCOPY     • COLONOSCOPY N/A 2021    Procedure: COLONOSCOPY INTO CECUM AND TI;  Surgeon: Artur Jacobson MD;  Location:  [facility];  Service: Gastroenterology;  Laterality: N/A;  PRE: RECTAL BLEEDING  POST: DIVERTICULOSIS, HEMORRHOIDS   • COLONOSCOPY W/ POLYPECTOMY N/A 6/3/2021    Procedure: COLONOSCOPY, polypectomies;  Surgeon: Luan Meek MD;  Location:  [facility];  Service: Gastroenterology;  Laterality: N/A;  Diverticulosis  Ascending colon polyp x 4 (3 cold snare)  Cecal polyp (cold snare)  Rectal polyp   • CORONARY ARTERY BYPASS GRAFT      MIMI Frederick to LAD, SVG to OM, SVG Post Desc   • ENDOSCOPY N/A 6/3/2021    Procedure: ESOPHAGOGASTRODUODENOSCOPY with biopsies;  Surgeon: Luan Meek MD;  Location: [facility] OR;  Service: Gastroenterology;  Laterality: N/A;  Reflux  Gastric and duodenal ulcers  Biopsies: gastric, distal esophagus   • FINGER SURGERY         Social History     Socioeconomic History   • Marital status:    Tobacco Use   • Smoking status: Former     Types: Cigarettes, Cigars     Quit date: 1991     Years since quittin.3   • Smokeless tobacco: Never   Vaping Use   • Vaping Use: Never used   Substance and Sexual Activity   • Alcohol use: Yes     Alcohol/week: 2.0 standard drinks     Types: 2 Shots of liquor per week     Comment: NIGHTLY/caffeine use   • Drug use: No   • Sexual activity: Not Currently         Procedures       Objective:     Vitals:    23 1023   BP: 100/60   BP Location: Left arm   Patient Position: Sitting   Pulse: 73   SpO2: 97%   Weight: 103 kg (227 lb 9.6 oz)   Height: 182.9 cm (72\")         Physical Exam  Constitutional:       General: He is not in acute distress.     Appearance: He is well-developed. He is not diaphoretic.   HENT:      Head: Normocephalic and atraumatic.      Nose: Nose normal.   Eyes:      General:         Right eye: No discharge.         Left eye: No discharge.      " Conjunctiva/sclera: Conjunctivae normal.      Pupils: Pupils are equal, round, and reactive to light.   Neck:      Thyroid: No thyromegaly.      Trachea: No tracheal deviation.   Cardiovascular:      Rate and Rhythm: Normal rate. Rhythm irregularly irregular.      Pulses: Normal pulses.      Heart sounds: S1 normal and S2 normal. Murmur heard.   High-pitched blowing holosystolic murmur is present with a grade of 1/6 at the apex.    No S3 sounds.   Pulmonary:      Effort: Pulmonary effort is normal. No respiratory distress.      Breath sounds: Normal breath sounds. No stridor.   Chest:      Chest wall: No tenderness.   Abdominal:      General: Bowel sounds are normal. There is no distension.      Palpations: Abdomen is soft. There is no mass.      Tenderness: There is no abdominal tenderness. There is no guarding or rebound.   Musculoskeletal:         General: No tenderness or deformity. Normal range of motion.      Cervical back: Normal range of motion and neck supple.   Lymphadenopathy:      Cervical: No cervical adenopathy.   Skin:     General: Skin is warm and dry.      Findings: No erythema or rash.   Neurological:      Mental Status: He is alert and oriented to person, place, and time.      Deep Tendon Reflexes: Reflexes are normal and symmetric.   Psychiatric:         Thought Content: Thought content normal.         Lab Review:             Lab Results   Component Value Date    GLUCOSE 123 (H) 12/21/2022    BUN 35 (H) 12/21/2022    CREATININE 1.70 (H) 02/02/2023    EGFRIFNONA 56 (L) 09/07/2021    EGFRIFAFRI 77 09/29/2020    BCR 33.3 (H) 12/21/2022    K 5.7 (H) 12/21/2022    CO2 26.5 12/21/2022    CALCIUM 9.0 12/21/2022    PROTENTOTREF 6.2 09/29/2020    ALBUMIN 3.90 12/20/2022    LABIL2 2.4 09/29/2020    AST 26 12/20/2022    ALT 14 12/20/2022       Results for orders placed during the hospital encounter of 11/17/22    Adult Transthoracic Echo Complete W/ Cont if Necessary Per Protocol    Interpretation  Summary  •  Left ventricular ejection fraction appears to be 41 - 45%.  •  Left ventricular wall thickness is consistent with moderate concentric hypertrophy.  •  Left ventricular diastolic function was indeterminate.  •  The right atrial cavity is severely  dilated.  •  There is calcification of the aortic valve.  •  Estimated right ventricular systolic pressure from tricuspid regurgitation is moderately elevated (45-55 mmHg). Calculated right ventricular systolic pressure from tricuspid regurgitation is 50 mmHg.  •  Moderate pulmonic valve regurgitation is present.    CHADS-VASc Risk Assessment            3 Total Score    1 CHF    1 Hypertension    1 Age 65-74        Criteria that do not apply:    Age >/= 75    DM    PRIOR STROKE/TIA/THROMBO    Vascular Disease    Sex: Female                Assessment:          Diagnosis Plan   1. PVD (peripheral vascular disease) with claudication  Doppler Ankle Brachial Index Single Level CAR      2. Coronary artery disease involving native coronary artery of native heart without angina pectoris        3. Stenosis of right carotid artery        4. Ischemic cardiomyopathy        5. Nonrheumatic mitral valve regurgitation        6. Permanent atrial fibrillation        7. Chronic systolic heart failure (CMS/HCC)        8. Chronic anticoagulation        9. Gastrointestinal hemorrhage associated with anorectal source               Plan:       1. Coronary artery disease: He has history of CAD with prior CABG with LIMA to LAD, SVG to OM, and SVG to the posterior descending coronary arteries in 2009 by Dr. Jeet Bhagat.    Stress test January 2021 showed no ischemia  2. Acute on chronic systolic and diastolic combined heart failure: Continue guideline directed medical therapy, euvolemic and asymptomatic at this point.  3. Scheduled for Watchman device next week  4. Pain in legs with walking, he thinks it is back pain, he has been followed by vascular, no prior history of PAD with  claudication, we will check ankle-brachial index  5. Ischemic cardiomyopathy: Stress test January 2021 showed no ischemia, we may need to repeat that. November 2022 echocardiogram shows EF 41 to 45% and moderate pulmonary hypertension.    6. Permanent atrial fibrillation: His [id number] score is 4 and he has been off Xarelto since his last GI bleed.  Heart rate is controlled with 50 mg metoprolol succinate twice daily.  7. Valvular heart disease, continue to follow, no significant valvular issues on the most recent echocardiogram  8. Hypertension: Controlled on current regimen.  9. Dyslipidemia: He is treated with 80 mg pravastatin daily.  10. Right carotid artery stenosis: He had right carotid endarterectomy in 2009 by Dr. Lewis.  11. Sleep apnea: He is compliant with CPAP.    Shared decision making-    As noted previously, the patient and I have discussed options to decrease the risk of cardiac thromboembolic complications given his [id number] score of 4 and have reviewed the shared decision making tool for oral anticoagulation in atrial fibrillation.  After discussion, the patient has decided to pursue left atrial appendage occlusion device implantation.  He is a suitable candidate for short-term anticoagulation therapy, but unable to take long-term anticoagulation secondary to history of GI bleeding.    Thank you very much for allowing us to participate in the care of this pleasant patient.  Please don't hesitate to call if I can be of assistance in any way.      Current Outpatient Medications:   •  clopidogrel (PLAVIX) 75 MG tablet, Take 1 tablet by mouth Daily., Disp: 90 tablet, Rfl: 3  •  cyproheptadine (PERIACTIN) 4 MG tablet, Take 1 tablet by mouth every night at bedtime., Disp: , Rfl:   •  famotidine (PEPCID) 40 MG tablet, Take 1 tablet by mouth 2 (Two) Times a Day. With lunch and at bedtime, Disp: 180 tablet, Rfl: 3  •  fluorometholone (FML) 0.1 % ophthalmic suspension, 3 (Three) Times a Day., Disp:  , Rfl:   •  Magnesium 200 MG tablet, Take 250 mg by mouth Daily., Disp: , Rfl:   •  metoprolol succinate XL (TOPROL-XL) 50 MG 24 hr tablet, Take 1 tablet by mouth 2 (Two) Times a Day., Disp: 180 tablet, Rfl: 3  •  pantoprazole (PROTONIX) 40 MG EC tablet, Take 1 tablet by mouth 2 (Two) Times a Day., Disp: 180 tablet, Rfl: 2  •  pravastatin (PRAVACHOL) 40 MG tablet, TAKE 1 TABLET BY MOUTH DAILY, Disp: 90 tablet, Rfl: 3  •  rivaroxaban (Xarelto) 20 MG tablet, Take 1 tablet by mouth Daily., Disp: 30 tablet, Rfl: 11  •  sacubitril-valsartan (Entresto) 49-51 MG tablet, Take 1 tablet by mouth 2 (Two) Times a Day., Disp: 180 tablet, Rfl: 3  •  sucralfate (CARAFATE) 1 g tablet, Take 1 tablet by mouth 2 (Two) Times a Day., Disp: 180 tablet, Rfl: 3  •  torsemide (DEMADEX) 20 MG tablet, Take 2 tablets by mouth Daily., Disp: 60 tablet, Rfl: 5

## 2023-04-11 NOTE — PROGRESS NOTES
Subjective:     Encounter Date: 04/11/23        Patient ID: Renato Marquez is a 72 y.o. male.    Chief Complaint: CAD  Atrial Fibrillation  Past medical history includes atrial fibrillation.       Had the pleasure of seeing this patient in the office today.  He comes in for follow-up.    Overall he has been doing well from a cardiac standpoint.  No chest pain or chest discomfort, no shortness of breath.  No lower extremity edema.  He is scheduled to have his Watchman device soon.  His only complaint is pain in his legs after he is playing golf.     He has a history of chronic atrial fibrillation. He has a history of CAD with prior CABG. This was performed in 2009 by Dr. Jeet Bhagat. He had a three-vessel CABG with LIMA to the LAD, SVG to the obtuse marginal, and SVG to the posterior descending coronary arteries. He also has a history of cerebrovascular disease with a right carotid endarterectomy in 2009. The carotid endarterectomy was performed at the same time by Dr. Lewis.He had an ejection fraction of 37% on his stress test that was performed in January 2016.  That showed no ischemia.  We discussed cardioversion with him, but he was having no symptoms and elected to remain on his current medical regimen.    He is on CPAP at night.    The following portions of the patient's history were reviewed and updated as appropriate: allergies, current medications, past family history, past medical history, past social history, past surgical history and problem list.    Past Medical History:   Diagnosis Date   • Abnormal ECG    • CAD (coronary artery disease)    • Cancer     skin cancer   • Carotid arterial disease    • Chronic atrial fibrillation    • Complex sleep apnea syndrome     BiPAP ST   • Hyperlipidemia    • Hypertension    • Ischemic cardiomyopathy    • Mitral regurgitation    • Rectal bleeding        Past Surgical History:   Procedure Laterality Date   • CAROTID ENDARTERECTOMY Right 2009    Dr Lewis   •  "COLONOSCOPY     • COLONOSCOPY N/A 2021    Procedure: COLONOSCOPY INTO CECUM AND TI;  Surgeon: Artur Jacobson MD;  Location:  [facility];  Service: Gastroenterology;  Laterality: N/A;  PRE: RECTAL BLEEDING  POST: DIVERTICULOSIS, HEMORRHOIDS   • COLONOSCOPY W/ POLYPECTOMY N/A 6/3/2021    Procedure: COLONOSCOPY, polypectomies;  Surgeon: Luan Meek MD;  Location:  [facility];  Service: Gastroenterology;  Laterality: N/A;  Diverticulosis  Ascending colon polyp x 4 (3 cold snare)  Cecal polyp (cold snare)  Rectal polyp   • CORONARY ARTERY BYPASS GRAFT      MIMI Frederick to LAD, SVG to OM, SVG Post Desc   • ENDOSCOPY N/A 6/3/2021    Procedure: ESOPHAGOGASTRODUODENOSCOPY with biopsies;  Surgeon: Luan Meek MD;  Location: [facility] OR;  Service: Gastroenterology;  Laterality: N/A;  Reflux  Gastric and duodenal ulcers  Biopsies: gastric, distal esophagus   • FINGER SURGERY         Social History     Socioeconomic History   • Marital status:    Tobacco Use   • Smoking status: Former     Types: Cigarettes, Cigars     Quit date: 1991     Years since quittin.3   • Smokeless tobacco: Never   Vaping Use   • Vaping Use: Never used   Substance and Sexual Activity   • Alcohol use: Yes     Alcohol/week: 2.0 standard drinks     Types: 2 Shots of liquor per week     Comment: NIGHTLY/caffeine use   • Drug use: No   • Sexual activity: Not Currently         Procedures       Objective:     Vitals:    23 1023   BP: 100/60   BP Location: Left arm   Patient Position: Sitting   Pulse: 73   SpO2: 97%   Weight: 103 kg (227 lb 9.6 oz)   Height: 182.9 cm (72\")         Physical Exam  Constitutional:       General: He is not in acute distress.     Appearance: He is well-developed. He is not diaphoretic.   HENT:      Head: Normocephalic and atraumatic.      Nose: Nose normal.   Eyes:      General:         Right eye: No discharge.         Left eye: No discharge.      " Conjunctiva/sclera: Conjunctivae normal.      Pupils: Pupils are equal, round, and reactive to light.   Neck:      Thyroid: No thyromegaly.      Trachea: No tracheal deviation.   Cardiovascular:      Rate and Rhythm: Normal rate. Rhythm irregularly irregular.      Pulses: Normal pulses.      Heart sounds: S1 normal and S2 normal. Murmur heard.   High-pitched blowing holosystolic murmur is present with a grade of 1/6 at the apex.    No S3 sounds.   Pulmonary:      Effort: Pulmonary effort is normal. No respiratory distress.      Breath sounds: Normal breath sounds. No stridor.   Chest:      Chest wall: No tenderness.   Abdominal:      General: Bowel sounds are normal. There is no distension.      Palpations: Abdomen is soft. There is no mass.      Tenderness: There is no abdominal tenderness. There is no guarding or rebound.   Musculoskeletal:         General: No tenderness or deformity. Normal range of motion.      Cervical back: Normal range of motion and neck supple.   Lymphadenopathy:      Cervical: No cervical adenopathy.   Skin:     General: Skin is warm and dry.      Findings: No erythema or rash.   Neurological:      Mental Status: He is alert and oriented to person, place, and time.      Deep Tendon Reflexes: Reflexes are normal and symmetric.   Psychiatric:         Thought Content: Thought content normal.         Lab Review:             Lab Results   Component Value Date    GLUCOSE 123 (H) 12/21/2022    BUN 35 (H) 12/21/2022    CREATININE 1.70 (H) 02/02/2023    EGFRIFNONA 56 (L) 09/07/2021    EGFRIFAFRI 77 09/29/2020    BCR 33.3 (H) 12/21/2022    K 5.7 (H) 12/21/2022    CO2 26.5 12/21/2022    CALCIUM 9.0 12/21/2022    PROTENTOTREF 6.2 09/29/2020    ALBUMIN 3.90 12/20/2022    LABIL2 2.4 09/29/2020    AST 26 12/20/2022    ALT 14 12/20/2022       Results for orders placed during the hospital encounter of 11/17/22    Adult Transthoracic Echo Complete W/ Cont if Necessary Per Protocol    Interpretation  Summary  •  Left ventricular ejection fraction appears to be 41 - 45%.  •  Left ventricular wall thickness is consistent with moderate concentric hypertrophy.  •  Left ventricular diastolic function was indeterminate.  •  The right atrial cavity is severely  dilated.  •  There is calcification of the aortic valve.  •  Estimated right ventricular systolic pressure from tricuspid regurgitation is moderately elevated (45-55 mmHg). Calculated right ventricular systolic pressure from tricuspid regurgitation is 50 mmHg.  •  Moderate pulmonic valve regurgitation is present.    CHADS-VASc Risk Assessment            3 Total Score    1 CHF    1 Hypertension    1 Age 65-74        Criteria that do not apply:    Age >/= 75    DM    PRIOR STROKE/TIA/THROMBO    Vascular Disease    Sex: Female                Assessment:          Diagnosis Plan   1. PVD (peripheral vascular disease) with claudication  Doppler Ankle Brachial Index Single Level CAR      2. Coronary artery disease involving native coronary artery of native heart without angina pectoris        3. Stenosis of right carotid artery        4. Ischemic cardiomyopathy        5. Nonrheumatic mitral valve regurgitation        6. Permanent atrial fibrillation        7. Chronic systolic heart failure (CMS/HCC)        8. Chronic anticoagulation        9. Gastrointestinal hemorrhage associated with anorectal source               Plan:       1. Coronary artery disease: He has history of CAD with prior CABG with LIMA to LAD, SVG to OM, and SVG to the posterior descending coronary arteries in 2009 by Dr. Jeet Bhagat.    Stress test January 2021 showed no ischemia  2. Acute on chronic systolic and diastolic combined heart failure: Continue guideline directed medical therapy, euvolemic and asymptomatic at this point.  3. Scheduled for Watchman device next week  4. Pain in legs with walking, he thinks it is back pain, he has been followed by vascular, no prior history of PAD with  claudication, we will check ankle-brachial index  5. Ischemic cardiomyopathy: Stress test January 2021 showed no ischemia, we may need to repeat that. November 2022 echocardiogram shows EF 41 to 45% and moderate pulmonary hypertension.    6. Permanent atrial fibrillation: His [id number] score is 4 and he has been off Xarelto since his last GI bleed.  Heart rate is controlled with 50 mg metoprolol succinate twice daily.  7. Valvular heart disease, continue to follow, no significant valvular issues on the most recent echocardiogram  8. Hypertension: Controlled on current regimen.  9. Dyslipidemia: He is treated with 80 mg pravastatin daily.  10. Right carotid artery stenosis: He had right carotid endarterectomy in 2009 by Dr. Lewis.  11. Sleep apnea: He is compliant with CPAP.    Shared decision making-    As noted previously, the patient and I have discussed options to decrease the risk of cardiac thromboembolic complications given his [id number] score of 4 and have reviewed the shared decision making tool for oral anticoagulation in atrial fibrillation.  After discussion, the patient has decided to pursue left atrial appendage occlusion device implantation.  He is a suitable candidate for short-term anticoagulation therapy, but unable to take long-term anticoagulation secondary to history of GI bleeding.    Thank you very much for allowing us to participate in the care of this pleasant patient.  Please don't hesitate to call if I can be of assistance in any way.      Current Outpatient Medications:   •  clopidogrel (PLAVIX) 75 MG tablet, Take 1 tablet by mouth Daily., Disp: 90 tablet, Rfl: 3  •  cyproheptadine (PERIACTIN) 4 MG tablet, Take 1 tablet by mouth every night at bedtime., Disp: , Rfl:   •  famotidine (PEPCID) 40 MG tablet, Take 1 tablet by mouth 2 (Two) Times a Day. With lunch and at bedtime, Disp: 180 tablet, Rfl: 3  •  fluorometholone (FML) 0.1 % ophthalmic suspension, 3 (Three) Times a Day., Disp:  , Rfl:   •  Magnesium 200 MG tablet, Take 250 mg by mouth Daily., Disp: , Rfl:   •  metoprolol succinate XL (TOPROL-XL) 50 MG 24 hr tablet, Take 1 tablet by mouth 2 (Two) Times a Day., Disp: 180 tablet, Rfl: 3  •  pantoprazole (PROTONIX) 40 MG EC tablet, Take 1 tablet by mouth 2 (Two) Times a Day., Disp: 180 tablet, Rfl: 2  •  pravastatin (PRAVACHOL) 40 MG tablet, TAKE 1 TABLET BY MOUTH DAILY, Disp: 90 tablet, Rfl: 3  •  rivaroxaban (Xarelto) 20 MG tablet, Take 1 tablet by mouth Daily., Disp: 30 tablet, Rfl: 11  •  sacubitril-valsartan (Entresto) 49-51 MG tablet, Take 1 tablet by mouth 2 (Two) Times a Day., Disp: 180 tablet, Rfl: 3  •  sucralfate (CARAFATE) 1 g tablet, Take 1 tablet by mouth 2 (Two) Times a Day., Disp: 180 tablet, Rfl: 3  •  torsemide (DEMADEX) 20 MG tablet, Take 2 tablets by mouth Daily., Disp: 60 tablet, Rfl: 5

## 2023-04-14 ENCOUNTER — LAB (OUTPATIENT)
Dept: LAB | Facility: HOSPITAL | Age: 73
End: 2023-04-14
Payer: MEDICARE

## 2023-04-14 DIAGNOSIS — Z01.818 PREOP TESTING: ICD-10-CM

## 2023-04-14 DIAGNOSIS — Z79.01 CHRONIC ANTICOAGULATION: ICD-10-CM

## 2023-04-14 DIAGNOSIS — I48.21 PERMANENT ATRIAL FIBRILLATION: ICD-10-CM

## 2023-04-14 DIAGNOSIS — K62.5 GASTROINTESTINAL HEMORRHAGE ASSOCIATED WITH ANORECTAL SOURCE: ICD-10-CM

## 2023-04-14 LAB
ALBUMIN SERPL-MCNC: 4.4 G/DL (ref 3.5–5.2)
ALBUMIN/GLOB SERPL: 1.4 G/DL
ALP SERPL-CCNC: 79 U/L (ref 39–117)
ALT SERPL W P-5'-P-CCNC: 19 U/L (ref 1–41)
ANION GAP SERPL CALCULATED.3IONS-SCNC: 11 MMOL/L (ref 5–15)
AST SERPL-CCNC: 27 U/L (ref 1–40)
BILIRUB SERPL-MCNC: 0.8 MG/DL (ref 0–1.2)
BUN SERPL-MCNC: 81 MG/DL (ref 8–23)
BUN/CREAT SERPL: 40.5 (ref 7–25)
CALCIUM SPEC-SCNC: 9.6 MG/DL (ref 8.6–10.5)
CHLORIDE SERPL-SCNC: 101 MMOL/L (ref 98–107)
CO2 SERPL-SCNC: 25 MMOL/L (ref 22–29)
CREAT SERPL-MCNC: 2 MG/DL (ref 0.76–1.27)
DEPRECATED RDW RBC AUTO: 50.6 FL (ref 37–54)
EGFRCR SERPLBLD CKD-EPI 2021: 34.4 ML/MIN/1.73
ERYTHROCYTE [DISTWIDTH] IN BLOOD BY AUTOMATED COUNT: 14.5 % (ref 12.3–15.4)
GLOBULIN UR ELPH-MCNC: 3.2 GM/DL
GLUCOSE SERPL-MCNC: 113 MG/DL (ref 65–99)
HCT VFR BLD AUTO: 41.6 % (ref 37.5–51)
HGB BLD-MCNC: 13.2 G/DL (ref 13–17.7)
INR PPP: 1.07 (ref 0.9–1.1)
MCH RBC QN AUTO: 30 PG (ref 26.6–33)
MCHC RBC AUTO-ENTMCNC: 31.7 G/DL (ref 31.5–35.7)
MCV RBC AUTO: 94.5 FL (ref 79–97)
PLATELET # BLD AUTO: 145 10*3/MM3 (ref 140–450)
PMV BLD AUTO: 10.4 FL (ref 6–12)
POTASSIUM SERPL-SCNC: 5.5 MMOL/L (ref 3.5–5.2)
PROT SERPL-MCNC: 7.6 G/DL (ref 6–8.5)
PROTHROMBIN TIME: 14.1 SECONDS (ref 11.7–14.2)
RBC # BLD AUTO: 4.4 10*6/MM3 (ref 4.14–5.8)
SODIUM SERPL-SCNC: 137 MMOL/L (ref 136–145)
WBC NRBC COR # BLD: 6.8 10*3/MM3 (ref 3.4–10.8)

## 2023-04-14 PROCEDURE — 85027 COMPLETE CBC AUTOMATED: CPT

## 2023-04-14 PROCEDURE — 36415 COLL VENOUS BLD VENIPUNCTURE: CPT

## 2023-04-14 PROCEDURE — 85610 PROTHROMBIN TIME: CPT

## 2023-04-14 PROCEDURE — 80053 COMPREHEN METABOLIC PANEL: CPT

## 2023-04-17 ENCOUNTER — TELEPHONE (OUTPATIENT)
Dept: CARDIOLOGY | Facility: HOSPITAL | Age: 73
End: 2023-04-17
Payer: MEDICARE

## 2023-04-17 ENCOUNTER — TELEPHONE (OUTPATIENT)
Dept: CARDIOLOGY | Facility: CLINIC | Age: 73
End: 2023-04-17
Payer: MEDICARE

## 2023-04-17 NOTE — TELEPHONE ENCOUNTER
I spoke with Mr Marquez and let him know we would like him to come in at 11am 4/19/23 for the LAAO procedure This is agreeable to him

## 2023-04-17 NOTE — TELEPHONE ENCOUNTER
Called and talked with patient's wife.  His repeat labs show that he is likely a little over diuresed.  I know that his diuretic was recently increased from once a day to twice daily and this might be the case as a few months ago his renal function was much better.  I would recommend that we hold his diuretic which I told his wife that he will hold his torsemide today and tomorrow.  We will have him come in for labs early and hydration and if renal function showing improvement I think we can still proceed forward with the case but if renal function worsening we might need to reevaluate.    I talked with our coordinator he was scheduled to be our first case but I like for him to still come in early to get labs and to start hydration but we might reschedule his timing on the schedule to be either second or third case which will allow us time to hydrate him a little longer and to follow-up on repeat labs

## 2023-04-19 ENCOUNTER — HOSPITAL ENCOUNTER (INPATIENT)
Facility: HOSPITAL | Age: 73
LOS: 1 days | Discharge: HOME | End: 2023-04-20
Attending: INTERNAL MEDICINE | Admitting: INTERNAL MEDICINE
Payer: MEDICARE

## 2023-04-19 ENCOUNTER — ANESTHESIA (OUTPATIENT)
Dept: CARDIOLOGY | Facility: HOSPITAL | Age: 73
End: 2023-04-19
Payer: MEDICARE

## 2023-04-19 ENCOUNTER — ANESTHESIA EVENT (OUTPATIENT)
Dept: CARDIOLOGY | Facility: HOSPITAL | Age: 73
End: 2023-04-19
Payer: MEDICARE

## 2023-04-19 ENCOUNTER — APPOINTMENT (OUTPATIENT)
Dept: CARDIOLOGY | Facility: HOSPITAL | Age: 73
End: 2023-04-19
Payer: MEDICARE

## 2023-04-19 DIAGNOSIS — K62.5 GASTROINTESTINAL HEMORRHAGE ASSOCIATED WITH ANORECTAL SOURCE: ICD-10-CM

## 2023-04-19 DIAGNOSIS — I50.22 CHRONIC SYSTOLIC HEART FAILURE: Primary | ICD-10-CM

## 2023-04-19 DIAGNOSIS — I65.21 STENOSIS OF RIGHT CAROTID ARTERY: Chronic | ICD-10-CM

## 2023-04-19 DIAGNOSIS — I25.5 ISCHEMIC CARDIOMYOPATHY: Chronic | ICD-10-CM

## 2023-04-19 DIAGNOSIS — I34.0 NONRHEUMATIC MITRAL VALVE REGURGITATION: Chronic | ICD-10-CM

## 2023-04-19 DIAGNOSIS — I25.10 CORONARY ARTERY DISEASE INVOLVING NATIVE CORONARY ARTERY OF NATIVE HEART WITHOUT ANGINA PECTORIS: ICD-10-CM

## 2023-04-19 DIAGNOSIS — I48.21 PERMANENT ATRIAL FIBRILLATION: ICD-10-CM

## 2023-04-19 DIAGNOSIS — Z79.01 CHRONIC ANTICOAGULATION: Chronic | ICD-10-CM

## 2023-04-19 LAB
ABO GROUP BLD: NORMAL
ACT BLD: 323 SECONDS (ref 82–152)
ALBUMIN SERPL-MCNC: 3.9 G/DL (ref 3.5–5.2)
ANION GAP SERPL CALCULATED.3IONS-SCNC: 6 MMOL/L (ref 5–15)
ANION GAP SERPL CALCULATED.3IONS-SCNC: 6.4 MMOL/L (ref 5–15)
BLD GP AB SCN SERPL QL: NEGATIVE
BUN SERPL-MCNC: 57 MG/DL (ref 8–23)
BUN SERPL-MCNC: 59 MG/DL (ref 8–23)
BUN/CREAT SERPL: 35.8 (ref 7–25)
BUN/CREAT SERPL: 37.7 (ref 7–25)
CALCIUM SPEC-SCNC: 8.7 MG/DL (ref 8.6–10.5)
CALCIUM SPEC-SCNC: 9.9 MG/DL (ref 8.6–10.5)
CHLORIDE SERPL-SCNC: 105 MMOL/L (ref 98–107)
CHLORIDE SERPL-SCNC: 110 MMOL/L (ref 98–107)
CO2 SERPL-SCNC: 24 MMOL/L (ref 22–29)
CO2 SERPL-SCNC: 27.6 MMOL/L (ref 22–29)
CREAT SERPL-MCNC: 1.51 MG/DL (ref 0.76–1.27)
CREAT SERPL-MCNC: 1.65 MG/DL (ref 0.76–1.27)
EGFRCR SERPLBLD CKD-EPI 2021: 43.3 ML/MIN/1.73
EGFRCR SERPLBLD CKD-EPI 2021: 48.2 ML/MIN/1.73
GLUCOSE SERPL-MCNC: 125 MG/DL (ref 65–99)
GLUCOSE SERPL-MCNC: 128 MG/DL (ref 65–99)
PHOSPHATE SERPL-MCNC: 3.4 MG/DL (ref 2.5–4.5)
POTASSIUM SERPL-SCNC: 5.6 MMOL/L (ref 3.5–5.2)
POTASSIUM SERPL-SCNC: 6 MMOL/L (ref 3.5–5.2)
RH BLD: POSITIVE
SODIUM SERPL-SCNC: 139 MMOL/L (ref 136–145)
SODIUM SERPL-SCNC: 140 MMOL/L (ref 136–145)
T&S EXPIRATION DATE: NORMAL

## 2023-04-19 PROCEDURE — 02L73DK OCCLUSION OF LEFT ATRIAL APPENDAGE WITH INTRALUMINAL DEVICE, PERCUTANEOUS APPROACH: ICD-10-PCS | Performed by: INTERNAL MEDICINE

## 2023-04-19 PROCEDURE — C1760 CLOSURE DEV, VASC: HCPCS | Performed by: INTERNAL MEDICINE

## 2023-04-19 PROCEDURE — 93355 ECHO TRANSESOPHAGEAL (TEE): CPT | Performed by: ANESTHESIOLOGY

## 2023-04-19 PROCEDURE — 25010000002 DEXAMETHASONE SODIUM PHOSPHATE 20 MG/5ML SOLUTION: Performed by: ANESTHESIOLOGY

## 2023-04-19 PROCEDURE — 25510000001 IOPAMIDOL PER 1 ML: Performed by: INTERNAL MEDICINE

## 2023-04-19 PROCEDURE — 80069 RENAL FUNCTION PANEL: CPT | Performed by: NURSE PRACTITIONER

## 2023-04-19 PROCEDURE — 85347 COAGULATION TIME ACTIVATED: CPT

## 2023-04-19 PROCEDURE — C1893 INTRO/SHEATH, FIXED,NON-PEEL: HCPCS | Performed by: INTERNAL MEDICINE

## 2023-04-19 PROCEDURE — 25010000002 MIDAZOLAM PER 1 MG: Performed by: ANESTHESIOLOGY

## 2023-04-19 PROCEDURE — 33340 PERQ CLSR TCAT L ATR APNDGE: CPT | Performed by: INTERNAL MEDICINE

## 2023-04-19 PROCEDURE — C1894 INTRO/SHEATH, NON-LASER: HCPCS | Performed by: INTERNAL MEDICINE

## 2023-04-19 PROCEDURE — 80048 BASIC METABOLIC PNL TOTAL CA: CPT | Performed by: INTERNAL MEDICINE

## 2023-04-19 PROCEDURE — 25010000002 ONDANSETRON PER 1 MG: Performed by: ANESTHESIOLOGY

## 2023-04-19 PROCEDURE — 86900 BLOOD TYPING SEROLOGIC ABO: CPT | Performed by: INTERNAL MEDICINE

## 2023-04-19 PROCEDURE — C1889 IMPLANT/INSERT DEVICE, NOC: HCPCS | Performed by: INTERNAL MEDICINE

## 2023-04-19 PROCEDURE — 86901 BLOOD TYPING SEROLOGIC RH(D): CPT | Performed by: INTERNAL MEDICINE

## 2023-04-19 PROCEDURE — 25010000002 CEFAZOLIN IN DEXTROSE 2-4 GM/100ML-% SOLUTION: Performed by: ANESTHESIOLOGY

## 2023-04-19 PROCEDURE — 25010000002 HEPARIN (PORCINE) PER 1000 UNITS: Performed by: INTERNAL MEDICINE

## 2023-04-19 PROCEDURE — 25010000002 PROPOFOL 10 MG/ML EMULSION: Performed by: ANESTHESIOLOGY

## 2023-04-19 PROCEDURE — C1769 GUIDE WIRE: HCPCS | Performed by: INTERNAL MEDICINE

## 2023-04-19 PROCEDURE — 86850 RBC ANTIBODY SCREEN: CPT | Performed by: INTERNAL MEDICINE

## 2023-04-19 DEVICE — CAP WATCHMAN FLX PROC: Type: IMPLANTABLE DEVICE | Status: FUNCTIONAL

## 2023-04-19 DEVICE — LEFT ATRIAL APPENDAGE CLOSURE DEVICE WITH DELIVERY SYSTEM
Type: IMPLANTABLE DEVICE | Status: FUNCTIONAL
Brand: WATCHMAN FLX™

## 2023-04-19 DEVICE — CAP SYS WATCHMAN TRUSEAL ACC PROC: Type: IMPLANTABLE DEVICE | Status: FUNCTIONAL

## 2023-04-19 RX ORDER — NALOXONE HCL 0.4 MG/ML
0.2 VIAL (ML) INJECTION AS NEEDED
Status: DISCONTINUED | OUTPATIENT
Start: 2023-04-19 | End: 2023-04-19

## 2023-04-19 RX ORDER — PANTOPRAZOLE SODIUM 40 MG/1
40 TABLET, DELAYED RELEASE ORAL
Status: DISCONTINUED | OUTPATIENT
Start: 2023-04-20 | End: 2023-04-20 | Stop reason: HOSPADM

## 2023-04-19 RX ORDER — SODIUM CHLORIDE 9 MG/ML
75 INJECTION, SOLUTION INTRAVENOUS CONTINUOUS
Status: DISCONTINUED | OUTPATIENT
Start: 2023-04-19 | End: 2023-04-20

## 2023-04-19 RX ORDER — PROMETHAZINE HYDROCHLORIDE 25 MG/1
25 SUPPOSITORY RECTAL ONCE AS NEEDED
Status: DISCONTINUED | OUTPATIENT
Start: 2023-04-19 | End: 2023-04-19

## 2023-04-19 RX ORDER — TORSEMIDE 20 MG/1
40 TABLET ORAL DAILY
Status: DISCONTINUED | OUTPATIENT
Start: 2023-04-20 | End: 2023-04-20 | Stop reason: HOSPADM

## 2023-04-19 RX ORDER — MIDAZOLAM HYDROCHLORIDE 1 MG/ML
0.5 INJECTION INTRAMUSCULAR; INTRAVENOUS
Status: DISCONTINUED | OUTPATIENT
Start: 2023-04-19 | End: 2023-04-19

## 2023-04-19 RX ORDER — SODIUM CHLORIDE 9 MG/ML
40 INJECTION, SOLUTION INTRAVENOUS AS NEEDED
Status: DISCONTINUED | OUTPATIENT
Start: 2023-04-19 | End: 2023-04-19

## 2023-04-19 RX ORDER — SUCRALFATE 1 G/1
1 TABLET ORAL 2 TIMES DAILY
Status: DISCONTINUED | OUTPATIENT
Start: 2023-04-19 | End: 2023-04-20 | Stop reason: HOSPADM

## 2023-04-19 RX ORDER — METOPROLOL SUCCINATE 50 MG/1
50 TABLET, EXTENDED RELEASE ORAL 2 TIMES DAILY
Status: DISCONTINUED | OUTPATIENT
Start: 2023-04-19 | End: 2023-04-20 | Stop reason: HOSPADM

## 2023-04-19 RX ORDER — ACETAMINOPHEN 325 MG/1
650 TABLET ORAL EVERY 4 HOURS PRN
Status: DISCONTINUED | OUTPATIENT
Start: 2023-04-19 | End: 2023-04-20 | Stop reason: HOSPADM

## 2023-04-19 RX ORDER — DEXAMETHASONE SODIUM PHOSPHATE 4 MG/ML
INJECTION, SOLUTION INTRA-ARTICULAR; INTRALESIONAL; INTRAMUSCULAR; INTRAVENOUS; SOFT TISSUE AS NEEDED
Status: DISCONTINUED | OUTPATIENT
Start: 2023-04-19 | End: 2023-04-19 | Stop reason: SURG

## 2023-04-19 RX ORDER — CLOPIDOGREL BISULFATE 75 MG/1
75 TABLET ORAL DAILY
Status: DISCONTINUED | OUTPATIENT
Start: 2023-04-20 | End: 2023-04-20 | Stop reason: HOSPADM

## 2023-04-19 RX ORDER — DIPHENHYDRAMINE HYDROCHLORIDE 50 MG/ML
12.5 INJECTION INTRAMUSCULAR; INTRAVENOUS
Status: DISCONTINUED | OUTPATIENT
Start: 2023-04-19 | End: 2023-04-19

## 2023-04-19 RX ORDER — SODIUM CHLORIDE, SODIUM LACTATE, POTASSIUM CHLORIDE, CALCIUM CHLORIDE 600; 310; 30; 20 MG/100ML; MG/100ML; MG/100ML; MG/100ML
9 INJECTION, SOLUTION INTRAVENOUS CONTINUOUS PRN
Status: DISCONTINUED | OUTPATIENT
Start: 2023-04-19 | End: 2023-04-20 | Stop reason: HOSPADM

## 2023-04-19 RX ORDER — ROCURONIUM BROMIDE 10 MG/ML
INJECTION, SOLUTION INTRAVENOUS AS NEEDED
Status: DISCONTINUED | OUTPATIENT
Start: 2023-04-19 | End: 2023-04-19 | Stop reason: SURG

## 2023-04-19 RX ORDER — SODIUM CHLORIDE 0.9 % (FLUSH) 0.9 %
10 SYRINGE (ML) INJECTION AS NEEDED
Status: DISCONTINUED | OUTPATIENT
Start: 2023-04-19 | End: 2023-04-19

## 2023-04-19 RX ORDER — ONDANSETRON 2 MG/ML
4 INJECTION INTRAMUSCULAR; INTRAVENOUS EVERY 6 HOURS PRN
Status: DISCONTINUED | OUTPATIENT
Start: 2023-04-19 | End: 2023-04-20 | Stop reason: HOSPADM

## 2023-04-19 RX ORDER — SODIUM CHLORIDE 0.9 % (FLUSH) 0.9 %
10 SYRINGE (ML) INJECTION EVERY 12 HOURS SCHEDULED
Status: DISCONTINUED | OUTPATIENT
Start: 2023-04-19 | End: 2023-04-19

## 2023-04-19 RX ORDER — LABETALOL HYDROCHLORIDE 5 MG/ML
5 INJECTION, SOLUTION INTRAVENOUS
Status: DISCONTINUED | OUTPATIENT
Start: 2023-04-19 | End: 2023-04-19

## 2023-04-19 RX ORDER — PROMETHAZINE HYDROCHLORIDE 25 MG/1
25 TABLET ORAL ONCE AS NEEDED
Status: DISCONTINUED | OUTPATIENT
Start: 2023-04-19 | End: 2023-04-19

## 2023-04-19 RX ORDER — EPHEDRINE SULFATE 50 MG/ML
5 INJECTION, SOLUTION INTRAVENOUS ONCE AS NEEDED
Status: DISCONTINUED | OUTPATIENT
Start: 2023-04-19 | End: 2023-04-19

## 2023-04-19 RX ORDER — PROPOFOL 10 MG/ML
VIAL (ML) INTRAVENOUS AS NEEDED
Status: DISCONTINUED | OUTPATIENT
Start: 2023-04-19 | End: 2023-04-19 | Stop reason: SURG

## 2023-04-19 RX ORDER — DROPERIDOL 2.5 MG/ML
0.62 INJECTION, SOLUTION INTRAMUSCULAR; INTRAVENOUS
Status: DISCONTINUED | OUTPATIENT
Start: 2023-04-19 | End: 2023-04-19

## 2023-04-19 RX ORDER — FENTANYL CITRATE 50 UG/ML
25 INJECTION, SOLUTION INTRAMUSCULAR; INTRAVENOUS
Status: DISCONTINUED | OUTPATIENT
Start: 2023-04-19 | End: 2023-04-19

## 2023-04-19 RX ORDER — ONDANSETRON 2 MG/ML
INJECTION INTRAMUSCULAR; INTRAVENOUS AS NEEDED
Status: DISCONTINUED | OUTPATIENT
Start: 2023-04-19 | End: 2023-04-19 | Stop reason: SURG

## 2023-04-19 RX ORDER — HYDROCODONE BITARTRATE AND ACETAMINOPHEN 5; 325 MG/1; MG/1
1 TABLET ORAL ONCE AS NEEDED
Status: DISCONTINUED | OUTPATIENT
Start: 2023-04-19 | End: 2023-04-19

## 2023-04-19 RX ORDER — HYDROMORPHONE HYDROCHLORIDE 1 MG/ML
0.25 INJECTION, SOLUTION INTRAMUSCULAR; INTRAVENOUS; SUBCUTANEOUS
Status: DISCONTINUED | OUTPATIENT
Start: 2023-04-19 | End: 2023-04-19

## 2023-04-19 RX ORDER — FLUMAZENIL 0.1 MG/ML
0.2 INJECTION INTRAVENOUS AS NEEDED
Status: DISCONTINUED | OUTPATIENT
Start: 2023-04-19 | End: 2023-04-19

## 2023-04-19 RX ORDER — LIDOCAINE HYDROCHLORIDE AND EPINEPHRINE 5; 5 MG/ML; UG/ML
INJECTION, SOLUTION INFILTRATION; PERINEURAL
Status: DISCONTINUED | OUTPATIENT
Start: 2023-04-19 | End: 2023-04-19 | Stop reason: HOSPADM

## 2023-04-19 RX ORDER — ONDANSETRON 2 MG/ML
4 INJECTION INTRAMUSCULAR; INTRAVENOUS ONCE AS NEEDED
Status: DISCONTINUED | OUTPATIENT
Start: 2023-04-19 | End: 2023-04-19

## 2023-04-19 RX ORDER — ONDANSETRON 4 MG/1
4 TABLET, FILM COATED ORAL EVERY 6 HOURS PRN
Status: DISCONTINUED | OUTPATIENT
Start: 2023-04-19 | End: 2023-04-20 | Stop reason: HOSPADM

## 2023-04-19 RX ORDER — CEFAZOLIN SODIUM 2 G/100ML
INJECTION, SOLUTION INTRAVENOUS AS NEEDED
Status: DISCONTINUED | OUTPATIENT
Start: 2023-04-19 | End: 2023-04-19 | Stop reason: SURG

## 2023-04-19 RX ORDER — FAMOTIDINE 10 MG/ML
20 INJECTION, SOLUTION INTRAVENOUS
Status: COMPLETED | OUTPATIENT
Start: 2023-04-19 | End: 2023-04-19

## 2023-04-19 RX ORDER — PRAVASTATIN SODIUM 40 MG
40 TABLET ORAL DAILY
Status: DISCONTINUED | OUTPATIENT
Start: 2023-04-20 | End: 2023-04-20 | Stop reason: HOSPADM

## 2023-04-19 RX ORDER — HYDROCODONE BITARTRATE AND ACETAMINOPHEN 5; 325 MG/1; MG/1
1 TABLET ORAL EVERY 4 HOURS PRN
Status: DISCONTINUED | OUTPATIENT
Start: 2023-04-19 | End: 2023-04-20 | Stop reason: HOSPADM

## 2023-04-19 RX ORDER — IPRATROPIUM BROMIDE AND ALBUTEROL SULFATE 2.5; .5 MG/3ML; MG/3ML
3 SOLUTION RESPIRATORY (INHALATION) ONCE AS NEEDED
Status: DISCONTINUED | OUTPATIENT
Start: 2023-04-19 | End: 2023-04-19

## 2023-04-19 RX ORDER — HYDRALAZINE HYDROCHLORIDE 20 MG/ML
5 INJECTION INTRAMUSCULAR; INTRAVENOUS
Status: DISCONTINUED | OUTPATIENT
Start: 2023-04-19 | End: 2023-04-19

## 2023-04-19 RX ORDER — LIDOCAINE HYDROCHLORIDE 20 MG/ML
INJECTION, SOLUTION INFILTRATION; PERINEURAL AS NEEDED
Status: DISCONTINUED | OUTPATIENT
Start: 2023-04-19 | End: 2023-04-19 | Stop reason: SURG

## 2023-04-19 RX ORDER — FAMOTIDINE 20 MG/1
40 TABLET, FILM COATED ORAL
Status: DISCONTINUED | OUTPATIENT
Start: 2023-04-20 | End: 2023-04-19

## 2023-04-19 RX ORDER — HYDROCODONE BITARTRATE AND ACETAMINOPHEN 7.5; 325 MG/1; MG/1
1 TABLET ORAL EVERY 4 HOURS PRN
Status: DISCONTINUED | OUTPATIENT
Start: 2023-04-19 | End: 2023-04-19

## 2023-04-19 RX ORDER — HEPARIN SODIUM 1000 [USP'U]/ML
INJECTION, SOLUTION INTRAVENOUS; SUBCUTANEOUS
Status: DISCONTINUED | OUTPATIENT
Start: 2023-04-19 | End: 2023-04-19 | Stop reason: HOSPADM

## 2023-04-19 RX ORDER — FAMOTIDINE 20 MG/1
40 TABLET, FILM COATED ORAL
Status: DISCONTINUED | OUTPATIENT
Start: 2023-04-20 | End: 2023-04-20 | Stop reason: HOSPADM

## 2023-04-19 RX ADMIN — MIDAZOLAM 0.5 MG: 1 INJECTION INTRAMUSCULAR; INTRAVENOUS at 14:08

## 2023-04-19 RX ADMIN — ROCURONIUM BROMIDE 50 MG: 50 INJECTION INTRAVENOUS at 15:43

## 2023-04-19 RX ADMIN — FAMOTIDINE 20 MG: 10 INJECTION INTRAVENOUS at 14:08

## 2023-04-19 RX ADMIN — PROPOFOL 20 MG: 10 INJECTION, EMULSION INTRAVENOUS at 15:45

## 2023-04-19 RX ADMIN — DEXAMETHASONE SODIUM PHOSPHATE 8 MG: 4 INJECTION, SOLUTION INTRAMUSCULAR; INTRAVENOUS at 15:54

## 2023-04-19 RX ADMIN — APIXABAN 5 MG: 5 TABLET, FILM COATED ORAL at 21:59

## 2023-04-19 RX ADMIN — SACUBITRIL AND VALSARTAN 1 TABLET: 49; 51 TABLET, FILM COATED ORAL at 20:54

## 2023-04-19 RX ADMIN — NOREPINEPHRINE BITARTRATE 0.04 MCG/KG/MIN: 1 INJECTION, SOLUTION, CONCENTRATE INTRAVENOUS at 15:43

## 2023-04-19 RX ADMIN — SODIUM CHLORIDE, POTASSIUM CHLORIDE, SODIUM LACTATE AND CALCIUM CHLORIDE: 600; 310; 30; 20 INJECTION, SOLUTION INTRAVENOUS at 15:29

## 2023-04-19 RX ADMIN — SODIUM CHLORIDE 75 ML/HR: 9 INJECTION, SOLUTION INTRAVENOUS at 13:03

## 2023-04-19 RX ADMIN — PROPOFOL 130 MG: 10 INJECTION, EMULSION INTRAVENOUS at 15:43

## 2023-04-19 RX ADMIN — CEFAZOLIN SODIUM 2 G: 2 INJECTION, SOLUTION INTRAVENOUS at 15:44

## 2023-04-19 RX ADMIN — SODIUM CHLORIDE 75 ML/HR: 9 INJECTION, SOLUTION INTRAVENOUS at 19:26

## 2023-04-19 RX ADMIN — SUGAMMADEX 200 MG: 100 INJECTION, SOLUTION INTRAVENOUS at 16:50

## 2023-04-19 RX ADMIN — SUCRALFATE 1 G: 1 TABLET ORAL at 20:53

## 2023-04-19 RX ADMIN — ONDANSETRON 4 MG: 2 INJECTION INTRAMUSCULAR; INTRAVENOUS at 16:49

## 2023-04-19 RX ADMIN — LIDOCAINE HYDROCHLORIDE 100 MG: 20 INJECTION, SOLUTION INFILTRATION; PERINEURAL at 15:42

## 2023-04-19 NOTE — Clinical Note
using micropuncture technique with ultrasound guidance into the right femoral vein. Sheath insertion not delayed.

## 2023-04-19 NOTE — ANESTHESIA POSTPROCEDURE EVALUATION
"Patient: Renato Marquez    Procedure Summary     Date: 04/19/23 Room / Location: [facility]    Anesthesia Start: 1529 Anesthesia Stop: 1721    Procedure: Atrial Appendage Occlusion Diagnosis:       Permanent atrial fibrillation      Chronic anticoagulation      Gastrointestinal hemorrhage associated with anorectal source      (Permanent atrial fibrillation  GI bleeding)    Providers: Dustin Neal MD Provider: Reynaldo Stout MD    Anesthesia Type: general ASA Status: 3          Anesthesia Type: general    Vitals  Vitals Value Taken Time   BP 97/59 04/19/23 1735   Temp 36.6 °C (97.9 °F) 04/19/23 1715   Pulse 76 04/19/23 1739   Resp 18 04/19/23 1735   SpO2 100 % 04/19/23 1739   Vitals shown include unvalidated device data.        Post Anesthesia Care and Evaluation    Level of consciousness: awake and alert  Pain management: adequate    Airway patency: patent  Anesthetic complications: No anesthetic complications  PONV Status: none  Cardiovascular status: acceptable  Respiratory status: acceptable  Hydration status: acceptable    Comments: BP 97/59   Pulse 76   Temp 36.6 °C (97.9 °F) (Oral)   Resp 18   Ht 185.4 cm (73\")   Wt 99.8 kg (220 lb)   SpO2 100%   BMI 29.03 kg/m²         "

## 2023-04-19 NOTE — PLAN OF CARE
Goal Outcome Evaluation:         Hyperkalemia noted, renal panel to be collected per cardiology. Safeguard intact upon transfer to [facility]. Afib on monitor. Vitals otherwise stable. Will continue plan of care.

## 2023-04-19 NOTE — ANESTHESIA PROCEDURE NOTES
Structural Heart ADDY    Procedure Performed: Structural Heart ADDY       Start Time:        End Time:      Preanesthesia Checklist:  Patient identified, IV assessed, risks and benefits discussed, monitors and equipment assessed, procedure being performed at surgeon's request and anesthesia consent obtained.    General Procedure Information  Diagnostic Indications for Echo:  assessment of ascending aorta, assessment of surgical repair and hemodynamic monitoring  Physician Requesting Echo: Dustin Neal MD  CPT Code:  11375, 74573  ICD Code for Medical Necessity:  Mitral regurgitation nonrhuematic  Location performed:  OR  Intubated  Bite block placed  Heart visualized  Probe Insertion:  Easy  Probe Type:  Multiplane  Modalities:  Color flow mapping, pulse wave Doppler and continuous wave Doppler    Echocardiographic and Doppler Measurements    Ventricles    Right Ventricle:  Cavity size dilated.  Hypertrophy not present.  Thrombus not present.    Left Ventricle:  Cavity size dilated.  Thrombus not present.  Global Function moderately impaired.  Ejection Fraction 40%.          Valves    Aortic Valve:  Annulus normal.  Stenosis not present.  Area: 1.33 cm².  Mean Gradient: 3 mmHg.  Regurgitation trace.  Leaflets calcified.  Leaflet motions normal.      Mitral Valve:  Annulus dilated.  Stenosis not present.  Regurgitation severe.      Tricuspid Valve:  Annulus dilated.  Stenosis not present.  Regurgitation severe.    Other Valve Findings:       MV large eccentric jet directed towards lateral wall  TV systolic flow reversal seen in hepatic vein     Aorta    Ascending Aorta:  Dissection not present.  Plaque thickness less than 3 mm.  Mobile plaque not present.    Aortic Arch:  Dissection not present.  Plaque thickness less than 3 mm.  Mobile plaque not present.    Descending Aorta:  Dissection not present.  Plaque thickness less than 3 mm.  Mobile plaque not present.        Atria    Right Atrium:  Size dilated.   Thrombus not present.  Tumor not present.  Device not present.      Left Atrium:  Size dilated.  Spontaneous echo contrast present.  Thrombus not present.  Tumor not present.  Device not present.    Left atrial appendage normal.          Diastolic Function Measurements:  Diastolic Dysfunction Grade= indeterminate  E=  ms  A=  ms  E/A Ratio=   DT=  ms  S/D=   IVRT=    Other Findings  Pericardium:  normal      Anesthesia Information  Performed Personally  Anesthesiologist:  Reynaldo Stout MD      Echocardiogram Comments:       Post deployment   No leak seen around device with CFD  Pulm vein flow appears as pre deployment   No RWMAS evident   MR same severity as preop

## 2023-04-19 NOTE — ANESTHESIA PROCEDURE NOTES
Airway  Urgency: elective    Date/Time: 4/19/2023 3:46 PM  Airway not difficult    General Information and Staff    Patient location during procedure: OR  Anesthesiologist: Reynaldo Stout MD    Indications and Patient Condition  Indications for airway management: airway protection    Preoxygenated: yes  Mask difficulty assessment: 1 - vent by mask    Final Airway Details  Final airway type: endotracheal airway      Successful airway: ETT  Cuffed: yes   Successful intubation technique: direct laryngoscopy  Facilitating devices/methods: Bougie and cricoid pressure  Endotracheal tube insertion site: oral  Blade: Nilam  Blade size: 4  ETT size (mm): 7.5  Cormack-Lehane Classification: grade III - view of epiglottis only  Placement verified by: chest auscultation   Measured from: lips  ETT/EBT  to lips (cm): 21  Number of attempts at approach: 1  Assessment: lips, teeth, and gum same as pre-op and atraumatic intubation

## 2023-04-19 NOTE — Clinical Note
Hemostasis started on the right femoral vein. Perclose was used in achieving hemostasis. Closure device deployed in the vessel and manual pressure applied to vessel. Hemostasis achieved successfully. Closure device additional comment: Perclose deployed then figure eight stitch applied. 4x4 tegaderm placed

## 2023-04-19 NOTE — Clinical Note
Allergies reviewed.  H&P note has been confirmed for the patient. Procedural consent has been signed.  Blood consent has not been signed. Staff has reviewed the patient's labs.

## 2023-04-19 NOTE — ANESTHESIA PREPROCEDURE EVALUATION
Anesthesia Evaluation     Patient summary reviewed   NPO Solid Status: > 8 hours             Airway   Mallampati: II  Neck ROM: full  No difficulty expected  Dental      Pulmonary    (+) sleep apnea,   Cardiovascular     ECG reviewed  Rhythm: irregular    (+) hypertension, valvular problems/murmurs TI, CABG, dysrhythmias Atrial Fib,     ROS comment: EF 45%    Neuro/Psych  GI/Hepatic/Renal/Endo    (+)   thyroid problem     Musculoskeletal     Abdominal    Substance History      OB/GYN          Other   arthritis,                      Anesthesia Plan    ASA 3     general       Anesthetic plan, risks, benefits, and alternatives have been provided, discussed and informed consent has been obtained with: patient.    Use of blood products discussed with patient .       CODE STATUS:

## 2023-04-20 ENCOUNTER — APPOINTMENT (OUTPATIENT)
Dept: CARDIOLOGY | Facility: HOSPITAL | Age: 73
End: 2023-04-20
Payer: MEDICARE

## 2023-04-20 ENCOUNTER — READMISSION MANAGEMENT (OUTPATIENT)
Dept: CALL CENTER | Facility: HOSPITAL | Age: 73
End: 2023-04-20
Payer: MEDICARE

## 2023-04-20 VITALS
TEMPERATURE: 97.5 F | HEIGHT: 73 IN | OXYGEN SATURATION: 92 % | DIASTOLIC BLOOD PRESSURE: 53 MMHG | SYSTOLIC BLOOD PRESSURE: 103 MMHG | BODY MASS INDEX: 29.82 KG/M2 | WEIGHT: 225 LBS | HEART RATE: 98 BPM | RESPIRATION RATE: 18 BRPM

## 2023-04-20 DIAGNOSIS — Z95.818 PRESENCE OF WATCHMAN LEFT ATRIAL APPENDAGE CLOSURE DEVICE: ICD-10-CM

## 2023-04-20 DIAGNOSIS — K62.5 GASTROINTESTINAL HEMORRHAGE ASSOCIATED WITH ANORECTAL SOURCE: ICD-10-CM

## 2023-04-20 DIAGNOSIS — I48.21 PERMANENT ATRIAL FIBRILLATION: Primary | ICD-10-CM

## 2023-04-20 LAB
ANION GAP SERPL CALCULATED.3IONS-SCNC: 8.4 MMOL/L (ref 5–15)
BUN SERPL-MCNC: 53 MG/DL (ref 8–23)
BUN/CREAT SERPL: 33.8 (ref 7–25)
CALCIUM SPEC-SCNC: 9 MG/DL (ref 8.6–10.5)
CHLORIDE SERPL-SCNC: 111 MMOL/L (ref 98–107)
CO2 SERPL-SCNC: 21.6 MMOL/L (ref 22–29)
CREAT SERPL-MCNC: 1.57 MG/DL (ref 0.76–1.27)
DEPRECATED RDW RBC AUTO: 49.4 FL (ref 37–54)
EGFRCR SERPLBLD CKD-EPI 2021: 46 ML/MIN/1.73
ERYTHROCYTE [DISTWIDTH] IN BLOOD BY AUTOMATED COUNT: 14.3 % (ref 12.3–15.4)
GLUCOSE SERPL-MCNC: 207 MG/DL (ref 65–99)
HCT VFR BLD AUTO: 32.6 % (ref 37.5–51)
HGB BLD-MCNC: 10.8 G/DL (ref 13–17.7)
MAXIMAL PREDICTED HEART RATE: 146 BPM
MCH RBC QN AUTO: 31.7 PG (ref 26.6–33)
MCHC RBC AUTO-ENTMCNC: 33.1 G/DL (ref 31.5–35.7)
MCV RBC AUTO: 95.6 FL (ref 79–97)
PLATELET # BLD AUTO: 108 10*3/MM3 (ref 140–450)
PMV BLD AUTO: 10.4 FL (ref 6–12)
POTASSIUM SERPL-SCNC: 5.8 MMOL/L (ref 3.5–5.2)
QT INTERVAL: 362 MS
RBC # BLD AUTO: 3.41 10*6/MM3 (ref 4.14–5.8)
SODIUM SERPL-SCNC: 141 MMOL/L (ref 136–145)
STRESS TARGET HR: 124 BPM
WBC NRBC COR # BLD: 6.33 10*3/MM3 (ref 3.4–10.8)

## 2023-04-20 PROCEDURE — 99239 HOSP IP/OBS DSCHRG MGMT >30: CPT | Performed by: INTERNAL MEDICINE

## 2023-04-20 PROCEDURE — 93321 DOPPLER ECHO F-UP/LMTD STD: CPT

## 2023-04-20 PROCEDURE — 85027 COMPLETE CBC AUTOMATED: CPT | Performed by: INTERNAL MEDICINE

## 2023-04-20 PROCEDURE — 93005 ELECTROCARDIOGRAM TRACING: CPT | Performed by: INTERNAL MEDICINE

## 2023-04-20 PROCEDURE — 93010 ELECTROCARDIOGRAM REPORT: CPT | Performed by: INTERNAL MEDICINE

## 2023-04-20 PROCEDURE — 80048 BASIC METABOLIC PNL TOTAL CA: CPT | Performed by: INTERNAL MEDICINE

## 2023-04-20 PROCEDURE — 93308 TTE F-UP OR LMTD: CPT | Performed by: INTERNAL MEDICINE

## 2023-04-20 PROCEDURE — 93308 TTE F-UP OR LMTD: CPT

## 2023-04-20 PROCEDURE — 93321 DOPPLER ECHO F-UP/LMTD STD: CPT | Performed by: INTERNAL MEDICINE

## 2023-04-20 PROCEDURE — 93325 DOPPLER ECHO COLOR FLOW MAPG: CPT

## 2023-04-20 PROCEDURE — 93325 DOPPLER ECHO COLOR FLOW MAPG: CPT | Performed by: INTERNAL MEDICINE

## 2023-04-20 RX ORDER — TORSEMIDE 20 MG/1
20 TABLET ORAL DAILY
Qty: 60 TABLET | Refills: 5
Start: 2023-04-20

## 2023-04-20 RX ORDER — BUMETANIDE 0.25 MG/ML
2 INJECTION INTRAMUSCULAR; INTRAVENOUS ONCE
Status: COMPLETED | OUTPATIENT
Start: 2023-04-20 | End: 2023-04-20

## 2023-04-20 RX ORDER — SODIUM CHLORIDE 450 MG/100ML
75 INJECTION, SOLUTION INTRAVENOUS CONTINUOUS
Status: DISCONTINUED | OUTPATIENT
Start: 2023-04-20 | End: 2023-04-20 | Stop reason: HOSPADM

## 2023-04-20 RX ADMIN — APIXABAN 5 MG: 5 TABLET, FILM COATED ORAL at 08:31

## 2023-04-20 RX ADMIN — FAMOTIDINE 40 MG: 20 TABLET, FILM COATED ORAL at 11:36

## 2023-04-20 RX ADMIN — PANTOPRAZOLE SODIUM 40 MG: 40 TABLET, DELAYED RELEASE ORAL at 06:36

## 2023-04-20 RX ADMIN — PRAVASTATIN SODIUM 40 MG: 40 TABLET ORAL at 10:52

## 2023-04-20 RX ADMIN — SODIUM CHLORIDE 75 ML/HR: 4.5 INJECTION, SOLUTION INTRAVENOUS at 07:37

## 2023-04-20 RX ADMIN — SUCRALFATE 1 G: 1 TABLET ORAL at 08:31

## 2023-04-20 RX ADMIN — METOPROLOL SUCCINATE 50 MG: 50 TABLET, FILM COATED, EXTENDED RELEASE ORAL at 08:31

## 2023-04-20 RX ADMIN — BUMETANIDE 2 MG: 0.25 INJECTION INTRAMUSCULAR; INTRAVENOUS at 10:51

## 2023-04-20 RX ADMIN — TORSEMIDE 40 MG: 20 TABLET ORAL at 08:31

## 2023-04-20 RX ADMIN — CLOPIDOGREL BISULFATE 75 MG: 75 TABLET, FILM COATED ORAL at 08:31

## 2023-04-20 NOTE — NURSING NOTE
Bladder scan 495 this morning. Repeated bladder scan after 400 u/o with 303 residual. Was able to admin 2 mg IV bemux at 1045 after getting from pharm. Will bladder scan post urination asap. WCTM.     Newly found and charted BL upper extremity tremors reported to RN. Obvious when arms are raised. Informed NP.

## 2023-04-20 NOTE — PLAN OF CARE
Goal Outcome Evaluation:      VSS, AOX4. Post residual bladder scan wnl, discharge approved at this time.

## 2023-04-20 NOTE — OUTREACH NOTE
Prep Survey    Flowsheet Row Responses   [facility] facility patient discharged from? [place]   Is LACE score < 7 ? No   Eligibility Readm Mgmt   Discharge diagnosis ATRIAL APPENDAGE EXCLUSION LEFT WITH TRANSESOPHAGEAL ECHOCARDIOGRAM   Does the patient have one of the following disease processes/diagnoses(primary or secondary)? Cardiothoracic surgery   Does the patient have Home health ordered? No   Is there a DME ordered? No   Prep survey completed? Yes          NUSRAT ALDANA - Registered Nurse

## 2023-04-20 NOTE — DISCHARGE SUMMARY
[facility] Discharge Summary      Patient Name: Renato Marquez  Age/Sex: 72 y.o. male  : 1950  MRN: [id number]    Encounter Provider: Dustin Neal MD  Referring Provider: Dustin Neal MD  Place of Service: [facility]  Patient Care Team:  Provider, No Known as PCP - General         Date of Discharge:  2023     Date of Admit: 2023      Discharge Diagnosis:   Permanent atrial fibrillation    Chronic anticoagulation    Gastrointestinal hemorrhage associated with anorectal source        Hospital Course:   Mr. Marquez is a 72 y.o. gentleman with past medical history notable for permanent nonvalvular atrial fibrillation with a [id number] score of 4 and a has bled score of 5, history of recurrent GI bleeding while on anticoagulation, coronary artery disease status post CABG, ischemic cardiomyopathy, chronic systolic congestive heart failure, obstructive sleep apnea, pulmonary hypertension, hypothyroidism, and mixed hyperlipidemia who presents for elective watchman implantation.    He underwent implantation of watchman on 2023 without any significant issues.  Of note leading into his procedure he had been on a higher dose diuretic and was noted in his preop labs that his creatinine and BUN were significantly elevated with associated hyperkalemia.  We had him hold his diuretics coming into his hospital stay and this did improve both his BUN and creatinine.  He was noted to have still some hyperkalemia.  Nephrology was consulted and they recommended initially stopping the Entresto altogether however with further discussion given his chronic systolic heart failure without that we could potentially de-escalate his diuretic from 40 mg of torsemide down to 20 mg of torsemide and cut his Entresto in half to see if this would improve his kidney function electrolytes while maintaining good control of his congestive heart failure.  He has  follow-up with nephrology and with us.  There is also some concerns regarding urination he did have a postvoid residual of 145.  With regards to his anticoagulation he has had a lot of issues with Xarelto.  There was some concerns that Eliquis was giving him a rash but it turned out to be a another dermatologic issue the wife suggested that we go back on this to see if this would sit better with him because he has had some recurrent bleeding on Xarelto we will send him home on 5 mg of Eliquis.      Physical Examination:   Constitutional: Well appearing, well developed, no acute distress   HENT: Oropharynx clear and membrane moist  Eyes: Normal conjunctiva, no sclera icterus.  Neck: Supple, no carotid bruit bilaterally.  Cardiovascular: Irregularly irregular rate and rhythm, Early peaking systolic murmur over the right upper sternal border, Trace bilateral lower extremity edema.  Pulmonary: Normal respiratory effort, normal lung sounds, no wheezing.  Abdominal: Soft, nontender, no hepatosplenomegaly, liver is non-pulsatile.  Neurological: Alert and orient x 3.   Skin: Warm, dry, no ecchymosis, no rash.  Psych: Appropriate mood and affect. Normal judgment and insight.    Procedures Performed:  Procedure(s):  Atrial Appendage Occlusion       Limited Echocardiogram 4/20/2023:  · There is no evidence of pericardial effusion. .  · Normal left ventricular cavity size noted. Left ventricular wall thickness is consistent with mild to moderate concentric hypertrophy. All left ventricular wall segments contract normally.    Watchman Implantation 4/19/2023  · Right common femoral venotomy utilizing micropuncture kit and ultrasound guidance.  · Left common femoral venotomy utilizing micropuncture kit and ultrasound guidance.  · Transseptal puncture of inter-atrial septum under fluoroscopic and transesophageal guidance   · Successful placement of a 31 mm watchman left atrial appendage closure device.  · Percutaneous closure of  right femoral venotomy access site with #1 suture mediated closure device and figure of 8 suture.        Consults:  Consults     Date and Time Order Name Status Description    4/19/2023  7:42 PM Inpatient Nephrology Consult Completed           Pertinent Test Results:    Results from last 7 days   Lab Units 04/20/23 0331 04/19/23 2015 04/19/23  1303   SODIUM mmol/L 141 140 139   POTASSIUM mmol/L 5.8* 6.0* 5.6*   CHLORIDE mmol/L 111* 110* 105   CO2 mmol/L 21.6* 24.0 27.6   BUN mg/dL 53* 57* 59*   CREATININE mg/dL 1.57* 1.51* 1.65*   GLUCOSE mg/dL 207* 128* 125*   CALCIUM mg/dL 9.0 8.7 9.9           Results from last 7 days   Lab Units 04/20/23 0331 04/14/23  1049   WBC 10*3/mm3 6.33 6.80   HEMOGLOBIN g/dL 10.8* 13.2   HEMATOCRIT % 32.6* 41.6   PLATELETS 10*3/mm3 108* 145     Results from last 7 days   Lab Units 04/14/23  1049   INR  1.07                           Discharge Medications     Your medication list      START taking these medications      Instructions Last Dose Given Next Dose Due   apixaban 5 MG tablet tablet  Commonly known as: ELIQUIS      Take 1 tablet by mouth Every 12 (Twelve) Hours. Indications: Atrial Fibrillation       sacubitril-valsartan 24-26 MG tablet  Commonly known as: ENTRESTO  Replaces: Entresto 49-51 MG tablet      Take 1 tablet by mouth 2 (Two) Times a Day.          CHANGE how you take these medications      Instructions Last Dose Given Next Dose Due   torsemide 20 MG tablet  Commonly known as: DEMADEX  What changed: how much to take      Take 1 tablet by mouth Daily.          CONTINUE taking these medications      Instructions Last Dose Given Next Dose Due   clopidogrel 75 MG tablet  Commonly known as: PLAVIX      Take 1 tablet by mouth Daily.       famotidine 40 MG tablet  Commonly known as: PEPCID      Take 1 tablet by mouth 2 (Two) Times a Day. With lunch and at bedtime       fluorometholone 0.1 % ophthalmic suspension  Commonly known as: FML      3 (Three) Times a Day.        Magnesium 200 MG tablet      Take 250 mg by mouth Daily.       metoprolol succinate XL 50 MG 24 hr tablet  Commonly known as: TOPROL-XL      Take 1 tablet by mouth 2 (Two) Times a Day.       pantoprazole 40 MG EC tablet  Commonly known as: PROTONIX      Take 1 tablet by mouth 2 (Two) Times a Day.       pravastatin 40 MG tablet  Commonly known as: PRAVACHOL      TAKE 1 TABLET BY MOUTH DAILY       sucralfate 1 g tablet  Commonly known as: CARAFATE      Take 1 tablet by mouth 2 (Two) Times a Day.          STOP taking these medications    Entresto 49-51 MG tablet  Generic drug: sacubitril-valsartan  Replaced by: sacubitril-valsartan 24-26 MG tablet        rivaroxaban 20 MG tablet  Commonly known as: Xarelto              Where to Get Your Medications      These medications were sent to [id number] - [place], [place] - [address] - [phone]  - [phone] [address], [facility] [address]    Phone: [phone]   · apixaban 5 MG tablet tablet  · sacubitril-valsartan 24-26 MG tablet     Information about where to get these medications is not yet available    Ask your nurse or doctor about these medications  · torsemide 20 MG tablet           Discharge Diet:         Discharge disposition: Home    Discharge condition: Stable      Follow-up Appointments  Future Appointments   Date Time Provider Department Center   4/28/2023  8:45 AM Brenda Veloz DNP, APRN [facility] None   5/1/2023 10:00 AM Misty Harris APRN [facility]   5/18/2023  9:00 AM Luan Meek MD [facility] GE [facility]   7/13/2023  1:30 PM Jett Mcguire MD [facility] None      Follow-up Information     Provider, No Known .    Contact information:  [facility] [address] [phone]             Dustin Neal MD Follow up in 1 week(s).    Specialty: Cardiology  Why: Follow up with Misty Harris in one week with labs followed by Dr  Val  Contact information:  [address] [place] [address]  [phone]                           Test Results Pending at Discharge  Additional Instructions for the Follow-ups that You Need to Schedule     Basic Metabolic Panel    Apr 25, 2023 (Approximate)      Release to patient: Routine Release         CBC & Differential    Apr 25, 2023 (Approximate)      Manual Differential: No    Release to patient: Routine Release                  Time:   I spent 32 minutes on this discharge activity which included: face-to-face encounter with the patient, reviewing the data in the system, coordination of the care with the nursing staff as well as consultants, documentation, and entering orders.          Dustin Neal MD  [facility]  04/20/23  16:28 EDT

## 2023-04-20 NOTE — PLAN OF CARE
Goal Outcome Evaluation:               Diuretic in Use: torsemide  Response to Diuretics (Output greater than intake): no  Daily Weight (up or down): up  O2 Requirements: oxygen 2 liter NC  Functional Status (Activity level, tolerance and respiratory symptoms): standby  Discharge Plans: nephrology to see for kidney function, cont on I/v fluid, D/C when able.

## 2023-04-20 NOTE — CONSULTS
[facility] Consult Note      Patient Name: Renato Marquez  : 1950  MRN: [id number]  Primary Care Physician:  Provider, No Known  Referring Physician: Dustin Neal MD  Date of admission: 2023    Subjective     Reason for Consult:  KATERINA and hyperkalemia    HPI:   Renato Marquez is a 72 y.o. male with a past medical history of hypertension coronary artery disease status post CABG paroxysmal A-fib status post Watchman procedure and ischemic cardiomyopathy with last ejection fraction 41 to 45% with moderate pulmonary hypertension who is currently status post Watchman procedure.  His Creatinine today is 1.57 with potassium of 60 were consulted to help with management of his acute kidney injury and hyperkalemia.    Review of Systems:   14 point review of systems is otherwise negative except for mentioned above on HPI    Personal History     Past Medical History:   Diagnosis Date   • Abnormal ECG    • CAD (coronary artery disease)    • Cancer     skin cancer   • Carotid arterial disease    • Chronic atrial fibrillation    • Complex sleep apnea syndrome     BiPAP ST   • Hyperlipidemia    • Hypertension    • Ischemic cardiomyopathy    • Mitral regurgitation    • Rectal bleeding        Past Surgical History:   Procedure Laterality Date   • CAROTID ENDARTERECTOMY Right 2009    Dr Lewis   • COLONOSCOPY     • COLONOSCOPY N/A 2021    Procedure: COLONOSCOPY INTO CECUM AND TI;  Surgeon: Artur Jacobson MD;  Location: [facility] ENDOSCOPY;  Service: Gastroenterology;  Laterality: N/A;  PRE: RECTAL BLEEDING  POST: DIVERTICULOSIS, HEMORRHOIDS   • COLONOSCOPY W/ POLYPECTOMY N/A 6/3/2021    Procedure: COLONOSCOPY, polypectomies;  Surgeon: Luan Meek MD;  Location: [facility] OR;  Service: Gastroenterology;  Laterality: N/A;  Diverticulosis  Ascending colon polyp x 4 (3 cold snare)  Cecal polyp (cold snare)  Rectal polyp   • CORONARY ARTERY BYPASS GRAFT        Bhagat, LIMA to LAD, SVG to OM, SVG Post Desc   • ENDOSCOPY N/A 6/3/2021    Procedure: ESOPHAGOGASTRODUODENOSCOPY with biopsies;  Surgeon: Luan Meek MD;  Location: [facility];  Service: Gastroenterology;  Laterality: N/A;  Reflux  Gastric and duodenal ulcers  Biopsies: gastric, distal esophagus   • FINGER SURGERY         Family History: family history includes Heart disease in his father; Hypertension in his father; Stroke in his father.    Social History:  reports that he quit smoking about 31 years ago. His smoking use included cigarettes and cigars. He has never used smokeless tobacco. He reports current alcohol use of about 2.0 standard drinks per week. He reports that he does not use drugs.    Home Medications:  Prior to Admission medications    Medication Sig Start Date End Date Taking? Authorizing Provider   clopidogrel (PLAVIX) 75 MG tablet Take 1 tablet by mouth Daily. 9/15/22  Yes Mauricio Scott III, MD   famotidine (PEPCID) 40 MG tablet Take 1 tablet by mouth 2 (Two) Times a Day. With lunch and at bedtime 8/30/22  Yes Luan Meek MD   fluorometholone (FML) 0.1 % ophthalmic suspension 3 (Three) Times a Day. 3/1/23  Yes ProviderEdgar MD   Magnesium 200 MG tablet Take 250 mg by mouth Daily.   Yes Provider, MD Edgar   metoprolol succinate XL (TOPROL-XL) 50 MG 24 hr tablet Take 1 tablet by mouth 2 (Two) Times a Day. 10/14/22  Yes Mauricio Scott III, MD   pantoprazole (PROTONIX) 40 MG EC tablet Take 1 tablet by mouth 2 (Two) Times a Day. 10/20/22  Yes Luan Meek MD   pravastatin (PRAVACHOL) 40 MG tablet TAKE 1 TABLET BY MOUTH DAILY 11/23/22  Yes Mauricio Scott III, MD   sacubitril-valsartan (Entresto) 49-51 MG tablet Take 1 tablet by mouth 2 (Two) Times a Day. 2/1/23  Yes Mauricio Scott III, MD   sucralfate (CARAFATE) 1 g tablet Take 1 tablet by mouth 2 (Two) Times a Day. 4/20/22  Yes Luan Meek MD   rivaroxaban (Xarelto) 20 MG  tablet Take 1 tablet by mouth Daily. 12/14/22   Ladan Zapata APRN   torsemide (DEMADEX) 20 MG tablet Take 2 tablets by mouth Daily. 1/16/23   Mauricio Scott III, MD       Allergies:  No Known Allergies    Objective     Vitals:   Temp:  [97.3 °F (36.3 °C)-98 °F (36.7 °C)] 98 °F (36.7 °C)  Heart Rate:  [] 76  Resp:  [18-20] 18  BP: ()/(48-85) 98/48  Flow (L/min):  [2-4] 2    Intake/Output Summary (Last 24 hours) at 4/20/2023 0701  Last data filed at 4/20/2023 0635  Gross per 24 hour   Intake 1120 ml   Output 525 ml   Net 595 ml       Physical Exam:    General Appearance: alert, oriented x 3, no acute distress   Skin: warm and dry  HEENT: oral mucosa normal, nonicteric sclera  Neck: supple, no JVD  Lungs: CTA  Heart: RRR, normal S1 and S2  Abdomen: soft, nontender, nondistended  : no palpable bladder  Extremities: no edema, cyanosis or clubbing  Neuro: normal speech and mental status     Scheduled Meds:     apixaban, 5 mg, Oral, Q12H  clopidogrel, 75 mg, Oral, Daily  famotidine, 40 mg, Oral, BID AC  metoprolol succinate XL, 50 mg, Oral, BID  pantoprazole, 40 mg, Oral, BID AC  pravastatin, 40 mg, Oral, Daily  sacubitril-valsartan, 1 tablet, Oral, BID  sucralfate, 1 g, Oral, BID  torsemide, 40 mg, Oral, Daily      IV Meds:   lactated ringers, 9 mL/hr, Last Rate: 125 mL/hr (04/19/23 1529)  sodium chloride, 75 mL/hr, Last Rate: 75 mL/hr (04/19/23 1926)        Results Reviewed:   I have personally reviewed the results from the time of this admission to 4/20/2023 07:01 EDT     Lab Results   Component Value Date    GLUCOSE 207 (H) 04/20/2023    CALCIUM 9.0 04/20/2023     04/20/2023    K 5.8 (H) 04/20/2023    CO2 21.6 (L) 04/20/2023     (H) 04/20/2023    BUN 53 (H) 04/20/2023    CREATININE 1.57 (H) 04/20/2023    EGFRIFAFRI 77 09/29/2020    EGFRIFNONA 56 (L) 09/07/2021    BCR 33.8 (H) 04/20/2023    ANIONGAP 8.4 04/20/2023      Lab Results   Component Value Date    MG 2.4 12/14/2022     PHOS 3.4 04/19/2023    ALBUMIN 3.9 04/19/2023           Assessment / Plan     ASSESSMENT:    1.  Hyperkalemia that appears due to KATERINA and side effect of Entresto.  We will need to rule out urinary retention.  Will discontinue Entresto.  Change IV fluid to half-normal saline at normal she has been contributing to expansion acidosis.  Will give 1 dose of diuretic and repeat lab in the afternoon.  Will check bladder scan and postvoid residual.    2.  Acute kidney injury with last creatinine 1.5 mg/dL down from 2 mg/dL about a week ago.  Continue with IV hydration and hold Entresto.  3.  Non-anion gap metabolic acidosis due to expansion acidosis  4.  Coronary disease status post CABG  5.  Paroxysmal A-fib status post Watchman device  6.  Ischemic cardiomyopathy with last EF 41 to 45% on echocardiogram with moderate pulmonary hypertension  7.  History of hypertension  8.  History of dyslipidemia    PLAN:    1.  Switch IV fluid to half-normal saline  2.  Hold Entresto for now.  Will discuss with cardiology  3.  Check bladder scan postvoid residual  4.  Repeat lab in the afternoon  5.  Surveillance labs    Thank you for involving us in the care of Renatopadma Marquez.  Please feel free to call with any questions.    Kandice Zelaya MD  04/20/23  07:01 EDT    [facility]  [phone]

## 2023-04-24 ENCOUNTER — TELEPHONE (OUTPATIENT)
Dept: CARDIOLOGY | Facility: HOSPITAL | Age: 73
End: 2023-04-24
Payer: MEDICARE

## 2023-04-24 NOTE — TELEPHONE ENCOUNTER
Mrs Marquez calls to report her  had a nose bleed that lasted most of the day yesterday They believe this was caused in part by the Eliquis which was just recently started I spoke with Dr Neal and he recommends cutting the dose in half. Mr Marquez will take 2.5mg of Eliquis every twelve hours and let us know if this helps

## 2023-05-01 ENCOUNTER — HOSPITAL ENCOUNTER (OUTPATIENT)
Dept: CARDIOLOGY | Facility: HOSPITAL | Age: 73
Discharge: HOME | End: 2023-05-01
Admitting: INTERNAL MEDICINE
Payer: MEDICARE

## 2023-05-01 ENCOUNTER — OFFICE VISIT (OUTPATIENT)
Dept: CARDIOLOGY | Facility: CLINIC | Age: 73
End: 2023-05-01
Payer: MEDICARE

## 2023-05-01 VITALS
HEART RATE: 61 BPM | DIASTOLIC BLOOD PRESSURE: 64 MMHG | WEIGHT: 230 LBS | HEIGHT: 73 IN | SYSTOLIC BLOOD PRESSURE: 108 MMHG | BODY MASS INDEX: 30.48 KG/M2

## 2023-05-01 DIAGNOSIS — I48.21 PERMANENT ATRIAL FIBRILLATION: Primary | ICD-10-CM

## 2023-05-01 DIAGNOSIS — I48.21 PERMANENT ATRIAL FIBRILLATION: ICD-10-CM

## 2023-05-01 DIAGNOSIS — Z95.818 PRESENCE OF WATCHMAN LEFT ATRIAL APPENDAGE CLOSURE DEVICE: ICD-10-CM

## 2023-05-01 DIAGNOSIS — K62.5 GASTROINTESTINAL HEMORRHAGE ASSOCIATED WITH ANORECTAL SOURCE: ICD-10-CM

## 2023-05-01 LAB
ANION GAP SERPL CALCULATED.3IONS-SCNC: 11.1 MMOL/L (ref 5–15)
BUN SERPL-MCNC: 44 MG/DL (ref 8–23)
BUN/CREAT SERPL: 30.1 (ref 7–25)
CALCIUM SPEC-SCNC: 9.5 MG/DL (ref 8.6–10.5)
CHLORIDE SERPL-SCNC: 104 MMOL/L (ref 98–107)
CO2 SERPL-SCNC: 23.9 MMOL/L (ref 22–29)
CREAT SERPL-MCNC: 1.46 MG/DL (ref 0.76–1.27)
DEPRECATED RDW RBC AUTO: 48.3 FL (ref 37–54)
EGFRCR SERPLBLD CKD-EPI 2021: 50.2 ML/MIN/1.73
ERYTHROCYTE [DISTWIDTH] IN BLOOD BY AUTOMATED COUNT: 14.2 % (ref 12.3–15.4)
GLUCOSE SERPL-MCNC: 90 MG/DL (ref 65–99)
HCT VFR BLD AUTO: 33.5 % (ref 37.5–51)
HGB BLD-MCNC: 11.4 G/DL (ref 13–17.7)
MCH RBC QN AUTO: 31.6 PG (ref 26.6–33)
MCHC RBC AUTO-ENTMCNC: 34 G/DL (ref 31.5–35.7)
MCV RBC AUTO: 92.8 FL (ref 79–97)
PLATELET # BLD AUTO: 141 10*3/MM3 (ref 140–450)
PMV BLD AUTO: 10.2 FL (ref 6–12)
POTASSIUM SERPL-SCNC: 4.4 MMOL/L (ref 3.5–5.2)
RBC # BLD AUTO: 3.61 10*6/MM3 (ref 4.14–5.8)
SODIUM SERPL-SCNC: 139 MMOL/L (ref 136–145)
WBC NRBC COR # BLD: 7.33 10*3/MM3 (ref 3.4–10.8)

## 2023-05-01 PROCEDURE — 85027 COMPLETE CBC AUTOMATED: CPT | Performed by: INTERNAL MEDICINE

## 2023-05-01 PROCEDURE — 80048 BASIC METABOLIC PNL TOTAL CA: CPT | Performed by: INTERNAL MEDICINE

## 2023-05-01 PROCEDURE — 36415 COLL VENOUS BLD VENIPUNCTURE: CPT

## 2023-05-01 NOTE — PROGRESS NOTES
[facility] Follow Up Office Note     Encounter Date:23  Patient:Renato Marquez  :1950  MRN:[id number]      Chief Complaint:   Chief Complaint   Patient presents with   • Cardiomyopathy         History of Presenting Illness:        Renato Marquez is a 72 y.o. male who is here for follow-up.  He is a patient of Dr Scott and a patient of Dr Neal for left atrial appendage occlusion.    Patient has a past medical history significant for permanent atrial fibrillation, recurrent GI bleeding, coronary artery disease with prior CABG, CVA, carotid disease status post endarterectomy in , systolic heart failure.    Patient is status post elective Watchman implantation 2023.  Going into his procedure he had been on a higher diuretic dose and preop labs showed elevated creatinine and BUN.  His diuretics were held prior to his procedure, he still had some hyperkalemia.  Nephrology saw him and ultimately his Entresto was cut in half and his torsemide was also cut in half.  Additionally, he has had some difficulties with anticoagulants.  There was concern apixaban caused him a rash, however this ended up being another dermatologic issue and he was discharged back on apixaban.    Patient reports he continues to have bleeding from his nose a little bit but has not noticed any dark stools or overt GI bleeding.  He has a rash on his face that developed in the last 3 days.  He is seeing dermatology tomorrow.  He has also noticed he is gaining weight and has some swelling in his legs.  He denies increasing shortness of breath or orthopnea.  He also denies palpitations or presyncope.    Review of Systems:  Review of Systems   Cardiovascular: Positive for leg swelling. Negative for chest pain, dyspnea on exertion, orthopnea and palpitations.   Respiratory: Negative for shortness of breath.    Hematologic/Lymphatic: Bruises/bleeds easily.   Skin: Positive for rash.       Current Outpatient Medications  on File Prior to Visit   Medication Sig Dispense Refill   • apixaban (ELIQUIS) 5 MG tablet tablet Take 1 tablet by mouth Every 12 (Twelve) Hours. Indications: Atrial Fibrillation 60 tablet 1   • clopidogrel (PLAVIX) 75 MG tablet Take 1 tablet by mouth Daily. 90 tablet 3   • famotidine (PEPCID) 40 MG tablet Take 1 tablet by mouth 2 (Two) Times a Day. With lunch and at bedtime 180 tablet 3   • fluorometholone (FML) 0.1 % ophthalmic suspension 3 (Three) Times a Day.     • Magnesium 200 MG tablet Take 250 mg by mouth Daily.     • metoprolol succinate XL (TOPROL-XL) 50 MG 24 hr tablet Take 1 tablet by mouth 2 (Two) Times a Day. 180 tablet 3   • pantoprazole (PROTONIX) 40 MG EC tablet Take 1 tablet by mouth 2 (Two) Times a Day. 180 tablet 2   • pravastatin (PRAVACHOL) 40 MG tablet TAKE 1 TABLET BY MOUTH DAILY 90 tablet 3   • sacubitril-valsartan (ENTRESTO) 24-26 MG tablet Take 1 tablet by mouth 2 (Two) Times a Day. 180 tablet 3   • sucralfate (CARAFATE) 1 g tablet Take 1 tablet by mouth 2 (Two) Times a Day. 180 tablet 3   • torsemide (DEMADEX) 20 MG tablet Take 1 tablet by mouth Daily. (Patient taking differently: Take 1 tablet by mouth Daily. Thinks he takes 1/2 tab in am 1/2 tab pm) 60 tablet 5     No current facility-administered medications on file prior to visit.       No Known Allergies    Past Medical History:   Diagnosis Date   • Abnormal ECG    • CAD (coronary artery disease)    • Cancer     skin cancer   • Carotid arterial disease    • Chronic atrial fibrillation    • Complex sleep apnea syndrome     BiPAP ST   • Hyperlipidemia    • Hypertension    • Ischemic cardiomyopathy    • Mitral regurgitation    • Rectal bleeding        Past Surgical History:   Procedure Laterality Date   • ATRIAL APPENDAGE EXCLUSION LEFT WITH TRANSESOPHAGEAL ECHOCARDIOGRAM N/A 4/19/2023    Procedure: Atrial Appendage Occlusion;  Surgeon: Dustni Neal MD;  Location: [facility] INVASIVE LOCATION;  Service: Cardiovascular;   Laterality: N/A;   • CAROTID ENDARTERECTOMY Right 2009    Dr Lewis   • COLONOSCOPY     • COLONOSCOPY N/A 2021    Procedure: COLONOSCOPY INTO CECUM AND TI;  Surgeon: Artur Jacobson MD;  Location: [facility] ENDOSCOPY;  Service: Gastroenterology;  Laterality: N/A;  PRE: RECTAL BLEEDING  POST: DIVERTICULOSIS, HEMORRHOIDS   • COLONOSCOPY W/ POLYPECTOMY N/A 6/3/2021    Procedure: COLONOSCOPY, polypectomies;  Surgeon: Luan Meek MD;  Location: [facility] OR;  Service: Gastroenterology;  Laterality: N/A;  Diverticulosis  Ascending colon polyp x 4 (3 cold snare)  Cecal polyp (cold snare)  Rectal polyp   • CORONARY ARTERY BYPASS GRAFT      MIMI Frederick to LAD, SVG to OM, SVG Post Desc   • ENDOSCOPY N/A 6/3/2021    Procedure: ESOPHAGOGASTRODUODENOSCOPY with biopsies;  Surgeon: Luan Meek MD;  Location: [facility] OR;  Service: Gastroenterology;  Laterality: N/A;  Reflux  Gastric and duodenal ulcers  Biopsies: gastric, distal esophagus   • FINGER SURGERY         Social History     Socioeconomic History   • Marital status:    Tobacco Use   • Smoking status: Former     Types: Cigarettes, Cigars     Quit date: 1991     Years since quittin.3   • Smokeless tobacco: Never   Vaping Use   • Vaping Use: Never used   Substance and Sexual Activity   • Alcohol use: Yes     Alcohol/week: 2.0 standard drinks     Types: 2 Shots of liquor per week     Comment: NIGHTLY/caffeine use   • Drug use: No   • Sexual activity: Not Currently       Family History   Problem Relation Age of Onset   • Heart disease Father    • Stroke Father    • Hypertension Father        The following portions of the patient's history were reviewed and updated as appropriate: allergies, current medications, past family history, past medical history, past social history, past surgical history and problem list.       Objective:       Vitals:    23 1000   BP: 108/64   Pulse: 61   Weight: 104 kg (230 lb)  "  Height: 185.4 cm (73\")         Physical Exam:  Constitutional: Well appearing, well developed, no acute distress   HENT: Oropharynx clear and membrane moist  Eyes: Normal conjunctiva, no sclera icterus  Neck: Supple, no carotid bruit bilaterally  Cardiovascular: Irregularly irregular rate and rhythm, No Murmur, Trace bilateral lower extremity edema  Pulmonary: Normal respiratory effort, normal lung sounds, no wheezing  Neurological: Alert and orient x 3  Skin: Warm, dry, no ecchymosis, no rash  Psych: Appropriate mood and affect. Normal judgment and insight         Lab Results   Component Value Date     04/20/2023     04/19/2023    K 5.8 (H) 04/20/2023    K 6.0 (H) 04/19/2023     (H) 04/20/2023     (H) 04/19/2023    CO2 21.6 (L) 04/20/2023    CO2 24.0 04/19/2023    BUN 53 (H) 04/20/2023    BUN 57 (H) 04/19/2023    CREATININE 1.57 (H) 04/20/2023    CREATININE 1.51 (H) 04/19/2023    EGFRIFNONA 56 (L) 09/07/2021    EGFRIFNONA 77 06/21/2021    EGFRIFAFRI 77 09/29/2020    GLUCOSE 207 (H) 04/20/2023    GLUCOSE 128 (H) 04/19/2023    CALCIUM 9.0 04/20/2023    CALCIUM 8.7 04/19/2023    PROTENTOTREF 6.2 09/29/2020    ALBUMIN 3.9 04/19/2023    ALBUMIN 4.4 04/14/2023    BILITOT 0.8 04/14/2023    BILITOT 1.0 12/20/2022    AST 27 04/14/2023    AST 26 12/20/2022    ALT 19 04/14/2023    ALT 14 12/20/2022     Lab Results   Component Value Date    WBC 6.33 04/20/2023    WBC 6.80 04/14/2023    HGB 10.8 (L) 04/20/2023    HGB 13.2 04/14/2023    HCT 32.6 (L) 04/20/2023    HCT 41.6 04/14/2023    MCV 95.6 04/20/2023    MCV 94.5 04/14/2023     (L) 04/20/2023     04/14/2023     Lab Results   Component Value Date    CHOL 156 11/23/2022    CHOL 154 01/29/2019    TRIG 117 11/23/2022    TRIG 62 09/29/2020    HDL 50 11/23/2022    HDL 71 (H) 09/29/2020    LDL 85 11/23/2022    LDL 79 09/29/2020     Lab Results   Component Value Date    PROBNP 2,473.0 (H) 11/23/2022    PROBNP 4,945.0 (H) 11/08/2022     Lab " Results   Component Value Date    TROPONINT <0.010 06/20/2021     Lab Results   Component Value Date    TSH 1.510 06/22/2021    TSH 1.260 09/29/2020           ECG 12 Lead    Date/Time: 5/1/2023 10:02 AM  Performed by: Misty Harris APRN  Authorized by: Misty Harris APRN   Comparison: compared with previous ECG from 4/20/2023  Similar to previous ECG  Rhythm: atrial fibrillation  Ectopy: unifocal PVCs  Rate: normal  BPM: 61  QRS axis: normal  Other findings: non-specific ST-T wave changes               Assessment:          Diagnosis Plan   1. Permanent atrial fibrillation  ECG 12 Lead             Plan:       Permanent atrial fibrillation - while on chronic anticoagulation has had recurrent GI bleeds.  He is status post watchman 4/19/2023.  He is on apixaban.  Groin site without hematoma.  Repeat CBC    Chronic systolic heart failure - diuretic and torsemide recently decreased due to KATERINA and hyperkalemia.  He feels like he is putting on fluid and has just mild lower extremity edema.  Repeat labs today.  Depending on labs could consider just barely increasing his diuretic, but if he still has an elevated creatinine we will hold off.  If any changes were made he would need close lab follow-up.  He sees nephrology on 5/30    Coronary artery disease with prior CABG - denies anginal symptoms.  Continue Plavix, beta-blocker, statin      Patient is seen today for follow-up.  I think he is doing well postprocedure.  Check labs today.  Will make a decision based on this regarding diuretics.  He sees nephrology 5/30/2023.  Follow-up with [facility] and appointment with Dr. Neal as scheduled    Orders Placed This Encounter   Procedures   • ECG 12 Lead     This order was created via procedure documentation     Order Specific Question:   Release to patient     Answer:   Routine Release            SIRIA Victor  [facility]  05/01/23  10:26 EDT

## 2023-05-08 ENCOUNTER — READMISSION MANAGEMENT (OUTPATIENT)
Dept: CALL CENTER | Facility: HOSPITAL | Age: 73
End: 2023-05-08
Payer: MEDICARE

## 2023-05-08 DIAGNOSIS — K21.00 GASTROESOPHAGEAL REFLUX DISEASE WITH ESOPHAGITIS WITHOUT HEMORRHAGE: ICD-10-CM

## 2023-05-08 DIAGNOSIS — K22.70 BARRETT'S ESOPHAGUS WITHOUT DYSPLASIA: ICD-10-CM

## 2023-05-08 RX ORDER — SUCRALFATE 1 G/1
1 TABLET ORAL 2 TIMES DAILY
Qty: 180 TABLET | Refills: 3 | Status: SHIPPED | OUTPATIENT
Start: 2023-05-08

## 2023-05-08 NOTE — OUTREACH NOTE
CT Surgery Week 3 Survey    Flowsheet Row Responses   [facility] patient discharged from? [place]   Does the patient have one of the following disease processes/diagnoses(primary or secondary)? Cardiothoracic surgery   Week 3 attempt successful? No   Unsuccessful attempts Attempt 1          Shonna LUX - Registered Nurse

## 2023-05-08 NOTE — TELEPHONE ENCOUNTER
Received refill request for Sucralfate 1 gram [facility]     LOV 1/18/2023

## 2023-05-11 ENCOUNTER — READMISSION MANAGEMENT (OUTPATIENT)
Dept: CALL CENTER | Facility: HOSPITAL | Age: 73
End: 2023-05-11
Payer: MEDICARE

## 2023-05-11 NOTE — OUTREACH NOTE
CT Surgery Week 3 Survey    Flowsheet Row Responses   [facility] patient discharged from? [place]   Does the patient have one of the following disease processes/diagnoses(primary or secondary)? Cardiothoracic surgery   Week 3 attempt successful? Yes   Call start time 1636   Call end time 1642   Discharge diagnosis ATRIAL APPENDAGE EXCLUSION LEFT WITH TRANSESOPHAGEAL ECHOCARDIOGRAM   Meds reviewed with patient/caregiver? Yes   Is the patient having any side effects they believe may be caused by any medication additions or changes? No   Does the patient have all medications related to this admission filled (includes all antibiotics, pain medications, cardiac medications, etc.) Yes   Is the patient taking all medications as directed (includes completed medication regime)? Yes   Does the patient have a primary care provider?  Yes   Does the patient have an appointment scheduled with their C/T surgeon? Yes   Has the patient kept scheduled appointments due by today? Yes   Has home health visited the patient within 72 hours of discharge? N/A   Psychosocial issues? No   Did the patient receive a copy of their discharge instructions? Yes   Nursing interventions Reviewed instructions with patient   What is the patient's perception of their health status since discharge? Improving   Nursing interventions Nurse provided patient education   Is the patient /caregiver able to teach back basic post-op care? Use a clean wash cloth and antibacterial bar or liquid soap to clean incisions   Is the patient/caregiver able to teach back signs and symptoms of incisional infection? Increased redness, swelling or pain at the incisonal site, Increased drainage or bleeding, Incisional warmth, Pus or odor from incision, Fever   Is the patient/caregiver able to teach back the hierarchy of who to call/visit for symptoms/problems? PCP, Specialist, Home health nurse, Urgent Care, ED, 911 Yes   Additional teach back comments states free of  edema   Week 3 call completed? Yes   Is the patient interested in additional calls from an ambulatory ?  NOTE:  applies to high risk patients requiring additional follow-up. No          Ramya WETZEL - Registered Nurse

## 2023-05-12 ENCOUNTER — TELEPHONE (OUTPATIENT)
Dept: CARDIOLOGY | Facility: CLINIC | Age: 73
End: 2023-05-12
Payer: MEDICARE

## 2023-05-12 NOTE — TELEPHONE ENCOUNTER
Pt's wife Roxane called. She would like to know the results of his 5/1/23 labs and any medication changes you potentially plan on making.    She can be reached at #[phone]    ThanksCelestina      On 5/1/23:    Chronic systolic heart failure - diuretic and torsemide recently decreased due to KATERINA and hyperkalemia.  He feels like he is putting on fluid and has just mild lower extremity edema.  Repeat labs today.  Depending on labs could consider just barely increasing his diuretic, but if he still has an elevated creatinine we will hold off.  If any changes were made he would need close lab follow-up.  He sees nephrology on 5/30

## 2023-05-18 ENCOUNTER — LAB (OUTPATIENT)
Dept: LAB | Facility: HOSPITAL | Age: 73
End: 2023-05-18
Payer: MEDICARE

## 2023-05-18 ENCOUNTER — OFFICE VISIT (OUTPATIENT)
Dept: GASTROENTEROLOGY | Facility: CLINIC | Age: 73
End: 2023-05-18
Payer: MEDICARE

## 2023-05-18 VITALS
SYSTOLIC BLOOD PRESSURE: 120 MMHG | HEIGHT: 73 IN | DIASTOLIC BLOOD PRESSURE: 70 MMHG | BODY MASS INDEX: 30.3 KG/M2 | WEIGHT: 228.6 LBS

## 2023-05-18 DIAGNOSIS — Z86.010 PERSONAL HISTORY OF COLONIC POLYPS: ICD-10-CM

## 2023-05-18 DIAGNOSIS — I48.21 PERMANENT ATRIAL FIBRILLATION: ICD-10-CM

## 2023-05-18 DIAGNOSIS — Z95.818 PRESENCE OF WATCHMAN LEFT ATRIAL APPENDAGE CLOSURE DEVICE: ICD-10-CM

## 2023-05-18 DIAGNOSIS — R35.1 NOCTURIA: ICD-10-CM

## 2023-05-18 DIAGNOSIS — K62.5 GASTROINTESTINAL HEMORRHAGE ASSOCIATED WITH ANORECTAL SOURCE: ICD-10-CM

## 2023-05-18 DIAGNOSIS — K22.70 BARRETT'S ESOPHAGUS WITHOUT DYSPLASIA: ICD-10-CM

## 2023-05-18 DIAGNOSIS — K21.00 GASTROESOPHAGEAL REFLUX DISEASE WITH ESOPHAGITIS WITHOUT HEMORRHAGE: Primary | ICD-10-CM

## 2023-05-18 LAB
ANION GAP SERPL CALCULATED.3IONS-SCNC: 9.5 MMOL/L (ref 5–15)
BUN SERPL-MCNC: 37 MG/DL (ref 8–23)
BUN/CREAT SERPL: 24.3 (ref 7–25)
CALCIUM SPEC-SCNC: 9.3 MG/DL (ref 8.6–10.5)
CHLORIDE SERPL-SCNC: 102 MMOL/L (ref 98–107)
CO2 SERPL-SCNC: 26.5 MMOL/L (ref 22–29)
CREAT SERPL-MCNC: 1.52 MG/DL (ref 0.76–1.27)
DEPRECATED RDW RBC AUTO: 48.4 FL (ref 37–54)
EGFRCR SERPLBLD CKD-EPI 2021: 47.8 ML/MIN/1.73
ERYTHROCYTE [DISTWIDTH] IN BLOOD BY AUTOMATED COUNT: 14.4 % (ref 12.3–15.4)
GLUCOSE SERPL-MCNC: 106 MG/DL (ref 65–99)
HCT VFR BLD AUTO: 35.4 % (ref 37.5–51)
HGB BLD-MCNC: 11.9 G/DL (ref 13–17.7)
MCH RBC QN AUTO: 31.2 PG (ref 26.6–33)
MCHC RBC AUTO-ENTMCNC: 33.6 G/DL (ref 31.5–35.7)
MCV RBC AUTO: 92.9 FL (ref 79–97)
PLATELET # BLD AUTO: 197 10*3/MM3 (ref 140–450)
PMV BLD AUTO: 9.8 FL (ref 6–12)
POTASSIUM SERPL-SCNC: 4.6 MMOL/L (ref 3.5–5.2)
RBC # BLD AUTO: 3.81 10*6/MM3 (ref 4.14–5.8)
SODIUM SERPL-SCNC: 138 MMOL/L (ref 136–145)
WBC NRBC COR # BLD: 6.78 10*3/MM3 (ref 3.4–10.8)

## 2023-05-18 PROCEDURE — 1160F RVW MEDS BY RX/DR IN RCRD: CPT | Performed by: INTERNAL MEDICINE

## 2023-05-18 PROCEDURE — 80048 BASIC METABOLIC PNL TOTAL CA: CPT

## 2023-05-18 PROCEDURE — 3074F SYST BP LT 130 MM HG: CPT | Performed by: INTERNAL MEDICINE

## 2023-05-18 PROCEDURE — 3078F DIAST BP <80 MM HG: CPT | Performed by: INTERNAL MEDICINE

## 2023-05-18 PROCEDURE — 99213 OFFICE O/P EST LOW 20 MIN: CPT | Performed by: INTERNAL MEDICINE

## 2023-05-18 PROCEDURE — 1159F MED LIST DOCD IN RCRD: CPT | Performed by: INTERNAL MEDICINE

## 2023-05-18 PROCEDURE — 36415 COLL VENOUS BLD VENIPUNCTURE: CPT

## 2023-05-18 PROCEDURE — 85027 COMPLETE CBC AUTOMATED: CPT

## 2023-05-18 NOTE — PROGRESS NOTES
PATIENT INFORMATION  Renato Marquez       - 1950    CHIEF COMPLAINT  Chief Complaint   Patient presents with   • Anemia   • Rectal Bleeding   • Novak's esophagus   • Heartburn       HISTORY OF PRESENT ILLNESS  Reviewed his recent cardiology procedures and is doing well and his HGB is up to 11 and is planning on playing golf on Sun          REVIEWED PERTINENT RESULTS/ LABS  Lab Results   Component Value Date    CASEREPORT  2021     Surgical Pathology Report                         Case: [id number]                                  Authorizing Provider:  Luna Meek        Collected:           2021 03:54 PM                                 MD Miguel                                                                   Ordering Location:     [facility]   Received:            2021 07:40 PM                                 OR                                                                           Pathologist:           Letitia Bolden MD                                                    Specimens:   1) - Stomach                                                                                        2) - Esophagus, Distal                                                                              3) - Large Intestine, Right / Ascending Colon, polyp x 4 (3 cold snare)                             4) - Large Intestine, Cecum, cold snare                                                             5) - Large Intestine, Rectum                                                               FINALDX  2021     1. Stomach, Biopsy:     A. Mild active gastritis/gastropathy.   B. Negative for Helicobacter pylori organisms by immunohistochemical stain.   C. No metaplasia, dysplasia nor malignancy identified.    2. Distal Esophagus, Biopsy:   A. Squamoglandular mucosa with intestinal metaplasia consistent with NOVAK'S ESOPHAGUS.    B. Negative for dysplasia.    3.  Ascending Colon, Biopsies:   A. Fragments of sessile serrated lesion and tubular adenoma.   B. Negative for high grade dysplasia.    4. Cecum, Biopsy:   A. Serrated polyp most suggestive of sessile serrated lesion.    5. Rectum, Biopsy:     A. Hyperplastic polyp.    Swm/kds       Lab Results   Component Value Date    HGB 11.4 (L) 2023    MCV 92.8 2023     2023    ALT 19 2023    AST 27 2023    INR 1.07 2023    TRIG 117 2022      EP/CRM Study    Result Date: 2023  Narrative: [facility] TRANSCATHETER LEFT ATRIAL APPENDAGE OCCLUSION (WATCHMAN) OPERATIVE REPORT  Patient: Renato Marquez : 1950 MRN: [id number] Procedure Date: 23 Referring Physician: Mauricio Scott MD Interventional Cardiologist (): Dustin Neal MD Electrophysiologist Cardiologist (Assistant): Dilan Dash MD Clinical Presentation: Mr. Marquez is a 72 y.o. gentleman with past medical history notable for permanent nonvalvular atrial fibrillation with a [id number] score of 4 and a has bled score of 5, history of recurrent GI bleeding while on anticoagulation, coronary artery disease status post CABG, ischemic cardiomyopathy, chronic systolic congestive heart failure, obstructive sleep apnea, pulmonary hypertension, hypothyroidism, and mixed hyperlipidemia who presents for elective watchman implantation.  He was evaluated by myself and also had a shared decision with his primary cardiologist regarding risk and benefits of bleeding while on anticoagulation versus holding anticoagulation and stroke risk.  After talking with me about risk and benefits of implantation of watchman he decided to proceed forward with watchman implantation with the hopes of avoiding long-term anticoagulation use due to recurrent bleeding. Preoperative Diagnoses: Nonvalvular atrial fibrillation Chronic systolic congestive heart failure NYHA class III Ischemic cardiomyopathy  Coronary artery disease status post CABG History of GI bleeding on anticoagulation Obstructive sleep apnea Pulmonary hypertension Mixed hyperlipidemia Postoperative Diagnoses: Nonvalvular atrial fibrillation Chronic systolic congestive heart failure NYHA class III Ischemic cardiomyopathy Coronary artery disease status post CABG History of GI bleeding on anticoagulation Obstructive sleep apnea Pulmonary hypertension Mixed hyperlipidemia Procedures Performed: Right common femoral venotomy utilizing micropuncture kit and ultrasound guidance. Left common femoral venotomy utilizing micropuncture kit and ultrasound guidance. Transseptal puncture of inter-atrial septum under fluoroscopic and transesophageal guidance Successful placement of a 31 mm watchman left atrial appendage closure device. Percutaneous closure of right femoral venotomy access site with #1 suture mediated closure device and figure of 8 suture. Findings: Transesophageal imaging preimplantation demonstrated a maximum ostial diameter of 25 mm Invasive pressures demonstrated a mean left atrial pressure of 20 mmHg Successful placement of 31 mm Watchman left atrial appendage closure device with no residual leak there was a 7 mm shoulder with 10% compression. No device related thrombus noted during procedure. Recommendations: The patient will be transferred to the postprocedure monitor area and then transferred to our stepdown unit for monitoring overnight.  Plan for limited echocardiogram in a.m. to assess for any pericardial effusion. Patient will start back on anticoagulation tonight with plans to continue along with clopidogrel until his ADDY in 45 days  Procedure Details: The patient was brought to the cardiac Cath Lab where the patient was placed on the operating room table in a supine position. The patient was sedated by our anesthesiologist, please see their documentation regarding medications and monitoring. After the patient was appropriately sedated,  antibiotics were given and the patient was intubated.  She was then prepped and draped in a standard sterile fashion. A timeout was performed to confirm the correct patient, procedure, and site.  We began the case by administering lidocaine to the right groin and access was obtained the right common femoral vein with a micropuncture needle and ultrasound guidance.  We then dilated up to place an Perclose to preclosed our artery and then over an Amplatzer Super Stiff guidewire we placed a 16 Grenadian sheath.  We then advanced our Henning wire and our Henning crossing system.  Using transesophageal guidance and fluoroscopy we were able to find an optimal position on the septum to puncture in order to be as coaxial with the left atrial appendage as we could be.  We then crossed with our Henning crossing wire to then advance our Henning dilator and watchman sheath as a unit.  Once our sheath was across the septum we then advanced an angled pigtail catheter and advanced this out into the left atrial appendage.  Once we are in position we performed selective angiography of the left atrial appendage through our pigtail catheter.  We then advanced our delivery sheath out into the left atrial appendage over our angled pigtail catheter.  Once we are in position we then removed our angled pigtail catheter and advanced our 31 mm watchman delivery system after it was prepped and de-aired.  We first deployed our Watchman device fairly deep with advancing the whole system as we on sheath with forward pressure.  This did give a nice implantation.  Her device was just at the ostium of the left atrial appendage.  We had good compression fluoroscopically we had some concerns that on the inferior aspect of the device we might of had a leak.  There is some wire bias and our device might of actually sat down better once we released it however we did not want to take this chance of having a residual leak.  For this reason we decided to  recapture our device once we be captured instead of an she think with more pressure we actually just under sheath with our distal end of her device back a little further.  This allowed us to deploy our device a little more proximally.  In this deployment we had a nice seal her device was less compressed at more than 10% but we did not have a leak around the device we also had a good tug.  We did notice a shoulder on the inferior aspect but we felt that this was a much better position and size for the shape of his ostium. Once we felt that we had good positioning of our Watchman device and had stability.  A tug test was performed to ensure proper stability of our device.  We also took selective angiography through our sheath to make sure watchman was appropriately positioned and well sealed around the appendage.  Further assessment with transesophageal echocardiogram was performed to look for any particular leaks, pericardial effusion, and to further assess for compression and position.  Once we are satisfied with our device and went through our PASS criteria we then deployed our device removed our delivery wire and pulled our delivery sheath across the septum.  We then removed our delivery sheath through our 16 Cymraes sheath.  Our 16 Cymraes sheath was removed over a guidewire and hemostasis achieved with deployment with our Perclose.  We then placed a figure-of-eight suture for optimal hemostasis and applied lidocaine with epinephrin to further assist with optimal hemostasis.  He was transferred to our post procedure area for recovery from anesthesia   Complications: None. Estimate Blood Loss: 20 milliliters. Specimens Removed: Not applicable.   Dustin Neal MD [facility] 03/29/23 15:32 EDT    Adult Transthoracic Echo Limited W/ Cont if Necessary Per Protocol    Result Date: 4/20/2023  Narrative: •  There is no evidence of pericardial effusion. . •  Normal left ventricular cavity size noted.  Left ventricular wall thickness is consistent with mild to moderate concentric hypertrophy. All left ventricular wall segments contract normally.     Structural Heart ADDY    Result Date: 4/19/2023  Narrative: Reynaldo Stout MD     4/19/2023  5:39 PM Structural Heart ADDY Procedure Performed: Structural Heart ADDY      Start Time:      End Time:  Preanesthesia Checklist: Patient identified, IV assessed, risks and benefits discussed, monitors and equipment assessed, procedure being performed at surgeon's request and anesthesia consent obtained. General Procedure Information Diagnostic Indications for Echo:  assessment of ascending aorta, assessment of surgical repair and hemodynamic monitoring Physician Requesting Echo: Dustin Neal MD CPT Code:  55017, 37542 ICD Code for Medical Necessity:  Mitral regurgitation nonrhuematic Location performed:  OR Intubated Bite block placed Heart visualized Probe Insertion:  Easy Probe Type:  Multiplane Modalities:  Color flow mapping, pulse wave Doppler and continuous wave Doppler Echocardiographic and Doppler Measurements Ventricles Right Ventricle: Cavity size dilated.  Hypertrophy not present.  Thrombus not present.  Left Ventricle: Cavity size dilated.  Thrombus not present.  Global Function moderately impaired.  Ejection Fraction 40%.  Valves Aortic Valve: Annulus normal.  Stenosis not present.  Area: 1.33 cm².  Mean Gradient: 3 mmHg.  Regurgitation trace.  Leaflets calcified.  Leaflet motions normal.  Mitral Valve: Annulus dilated.  Stenosis not present.  Regurgitation severe.  Tricuspid Valve: Annulus dilated.  Stenosis not present.  Regurgitation severe.  Other Valve Findings:      MV large eccentric jet directed towards lateral wall TV systolic flow reversal seen in hepatic vein Aorta Ascending Aorta: Dissection not present.  Plaque thickness less than 3 mm.  Mobile plaque not present.  Aortic Arch: Dissection not present.  Plaque thickness less than 3 mm.  Mobile  plaque not present.  Descending Aorta: Dissection not present.  Plaque thickness less than 3 mm.  Mobile plaque not present.  Atria Right Atrium: Size dilated.  Thrombus not present.  Tumor not present.  Device not present.  Left Atrium: Size dilated.  Spontaneous echo contrast present.  Thrombus not present.  Tumor not present.  Device not present.  Left atrial appendage normal. Diastolic Function Measurements: Diastolic Dysfunction Grade= indeterminate E=  ms A=  ms E/A Ratio= DT=  ms S/D= IVRT= Other Findings Pericardium:  normal Anesthesia Information Performed Personally Anesthesiologist:  Reynaldo Stout MD Echocardiogram Comments:      Post deployment No leak seen around device with CFD Pulm vein flow appears as pre deployment No RWMAS evident MR same severity as preop       REVIEW OF SYSTEMS  Review of Systems   Constitutional: Negative for activity change, chills, fever and unexpected weight change.   HENT: Negative for congestion.    Eyes: Negative for visual disturbance.   Respiratory: Negative for shortness of breath.    Cardiovascular: Negative for chest pain and palpitations.   Gastrointestinal: Positive for anal bleeding (pt attributes to anticoagulant) and blood in stool. Negative for abdominal pain.   Endocrine: Negative for cold intolerance and heat intolerance.   Genitourinary: Negative for hematuria.   Musculoskeletal: Negative for gait problem.   Skin: Negative for color change.   Allergic/Immunologic: Negative for immunocompromised state.   Neurological: Negative for weakness and light-headedness.   Hematological: Negative for adenopathy.   Psychiatric/Behavioral: Negative for sleep disturbance. The patient is not nervous/anxious.          ACTIVE PROBLEMS  Patient Active Problem List    Diagnosis    • Acute systolic heart failure (CMS/HCC) [I50.21]    • Acute on chronic systolic heart failure (CMS/HCC) [I50.23]    • Chronic systolic heart failure (CMS/HCC) [I50.22]    • Gastroesophageal  reflux disease with esophagitis without hemorrhage [K21.00]    • 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 [K22.70]    • Personal history of colonic polyps [Z86.010]    • Diverticulosis [K57.90]    • Rectal bleeding [K62.5]    • Hyponatremia [E87.1]    • Elevated brain natriuretic peptide (BNP) level [R79.89]    • Acute on chronic combined systolic and diastolic CHF (congestive heart failure) [I50.43]    • Hemorrhagic disorder due to circulating anticoagulants [D68.318]    • Adenomatous polyp of ascending colon [D12.2]    • GI bleed [K92.2]    • Gastrointestinal hemorrhage associated with anorectal source [K62.5]    • Anemia due to acute blood loss [D62]    • 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]    • Class 1 obesity due to excess calories without serious comorbidity with body mass index (BMI) of 31.0 to 31.9 in adult [E66.09, Z68.31]    • Complex tear of medial meniscus of right knee as current injury [S83.231A]    • Primary osteoarthritis of right knee [M17.11]    • Abnormal MRI, knee [R93.6]    • Chronic pain of both knees [M25.561, M25.562, G89.29]    • Abnormal x-ray of lumbar spine [R93.7]    • Lumbar facet arthropathy [M47.816]    • Hyperkalemia [E87.5]    • Hx of melanoma of skin [Z85.820]    • History of basal cell carcinoma (BCC) of skin [Z85.828]    • Leg cramps [R25.2]    • DDD (degenerative disc disease), lumbar [M51.36]    • Chronic bilateral low back pain with right-sided sciatica [M54.41, G89.29]    • Chronic anticoagulation [Z79.01]    • Coronary artery disease involving native coronary artery of native heart without angina pectoris [I25.10]    • Carotid arterial disease [I77.9]    • Permanent atrial fibrillation [I48.21]    • Ischemic cardiomyopathy [I25.5]    • Mitral regurgitation [I34.0]    • Hypothyroidism [E03.9]    • HTN (hypertension) [I10]    • Dyslipidemia [E78.5]          PAST MEDICAL HISTORY  Past Medical History:   Diagnosis Date   • Abnormal ECG    • CAD (coronary artery disease)    •  Cancer     skin cancer   • Carotid arterial disease    • Chronic atrial fibrillation    • Complex sleep apnea syndrome     BiPAP ST   • Hyperlipidemia    • Hypertension    • Ischemic cardiomyopathy    • Mitral regurgitation    • Rectal bleeding          SURGICAL HISTORY  Past Surgical History:   Procedure Laterality Date   • ATRIAL APPENDAGE EXCLUSION LEFT WITH TRANSESOPHAGEAL ECHOCARDIOGRAM N/A 2023    Procedure: Atrial Appendage Occlusion;  Surgeon: Dustin Neal MD;  Location: [facility] CATH INVASIVE LOCATION;  Service: Cardiovascular;  Laterality: N/A;   • CAROTID ENDARTERECTOMY Right     Dr Lewis   • COLONOSCOPY     • COLONOSCOPY N/A 2021    Procedure: COLONOSCOPY INTO CECUM AND TI;  Surgeon: Artur Jacobson MD;  [facility];  Service: Gastroenterology;  Laterality: N/A;  PRE: RECTAL BLEEDING  POST: DIVERTICULOSIS, HEMORRHOIDS   • COLONOSCOPY W/ POLYPECTOMY N/A 6/3/2021    Procedure: COLONOSCOPY, polypectomies;  Surgeon: Luan Meek MD;  Location: [facility] OR;  Service: Gastroenterology;  Laterality: N/A;  Diverticulosis  Ascending colon polyp x 4 (3 cold snare)  Cecal polyp (cold snare)  Rectal polyp   • CORONARY ARTERY BYPASS GRAFT      MIMI Frederick to LAD, SVG to OM, SVG Post Desc   • ENDOSCOPY N/A 6/3/2021    Procedure: ESOPHAGOGASTRODUODENOSCOPY with biopsies;  Surgeon: Luan Meek MD;  Location: [facility] OR;  Service: Gastroenterology;  Laterality: N/A;  Reflux  Gastric and duodenal ulcers  Biopsies: gastric, distal esophagus   • FINGER SURGERY           FAMILY HISTORY  Family History   Problem Relation Age of Onset   • Heart disease Father    • Stroke Father    • Hypertension Father          SOCIAL HISTORY  Social History     Occupational History   • Not on file   Tobacco Use   • Smoking status: Former     Types: Cigarettes, Cigars     Quit date: 1991     Years since quittin.4   • Smokeless tobacco: Never   Vaping Use  "  • Vaping Use: Never used   Substance and Sexual Activity   • Alcohol use: Yes     Alcohol/week: 2.0 standard drinks     Types: 2 Shots of liquor per week     Comment: NIGHTLY/caffeine use   • Drug use: No   • Sexual activity: Not Currently         CURRENT MEDICATIONS    Current Outpatient Medications:   •  apixaban (ELIQUIS) 5 MG tablet tablet, Take 1 tablet by mouth Every 12 (Twelve) Hours. Indications: Atrial Fibrillation, Disp: 60 tablet, Rfl: 1  •  famotidine (PEPCID) 40 MG tablet, Take 1 tablet by mouth 2 (Two) Times a Day. With lunch and at bedtime, Disp: 180 tablet, Rfl: 3  •  fluorometholone (FML) 0.1 % ophthalmic suspension, 3 (Three) Times a Day., Disp: , Rfl:   •  Magnesium 200 MG tablet, Take 250 mg by mouth Daily., Disp: , Rfl:   •  metoprolol succinate XL (TOPROL-XL) 50 MG 24 hr tablet, Take 1 tablet by mouth 2 (Two) Times a Day., Disp: 180 tablet, Rfl: 3  •  mupirocin (BACTROBAN) 2 % ointment, APPLY TO INFECTION/BIOPSY SITES TWICE DAILY, Disp: , Rfl:   •  pantoprazole (PROTONIX) 40 MG EC tablet, Take 1 tablet by mouth 2 (Two) Times a Day., Disp: 180 tablet, Rfl: 2  •  pravastatin (PRAVACHOL) 40 MG tablet, TAKE 1 TABLET BY MOUTH DAILY, Disp: 90 tablet, Rfl: 3  •  sacubitril-valsartan (ENTRESTO) 24-26 MG tablet, Take 1 tablet by mouth 2 (Two) Times a Day., Disp: 180 tablet, Rfl: 3  •  sucralfate (CARAFATE) 1 g tablet, Take 1 tablet by mouth 2 (Two) Times a Day., Disp: 180 tablet, Rfl: 3  •  torsemide (DEMADEX) 20 MG tablet, Take 1 tablet by mouth Daily. (Patient taking differently: Take 1 tablet by mouth Daily. Thinks he takes 1/2 tab in am 1/2 tab pm), Disp: 60 tablet, Rfl: 5    ALLERGIES  Patient has no known allergies.    VITALS  Vitals:    05/18/23 0854   BP: 120/70   BP Location: Left arm   Patient Position: Sitting   Cuff Size: Large Adult   Weight: 104 kg (228 lb 9.6 oz)   Height: 185.4 cm (73\")       PHYSICAL EXAM  Debilities/Disabilities Identified: None  Emotional Behavior: Appropriate  Wt " "Readings from Last 3 Encounters:   05/18/23 104 kg (228 lb 9.6 oz)   05/01/23 104 kg (230 lb)   04/20/23 102 kg (225 lb)     Ht Readings from Last 1 Encounters:   05/18/23 185.4 cm (73\")     Body mass index is 30.16 kg/m².  Physical Exam  Constitutional:       Appearance: He is well-developed. He is not diaphoretic.   HENT:      Head: Normocephalic and atraumatic.   Eyes:      General: No scleral icterus.     Conjunctiva/sclera: Conjunctivae normal.      Pupils: Pupils are equal, round, and reactive to light.   Neck:      Thyroid: No thyromegaly.   Cardiovascular:      Rate and Rhythm: Normal rate and regular rhythm.      Heart sounds: Normal heart sounds. No murmur heard.    No gallop.   Pulmonary:      Effort: Pulmonary effort is normal.      Breath sounds: Normal breath sounds. No wheezing or rales.   Abdominal:      General: Bowel sounds are normal. There is no distension or abdominal bruit.      Palpations: Abdomen is soft. There is no shifting dullness, fluid wave or mass.      Tenderness: There is no abdominal tenderness. There is no guarding. Negative signs include Wiggins's sign.      Hernia: There is no hernia in the ventral area.   Musculoskeletal:         General: Normal range of motion.      Cervical back: Normal range of motion and neck supple.   Lymphadenopathy:      Cervical: No cervical adenopathy.   Skin:     General: Skin is warm and dry.      Findings: No erythema or rash.   Neurological:      Mental Status: He is alert and oriented to person, place, and time.   Psychiatric:         Mood and Affect: Mood normal.         Behavior: Behavior normal.         CLINICAL DATA REVIEWED   reviewed previous lab results and integrated with today's visit, reviewed notes from other physicians and/or last GI encounter, reviewed previous endoscopy results and available photos, reviewed surgical pathology results from previous biopsies    ASSESSMENT  Diagnoses and all orders for this visit:    Gastroesophageal " reflux disease with esophagitis without hemorrhage    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    Personal history of colonic polyps    Nocturia  -     Ambulatory Referral to Urology    Other orders  -     mupirocin (BACTROBAN) 2 % ointment; APPLY TO INFECTION/BIOPSY SITES TWICE DAILY          PLAN  Return in about 1 year (around 5/18/2024).    I have discussed the above plan with the patient.  They verbalize understanding and are in agreement with the plan.  They have been advised to contact the office for any questions, concerns, or changes related to their health.

## 2023-05-23 ENCOUNTER — ANESTHESIA (OUTPATIENT)
Dept: POSTOP/PACU | Facility: HOSPITAL | Age: 73
End: 2023-05-23
Payer: MEDICARE

## 2023-05-23 ENCOUNTER — HOSPITAL ENCOUNTER (OUTPATIENT)
Dept: POSTOP/PACU | Facility: HOSPITAL | Age: 73
Discharge: HOME | End: 2023-05-23
Payer: MEDICARE

## 2023-05-23 ENCOUNTER — ANESTHESIA EVENT (OUTPATIENT)
Dept: POSTOP/PACU | Facility: HOSPITAL | Age: 73
End: 2023-05-23
Payer: MEDICARE

## 2023-05-23 VITALS
WEIGHT: 228 LBS | OXYGEN SATURATION: 97 % | BODY MASS INDEX: 30.22 KG/M2 | TEMPERATURE: 97.8 F | RESPIRATION RATE: 16 BRPM | SYSTOLIC BLOOD PRESSURE: 100 MMHG | DIASTOLIC BLOOD PRESSURE: 62 MMHG | HEART RATE: 66 BPM | HEIGHT: 73 IN

## 2023-05-23 VITALS
HEART RATE: 89 BPM | SYSTOLIC BLOOD PRESSURE: 96 MMHG | TEMPERATURE: 96.8 F | DIASTOLIC BLOOD PRESSURE: 55 MMHG | OXYGEN SATURATION: 94 %

## 2023-05-23 DIAGNOSIS — I48.21 PERMANENT ATRIAL FIBRILLATION: ICD-10-CM

## 2023-05-23 DIAGNOSIS — Z95.818 PRESENCE OF WATCHMAN LEFT ATRIAL APPENDAGE CLOSURE DEVICE: ICD-10-CM

## 2023-05-23 DIAGNOSIS — K62.5 GASTROINTESTINAL HEMORRHAGE ASSOCIATED WITH ANORECTAL SOURCE: ICD-10-CM

## 2023-05-23 LAB — BH CV ECHO MEAS - MED PEAK E' VEL: 6.7 CM/SEC

## 2023-05-23 PROCEDURE — 93325 DOPPLER ECHO COLOR FLOW MAPG: CPT

## 2023-05-23 PROCEDURE — 93320 DOPPLER ECHO COMPLETE: CPT | Performed by: INTERNAL MEDICINE

## 2023-05-23 PROCEDURE — 0 LIDOCAINE 1 % SOLUTION: Performed by: NURSE ANESTHETIST, CERTIFIED REGISTERED

## 2023-05-23 PROCEDURE — 93320 DOPPLER ECHO COMPLETE: CPT

## 2023-05-23 PROCEDURE — 93312 ECHO TRANSESOPHAGEAL: CPT

## 2023-05-23 PROCEDURE — 25010000002 PROPOFOL 10 MG/ML EMULSION: Performed by: NURSE ANESTHETIST, CERTIFIED REGISTERED

## 2023-05-23 PROCEDURE — 93312 ECHO TRANSESOPHAGEAL: CPT | Performed by: INTERNAL MEDICINE

## 2023-05-23 PROCEDURE — 93325 DOPPLER ECHO COLOR FLOW MAPG: CPT | Performed by: INTERNAL MEDICINE

## 2023-05-23 PROCEDURE — 25010000002 PHENYLEPHRINE 10 MG/ML SOLUTION: Performed by: NURSE ANESTHETIST, CERTIFIED REGISTERED

## 2023-05-23 RX ORDER — MIDAZOLAM HYDROCHLORIDE 1 MG/ML
0.5 INJECTION INTRAMUSCULAR; INTRAVENOUS
Status: DISCONTINUED | OUTPATIENT
Start: 2023-05-23 | End: 2023-05-24 | Stop reason: HOSPADM

## 2023-05-23 RX ORDER — DROPERIDOL 2.5 MG/ML
0.62 INJECTION, SOLUTION INTRAMUSCULAR; INTRAVENOUS
Status: DISCONTINUED | OUTPATIENT
Start: 2023-05-23 | End: 2023-05-24 | Stop reason: HOSPADM

## 2023-05-23 RX ORDER — PHENYLEPHRINE HYDROCHLORIDE 10 MG/ML
INJECTION INTRAVENOUS AS NEEDED
Status: DISCONTINUED | OUTPATIENT
Start: 2023-05-23 | End: 2023-05-23 | Stop reason: SURG

## 2023-05-23 RX ORDER — SODIUM CHLORIDE, SODIUM LACTATE, POTASSIUM CHLORIDE, CALCIUM CHLORIDE 600; 310; 30; 20 MG/100ML; MG/100ML; MG/100ML; MG/100ML
INJECTION, SOLUTION INTRAVENOUS CONTINUOUS PRN
Status: DISCONTINUED | OUTPATIENT
Start: 2023-05-23 | End: 2023-05-23 | Stop reason: SURG

## 2023-05-23 RX ORDER — LIDOCAINE HYDROCHLORIDE 10 MG/ML
INJECTION, SOLUTION INFILTRATION; PERINEURAL AS NEEDED
Status: DISCONTINUED | OUTPATIENT
Start: 2023-05-23 | End: 2023-05-23 | Stop reason: SURG

## 2023-05-23 RX ORDER — SODIUM CHLORIDE, SODIUM LACTATE, POTASSIUM CHLORIDE, CALCIUM CHLORIDE 600; 310; 30; 20 MG/100ML; MG/100ML; MG/100ML; MG/100ML
9 INJECTION, SOLUTION INTRAVENOUS CONTINUOUS
Status: DISCONTINUED | OUTPATIENT
Start: 2023-05-23 | End: 2023-05-24 | Stop reason: HOSPADM

## 2023-05-23 RX ORDER — PROPOFOL 10 MG/ML
VIAL (ML) INTRAVENOUS CONTINUOUS PRN
Status: DISCONTINUED | OUTPATIENT
Start: 2023-05-23 | End: 2023-05-23 | Stop reason: SURG

## 2023-05-23 RX ORDER — FAMOTIDINE 10 MG/ML
20 INJECTION, SOLUTION INTRAVENOUS ONCE
Status: DISCONTINUED | OUTPATIENT
Start: 2023-05-23 | End: 2023-05-24 | Stop reason: HOSPADM

## 2023-05-23 RX ORDER — FLUMAZENIL 0.1 MG/ML
0.2 INJECTION INTRAVENOUS AS NEEDED
Status: DISCONTINUED | OUTPATIENT
Start: 2023-05-23 | End: 2023-05-24 | Stop reason: HOSPADM

## 2023-05-23 RX ORDER — SODIUM CHLORIDE 0.9 % (FLUSH) 0.9 %
3 SYRINGE (ML) INJECTION EVERY 12 HOURS SCHEDULED
Status: DISCONTINUED | OUTPATIENT
Start: 2023-05-23 | End: 2023-05-24 | Stop reason: HOSPADM

## 2023-05-23 RX ORDER — SODIUM CHLORIDE 0.9 % (FLUSH) 0.9 %
3-10 SYRINGE (ML) INJECTION AS NEEDED
Status: DISCONTINUED | OUTPATIENT
Start: 2023-05-23 | End: 2023-05-24 | Stop reason: HOSPADM

## 2023-05-23 RX ORDER — FENTANYL CITRATE 50 UG/ML
50 INJECTION, SOLUTION INTRAMUSCULAR; INTRAVENOUS ONCE AS NEEDED
Status: DISCONTINUED | OUTPATIENT
Start: 2023-05-23 | End: 2023-05-24 | Stop reason: HOSPADM

## 2023-05-23 RX ORDER — ONDANSETRON 2 MG/ML
4 INJECTION INTRAMUSCULAR; INTRAVENOUS ONCE AS NEEDED
Status: DISCONTINUED | OUTPATIENT
Start: 2023-05-23 | End: 2023-05-24 | Stop reason: HOSPADM

## 2023-05-23 RX ORDER — NALOXONE HCL 0.4 MG/ML
0.2 VIAL (ML) INJECTION AS NEEDED
Status: DISCONTINUED | OUTPATIENT
Start: 2023-05-23 | End: 2023-05-24 | Stop reason: HOSPADM

## 2023-05-23 RX ORDER — PROMETHAZINE HYDROCHLORIDE 25 MG/1
25 TABLET ORAL ONCE AS NEEDED
Status: DISCONTINUED | OUTPATIENT
Start: 2023-05-23 | End: 2023-05-24 | Stop reason: HOSPADM

## 2023-05-23 RX ORDER — PROPOFOL 10 MG/ML
VIAL (ML) INTRAVENOUS AS NEEDED
Status: DISCONTINUED | OUTPATIENT
Start: 2023-05-23 | End: 2023-05-23 | Stop reason: SURG

## 2023-05-23 RX ORDER — PROMETHAZINE HYDROCHLORIDE 25 MG/1
25 SUPPOSITORY RECTAL ONCE AS NEEDED
Status: DISCONTINUED | OUTPATIENT
Start: 2023-05-23 | End: 2023-05-24 | Stop reason: HOSPADM

## 2023-05-23 RX ORDER — DIPHENHYDRAMINE HYDROCHLORIDE 50 MG/ML
12.5 INJECTION INTRAMUSCULAR; INTRAVENOUS
Status: DISCONTINUED | OUTPATIENT
Start: 2023-05-23 | End: 2023-05-24 | Stop reason: HOSPADM

## 2023-05-23 RX ORDER — LIDOCAINE HYDROCHLORIDE 10 MG/ML
0.5 INJECTION, SOLUTION EPIDURAL; INFILTRATION; INTRACAUDAL; PERINEURAL ONCE AS NEEDED
Status: DISCONTINUED | OUTPATIENT
Start: 2023-05-23 | End: 2023-05-24 | Stop reason: HOSPADM

## 2023-05-23 RX ADMIN — Medication 160 MCG/KG/MIN: at 07:19

## 2023-05-23 RX ADMIN — PROPOFOL 30 MG: 10 INJECTION, EMULSION INTRAVENOUS at 07:24

## 2023-05-23 RX ADMIN — LIDOCAINE HYDROCHLORIDE 100 ML: 10 INJECTION, SOLUTION INFILTRATION; PERINEURAL at 07:19

## 2023-05-23 RX ADMIN — PROPOFOL 70 MG: 10 INJECTION, EMULSION INTRAVENOUS at 07:20

## 2023-05-23 RX ADMIN — SODIUM CHLORIDE, POTASSIUM CHLORIDE, SODIUM LACTATE AND CALCIUM CHLORIDE: 600; 310; 30; 20 INJECTION, SOLUTION INTRAVENOUS at 07:18

## 2023-05-23 RX ADMIN — PHENYLEPHRINE HYDROCHLORIDE 100 MCG: 10 INJECTION, SOLUTION INTRAVENOUS at 07:31

## 2023-05-23 NOTE — H&P
H&P reviewed agree with details.  Patient with recent left atrial appendage occlusion device here for follow-up ADDY to evaluate device placement.  No difficulty with previous ADDY.  No contraindication to undergoing the procedure.  Risks and benefits explained in detail.  Informed consent obtained.  Follow diagnostic testing for further treatment recommendations.    [facility] Follow Up Office Note     Encounter Date:23  Patient:Renato Marquez  :1950  MRN:[id number]      Chief Complaint:   No chief complaint on file.        History of Presenting Illness:        Renato Marquez is a 72 y.o. male who is here for follow-up.  He is a patient of Dr Scott and a patient of Dr Neal for left atrial appendage occlusion.    Patient has a past medical history significant for permanent atrial fibrillation, recurrent GI bleeding, coronary artery disease with prior CABG, CVA, carotid disease status post endarterectomy in , systolic heart failure.    Patient is status post elective Watchman implantation 2023.  Going into his procedure he had been on a higher diuretic dose and preop labs showed elevated creatinine and BUN.  His diuretics were held prior to his procedure, he still had some hyperkalemia.  Nephrology saw him and ultimately his Entresto was cut in half and his torsemide was also cut in half.  Additionally, he has had some difficulties with anticoagulants.  There was concern apixaban caused him a rash, however this ended up being another dermatologic issue and he was discharged back on apixaban.    Patient reports he continues to have bleeding from his nose a little bit but has not noticed any dark stools or overt GI bleeding.  He has a rash on his face that developed in the last 3 days.  He is seeing dermatology tomorrow.  He has also noticed he is gaining weight and has some swelling in his legs.  He denies increasing shortness of breath or orthopnea.  He also denies palpitations  or presyncope.    Review of Systems:  Review of Systems   Cardiovascular: Positive for leg swelling. Negative for chest pain, dyspnea on exertion, orthopnea and palpitations.   Respiratory: Negative for shortness of breath.    Hematologic/Lymphatic: Bruises/bleeds easily.   Skin: Positive for rash.       Current Outpatient Medications on File Prior to Encounter   Medication Sig Dispense Refill   • apixaban (ELIQUIS) 5 MG tablet tablet Take 1 tablet by mouth Every 12 (Twelve) Hours. Indications: Atrial Fibrillation (Patient taking differently: Take 2.5 mg by mouth Every 12 (Twelve) Hours. Indications: Atrial Fibrillation) 60 tablet 1   • famotidine (PEPCID) 40 MG tablet Take 1 tablet by mouth 2 (Two) Times a Day. With lunch and at bedtime 180 tablet 3   • fluorometholone (FML) 0.1 % ophthalmic suspension 3 (Three) Times a Day.     • Magnesium 200 MG tablet Take 250 mg by mouth Daily.     • metoprolol succinate XL (TOPROL-XL) 50 MG 24 hr tablet Take 1 tablet by mouth 2 (Two) Times a Day. 180 tablet 3   • pantoprazole (PROTONIX) 40 MG EC tablet Take 1 tablet by mouth 2 (Two) Times a Day. 180 tablet 2   • pravastatin (PRAVACHOL) 40 MG tablet TAKE 1 TABLET BY MOUTH DAILY 90 tablet 3   • sacubitril-valsartan (ENTRESTO) 24-26 MG tablet Take 1 tablet by mouth 2 (Two) Times a Day. 180 tablet 3   • sucralfate (CARAFATE) 1 g tablet Take 1 tablet by mouth 2 (Two) Times a Day. 180 tablet 3   • torsemide (DEMADEX) 20 MG tablet Take 1 tablet by mouth Daily. (Patient taking differently: Take 1 tablet by mouth Daily. Thinks he takes 1/2 tab in am 1/2 tab pm) 60 tablet 5   • mupirocin (BACTROBAN) 2 % ointment APPLY TO INFECTION/BIOPSY SITES TWICE DAILY       No current facility-administered medications on file prior to encounter.       No Known Allergies    Past Medical History:   Diagnosis Date   • Abnormal ECG    • CAD (coronary artery disease)    • Cancer     skin cancer   • Carotid arterial disease    • Chronic atrial  fibrillation    • Complex sleep apnea syndrome     BiPAP ST   • Hyperlipidemia    • Hypertension    • Ischemic cardiomyopathy    • Mitral regurgitation    • Rectal bleeding        Past Surgical History:   Procedure Laterality Date   • ATRIAL APPENDAGE EXCLUSION LEFT WITH TRANSESOPHAGEAL ECHOCARDIOGRAM N/A 2023    Procedure: Atrial Appendage Occlusion;  Surgeon: Dustin Neal MD;  [facility];  Service: Cardiovascular;  Laterality: N/A;   • CAROTID ENDARTERECTOMY Right 2009    Dr Lewis   • COLONOSCOPY     • COLONOSCOPY N/A 2021    Procedure: COLONOSCOPY INTO CECUM AND TI;  Surgeon: Artur Jacobson MD;  [facility] END[facility];  Service: Gastroenterology;  Laterality: N/A;  PRE: RECTAL BLEEDING  POST: DIVERTICULOSIS, HEMORRHOIDS   • COLONOSCOPY W/ POLYPECTOMY N/A 6/3/2021    Procedure: COLONOSCOPY, polypectomies;  Surgeon: Luan Meek MD;  Location: [facility] OR;  Service: Gastroenterology;  Laterality: N/A;  Diverticulosis  Ascending colon polyp x 4 (3 cold snare)  Cecal polyp (cold snare)  Rectal polyp   • CORONARY ARTERY BYPASS GRAFT      MIMI Frederick to LAD, SVG to OM, SVG Post Desc   • ENDOSCOPY N/A 6/3/2021    Procedure: ESOPHAGOGASTRODUODENOSCOPY with biopsies;  Surgeon: Luan Meek MD;  Location: [facility] OR;  Service: Gastroenterology;  Laterality: N/A;  Reflux  Gastric and duodenal ulcers  Biopsies: gastric, distal esophagus   • FINGER SURGERY         Social History     Socioeconomic History   • Marital status:    Tobacco Use   • Smoking status: Former     Types: Cigarettes, Cigars     Quit date: 1991     Years since quittin.4   • Smokeless tobacco: Never   Vaping Use   • Vaping Use: Never used   Substance and Sexual Activity   • Alcohol use: Yes     Alcohol/week: 2.0 standard drinks     Types: 2 Shots of liquor per week     Comment: NIGHTLY/caffeine use   • Drug use: No   • Sexual activity: Not  Currently       Family History   Problem Relation Age of Onset   • Heart disease Father    • Stroke Father    • Hypertension Father        The following portions of the patient's history were reviewed and updated as appropriate: allergies, current medications, past family history, past medical history, past social history, past surgical history and problem list.       Objective:       Vitals:    05/23/23 0555   BP: 98/55   BP Location: Left arm   Patient Position: Lying   Pulse: 73   Resp: 16   Temp: 97.8 °F (36.6 °C)   TempSrc: Oral   SpO2: 97%         Physical Exam:  Constitutional: Well appearing, well developed, no acute distress   HENT: Oropharynx clear and membrane moist  Eyes: Normal conjunctiva, no sclera icterus  Neck: Supple, no carotid bruit bilaterally  Cardiovascular: Irregularly irregular rate and rhythm, No Murmur, Trace bilateral lower extremity edema  Pulmonary: Normal respiratory effort, normal lung sounds, no wheezing  Neurological: Alert and orient x 3  Skin: Warm, dry, no ecchymosis, no rash  Psych: Appropriate mood and affect. Normal judgment and insight         Lab Results   Component Value Date     05/18/2023     05/01/2023    K 4.6 05/18/2023    K 4.4 05/01/2023     05/18/2023     05/01/2023    CO2 26.5 05/18/2023    CO2 23.9 05/01/2023    BUN 37 (H) 05/18/2023    BUN 44 (H) 05/01/2023    CREATININE 1.52 (H) 05/18/2023    CREATININE 1.46 (H) 05/01/2023    EGFRIFNONA 56 (L) 09/07/2021    EGFRIFNONA 77 06/21/2021    EGFRIFAFRI 77 09/29/2020    GLUCOSE 106 (H) 05/18/2023    GLUCOSE 90 05/01/2023    CALCIUM 9.3 05/18/2023    CALCIUM 9.5 05/01/2023    PROTENTOTREF 6.2 09/29/2020    ALBUMIN 3.9 04/19/2023    ALBUMIN 4.4 04/14/2023    BILITOT 0.8 04/14/2023    BILITOT 1.0 12/20/2022    AST 27 04/14/2023    AST 26 12/20/2022    ALT 19 04/14/2023    ALT 14 12/20/2022     Lab Results   Component Value Date    WBC 6.78 05/18/2023    WBC 7.33 05/01/2023    HGB 11.9 (L) 05/18/2023     HGB 11.4 (L) 05/01/2023    HCT 35.4 (L) 05/18/2023    HCT 33.5 (L) 05/01/2023    MCV 92.9 05/18/2023    MCV 92.8 05/01/2023     05/18/2023     05/01/2023     Lab Results   Component Value Date    CHOL 156 11/23/2022    CHOL 154 01/29/2019    TRIG 117 11/23/2022    TRIG 62 09/29/2020    HDL 50 11/23/2022    HDL 71 (H) 09/29/2020    LDL 85 11/23/2022    LDL 79 09/29/2020     Lab Results   Component Value Date    PROBNP 2,473.0 (H) 11/23/2022    PROBNP 4,945.0 (H) 11/08/2022     Lab Results   Component Value Date    TROPONINT <0.010 06/20/2021     Lab Results   Component Value Date    TSH 1.510 06/22/2021    TSH 1.260 09/29/2020         Procedures       Assessment:          Diagnosis Plan   1. Permanent atrial fibrillation  Adult Transesophageal Echo (ADDY) W/ Cont if Necessary Per Protocol    Adult Transesophageal Echo (ADDY) W/ Cont if Necessary Per Protocol      2. Gastrointestinal hemorrhage associated with anorectal source  Adult Transesophageal Echo (ADDY) W/ Cont if Necessary Per Protocol    Adult Transesophageal Echo (ADDY) W/ Cont if Necessary Per Protocol      3. Presence of Watchman left atrial appendage closure device  Adult Transesophageal Echo (ADDY) W/ Cont if Necessary Per Protocol    Adult Transesophageal Echo (ADDY) W/ Cont if Necessary Per Protocol             Plan:       Permanent atrial fibrillation - while on chronic anticoagulation has had recurrent GI bleeds.  He is status post watchman 4/19/2023.  He is on apixaban.  Groin site without hematoma.  Repeat CBC    Chronic systolic heart failure - diuretic and torsemide recently decreased due to KATERINA and hyperkalemia.  He feels like he is putting on fluid and has just mild lower extremity edema.  Repeat labs today.  Depending on labs could consider just barely increasing his diuretic, but if he still has an elevated creatinine we will hold off.  If any changes were made he would need close lab follow-up.  He sees nephrology on  5/30    Coronary artery disease with prior CABG - denies anginal symptoms.  Continue Plavix, beta-blocker, statin      Patient is seen today for follow-up.  I think he is doing well postprocedure.  Check labs today.  Will make a decision based on this regarding diuretics.  He sees nephrology 5/30/2023.  Follow-up with ADDY and appointment with Dr. Neal as scheduled    Orders Placed This Encounter   Procedures   • Pulse Oximetry, Continuous     Standing Status:   Standing     Number of Occurrences:   1   • Adult Transesophageal Echo (ADDY) W/ Cont if Necessary Per Protocol     Standing Status:   Standing     Number of Occurrences:   1     Scheduling Instructions:      Schedule between 2/21/24 - 6/14/24.     Order Specific Question:   What type of sedation does the patient require?     Answer:   Monitored Anesthesia Care     Order Specific Question:   Reason for exam?     Answer:   Re-evaluation of Prior ADDY for Interval Change     Comments:   s/p LAAO     Order Specific Question:   Release to patient     Answer:   Routine Release            SIRIA Victor  [facility]  05/23/23  07:12 EDT

## 2023-05-23 NOTE — ANESTHESIA POSTPROCEDURE EVALUATION
"Patient: Renato Marquez    Procedure Summary     Date: 05/23/23 Room / Location: [facility] PACU    Anesthesia Start: 0718 Anesthesia Stop: 0737    Procedure: ADULT TRANSESOPHAGEAL ECHO (ADDY) W/ CONT IF NECESSARY PER PROTOCOL Diagnosis:       Permanent atrial fibrillation      Gastrointestinal hemorrhage associated with anorectal source      Presence of Watchman left atrial appendage closure device      (Re-evaluation of Prior ADDY for Interval Change)    Scheduled Providers: Reynaldo Roblero Jr., MD Provider: Derik Horvath MD    Anesthesia Type: MAC ASA Status: 3          Anesthesia Type: MAC    Vitals  Vitals Value Taken Time   /62 05/23/23 0815   Temp 36 °C (96.8 °F) 05/23/23 0736   Pulse 66 05/23/23 0823   Resp 23 05/23/23 0815   SpO2 99 % 05/23/23 0823   Vitals shown include unvalidated device data.        Post Anesthesia Care and Evaluation    Patient location during evaluation: PHASE II  Patient participation: complete - patient participated  Level of consciousness: awake  Pain management: adequate    Airway patency: patent  Anesthetic complications: No anesthetic complications    Cardiovascular status: acceptable  Respiratory status: acceptable  Hydration status: acceptable    Comments: /62   Pulse 66   Temp 36.6 °C (97.8 °F) (Oral)   Resp 16   Ht 185.4 cm (73\")   Wt 103 kg (228 lb)   SpO2 97%   BMI 30.08 kg/m²         "

## 2023-05-23 NOTE — ANESTHESIA PREPROCEDURE EVALUATION
Anesthesia Evaluation     Patient summary reviewed and Nursing notes reviewed   no history of anesthetic complications:  NPO Solid Status: > 8 hours  NPO Liquid Status: > 2 hours           Airway   Mallampati: II  Neck ROM: full  no difficulty expected  Dental - normal exam     Pulmonary - normal exam   (+) a smoker Former, sleep apnea (BiPAP),   (-) COPD, asthma    PE comment: nonlabored  Cardiovascular     Rhythm: irregular  Rate: normal    (+) hypertension, valvular problems/murmurs MR, CAD, CABG, dysrhythmias Atrial Fib, PVC, CHF (combined systolic & diastollic CHF. LV EF 40%;) Systolic <55%, hyperlipidemia,  carotid artery disease  (-) past MI, angina, orthopnea, PND, HILARIO    ROS comment: Status post elective Watchman implantation 4/19/2023    Neuro/Psych- negative ROS  (-) seizures, TIA, CVA  GI/Hepatic/Renal/Endo    (+) obesity,  GI bleeding (rectal bleeding) , thyroid problem hypothyroidism  (-) GERD, liver disease, no renal disease, diabetes    Musculoskeletal     (+) arthralgias, back pain, chronic pain,   Abdominal    Substance History - negative use     OB/GYN negative ob/gyn ROS         Other   arthritis, blood dyscrasia anemia,   history of cancer (skin cancer (basal cell; melanoma))                    Anesthesia Plan    ASA 3     MAC       Anesthetic plan, risks, benefits, and alternatives have been provided, discussed and informed consent has been obtained with: patient.

## 2023-05-25 ENCOUNTER — OFFICE VISIT (OUTPATIENT)
Dept: CARDIOLOGY | Facility: CLINIC | Age: 73
End: 2023-05-25
Payer: MEDICARE

## 2023-05-25 VITALS
DIASTOLIC BLOOD PRESSURE: 62 MMHG | HEART RATE: 85 BPM | HEIGHT: 73 IN | BODY MASS INDEX: 29.82 KG/M2 | WEIGHT: 225 LBS | SYSTOLIC BLOOD PRESSURE: 108 MMHG

## 2023-05-25 DIAGNOSIS — I48.21 PERMANENT ATRIAL FIBRILLATION: Primary | ICD-10-CM

## 2023-05-25 RX ORDER — ASPIRIN 81 MG/1
81 TABLET ORAL DAILY
Qty: 90 TABLET | Refills: 3
Start: 2023-05-25

## 2023-05-25 RX ORDER — CLOPIDOGREL BISULFATE 75 MG/1
75 TABLET ORAL DAILY
Qty: 90 TABLET | Refills: 3 | Status: SHIPPED | OUTPATIENT
Start: 2023-05-25

## 2023-05-25 NOTE — PROGRESS NOTES
[facility] Follow Up Office Note     Encounter Date:23  Patient:Renato Marquez  :1950  MRN:[id number]    Referring Provider:  Mauricio Scott MD    Chief Complaint:   Chief Complaint   Patient presents with   • 45 day post watchmen       History of Presenting Illness:      Mr. Marquez is a 72 y.o. gentleman with past medical history notable for permanent atrial fibrillation, Carotid stenosis s/p CEA, coronary artery disease with prior CABG, chronic systolic heart failure, KATIE on cpap at night, pulmonary hypertension, and prior GI bleeding who presents to our office for scheduled follow up after Watchman.  Overall clinically patient is doing well.  He was having some issues with acute kidney injury leading into his procedure we did make an adjustment with his diuretic which is helped.  Since leaving the hospital his creatinine still remains mildly elevated but stable.  In general from a heart failure standpoint is doing well        Review of Systems:  Review of Systems   Constitutional: Negative.   HENT: Negative.    Eyes: Negative.    Cardiovascular: Negative.    Respiratory: Negative.    Endocrine: Negative.    Hematologic/Lymphatic: Negative.    Skin: Negative.    Musculoskeletal: Negative.    Gastrointestinal: Negative.    Genitourinary: Negative.    Neurological: Negative.    Psychiatric/Behavioral: Negative.    Allergic/Immunologic: Negative.        Current Outpatient Medications on File Prior to Visit   Medication Sig Dispense Refill   • famotidine (PEPCID) 40 MG tablet Take 1 tablet by mouth 2 (Two) Times a Day. With lunch and at bedtime 180 tablet 3   • fluorometholone (FML) 0.1 % ophthalmic suspension 3 (Three) Times a Day.     • Magnesium 200 MG tablet Take 250 mg by mouth Daily.     • metoprolol succinate XL (TOPROL-XL) 50 MG 24 hr tablet Take 1 tablet by mouth 2 (Two) Times a Day. 180 tablet 3   • mupirocin (BACTROBAN) 2 % ointment APPLY TO INFECTION/BIOPSY SITES  TWICE DAILY     • pantoprazole (PROTONIX) 40 MG EC tablet Take 1 tablet by mouth 2 (Two) Times a Day. 180 tablet 2   • pravastatin (PRAVACHOL) 40 MG tablet TAKE 1 TABLET BY MOUTH DAILY 90 tablet 3   • sacubitril-valsartan (ENTRESTO) 24-26 MG tablet Take 1 tablet by mouth 2 (Two) Times a Day. 180 tablet 3   • sucralfate (CARAFATE) 1 g tablet Take 1 tablet by mouth 2 (Two) Times a Day. 180 tablet 3   • torsemide (DEMADEX) 20 MG tablet Take 1 tablet by mouth Daily. 60 tablet 5   • [DISCONTINUED] apixaban (ELIQUIS) 5 MG tablet tablet Take 1 tablet by mouth Every 12 (Twelve) Hours. Indications: Atrial Fibrillation (Patient taking differently: Take 2.5 mg by mouth Every 12 (Twelve) Hours. Indications: Atrial Fibrillation) 60 tablet 1     No current facility-administered medications on file prior to visit.       No Known Allergies    Past Medical History:   Diagnosis Date   • Abnormal ECG    • CAD (coronary artery disease)    • Cancer     skin cancer   • Carotid arterial disease    • Chronic atrial fibrillation    • Complex sleep apnea syndrome     BiPAP ST   • Hyperlipidemia    • Hypertension    • Ischemic cardiomyopathy    • Mitral regurgitation    • Rectal bleeding        Past Surgical History:   Procedure Laterality Date   • ATRIAL APPENDAGE EXCLUSION LEFT WITH TRANSESOPHAGEAL ECHOCARDIOGRAM N/A 4/19/2023    Procedure: Atrial Appendage Occlusion;  Surgeon: Dustin Neal MD;  Location: [facility] CATH INVASIVE LOCATION;  Service: Cardiovascular;  Laterality: N/A;   • CAROTID ENDARTERECTOMY Right 2009    Dr Lewis   • COLONOSCOPY     • COLONOSCOPY N/A 6/22/2021    Procedure: COLONOSCOPY INTO CECUM AND TI;  Surgeon: Artur Jacobson MD;  Location: [facility] ENDOSCOPY;  Service: Gastroenterology;  Laterality: N/A;  PRE: RECTAL BLEEDING  POST: DIVERTICULOSIS, HEMORRHOIDS   • COLONOSCOPY W/ POLYPECTOMY N/A 6/3/2021    Procedure: COLONOSCOPY, polypectomies;  Surgeon: Luan Meek MD;  Location: [facility] OR;   "Service: Gastroenterology;  Laterality: N/A;  Diverticulosis  Ascending colon polyp x 4 (3 cold snare)  Cecal polyp (cold snare)  Rectal polyp   • CORONARY ARTERY BYPASS GRAFT  2009    Dr Bhagat LIMA to LAD, SVG to OM, SVG Post Desc   • ENDOSCOPY N/A 6/3/2021    Procedure: ESOPHAGOGASTRODUODENOSCOPY with biopsies;  Surgeon: Luan Meek MD;  Location: [facility] OR;  Service: Gastroenterology;  Laterality: N/A;  Reflux  Gastric and duodenal ulcers  Biopsies: gastric, distal esophagus   • FINGER SURGERY         Social History     Socioeconomic History   • Marital status:    Tobacco Use   • Smoking status: Former     Types: Cigarettes, Cigars     Quit date: 1991     Years since quittin.4   • Smokeless tobacco: Never   Vaping Use   • Vaping Use: Never used   Substance and Sexual Activity   • Alcohol use: Yes     Alcohol/week: 2.0 standard drinks     Types: 2 Shots of liquor per week     Comment: NIGHTLY/caffeine use   • Drug use: No   • Sexual activity: Not Currently       Family History   Problem Relation Age of Onset   • Heart disease Father    • Stroke Father    • Hypertension Father        The following portions of the patient's history were reviewed and updated as appropriate: allergies, current medications, past family history, past medical history, past social history, past surgical history and problem list.       Objective:       Vitals:    23 1018   BP: 108/62   BP Location: Left arm   Patient Position: Sitting   Pulse: 85   Weight: 102 kg (225 lb)   Height: 185.4 cm (73\")       Body mass index is 29.69 kg/m².    Physical Exam:  Constitutional: Well appearing, Well-developed, No acute distress   HENT: Oropharynx clear and membrane moist  Eyes: Normal conjunctiva, no sclera icterus.  Neck: Supple, no carotid bruit bilaterally.  Cardiovascular: Irregularly irregular rate and rhythm, No Murmur, Trace bilateral lower extremity edema.  Pulmonary: Normal respiratory effort, Normal " lung sounds, no wheezing.  Neurological: Alert and orient x 3.   Skin: Warm, dry, no ecchymosis, mild macular diffuse rash.  Psych: Appropriate mood and affect. Normal judgment and insight.      Lab Results   Component Value Date     05/18/2023     05/01/2023    K 4.6 05/18/2023    K 4.4 05/01/2023     05/18/2023     05/01/2023    CO2 26.5 05/18/2023    CO2 23.9 05/01/2023    BUN 37 (H) 05/18/2023    BUN 44 (H) 05/01/2023    CREATININE 1.52 (H) 05/18/2023    CREATININE 1.46 (H) 05/01/2023    EGFRIFNONA 56 (L) 09/07/2021    EGFRIFNONA 77 06/21/2021    EGFRIFAFRI 77 09/29/2020    GLUCOSE 106 (H) 05/18/2023    GLUCOSE 90 05/01/2023    CALCIUM 9.3 05/18/2023    CALCIUM 9.5 05/01/2023    PROTENTOTREF 6.2 09/29/2020    ALBUMIN 3.9 04/19/2023    ALBUMIN 4.4 04/14/2023    BILITOT 0.8 04/14/2023    BILITOT 1.0 12/20/2022    AST 27 04/14/2023    AST 26 12/20/2022    ALT 19 04/14/2023    ALT 14 12/20/2022     Lab Results   Component Value Date    WBC 6.78 05/18/2023    WBC 7.33 05/01/2023    HGB 11.9 (L) 05/18/2023    HGB 11.4 (L) 05/01/2023    HCT 35.4 (L) 05/18/2023    HCT 33.5 (L) 05/01/2023    MCV 92.9 05/18/2023    MCV 92.8 05/01/2023     05/18/2023     05/01/2023     Lab Results   Component Value Date    CHOL 156 11/23/2022    CHOL 154 01/29/2019    TRIG 117 11/23/2022    TRIG 62 09/29/2020    HDL 50 11/23/2022    HDL 71 (H) 09/29/2020    LDL 85 11/23/2022    LDL 79 09/29/2020     Lab Results   Component Value Date    PROBNP 2,473.0 (H) 11/23/2022    PROBNP 4,945.0 (H) 11/08/2022     Lab Results   Component Value Date    TROPONINT <0.010 06/20/2021     Lab Results   Component Value Date    TSH 1.510 06/22/2021    TSH 1.260 09/29/2020       ECG 12 Lead    Date/Time: 5/25/2023 10:49 AM  Performed by: Dustin Neal MD  Authorized by: Dustin Neal MD   Comparison: compared with previous ECG from 5/1/2023  Similar to previous ECG  Rhythm: atrial fibrillation  Ectopy: unifocal  PVCs  Other findings: non-specific ST-T wave changes        Transesophageal echocardiogram 5/23/2023 images reviewed by myself:  · Left ventricular systolic function is normal. Left ventricular ejection fraction appears to be 56 - 60%.  · Moderately reduced right ventricular systolic function noted.  · The right ventricular cavity is mildly dilated.  · The left atrial cavity is moderate to severely dilated.  There is an occlusion device in the left atrial appendage that is well-seated with no clear flow through or around the device.  · The right atrial cavity is severely  dilated.  · Moderate tricuspid valve regurgitation is present.  · Estimated right ventricular systolic pressure from tricuspid regurgitation is mildly elevated (35-45 mmHg).  · Moderate pulmonic valve regurgitation is present.  · There is moderate plaque in the aortic arch present. There is moderate plaque in the descending aorta present.    Limited Echocardiogram 4/20/2023:  • There is no evidence of pericardial effusion. .  • Normal left ventricular cavity size noted. Left ventricular wall thickness is consistent with mild to moderate concentric hypertrophy. All left ventricular wall segments contract normally.     Watchman Implantation 4/19/2023  • Right common femoral venotomy utilizing micropuncture kit and ultrasound guidance.  • Left common femoral venotomy utilizing micropuncture kit and ultrasound guidance.  • Transseptal puncture of inter-atrial septum under fluoroscopic and transesophageal guidance   • Successful placement of a 31 mm watchman left atrial appendage closure device.  • Percutaneous closure of right femoral venotomy access site with #1 suture mediated closure device and figure of 8 suture.    Echocardiogram 11/17/2022:  · Left ventricular ejection fraction appears to be 41 - 45%.  · Left ventricular wall thickness is consistent with moderate concentric hypertrophy.  · Left ventricular diastolic function was  indeterminate.  · The right atrial cavity is severely  dilated.  · There is calcification of the aortic valve.  · Estimated right ventricular systolic pressure from tricuspid regurgitation is moderately elevated (45-55 mmHg). Calculated right ventricular systolic pressure from tricuspid regurgitation is 50 mmHg.  · Moderate pulmonic valve regurgitation is present.    Holter Monitor 6/21/2021:  · An abnormal monitor study.  · Atrial fibrillation is present throughout  · Average heart beat 93 bpm, range during A. fib  bpm, frequent episodes of heart rate greater than 100 are seen.  · Frequent ventricular ectopy is seen  · 1 episode of wide-complex tachycardia that appears to be consistent with ventricular tachycardia is present at 1:25 PM, 29 beats duration, 170 bpm          Assessment:          Diagnosis Plan   1. Permanent atrial fibrillation  ECG 12 Lead             Plan:       Mr. Marquez is a 72 y.o. gentleman with past medical history notable for permanent atrial fibrillation, Carotid stenosis s/p CEA, coronary artery disease with prior CABG, chronic systolic heart failure, KATIE on cpap at night, pulmonary hypertension, and prior GI bleeding who presents to our office for scheduled follow-up after watchman implantation.  Overall he is doing well postoperatively from his watchman his follow-up ADDY shows good device function we will transition from Eliquis over to dual antiplatelet therapy for a total of 6 months followed by aspirin 81 mg lifelong.      Permanent Atrial Fibrillation:  · CHADVASC score 4  · HASBLED Score 5  · Status post watchman 4/2023 transesophageal echocardiogram this week demonstrates normal device function  · We will stop Eliquis  · We will start aspirin 81 mg and clopidogrel 75 mg for total of 6 months followed by aspirin 81 mg    Follow up:  As planned in September would recommend follow-up with his primary cardiologist in 3-month.    Thank you for allowing me to participate in the  care of Renato Marquez. Feel free to contact me directly with any further questions or concerns.    Dustin Neal MD  [facility]  05/25/23  10:52 EDT

## 2023-06-02 ENCOUNTER — TELEPHONE (OUTPATIENT)
Dept: CARDIOLOGY | Facility: CLINIC | Age: 73
End: 2023-06-02

## 2023-06-02 NOTE — TELEPHONE ENCOUNTER
Caller: PAULA    Relationship: SPOUSE    Best call back number: [phone]    What is the best time to reach you:  ANY    What was the call regarding: WOULD LIKE TO KNOW IF THE ECHO SCHEDULED ON 6/15/23 WAS STILL NEEDED SINCE ONE WAS COMPLETED ALREADY ON 5/23/23    Is it okay if the provider responds through MyChart: NO

## 2023-06-12 ENCOUNTER — TELEPHONE (OUTPATIENT)
Dept: CARDIOLOGY | Facility: CLINIC | Age: 73
End: 2023-06-12

## 2023-06-12 NOTE — TELEPHONE ENCOUNTER
Caller: Evelin Marquez    Relationship to patient: Emergency Contact    Best call back number: [phone]    Patient is needing: PATIENTS WIFE REQUESTING A CALL BACK TO RESCHEDULE TESTING.

## 2023-07-10 ENCOUNTER — TELEPHONE (OUTPATIENT)
Dept: CARDIOLOGY | Facility: CLINIC | Age: 73
End: 2023-07-10

## 2023-07-10 NOTE — TELEPHONE ENCOUNTER
Caller: Evelin Marquez    Relationship: Emergency Contact    Best call back number: [phone]    What is the best time to reach you: ANYTIME    Who are you requesting to speak with (clinical staff, provider,  specific staff member): ANYONE    What was the call regarding: PT'S WIFE IS CALLING TO SEE WHY THE PT HAS AN APPT ON 7.18.23 AND WHY THE PT WAS REFERRED. SHE SAYS THEY DONT KNOW ANYTHING ABOUT THE APPT.    Is it okay if the provider responds through MyChart: NO

## 2023-07-18 PROBLEM — I50.43 ACUTE ON CHRONIC COMBINED SYSTOLIC AND DIASTOLIC CHF (CONGESTIVE HEART FAILURE): Status: RESOLVED | Noted: 2021-06-21 | Resolved: 2023-07-18

## 2023-07-18 PROBLEM — I50.21 ACUTE SYSTOLIC HEART FAILURE: Status: RESOLVED | Noted: 2022-12-21 | Resolved: 2023-07-18

## 2023-07-18 PROBLEM — G47.33 OSA (OBSTRUCTIVE SLEEP APNEA): Status: ACTIVE | Noted: 2023-07-18

## 2023-07-18 PROBLEM — I27.20 PULMONARY HYPERTENSION: Status: ACTIVE | Noted: 2023-07-18

## 2023-07-18 PROBLEM — I50.812 CHRONIC RIGHT-SIDED HEART FAILURE: Status: ACTIVE | Noted: 2023-07-18

## 2023-07-21 ENCOUNTER — LAB (OUTPATIENT)
Dept: LAB | Facility: HOSPITAL | Age: 73
End: 2023-07-21
Payer: MEDICARE

## 2023-07-21 DIAGNOSIS — I25.5 ISCHEMIC CARDIOMYOPATHY: ICD-10-CM

## 2023-07-21 PROCEDURE — 84156 ASSAY OF PROTEIN URINE: CPT

## 2023-07-21 PROCEDURE — 84166 PROTEIN E-PHORESIS/URINE/CSF: CPT

## 2023-07-25 LAB
ALBUMIN 24H MFR UR ELPH: 60.4 %
ALPHA1 GLOB 24H MFR UR ELPH: 2.2 %
ALPHA2 GLOB 24H MFR UR ELPH: 8.2 %
B-GLOBULIN 24H MFR UR ELPH: 17.7 %
GAMMA GLOB 24H MFR UR ELPH: 11.5 %
LABORATORY COMMENT REPORT: ABNORMAL
M PROTEIN 24H MFR UR ELPH: ABNORMAL %
PROT 24H UR-MRATE: 421 MG/24 HR (ref 30–150)
PROT UR-MCNC: 18.3 MG/DL

## 2023-07-26 ENCOUNTER — TELEPHONE (OUTPATIENT)
Dept: CARDIOLOGY | Facility: HOSPITAL | Age: 73
End: 2023-07-26
Payer: MEDICARE

## 2023-07-26 NOTE — TELEPHONE ENCOUNTER
Called informed pt wife   She verbalized understanding and they will discuss with Dr Lawton at follow up

## 2023-07-26 NOTE — TELEPHONE ENCOUNTER
----- Message from SIRIA Cornejo sent at 7/20/2023  2:24 PM EDT -----  Please let the patient know that free light chains are elevated.  No change to current plan of care until we see what PYP scan looks like.    Thanks,  Deidre  ----- Message -----  From: Lab, Background User  Sent: 7/19/2023   5:10 PM EDT  To: Diogo Lawton MD PhD

## 2023-07-28 ENCOUNTER — HOSPITAL ENCOUNTER (OUTPATIENT)
Dept: PULMONOLOGY | Facility: HOSPITAL | Age: 73
Discharge: HOME | End: 2023-07-28
Payer: MEDICARE

## 2023-07-28 LAB
BDY SITE: ABNORMAL
HGB BLDA-MCNC: 12 G/DL (ref 14–18)
Lab: ABNORMAL
SAO2 % BLDCOA: 95.1 % (ref 94–99)

## 2023-07-28 PROCEDURE — 94729 DIFFUSING CAPACITY: CPT

## 2023-07-28 PROCEDURE — 82820 HEMOGLOBIN-OXYGEN AFFINITY: CPT

## 2023-07-28 PROCEDURE — 94010 BREATHING CAPACITY TEST: CPT

## 2023-07-28 PROCEDURE — 94726 PLETHYSMOGRAPHY LUNG VOLUMES: CPT

## 2023-07-29 DIAGNOSIS — Z79.01 CHRONIC ANTICOAGULATION: ICD-10-CM

## 2023-07-29 DIAGNOSIS — K21.00 GASTROESOPHAGEAL REFLUX DISEASE WITH ESOPHAGITIS WITHOUT HEMORRHAGE: ICD-10-CM

## 2023-07-29 DIAGNOSIS — K22.70 BARRETT'S ESOPHAGUS WITHOUT DYSPLASIA: ICD-10-CM

## 2023-07-31 RX ORDER — PANTOPRAZOLE SODIUM 40 MG/1
TABLET, DELAYED RELEASE ORAL
Qty: 180 TABLET | Refills: 2 | Status: SHIPPED | OUTPATIENT
Start: 2023-07-31

## 2023-08-01 ENCOUNTER — HOSPITAL ENCOUNTER (OUTPATIENT)
Dept: CARDIOLOGY | Facility: HOSPITAL | Age: 73
Discharge: HOME | End: 2023-08-01
Admitting: INTERNAL MEDICINE
Payer: MEDICARE

## 2023-08-01 ENCOUNTER — TELEPHONE (OUTPATIENT)
Dept: CARDIOLOGY | Facility: HOSPITAL | Age: 73
End: 2023-08-01
Payer: MEDICARE

## 2023-08-01 VITALS
WEIGHT: 224 LBS | SYSTOLIC BLOOD PRESSURE: 119 MMHG | DIASTOLIC BLOOD PRESSURE: 54 MMHG | OXYGEN SATURATION: 95 % | HEART RATE: 78 BPM | HEIGHT: 73 IN | BODY MASS INDEX: 29.69 KG/M2

## 2023-08-01 DIAGNOSIS — I51.9 DYSFUNCTION OF RIGHT CARDIAC VENTRICLE: ICD-10-CM

## 2023-08-01 DIAGNOSIS — I27.20 PULMONARY HYPERTENSION: Primary | ICD-10-CM

## 2023-08-01 DIAGNOSIS — I25.5 ISCHEMIC CARDIOMYOPATHY: Chronic | ICD-10-CM

## 2023-08-01 DIAGNOSIS — G47.33 OSA (OBSTRUCTIVE SLEEP APNEA): ICD-10-CM

## 2023-08-01 DIAGNOSIS — I50.1 LEFT VENTRICULAR FAILURE, UNSPECIFIED: ICD-10-CM

## 2023-08-01 LAB
ANION GAP SERPL CALCULATED.3IONS-SCNC: 14.2 MMOL/L (ref 5–15)
BUN SERPL-MCNC: 47 MG/DL (ref 8–23)
BUN/CREAT SERPL: 24.2 (ref 7–25)
CALCIUM SPEC-SCNC: 9.4 MG/DL (ref 8.6–10.5)
CHLORIDE SERPL-SCNC: 99 MMOL/L (ref 98–107)
CO2 SERPL-SCNC: 24.8 MMOL/L (ref 22–29)
CREAT SERPL-MCNC: 1.94 MG/DL (ref 0.76–1.27)
EGFRCR SERPLBLD CKD-EPI 2021: 35.4 ML/MIN/1.73
GLUCOSE SERPL-MCNC: 114 MG/DL (ref 65–99)
NT-PROBNP SERPL-MCNC: 6028 PG/ML (ref 0–1800)
POTASSIUM SERPL-SCNC: 4.9 MMOL/L (ref 3.5–5.2)
SODIUM SERPL-SCNC: 138 MMOL/L (ref 136–145)

## 2023-08-01 PROCEDURE — 80048 BASIC METABOLIC PNL TOTAL CA: CPT | Performed by: INTERNAL MEDICINE

## 2023-08-01 PROCEDURE — 83880 ASSAY OF NATRIURETIC PEPTIDE: CPT | Performed by: INTERNAL MEDICINE

## 2023-08-01 PROCEDURE — G0463 HOSPITAL OUTPT CLINIC VISIT: HCPCS

## 2023-08-04 ENCOUNTER — HOSPITAL ENCOUNTER (OUTPATIENT)
Dept: CARDIOLOGY | Facility: HOSPITAL | Age: 73
Discharge: HOME | End: 2023-08-04
Payer: MEDICARE

## 2023-08-04 VITALS
OXYGEN SATURATION: 94 % | BODY MASS INDEX: 29.08 KG/M2 | DIASTOLIC BLOOD PRESSURE: 55 MMHG | SYSTOLIC BLOOD PRESSURE: 110 MMHG | HEART RATE: 71 BPM | HEIGHT: 73 IN | WEIGHT: 219.4 LBS

## 2023-08-04 DIAGNOSIS — I48.21 PERMANENT ATRIAL FIBRILLATION: ICD-10-CM

## 2023-08-04 DIAGNOSIS — I10 PRIMARY HYPERTENSION: ICD-10-CM

## 2023-08-04 DIAGNOSIS — I27.20 PULMONARY HYPERTENSION: ICD-10-CM

## 2023-08-04 DIAGNOSIS — I50.812 CHRONIC RIGHT-SIDED HEART FAILURE: Primary | ICD-10-CM

## 2023-08-04 DIAGNOSIS — I25.10 CORONARY ARTERY DISEASE INVOLVING NATIVE CORONARY ARTERY OF NATIVE HEART WITHOUT ANGINA PECTORIS: Chronic | ICD-10-CM

## 2023-08-04 PROBLEM — E87.1 HYPONATREMIA: Status: RESOLVED | Noted: 2021-06-21 | Resolved: 2023-08-04

## 2023-08-04 PROBLEM — M47.816 LUMBAR FACET ARTHROPATHY: Status: RESOLVED | Noted: 2020-10-02 | Resolved: 2023-08-04

## 2023-08-04 PROBLEM — Z86.010 PERSONAL HISTORY OF COLONIC POLYPS: Status: RESOLVED | Noted: 2021-08-02 | Resolved: 2023-08-04

## 2023-08-04 PROBLEM — M25.562 CHRONIC PAIN OF BOTH KNEES: Status: RESOLVED | Noted: 2020-10-26 | Resolved: 2023-08-04

## 2023-08-04 PROBLEM — Z85.820 HX OF MELANOMA OF SKIN: Status: RESOLVED | Noted: 2020-09-29 | Resolved: 2023-08-04

## 2023-08-04 PROBLEM — R93.6 ABNORMAL MRI, KNEE: Status: RESOLVED | Noted: 2020-11-17 | Resolved: 2023-08-04

## 2023-08-04 PROBLEM — E87.5 HYPERKALEMIA: Status: RESOLVED | Noted: 2020-09-30 | Resolved: 2023-08-04

## 2023-08-04 PROBLEM — D12.2 ADENOMATOUS POLYP OF ASCENDING COLON: Status: RESOLVED | Noted: 2021-06-08 | Resolved: 2023-08-04

## 2023-08-04 PROBLEM — S83.231A COMPLEX TEAR OF MEDIAL MENISCUS OF RIGHT KNEE AS CURRENT INJURY: Status: RESOLVED | Noted: 2020-11-19 | Resolved: 2023-08-04

## 2023-08-04 PROBLEM — R25.2 LEG CRAMPS: Status: RESOLVED | Noted: 2020-09-29 | Resolved: 2023-08-04

## 2023-08-04 PROBLEM — M25.561 CHRONIC PAIN OF BOTH KNEES: Status: RESOLVED | Noted: 2020-10-26 | Resolved: 2023-08-04

## 2023-08-04 PROBLEM — G47.33 OSA (OBSTRUCTIVE SLEEP APNEA): Status: RESOLVED | Noted: 2023-07-18 | Resolved: 2023-08-04

## 2023-08-04 PROBLEM — Z85.828 HISTORY OF BASAL CELL CARCINOMA (BCC) OF SKIN: Status: RESOLVED | Noted: 2020-09-29 | Resolved: 2023-08-04

## 2023-08-04 PROBLEM — R93.7 ABNORMAL X-RAY OF LUMBAR SPINE: Status: RESOLVED | Noted: 2020-10-02 | Resolved: 2023-08-04

## 2023-08-04 PROBLEM — G89.29 CHRONIC PAIN OF BOTH KNEES: Status: RESOLVED | Noted: 2020-10-26 | Resolved: 2023-08-04

## 2023-08-04 PROBLEM — I50.22 CHRONIC SYSTOLIC HEART FAILURE: Status: RESOLVED | Noted: 2022-12-21 | Resolved: 2023-08-04

## 2023-08-04 PROBLEM — R79.89 ELEVATED BRAIN NATRIURETIC PEPTIDE (BNP) LEVEL: Status: RESOLVED | Noted: 2021-06-21 | Resolved: 2023-08-04

## 2023-08-04 PROBLEM — K62.5 GASTROINTESTINAL HEMORRHAGE ASSOCIATED WITH ANORECTAL SOURCE: Status: RESOLVED | Noted: 2021-06-02 | Resolved: 2023-08-04

## 2023-08-04 PROBLEM — Z79.01 CHRONIC ANTICOAGULATION: Chronic | Status: RESOLVED | Noted: 2017-03-23 | Resolved: 2023-08-04

## 2023-08-04 PROBLEM — Z86.0100 PERSONAL HISTORY OF COLONIC POLYPS: Status: RESOLVED | Noted: 2021-08-02 | Resolved: 2023-08-04

## 2023-08-04 PROBLEM — I50.23 ACUTE ON CHRONIC SYSTOLIC HEART FAILURE: Status: RESOLVED | Noted: 2022-12-21 | Resolved: 2023-08-04

## 2023-08-04 PROBLEM — K92.2 GI BLEED: Status: RESOLVED | Noted: 2021-06-02 | Resolved: 2023-08-04

## 2023-08-04 PROBLEM — D62 ANEMIA DUE TO ACUTE BLOOD LOSS: Status: RESOLVED | Noted: 2021-06-02 | Resolved: 2023-08-04

## 2023-08-04 PROBLEM — K62.5 RECTAL BLEEDING: Status: RESOLVED | Noted: 2021-06-21 | Resolved: 2023-08-04

## 2023-08-04 LAB
ANION GAP SERPL CALCULATED.3IONS-SCNC: 12 MMOL/L (ref 5–15)
BUN SERPL-MCNC: 47 MG/DL (ref 8–23)
BUN/CREAT SERPL: 27.3 (ref 7–25)
CALCIUM SPEC-SCNC: 9.5 MG/DL (ref 8.6–10.5)
CHLORIDE SERPL-SCNC: 99 MMOL/L (ref 98–107)
CO2 SERPL-SCNC: 28 MMOL/L (ref 22–29)
CREAT SERPL-MCNC: 1.72 MG/DL (ref 0.76–1.27)
EGFRCR SERPLBLD CKD-EPI 2021: 40.9 ML/MIN/1.73
GLUCOSE SERPL-MCNC: 110 MG/DL (ref 65–99)
POTASSIUM SERPL-SCNC: 4.5 MMOL/L (ref 3.5–5.2)
SODIUM SERPL-SCNC: 139 MMOL/L (ref 136–145)

## 2023-08-04 PROCEDURE — G0463 HOSPITAL OUTPT CLINIC VISIT: HCPCS

## 2023-08-04 PROCEDURE — 80048 BASIC METABOLIC PNL TOTAL CA: CPT | Performed by: NURSE PRACTITIONER

## 2023-08-07 ENCOUNTER — HOSPITAL ENCOUNTER (OUTPATIENT)
Dept: CARDIOLOGY | Facility: HOSPITAL | Age: 73
Discharge: HOME | End: 2023-08-07
Admitting: INTERNAL MEDICINE
Payer: MEDICARE

## 2023-08-07 ENCOUNTER — TELEPHONE (OUTPATIENT)
Dept: CARDIOLOGY | Facility: HOSPITAL | Age: 73
End: 2023-08-07
Payer: MEDICARE

## 2023-08-07 VITALS — WEIGHT: 220.46 LBS | BODY MASS INDEX: 29.22 KG/M2 | HEIGHT: 73 IN

## 2023-08-07 PROCEDURE — 0 TECHNETIUM TC99M PYROPHOSPHATE: Performed by: INTERNAL MEDICINE

## 2023-08-07 PROCEDURE — A9538 TC99M PYROPHOSPHATE: HCPCS | Performed by: INTERNAL MEDICINE

## 2023-08-07 PROCEDURE — 78803 RP LOCLZJ TUM SPECT 1 AREA: CPT | Performed by: INTERNAL MEDICINE

## 2023-08-07 PROCEDURE — 78803 RP LOCLZJ TUM SPECT 1 AREA: CPT

## 2023-08-07 RX ADMIN — TECHNETIUM TC99M PYROPHOSPHATE 1 DOSE: 12 INJECTION INTRAVENOUS at 10:10

## 2023-08-07 NOTE — TELEPHONE ENCOUNTER
----- Message from SIRIA Cornejo sent at 8/7/2023  1:11 PM EDT -----  Please let the patient know that labs are stable.  No change to current plan of care.    Thanks,  Deidre  ----- Message -----  From: Lab, Background User  Sent: 8/4/2023   4:23 PM EDT  To: SIRIA Cornejo

## 2023-08-11 ENCOUNTER — TELEPHONE (OUTPATIENT)
Dept: CARDIOLOGY | Facility: HOSPITAL | Age: 73
End: 2023-08-11
Payer: MEDICARE

## 2023-08-11 NOTE — TELEPHONE ENCOUNTER
Patient's wife called this morning. She reports patient is having muscle cramps in his hands, arms and legs. She is asking if there is a shot of anything that he could be given, as she believes that he is dehydrated.    Call Back number to Roxane is [phone].    Thank you,  Rosana LAY [facility]

## 2023-08-11 NOTE — TELEPHONE ENCOUNTER
Per Deidre BEVERLY patient can try eating a Banana , drink glass water and drink some Gatorade. If still has the cramps Monday call office and we will draw labs       Mrs. Marquez verbalized understanding

## 2023-08-14 ENCOUNTER — TELEPHONE (OUTPATIENT)
Dept: CARDIOLOGY | Facility: HOSPITAL | Age: 73
End: 2023-08-14
Payer: MEDICARE

## 2023-08-14 ENCOUNTER — OFFICE VISIT (OUTPATIENT)
Dept: CARDIOLOGY | Facility: CLINIC | Age: 73
End: 2023-08-14
Payer: MEDICARE

## 2023-08-14 ENCOUNTER — LAB (OUTPATIENT)
Dept: LAB | Facility: HOSPITAL | Age: 73
End: 2023-08-14
Payer: MEDICARE

## 2023-08-14 VITALS
HEIGHT: 73 IN | DIASTOLIC BLOOD PRESSURE: 60 MMHG | BODY MASS INDEX: 29.69 KG/M2 | HEART RATE: 80 BPM | WEIGHT: 224 LBS | SYSTOLIC BLOOD PRESSURE: 118 MMHG

## 2023-08-14 DIAGNOSIS — I27.20 PULMONARY HYPERTENSION: ICD-10-CM

## 2023-08-14 DIAGNOSIS — I50.812 CHRONIC RIGHT-SIDED HEART FAILURE: Primary | ICD-10-CM

## 2023-08-14 DIAGNOSIS — I50.812 CHRONIC RIGHT-SIDED HEART FAILURE: ICD-10-CM

## 2023-08-14 DIAGNOSIS — I70.211 ATHEROSCLEROSIS OF NATIVE ARTERIES OF EXTREMITIES WITH INTERMITTENT CLAUDICATION, RIGHT LEG: ICD-10-CM

## 2023-08-14 DIAGNOSIS — I25.5 ISCHEMIC CARDIOMYOPATHY: Chronic | ICD-10-CM

## 2023-08-14 DIAGNOSIS — I50.42 CHRONIC COMBINED SYSTOLIC AND DIASTOLIC HEART FAILURE: ICD-10-CM

## 2023-08-14 DIAGNOSIS — I25.10 CORONARY ARTERY DISEASE INVOLVING NATIVE CORONARY ARTERY OF NATIVE HEART WITHOUT ANGINA PECTORIS: Chronic | ICD-10-CM

## 2023-08-14 DIAGNOSIS — I50.22 CHRONIC SYSTOLIC HEART FAILURE: ICD-10-CM

## 2023-08-14 DIAGNOSIS — I10 HYPERTENSION, UNSPECIFIED TYPE: ICD-10-CM

## 2023-08-14 DIAGNOSIS — I73.9 PVD (PERIPHERAL VASCULAR DISEASE) WITH CLAUDICATION: Primary | ICD-10-CM

## 2023-08-14 LAB
ANION GAP SERPL CALCULATED.3IONS-SCNC: 13 MMOL/L (ref 5–15)
BUN SERPL-MCNC: 40 MG/DL (ref 8–23)
BUN/CREAT SERPL: 24.8 (ref 7–25)
CALCIUM SPEC-SCNC: 9.2 MG/DL (ref 8.6–10.5)
CHLORIDE SERPL-SCNC: 101 MMOL/L (ref 98–107)
CO2 SERPL-SCNC: 24 MMOL/L (ref 22–29)
CREAT SERPL-MCNC: 1.61 MG/DL (ref 0.76–1.27)
EGFRCR SERPLBLD CKD-EPI 2021: 44.3 ML/MIN/1.73
GLUCOSE SERPL-MCNC: 97 MG/DL (ref 65–99)
NT-PROBNP SERPL-MCNC: 5421 PG/ML (ref 0–1800)
POTASSIUM SERPL-SCNC: 4.7 MMOL/L (ref 3.5–5.2)
SODIUM SERPL-SCNC: 138 MMOL/L (ref 136–145)

## 2023-08-14 PROCEDURE — 80048 BASIC METABOLIC PNL TOTAL CA: CPT

## 2023-08-14 PROCEDURE — 83880 ASSAY OF NATRIURETIC PEPTIDE: CPT

## 2023-08-14 PROCEDURE — 36415 COLL VENOUS BLD VENIPUNCTURE: CPT

## 2023-08-14 PROCEDURE — 99214 OFFICE O/P EST MOD 30 MIN: CPT | Performed by: INTERNAL MEDICINE

## 2023-08-14 PROCEDURE — 3074F SYST BP LT 130 MM HG: CPT | Performed by: INTERNAL MEDICINE

## 2023-08-14 PROCEDURE — 3078F DIAST BP <80 MM HG: CPT | Performed by: INTERNAL MEDICINE

## 2023-08-14 NOTE — PROGRESS NOTES
Patient Name: Renato Marquez  :1950  Age: 73 y.o.  Sex: male  Referring Provider: Mauricio Scott  Primary Cardiologist: Mauricio Scott  Encounter Provider:  Ant Ambriz MD      Chief Complaint:   Chief Complaint   Patient presents with    Peripheral Vascular Disease       History of Present Illness   73-year-old male, new to this provider, comes today for further evaluation regarding his chronic systolic heart failure.  Current diagnoses to include coronary artery disease, carotid artery disease, mitral valve regurgitation, ischemic cardiomyopathy, hypertension, hyperlipidemia, complex sleep apnea syndrome, chronic atrial fibrillation, and history of skin cancer.  Historically he has undergone a CABG in  with Dr. Jeet Bhagat which was a three-vessel CABG-LIMA to LAD, SVG to obtuse marginal, and SVG to posterior descending coronary arteries.  He also had a history of a CEA in .    2023 he underwent a watchman implantation.  He presents to me secondary to bilateral leg pain.  He reports that when he walks greater than 100 feet he notices bilateral calf discomfort that improves with rest.  Moderate in intensity.  He also states that he has issues as of late with cramping of his legs at night.  He underwent a duplex lower extremity documented below with mild to moderate reduction bilaterally that appears to be localized to the femoral.      The following portions of the patient's history were reviewed and updated as appropriate: allergies, current medications, past family history, past medical history, past social history, past surgical history and problem list.    Current Outpatient Medications   Medication Sig Dispense Refill    aspirin (ASPIR) 81 MG EC tablet Take 1 tablet by mouth Daily. 90 tablet 3    clopidogrel (PLAVIX) 75 MG tablet Take 1 tablet by mouth Daily. 90 tablet 3    dapagliflozin Propanediol (Farxiga) 10 MG tablet Take 10 mg by mouth Daily. 30 tablet 3     famotidine (PEPCID) 40 MG tablet Take 1 tablet by mouth 2 (Two) Times a Day. With lunch and at bedtime 180 tablet 3    fluorometholone (FML) 0.1 % ophthalmic suspension 3 (Three) Times a Day.      Magnesium 200 MG tablet Take 250 mg by mouth Daily.      metoprolol succinate XL (TOPROL-XL) 50 MG 24 hr tablet Take 1 tablet by mouth Daily. 90 tablet 3    mupirocin (BACTROBAN) 2 % ointment APPLY TO INFECTION/BIOPSY SITES TWICE DAILY      pantoprazole (PROTONIX) 40 MG EC tablet TAKE 1 TABLET BY MOUTH TWICE DAILY 180 tablet 2    pravastatin (PRAVACHOL) 40 MG tablet TAKE 1 TABLET BY MOUTH DAILY 90 tablet 3    spironolactone (ALDACTONE) 25 MG tablet Take 1 tablet by mouth Daily. 90 tablet 3    sucralfate (CARAFATE) 1 g tablet Take 1 tablet by mouth 2 (Two) Times a Day. 180 tablet 3    torsemide (DEMADEX) 20 MG tablet Take 1 tablet by mouth Daily. 60 tablet 5     No current facility-administered medications for this visit.       Past Medical History:   Diagnosis Date    Abnormal ECG     CAD (coronary artery disease)     Cancer     skin cancer    Carotid arterial disease     Chronic atrial fibrillation     Complex sleep apnea syndrome     BiPAP ST    Hyperlipidemia     Hypertension     Ischemic cardiomyopathy     Mitral regurgitation     Rectal bleeding        Past Surgical History:   Procedure Laterality Date    ATRIAL APPENDAGE EXCLUSION LEFT WITH TRANSESOPHAGEAL ECHOCARDIOGRAM N/A 4/19/2023    Procedure: Atrial Appendage Occlusion;  Surgeon: Dustin Neal MD;  Location: [facility] CATH INVASIVE LOCATION;  Service: Cardiovascular;  Laterality: N/A;    CAROTID ENDARTERECTOMY Right 2009    Dr Lewis    COLONOSCOPY      COLONOSCOPY N/A 6/22/2021    Procedure: COLONOSCOPY INTO CECUM AND TI;  Surgeon: Artur Jacobson MD;  Location: [facility] ENDOSCOPY;  Service: Gastroenterology;  Laterality: N/A;  PRE: RECTAL BLEEDING  POST: DIVERTICULOSIS, HEMORRHOIDS    COLONOSCOPY W/ POLYPECTOMY N/A 6/3/2021    Procedure: COLONOSCOPY,  polypectomies;  Surgeon: Luan Meek MD;  Location: [facility] OR;  Service: Gastroenterology;  Laterality: N/A;  Diverticulosis  Ascending colon polyp x 4 (3 cold snare)  Cecal polyp (cold snare)  Rectal polyp    CORONARY ARTERY BYPASS GRAFT  2009    Dr Bhagat LIMA to LAD, SVG to OM, SVG Post Desc    ENDOSCOPY N/A 6/3/2021    Procedure: ESOPHAGOGASTRODUODENOSCOPY with biopsies;  Surgeon: Luan Meek MD;  Location: [facility] OR;  Service: Gastroenterology;  Laterality: N/A;  Reflux  Gastric and duodenal ulcers  Biopsies: gastric, distal esophagus    FINGER SURGERY         Physical Exam  Constitutional:       General: He is not in acute distress.     Appearance: Normal appearance. He is well-developed. He is not ill-appearing.   HENT:      Head: Normocephalic and atraumatic.   Eyes:      Conjunctiva/sclera: Conjunctivae normal.      Pupils: Pupils are equal, round, and reactive to light.   Neck:      Vascular: No JVD.   Cardiovascular:      Rate and Rhythm: Normal rate and regular rhythm. Rhythm irregular.      Pulses:           Femoral pulses are 2+ on the right side and 2+ on the left side.       Popliteal pulses are 2+ on the right side and 2+ on the left side.        Dorsalis pedis pulses are 1+ on the right side and 1+ on the left side.        Posterior tibial pulses are 1+ on the right side and 1+ on the left side.      Heart sounds: Normal heart sounds. No murmur heard.    No friction rub. No gallop.      Comments: Bilateral DP and PT with good Doppler signal.  Pulmonary:      Effort: Pulmonary effort is normal. No respiratory distress.      Breath sounds: Normal breath sounds.   Abdominal:      General: Bowel sounds are normal. There is no distension.      Palpations: Abdomen is soft.   Musculoskeletal:         General: No swelling or deformity.   Skin:     General: Skin is warm and dry.      Capillary Refill: Capillary refill takes less than 2 seconds.   Neurological:      Mental  "Status: He is alert and oriented to person, place, and time. Mental status is at baseline.   Psychiatric:         Mood and Affect: Mood normal.         Behavior: Behavior normal.        Review of Systems   Constitutional:  Negative for fatigue and unexpected weight change.   HENT:  Negative for congestion and nosebleeds.    Eyes:  Negative for photophobia and visual disturbance.   Respiratory:  Positive for shortness of breath. Negative for cough and chest tightness.    Cardiovascular:  Negative for chest pain, palpitations and leg swelling.   Gastrointestinal:  Negative for abdominal distention and blood in stool.   Endocrine: Negative for polyphagia and polyuria.   Genitourinary:  Positive for frequency. Negative for urgency.   Musculoskeletal:  Negative for joint swelling and myalgias.   Skin:  Negative for pallor and rash.   Neurological:  Negative for dizziness, syncope, weakness, light-headedness, numbness and headaches.   Hematological:  Does not bruise/bleed easily.   Psychiatric/Behavioral:  Negative for confusion and sleep disturbance.       OBJECTIVE:  /60   Pulse 80   Ht 185.4 cm (73\")   Wt 102 kg (224 lb)   BMI 29.55 kg/mý      Body mass index is 29.55 kg/mý.  Wt Readings from Last 1 Encounters:   08/14/23 102 kg (224 lb)       Lab Review:  Renal Function: Estimated Creatinine Clearance: 46.6 mL/min (A) (by C-G formula based on SCr of 1.72 mg/dL (H)).    Lab Results   Component Value Date    PROBNP 6,028.0 (H) 08/01/2023       Results for orders placed during the hospital encounter of 05/23/23    Adult Transesophageal Echo (ADDY) W/ Cont if Necessary Per Protocol    Interpretation Summary    Left ventricular systolic function is normal. Left ventricular ejection fraction appears to be 56 - 60%.    Moderately reduced right ventricular systolic function noted.    The right ventricular cavity is mildly dilated.    The left atrial cavity is moderate to severely dilated.  There is an occlusion " device in the left atrial appendage that is well-seated with no clear flow through or around the device.    The right atrial cavity is severely  dilated.    Moderate tricuspid valve regurgitation is present.    Estimated right ventricular systolic pressure from tricuspid regurgitation is mildly elevated (35-45 mmHg).    Moderate pulmonic valve regurgitation is present.    There is moderate plaque in the aortic arch present. There is moderate plaque in the descending aorta present.      Procedures        ASSESSMENT:  PLAN OF CARE:  1.  Claudication/abnormal OSWALD: Mild to moderate reduction bilaterally.  - Physical exam and Doppler performed.  DP and PT with great Doppler signal bilaterally.  Not a good candidate for Pletal.  - Although he does have chronic kidney disease I have recommended a CTA with runoff as there seems to be some degree of inconsistency with his symptoms and physical exam findings.    2.  Heart failure with reduced ejection fraction: Near Heart Association class II.  Most recent left ventricular ejection fraction normal.    3.  Permanent atrial fibrillation: Ventricular rate controlled.         nAt Ambriz MD  08/14/23

## 2023-08-14 NOTE — TELEPHONE ENCOUNTER
Patient wife called with update, patient cramping has stopped. Now having bloating and a couch. Patient wife states patient may have overdone it with the water over weekend. Has gained couple pounds in couple days, no swelling in ankles. Patient has appt with Dr. Ambriz today would like to know if patient should have labs done

## 2023-08-14 NOTE — TELEPHONE ENCOUNTER
Called and informed patient wife of lab results and instructions per Deidre Dupreeutcher verbalized understanding

## 2023-08-14 NOTE — TELEPHONE ENCOUNTER
----- Message from SIRIA Cornejo sent at 8/14/2023  2:41 PM EDT -----  Please let the patient know that he needs to be guarded in his fluid intake over the next few days.  I like him to take in 1500 to 1700 mL of fluid daily.  Keep a strict low-sodium diet of 2000 mg daily or less.  If this does not improve his symptoms then we will adjust his diuretic.    Thanks,  Deidre  ----- Message -----  From: Lab, Background User  Sent: 8/14/2023   1:18 PM EDT  To: SIRIA Cornejo

## 2023-09-08 DIAGNOSIS — K21.00 GASTROESOPHAGEAL REFLUX DISEASE WITH ESOPHAGITIS WITHOUT HEMORRHAGE: ICD-10-CM

## 2023-09-08 DIAGNOSIS — K22.70 BARRETT'S ESOPHAGUS WITHOUT DYSPLASIA: ICD-10-CM

## 2023-09-08 RX ORDER — FAMOTIDINE 40 MG/1
TABLET, FILM COATED ORAL
Qty: 180 TABLET | Refills: 3 | Status: SHIPPED | OUTPATIENT
Start: 2023-09-08

## 2023-09-15 ENCOUNTER — OFFICE VISIT (OUTPATIENT)
Dept: CARDIOLOGY | Facility: CLINIC | Age: 73
End: 2023-09-15
Payer: MEDICARE

## 2023-09-15 VITALS
HEART RATE: 76 BPM | SYSTOLIC BLOOD PRESSURE: 128 MMHG | DIASTOLIC BLOOD PRESSURE: 66 MMHG | HEIGHT: 73 IN | BODY MASS INDEX: 29.03 KG/M2 | WEIGHT: 219 LBS | OXYGEN SATURATION: 96 %

## 2023-09-15 DIAGNOSIS — I73.9 PAD (PERIPHERAL ARTERY DISEASE): ICD-10-CM

## 2023-09-15 DIAGNOSIS — I34.0 NONRHEUMATIC MITRAL VALVE REGURGITATION: Chronic | ICD-10-CM

## 2023-09-15 DIAGNOSIS — I27.20 PULMONARY HYPERTENSION: ICD-10-CM

## 2023-09-15 DIAGNOSIS — I25.5 ISCHEMIC CARDIOMYOPATHY: Chronic | ICD-10-CM

## 2023-09-15 DIAGNOSIS — Z95.818 PRESENCE OF WATCHMAN LEFT ATRIAL APPENDAGE CLOSURE DEVICE: ICD-10-CM

## 2023-09-15 DIAGNOSIS — I10 PRIMARY HYPERTENSION: ICD-10-CM

## 2023-09-15 DIAGNOSIS — I25.10 CORONARY ARTERY DISEASE INVOLVING NATIVE CORONARY ARTERY OF NATIVE HEART WITHOUT ANGINA PECTORIS: Primary | Chronic | ICD-10-CM

## 2023-09-15 DIAGNOSIS — I48.21 PERMANENT ATRIAL FIBRILLATION: ICD-10-CM

## 2023-09-15 PROCEDURE — 99214 OFFICE O/P EST MOD 30 MIN: CPT | Performed by: INTERNAL MEDICINE

## 2023-09-15 PROCEDURE — 3078F DIAST BP <80 MM HG: CPT | Performed by: INTERNAL MEDICINE

## 2023-09-15 PROCEDURE — 3074F SYST BP LT 130 MM HG: CPT | Performed by: INTERNAL MEDICINE

## 2023-09-15 NOTE — PROGRESS NOTES
Subjective:     Encounter Date: 09/15/23        Patient ID: Renato Marquez is a 73 y.o. male.    Chief Complaint: CAD  Atrial Fibrillation  Past medical history includes atrial fibrillation.     Had the pleasure of seeing this patient in the office today.  He comes in for follow-up of his cardiac status.    Patient is feeling much better.  He is lost about 10 to 12 pounds total he is feeling better.  His exertional dyspnea has resolved.  He is doing well on his current medical therapy.    He has had his left atrial appendage occlusion device placed.     He has a history of chronic atrial fibrillation. He has a history of CAD with prior CABG. This was performed in 2009 by Dr. Jeet Bhagat. He had a three-vessel CABG with LIMA to the LAD, SVG to the obtuse marginal, and SVG to the posterior descending coronary arteries. He also has a history of cerebrovascular disease with a right carotid endarterectomy in 2009. The carotid endarterectomy was performed at the same time by Dr. Lewis.He had an ejection fraction of 37% on his stress test that was performed in January 2016.  That showed no ischemia.  We discussed cardioversion with him, but he was having no symptoms and elected to remain on his current medical regimen.    He is on CPAP at night.    The following portions of the patient's history were reviewed and updated as appropriate: allergies, current medications, past family history, past medical history, past social history, past surgical history and problem list.    Past Medical History:   Diagnosis Date    Abnormal ECG     CAD (coronary artery disease)     Cancer     skin cancer    Carotid arterial disease     Chronic atrial fibrillation     Complex sleep apnea syndrome     BiPAP ST    Hyperlipidemia     Hypertension     Ischemic cardiomyopathy     Mitral regurgitation     Rectal bleeding        Past Surgical History:   Procedure Laterality Date    ATRIAL APPENDAGE EXCLUSION LEFT WITH TRANSESOPHAGEAL  "ECHOCARDIOGRAM N/A 2023    Procedure: Atrial Appendage Occlusion;  Surgeon: Dustin Neal MD;  Location: [facility] CATH INVASIVE LOCATION;  Service: Cardiovascular;  Laterality: N/A;    CAROTID ENDARTERECTOMY Right     Dr Lewis    COLONOSCOPY      COLONOSCOPY N/A 2021    Procedure: COLONOSCOPY INTO CECUM AND TI;  Surgeon: Artur Jacobson MD;  Location: [facility] ENDOSCOPY;  Service: Gastroenterology;  Laterality: N/A;  PRE: RECTAL BLEEDING  POST: DIVERTICULOSIS, HEMORRHOIDS    COLONOSCOPY W/ POLYPECTOMY N/A 6/3/2021    Procedure: COLONOSCOPY, polypectomies;  Surgeon: Luan Meek MD;  Location:  [facility] OR;  Service: Gastroenterology;  Laterality: N/A;  Diverticulosis  Ascending colon polyp x 4 (3 cold snare)  Cecal polyp (cold snare)  Rectal polyp    CORONARY ARTERY BYPASS GRAFT      MIMI Frederick to LAD, SVG to OM, SVG Post Desc    ENDOSCOPY N/A 6/3/2021    Procedure: ESOPHAGOGASTRODUODENOSCOPY with biopsies;  Surgeon: Luan Meek MD;  Location:  [facility] OR;  Service: Gastroenterology;  Laterality: N/A;  Reflux  Gastric and duodenal ulcers  Biopsies: gastric, distal esophagus    FINGER SURGERY         Social History     Socioeconomic History    Marital status:    Tobacco Use    Smoking status: Former     Types: Cigarettes, Cigars     Quit date: 1991     Years since quittin.7    Smokeless tobacco: Never   Vaping Use    Vaping Use: Never used   Substance and Sexual Activity    Alcohol use: Yes     Alcohol/week: 2.0 standard drinks     Types: 2 Shots of liquor per week     Comment: NIGHTLY/caffeine use    Drug use: No    Sexual activity: Not Currently         Procedures       Objective:     Vitals:    09/15/23 1134   BP: 128/66   BP Location: Left arm   Pulse: 76   SpO2: 96%   Weight: 99.3 kg (219 lb)   Height: 185.4 cm (73\")         Physical Exam  Constitutional:       General: He is not in acute distress.     Appearance: He is well-developed. " He is not diaphoretic.   HENT:      Head: Normocephalic and atraumatic.      Nose: Nose normal.   Eyes:      General:         Right eye: No discharge.         Left eye: No discharge.      Conjunctiva/sclera: Conjunctivae normal.      Pupils: Pupils are equal, round, and reactive to light.   Neck:      Thyroid: No thyromegaly.      Trachea: No tracheal deviation.   Cardiovascular:      Rate and Rhythm: Normal rate. Rhythm irregularly irregular.      Pulses: Normal pulses.      Heart sounds: S1 normal and S2 normal. Murmur heard.   High-pitched blowing holosystolic murmur is present with a grade of 1/6 at the apex.     No S3 sounds.   Pulmonary:      Effort: Pulmonary effort is normal. No respiratory distress.      Breath sounds: Normal breath sounds. No stridor.   Chest:      Chest wall: No tenderness.   Abdominal:      General: Bowel sounds are normal. There is no distension.      Palpations: Abdomen is soft. There is no mass.      Tenderness: There is no abdominal tenderness. There is no guarding or rebound.   Musculoskeletal:         General: No tenderness or deformity. Normal range of motion.      Cervical back: Normal range of motion and neck supple.   Lymphadenopathy:      Cervical: No cervical adenopathy.   Skin:     General: Skin is warm and dry.      Findings: No erythema or rash.   Neurological:      Mental Status: He is alert and oriented to person, place, and time.      Deep Tendon Reflexes: Reflexes are normal and symmetric.   Psychiatric:         Thought Content: Thought content normal.       Lab Review:             Lab Results   Component Value Date    GLUCOSE 97 08/14/2023    BUN 40 (H) 08/14/2023    CREATININE 1.61 (H) 08/14/2023    EGFRIFNONA 56 (L) 09/07/2021    EGFRIFAFRI 77 09/29/2020    BCR 24.8 08/14/2023    K 4.7 08/14/2023    CO2 24.0 08/14/2023    CALCIUM 9.2 08/14/2023    PROTENTOTREF 6.6 07/18/2023    ALBUMIN 3.6 07/18/2023    LABIL2 1.3 07/18/2023    AST 27 04/14/2023    ALT 19 04/14/2023        Results for orders placed during the hospital encounter of 05/23/23    Adult Transesophageal Echo (ADDY) W/ Cont if Necessary Per Protocol    Interpretation Summary    Left ventricular systolic function is normal. Left ventricular ejection fraction appears to be 56 - 60%.    Moderately reduced right ventricular systolic function noted.    The right ventricular cavity is mildly dilated.    The left atrial cavity is moderate to severely dilated.  There is an occlusion device in the left atrial appendage that is well-seated with no clear flow through or around the device.    The right atrial cavity is severely  dilated.    Moderate tricuspid valve regurgitation is present.    Estimated right ventricular systolic pressure from tricuspid regurgitation is mildly elevated (35-45 mmHg).    Moderate pulmonic valve regurgitation is present.    There is moderate plaque in the aortic arch present. There is moderate plaque in the descending aorta present.    CHADS-VASc Risk Assessment              3 Total Score    1 CHF    1 Hypertension    1 Age 65-74        Criteria that do not apply:    Age >/= 75    DM    PRIOR STROKE/TIA/THROMBO    Vascular Disease    Sex: Female                  Assessment:          Diagnosis Plan   1. Coronary artery disease involving native coronary artery of native heart without angina pectoris        2. Ischemic cardiomyopathy        3. Nonrheumatic mitral valve regurgitation        4. Permanent atrial fibrillation        5. Primary hypertension        6. Pulmonary hypertension        7. PAD (peripheral artery disease)        8. Presence of Watchman left atrial appendage closure device               Plan:       Coronary artery disease: He has history of CAD with prior CABG with LIMA to LAD, SVG to OM, and SVG to the posterior descending coronary arteries in 2009 by Dr. Jeet Bhagat.    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  Status post Watchman  Ischemic cardiomyopathy, most recent stress test showed no ischemia.  Permanent atrial fibrillation: His [id number] score is 4. Heart rate is controlled   Valvular heart disease, continue to follow, no significant valvular issues on the most recent echocardiogram  Hypertension: Controlled on current regimen.  Dyslipidemia: He is treated with 80 mg pravastatin daily.  Right carotid artery stenosis: He had right carotid endarterectomy in 2009 by Dr. Lewis.  Sleep apnea: He is compliant with CPAP.    Claudication, vascular ultrasound demonstrates bilateral femoral artery disease, referred to Dr. Ambriz.  Who is evaluating this further, CT angiogram pending.    Thank you very much for allowing us to participate in the care of this pleasant patient.  Please don't hesitate to call if I can be of assistance in any way.      Current Outpatient Medications:     aspirin (ASPIR) 81 MG EC tablet, Take 1 tablet by mouth Daily., Disp: 90 tablet, Rfl: 3    clopidogrel (PLAVIX) 75 MG tablet, Take 1 tablet by mouth Daily., Disp: 90 tablet, Rfl: 3    dapagliflozin Propanediol (Farxiga) 10 MG tablet, Take 10 mg by mouth Daily., Disp: 30 tablet, Rfl: 3    famotidine (PEPCID) 40 MG tablet, TAKE 1 TABLET BY MOUTH TWICE DAILY AT LUNCH AND AT BEDTIME, Disp: 180 tablet, Rfl: 3    Magnesium 200 MG tablet, Take 250 mg by mouth Daily., Disp: , Rfl:     metoprolol succinate XL (TOPROL-XL) 50 MG 24 hr tablet, Take 1 tablet by mouth Daily., Disp: 90 tablet, Rfl: 3    pantoprazole (PROTONIX) 40 MG EC tablet, TAKE 1 TABLET BY MOUTH TWICE DAILY, Disp: 180 tablet, Rfl: 2    pravastatin (PRAVACHOL) 40 MG tablet, TAKE 1 TABLET BY MOUTH DAILY, Disp: 90 tablet, Rfl: 3    spironolactone (ALDACTONE) 25 MG tablet, Take 1 tablet by mouth Daily., Disp: 90 tablet, Rfl: 3    sucralfate (CARAFATE) 1 g tablet, Take 1 tablet by mouth 2 (Two) Times a  Day., Disp: 180 tablet, Rfl: 3    torsemide (DEMADEX) 20 MG tablet, Take 1 tablet by mouth Daily., Disp: 60 tablet, Rfl: 5

## 2023-09-20 ENCOUNTER — OFFICE VISIT (OUTPATIENT)
Dept: CARDIOLOGY | Facility: CLINIC | Age: 73
End: 2023-09-20
Payer: MEDICARE

## 2023-09-20 VITALS
BODY MASS INDEX: 29.08 KG/M2 | SYSTOLIC BLOOD PRESSURE: 112 MMHG | WEIGHT: 219.4 LBS | HEIGHT: 73 IN | HEART RATE: 99 BPM | DIASTOLIC BLOOD PRESSURE: 64 MMHG

## 2023-09-20 DIAGNOSIS — I48.21 PERMANENT ATRIAL FIBRILLATION: Primary | ICD-10-CM

## 2023-09-20 PROCEDURE — 99214 OFFICE O/P EST MOD 30 MIN: CPT | Performed by: INTERNAL MEDICINE

## 2023-09-20 PROCEDURE — 1160F RVW MEDS BY RX/DR IN RCRD: CPT | Performed by: INTERNAL MEDICINE

## 2023-09-20 PROCEDURE — 93000 ELECTROCARDIOGRAM COMPLETE: CPT | Performed by: INTERNAL MEDICINE

## 2023-09-20 PROCEDURE — 3074F SYST BP LT 130 MM HG: CPT | Performed by: INTERNAL MEDICINE

## 2023-09-20 PROCEDURE — 1159F MED LIST DOCD IN RCRD: CPT | Performed by: INTERNAL MEDICINE

## 2023-09-20 PROCEDURE — 3078F DIAST BP <80 MM HG: CPT | Performed by: INTERNAL MEDICINE

## 2023-09-20 NOTE — PROGRESS NOTES
[facility] Follow Up Office Note     Encounter Date:23  Patient:Renato Marquez  :1950  MRN:[id number]    Referring Provider:  Mauricio Scott MD    Chief Complaint:   Chief Complaint   Patient presents with    6 month post watchman       History of Presenting Illness:      Mr. Marquez is a 73 y.o. gentleman with past medical history notable for permanent atrial fibrillation, Carotid stenosis s/p CEA, coronary artery disease with prior CABG, chronic systolic heart failure, KATIE on cpap at night, pulmonary hypertension, and prior GI bleeding who presents to our office for scheduled follow up after Watchman.  In general patient is doing well he is tolerating his dual antiplatelet therapy without any bleeding issues.        Review of Systems:  Review of Systems   Constitutional: Negative.   HENT: Negative.     Eyes: Negative.    Cardiovascular: Negative.    Respiratory: Negative.     Endocrine: Negative.    Hematologic/Lymphatic: Negative.    Skin: Negative.    Musculoskeletal: Negative.    Gastrointestinal: Negative.    Genitourinary: Negative.    Neurological: Negative.    Psychiatric/Behavioral: Negative.     Allergic/Immunologic: Negative.      Current Outpatient Medications on File Prior to Visit   Medication Sig Dispense Refill    aspirin (ASPIR) 81 MG EC tablet Take 1 tablet by mouth Daily. 90 tablet 3    clopidogrel (PLAVIX) 75 MG tablet Take 1 tablet by mouth Daily. 90 tablet 3    dapagliflozin Propanediol (Farxiga) 10 MG tablet Take 10 mg by mouth Daily. 30 tablet 3    famotidine (PEPCID) 40 MG tablet TAKE 1 TABLET BY MOUTH TWICE DAILY AT LUNCH AND AT BEDTIME 180 tablet 3    Magnesium 200 MG tablet Take 250 mg by mouth Daily.      metoprolol succinate XL (TOPROL-XL) 50 MG 24 hr tablet Take 1 tablet by mouth Daily. 90 tablet 3    pantoprazole (PROTONIX) 40 MG EC tablet TAKE 1 TABLET BY MOUTH TWICE DAILY 180 tablet 2    pravastatin (PRAVACHOL) 40 MG tablet TAKE 1 TABLET BY  MOUTH DAILY 90 tablet 3    spironolactone (ALDACTONE) 25 MG tablet Take 1 tablet by mouth Daily. 90 tablet 3    sucralfate (CARAFATE) 1 g tablet Take 1 tablet by mouth 2 (Two) Times a Day. 180 tablet 3    torsemide (DEMADEX) 20 MG tablet Take 1 tablet by mouth Daily. 60 tablet 5     No current facility-administered medications on file prior to visit.       No Known Allergies    Past Medical History:   Diagnosis Date    Abnormal ECG     CAD (coronary artery disease)     Cancer     skin cancer    Carotid arterial disease     Chronic atrial fibrillation     Complex sleep apnea syndrome     BiPAP ST    Hyperlipidemia     Hypertension     Ischemic cardiomyopathy     Mitral regurgitation     Rectal bleeding        Past Surgical History:   Procedure Laterality Date    ATRIAL APPENDAGE EXCLUSION LEFT WITH TRANSESOPHAGEAL ECHOCARDIOGRAM N/A 4/19/2023    Procedure: Atrial Appendage Occlusion;  Surgeon: Dustin Neal MD;  Location: [facility] CATH INVASIVE LOCATION;  Service: Cardiovascular;  Laterality: N/A;    CAROTID ENDARTERECTOMY Right 2009    Dr Lewis    COLONOSCOPY      COLONOSCOPY N/A 6/22/2021    Procedure: COLONOSCOPY INTO CECUM AND TI;  Surgeon: Artur Jacobson MD;  Location: [facility] ENDOSCOPY;  Service: Gastroenterology;  Laterality: N/A;  PRE: RECTAL BLEEDING  POST: DIVERTICULOSIS, HEMORRHOIDS    COLONOSCOPY W/ POLYPECTOMY N/A 6/3/2021    Procedure: COLONOSCOPY, polypectomies;  Surgeon: Luan Meek MD;  Location: [facility] OR;  Service: Gastroenterology;  Laterality: N/A;  Diverticulosis  Ascending colon polyp x 4 (3 cold snare)  Cecal polyp (cold snare)  Rectal polyp    CORONARY ARTERY BYPASS GRAFT  2009    MIMI Frederick to LAD, SVG to OM, SVG Post Desc    ENDOSCOPY N/A 6/3/2021    Procedure: ESOPHAGOGASTRODUODENOSCOPY with biopsies;  Surgeon: uLan Meek MD;  Location: [facility] OR;  Service: Gastroenterology;  Laterality: N/A;  Reflux  Gastric and duodenal ulcers  Biopsies:  "gastric, distal esophagus    FINGER SURGERY         Social History     Socioeconomic History    Marital status:    Tobacco Use    Smoking status: Former     Types: Cigarettes, Cigars     Quit date: 1991     Years since quittin.7    Smokeless tobacco: Never   Vaping Use    Vaping Use: Never used   Substance and Sexual Activity    Alcohol use: Yes     Alcohol/week: 2.0 standard drinks     Types: 2 Shots of liquor per week     Comment: NIGHTLY/caffeine use    Drug use: No    Sexual activity: Not Currently       Family History   Problem Relation Age of Onset    No Known Problems Mother     Heart disease Father     Stroke Father     Hypertension Father        The following portions of the patient's history were reviewed and updated as appropriate: allergies, current medications, past family history, past medical history, past social history, past surgical history and problem list.       Objective:       Vitals:    23 0926   BP: 112/64   BP Location: Left arm   Patient Position: Sitting   Pulse: 99   Weight: 99.5 kg (219 lb 6.4 oz)   Height: 185.4 cm (73\")       Body mass index is 28.95 kg/m².    Physical Exam:  Constitutional: Well appearing, Well-developed, No acute distress   HENT: Oropharynx clear and membrane moist  Eyes: Normal conjunctiva, no sclera icterus.  Neck: Supple, no carotid bruit bilaterally.  Cardiovascular: Irregularly irregular rate and rhythm, No Murmur, No bilateral lower extremity edema.  Pulmonary: Normal respiratory effort, normal lung sounds, no wheezing.  Neurological: Alert and orient x 3.   Skin: Warm, dry, scattered ecchymosis, no rash.  Psych: Appropriate mood and affect. Normal judgment and insight.       Lab Results   Component Value Date     2023     2023    K 4.7 2023    K 4.5 2023     2023    CL 99 2023    CO2 24.0 2023    CO2 28.0 2023    BUN 40 (H) 2023    BUN 47 (H) 2023    " CREATININE 1.61 (H) 08/14/2023    CREATININE 1.72 (H) 08/04/2023    EGFRIFNONA 56 (L) 09/07/2021    EGFRIFNONA 77 06/21/2021    EGFRIFAFRI 77 09/29/2020    GLUCOSE 97 08/14/2023    GLUCOSE 110 (H) 08/04/2023    CALCIUM 9.2 08/14/2023    CALCIUM 9.5 08/04/2023    PROTENTOTREF 6.6 07/18/2023    PROTENTOTREF 6.2 09/29/2020    ALBUMIN 3.6 07/18/2023    ALBUMIN 3.9 04/19/2023    BILITOT 0.8 04/14/2023    BILITOT 1.0 12/20/2022    AST 27 04/14/2023    AST 26 12/20/2022    ALT 19 04/14/2023    ALT 14 12/20/2022     Lab Results   Component Value Date    WBC 6.78 05/18/2023    WBC 7.33 05/01/2023    HGB 11.9 (L) 05/18/2023    HGB 11.4 (L) 05/01/2023    HCT 35.4 (L) 05/18/2023    HCT 33.5 (L) 05/01/2023    MCV 92.9 05/18/2023    MCV 92.8 05/01/2023     05/18/2023     05/01/2023     Lab Results   Component Value Date    CHOL 156 11/23/2022    CHOL 154 01/29/2019    TRIG 117 11/23/2022    TRIG 62 09/29/2020    HDL 50 11/23/2022    HDL 71 (H) 09/29/2020    LDL 85 11/23/2022    LDL 79 09/29/2020     Lab Results   Component Value Date    PROBNP 5,421.0 (H) 08/14/2023    PROBNP 6,028.0 (H) 08/01/2023     Lab Results   Component Value Date    TROPONINT <0.010 06/20/2021     Lab Results   Component Value Date    TSH 1.510 06/22/2021    TSH 1.260 09/29/2020       ECG 12 Lead    Date/Time: 9/20/2023 10:07 AM  Performed by: Dustin Neal MD  Authorized by: Kaebnick, Dustin W, MD   Comparison: compared with previous ECG from 5/25/2023  Similar to previous ECG  Rhythm: atrial fibrillation  Ectopy: unifocal PVCs    Transesophageal echocardiogram 5/23/2023:  Left ventricular systolic function is normal. Left ventricular ejection fraction appears to be 56 - 60%.  Moderately reduced right ventricular systolic function noted.  The right ventricular cavity is mildly dilated.  The left atrial cavity is moderate to severely dilated.  There is an occlusion device in the left atrial appendage that is well-seated with no clear flow  through or around the device.  The right atrial cavity is severely  dilated.  Moderate tricuspid valve regurgitation is present.  Estimated right ventricular systolic pressure from tricuspid regurgitation is mildly elevated (35-45 mmHg).  Moderate pulmonic valve regurgitation is present.  There is moderate plaque in the aortic arch present. There is moderate plaque in the descending aorta present.    Limited Echocardiogram 4/20/2023:  There is no evidence of pericardial effusion. .  Normal left ventricular cavity size noted. Left ventricular wall thickness is consistent with mild to moderate concentric hypertrophy. All left ventricular wall segments contract normally.     Watchman Implantation 4/19/2023  Right common femoral venotomy utilizing micropuncture kit and ultrasound guidance.  Left common femoral venotomy utilizing micropuncture kit and ultrasound guidance.  Transseptal puncture of inter-atrial septum under fluoroscopic and transesophageal guidance   Successful placement of a 31 mm watchman left atrial appendage closure device.  Percutaneous closure of right femoral venotomy access site with #1 suture mediated closure device and figure of 8 suture.    Echocardiogram 11/17/2022:  Left ventricular ejection fraction appears to be 41 - 45%.  Left ventricular wall thickness is consistent with moderate concentric hypertrophy.  Left ventricular diastolic function was indeterminate.  The right atrial cavity is severely  dilated.  There is calcification of the aortic valve.  Estimated right ventricular systolic pressure from tricuspid regurgitation is moderately elevated (45-55 mmHg). Calculated right ventricular systolic pressure from tricuspid regurgitation is 50 mmHg.  Moderate pulmonic valve regurgitation is present.    Holter Monitor 6/21/2021:  An abnormal monitor study.  Atrial fibrillation is present throughout  Average heart beat 93 bpm, range during A. fib  bpm, frequent episodes of heart rate  greater than 100 are seen.  Frequent ventricular ectopy is seen  1 episode of wide-complex tachycardia that appears to be consistent with ventricular tachycardia is present at 1:25 PM, 29 beats duration, 170 bpm          Assessment:          Diagnosis Plan   1. Permanent atrial fibrillation  ECG 12 Lead             Plan:       Mr. Marquez is a 73 y.o. gentleman with past medical history notable for permanent atrial fibrillation, Carotid stenosis s/p CEA, coronary artery disease with prior CABG, chronic systolic heart failure, KATIE on cpap at night, pulmonary hypertension, and prior GI bleeding who presents to our office for scheduled follow-up after watchman implantation.  From a watchman standpoint he is doing well with no bleeding on dual antiplatelet therapy.  From my standpoint he could stop his clopidogrel and remain just on 81 mg of aspirin.  That being said it does sound like he has some peripheral disease with claudication and would not be unreasonable for him to stay on dual antiplatelet therapy going forward as he is tolerating it well.  This would be helpful for management of his peripheral vascular disease he is scheduled to see one of our partners for further evaluation of his known lower extremity disease and claudication symptoms.    Permanent Atrial Fibrillation:  CHADVASC score 4  HASBLED Score 5  Status post watchman 4/2023 transesophageal echocardiogram this week demonstrates normal device function  Eliquis discontinued 5/2023  Continue dual antiplatelet therapy from a watchman standpoint could stop clopidogrel but will continue given his known peripheral vascular disease and claudication symptoms.    We will work on scheduling his Watchman ADDY in 6 months for his 1 year follow-up      Follow up:  We will schedule transesophageal echocardiogram in approximately 6 months for his 1 year follow-up post watchman.      Thank you for allowing me to participate in the care of Renato Marquez. Feel free  to contact me directly with any further questions or concerns.    Dustin Neal MD  [facility]  09/20/23  10:09 EDT

## 2023-09-21 ENCOUNTER — HOSPITAL ENCOUNTER (OUTPATIENT)
Dept: CT IMAGING | Facility: HOSPITAL | Age: 73
Discharge: HOME | End: 2023-09-21
Admitting: INTERNAL MEDICINE

## 2023-09-21 DIAGNOSIS — I73.9 PVD (PERIPHERAL VASCULAR DISEASE) WITH CLAUDICATION: ICD-10-CM

## 2023-09-21 DIAGNOSIS — I70.211 ATHEROSCLEROSIS OF NATIVE ARTERIES OF EXTREMITIES WITH INTERMITTENT CLAUDICATION, RIGHT LEG: ICD-10-CM

## 2023-09-21 LAB — CREAT BLDA-MCNC: 1.7 MG/DL (ref 0.6–1.3)

## 2023-09-21 PROCEDURE — 75635 CT ANGIO ABDOMINAL ARTERIES: CPT

## 2023-09-21 PROCEDURE — 25510000001 IOPAMIDOL PER 1 ML: Performed by: INTERNAL MEDICINE

## 2023-09-21 PROCEDURE — 82565 ASSAY OF CREATININE: CPT

## 2023-09-21 RX ADMIN — IOPAMIDOL 95 ML: 755 INJECTION, SOLUTION INTRAVENOUS at 16:49

## 2023-10-02 ENCOUNTER — HOSPITAL ENCOUNTER (OUTPATIENT)
Dept: CARDIOLOGY | Facility: HOSPITAL | Age: 73
Discharge: HOME | End: 2023-10-02
Admitting: INTERNAL MEDICINE
Payer: MEDICARE

## 2023-10-02 VITALS
DIASTOLIC BLOOD PRESSURE: 71 MMHG | SYSTOLIC BLOOD PRESSURE: 148 MMHG | RESPIRATION RATE: 14 BRPM | HEART RATE: 58 BPM | OXYGEN SATURATION: 97 %

## 2023-10-02 DIAGNOSIS — I51.9 DYSFUNCTION OF RIGHT CARDIAC VENTRICLE: ICD-10-CM

## 2023-10-02 DIAGNOSIS — I27.20 PULMONARY HYPERTENSION: Primary | ICD-10-CM

## 2023-10-02 DIAGNOSIS — I25.5 ISCHEMIC CARDIOMYOPATHY: Chronic | ICD-10-CM

## 2023-10-02 DIAGNOSIS — I42.8 OTHER CARDIOMYOPATHY: ICD-10-CM

## 2023-10-02 DIAGNOSIS — G47.31 COMPLEX SLEEP APNEA SYNDROME: ICD-10-CM

## 2023-10-02 PROCEDURE — G0463 HOSPITAL OUTPT CLINIC VISIT: HCPCS

## 2023-10-02 PROCEDURE — 99214 OFFICE O/P EST MOD 30 MIN: CPT | Performed by: INTERNAL MEDICINE

## 2023-10-02 RX ORDER — TORSEMIDE 20 MG/1
20 TABLET ORAL DAILY
Qty: 90 TABLET | Refills: 2
Start: 2023-10-02 | End: 2023-10-02 | Stop reason: SDUPTHER

## 2023-10-02 RX ORDER — TORSEMIDE 20 MG/1
20 TABLET ORAL DAILY
Qty: 90 TABLET | Refills: 0
Start: 2023-10-02 | End: 2023-10-03 | Stop reason: SDUPTHER

## 2023-10-02 NOTE — PROGRESS NOTES
Heart Failure & Pulmonary Arterial Hypertension Clinic  [facility]  Diogo Lawton M.D., Ph.D., [facility]       Mauricio Scott III, MD  [address] [facility] [address] [address]    Thank you for asking me to see Renato Marquez.     History of Present Illness  This is a 73 y.o. male with:    1. PH  2. HFrEF--now HFimpEF    Renato Marquez is a 73 y.o. male who presents today.  The patient's cardiologist is Dr. Scott.     Patient returns today for further evaluation and management of pulmonary hypertension and HFrEF.  Patient was diagnosed with HFrEF several years ago.  He was found to have ASCHD.  He is status post CABG with a LIMA to the LAD, SVG to the OM, and SVG to the PDA in 2009 by Dr. Jeet Bhagat.  This was detected during a life screening. Originally the concern was over SHELLEY. He did undergo. Most recent ischemia evaluation was in January 2021 without evidence of ischemia.  Echocardiogram was in 2015 reported LVEF of 35-40%.  Over the years this has improved.  He now meets criteria for HFimpEF.      He returns to clinic today.  He has been having some issues with marginal hemodynamics.  In fact, this required discontinuation of his ARNI.  He was started on SGLT2 inhibitor.  Denies adverse effects.  No emergency department visits or inpatient admissions related to failure.  Today, denies orthopnea, PND, lower extremity edema, ascites, early abdominal satiety. Since his last appointment he reports worsening dyspnea. He did have unintentional weight gain. He did double up his diuretics on Saturday and Sunday. He has an initial 6 lb weight gain. He is now down those 6 lbs.         Review of Systems - Review of Systems   Cardiovascular:  Positive for dyspnea on exertion. Negative for chest pain, claudication, cyanosis, irregular heartbeat, leg swelling, near-syncope, orthopnea, palpitations, paroxysmal nocturnal dyspnea and syncope.   Respiratory:  Positive for shortness of breath, sleep  disturbances due to breathing and snoring. Negative for cough and hemoptysis.    All other systems reviewed and are negative.      All other systems were reviewed and were negative.    Patient Active Problem List   Diagnosis    Coronary artery disease involving native coronary artery of native heart without angina pectoris    Carotid arterial disease    Permanent atrial fibrillation    Ischemic cardiomyopathy    Mitral regurgitation    Hypothyroidism    HTN (hypertension)    Dyslipidemia    DDD (degenerative disc disease), lumbar    Chronic bilateral low back pain with right-sided sciatica    Primary osteoarthritis of right knee    Class 1 obesity due to excess calories without serious comorbidity with body mass index (BMI) of 31.0 to 31.9 in adult    Complex sleep apnea syndrome    Hemorrhagic disorder due to circulating anticoagulants    Gastroesophageal reflux disease with esophagitis without hemorrhage    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     reports that he quit smoking about 31 years ago. His smoking use included cigarettes and cigars. He has never used smokeless tobacco. He reports current alcohol use of about 2.0 standard drinks per week. He reports that he does not use drugs.    No Known Allergies    Social Determinants of Health     Tobacco Use: Medium Risk    Smoking Tobacco Use: Former    Smokeless Tobacco Use: Never    Passive Exposure: Not on file   Alcohol Use: Not At Risk    Frequency of Alcohol Consumption: 4 or more times a week    Average Number of Drinks: 1 or 2    Frequency of Binge Drinking: Never   Financial Resource Strain: Not on  file   Food Insecurity: Not on file   Transportation Needs: Not on file   Physical Activity: Not on file   Stress: Not on file   Social Connections: Not on file   Intimate Partner Violence: Not on file   Depression: Not on file   Housing Stability: Not on file        Physical Activity: Not on file          Current Outpatient Medications:     torsemide (DEMADEX) 20 MG tablet, Take 1 tablet by mouth Daily., Disp: 90 tablet, Rfl: 0    aspirin (ASPIR) 81 MG EC tablet, Take 1 tablet by mouth Daily., Disp: 90 tablet, Rfl: 3    clopidogrel (PLAVIX) 75 MG tablet, Take 1 tablet by mouth Daily., Disp: 90 tablet, Rfl: 3    dapagliflozin Propanediol (Farxiga) 10 MG tablet, Take 10 mg by mouth Daily., Disp: 30 tablet, Rfl: 3    famotidine (PEPCID) 40 MG tablet, TAKE 1 TABLET BY MOUTH TWICE DAILY AT LUNCH AND AT BEDTIME, Disp: 180 tablet, Rfl: 3    Magnesium 200 MG tablet, Take 250 mg by mouth Daily., Disp: , Rfl:     metoprolol succinate XL (TOPROL-XL) 50 MG 24 hr tablet, Take 1 tablet by mouth Daily., Disp: 90 tablet, Rfl: 3    pantoprazole (PROTONIX) 40 MG EC tablet, TAKE 1 TABLET BY MOUTH TWICE DAILY, Disp: 180 tablet, Rfl: 2    pravastatin (PRAVACHOL) 40 MG tablet, TAKE 1 TABLET BY MOUTH DAILY, Disp: 90 tablet, Rfl: 3    spironolactone (ALDACTONE) 25 MG tablet, Take 1 tablet by mouth Daily., Disp: 90 tablet, Rfl: 3    sucralfate (CARAFATE) 1 g tablet, Take 1 tablet by mouth 2 (Two) Times a Day., Disp: 180 tablet, Rfl: 3    Vital Sign Review:     Vitals:    10/02/23 1124   BP: 148/71   Pulse: 58   Resp: 14   SpO2: 97%     PP:low  Pulse Ox: normal oxygenation on room air    Weight: 222 lbs        Physical Exam:    Vitals reviewed.   Constitutional:       General: Not in acute distress.     Appearance: Normal and healthy appearance. Well-developed, well-groomed and not in distress. Not ill-appearing, toxic-appearing or diaphoretic.      Interventions: Not intubated.  Eyes:      Conjunctiva/sclera: Conjunctivae normal.       Pupils: Pupils are equal, round, and reactive to light.   Neck:      Vascular: No hepatojugular reflux, JVD or JVR. JVD normal with 6 cm of water.   Pulmonary:      Effort: Pulmonary effort is normal. No tachypnea, bradypnea, accessory muscle usage, prolonged expiration, respiratory distress or retractions. Not intubated.      Breath sounds: Normal breath sounds and air entry. No stridor, decreased air movement or transmitted upper airway sounds. No decreased breath sounds. No wheezing. No rhonchi. No rales.   Cardiovascular:      PMI at left midclavicular line. Bradycardia present. Irregularly irregular rhythm. S1 with normal intensity. loud S2.       Murmurs: There is no murmur.      No gallop.  No click. No rub.   Neurological:      General: No focal deficit present.      Mental Status: Alert and oriented to person, place and time.   Psychiatric:         Behavior: Behavior is cooperative.          DATA REVIEWED:       TTE/ADDY:    Results for orders placed during the hospital encounter of 05/23/23    Adult Transesophageal Echo (ADDY) W/ Cont if Necessary Per Protocol    Interpretation Summary    Left ventricular systolic function is normal. Left ventricular ejection fraction appears to be 56 - 60%.    Moderately reduced right ventricular systolic function noted.    The right ventricular cavity is mildly dilated.    The left atrial cavity is moderate to severely dilated.  There is an occlusion device in the left atrial appendage that is well-seated with no clear flow through or around the device.    The right atrial cavity is severely  dilated.    Moderate tricuspid valve regurgitation is present.    Estimated right ventricular systolic pressure from tricuspid regurgitation is mildly elevated (35-45 mmHg).    Moderate pulmonic valve regurgitation is present.    There is moderate plaque in the aortic arch present. There is moderate plaque in the descending aorta present.      My interpretation of the TTE is below.      LVEF is 60%.  Moderately reduced right ventricular systolic function.  RV cavity is dilated. LAAO device is present.  Moderate TR with pulmonary hypertension  .      ==========================    CT SCAN: Dilated pulmonary artery and moderate emphysematous changes  -----------------------------------------------------        PFTs were interpreted today.  FEV1/FVC is 70% of predicted.  FVC is 85% of predicted.    Laboratory evaluations:    Lab Results   Component Value Date    GLUCOSE 97 08/14/2023    BUN 40 (H) 08/14/2023    CREATININE 1.70 (H) 09/21/2023    EGFRIFNONA 56 (L) 09/07/2021    EGFRIFAFRI 77 09/29/2020    BCR 24.8 08/14/2023    K 4.7 08/14/2023    CO2 24.0 08/14/2023    CALCIUM 9.2 08/14/2023    PROTENTOTREF 6.6 07/18/2023    ALBUMIN 3.6 07/18/2023    LABIL2 1.3 07/18/2023    AST 27 04/14/2023    ALT 19 04/14/2023     Lab Results   Component Value Date    WBC 6.78 05/18/2023    HGB 11.9 (L) 05/18/2023    HCT 35.4 (L) 05/18/2023    MCV 92.9 05/18/2023     05/18/2023     Lab Results   Component Value Date    CHOL 156 11/23/2022    CHLPL 162 09/29/2020    TRIG 117 11/23/2022    HDL 50 11/23/2022    LDL 85 11/23/2022     Lab Results   Component Value Date    TSH 1.510 06/22/2021     Lab Results   Component Value Date    TROPONINT <0.010 06/20/2021     No results found for: HGBA1C  No results found for: DDIMER  Lab Results   Component Value Date    ALT 19 04/14/2023     No results found for: HGBA1C  Lab Results   Component Value Date    CREATININE 1.70 (H) 09/21/2023     No results found for: IRON, TIBC, FERRITIN  Lab Results   Component Value Date    INR 1.07 04/14/2023    INR 1.63 (H) 06/21/2021    INR 1.85 (H) 06/20/2021    PROTIME 14.1 04/14/2023    PROTIME 19.1 (H) 06/21/2021    PROTIME 21.0 (H) 06/20/2021       PAH RISK ASSESSMENT:    Assessment & Plan      1. Pulmonary hypertension.  This is WHO-group-2/3.  Most recent 2D TTE shows pulmonary hypertension. CT showed emphysematous changes.  He  does have KATIE.  I suspect this is multifactorial.  Risk assessment was performed today.    -No current indication for PAH-specific medicines     2. Ischemic cardiomyopathy. Now with HFimpEF.  NYHA stage C; functional class III.  Today, euvolemic and perfused.  Clinical trajectory was assessed today and is improving ATTR negative.    He seems to have had an episode of ADHF over the weekend, but this responded very nicely to doubling of his diuretic regimen.  He did have some Italian food which is a possible etiology.  We will check his labs today.    -Toprol XL (AF)  -Aldactone with quarterly assessment of GFR  - Farxiga 10 mg.  Sick precautions  - Torsemide       3. RV Heart Failure.    RV is dilated. RVEF is abnormal.  RV status:  RV ADVERSELY-REMODELED with a ESVi of >114ml.   The patient presents today with RHF. The etiology is: PAH/KATIE.  Today, he is clinically stable.    -Metoprolol succinate  - Spironolactone  - Torsemide   4. KATIE (obstructive sleep apnea).  CPAP.  Follow-up with sleep medicine.   5. Research.  He is a candidate for ALLEVIATE. I will have research contact him.         ORDERS PLACED TODAY:  Orders Placed This Encounter   Procedures    Basic Metabolic Panel    proBNP          MEDS ORDERED TODAY:    New Medications Ordered This Visit   Medications    torsemide (DEMADEX) 20 MG tablet     Sig: Take 1 tablet by mouth Daily.     Dispense:  90 tablet     Refill:  0        MEDS DISCONTINUED TODAY:    Medications Discontinued During This Encounter   Medication Reason    torsemide (DEMADEX) 20 MG tablet Reorder              Return in about 6 weeks (around 11/13/2023).          This document has been electronically signed by Diogo Lawton MD PhD on October 2, 2023 11:42 EDT      Diogo Lawton M.D., Ph.D., [facility] Failure [facility] Pulmonary Hypertension [facility]  System Medical Director for HF and PAH

## 2023-10-03 RX ORDER — TORSEMIDE 20 MG/1
20 TABLET ORAL DAILY
Qty: 90 TABLET | Refills: 0 | Status: SHIPPED | OUTPATIENT
Start: 2023-10-03

## 2023-10-03 NOTE — ADDENDUM NOTE
Encounter addended by: Ada Ruth MA on: 10/3/2023 8:25 AM   Actions taken: Charge Capture section accepted

## 2023-10-30 RX ORDER — DAPAGLIFLOZIN 10 MG/1
1 TABLET, FILM COATED ORAL DAILY
Qty: 30 TABLET | Refills: 3 | Status: SHIPPED | OUTPATIENT
Start: 2023-10-30

## 2023-11-13 DIAGNOSIS — I48.20 CHRONIC ATRIAL FIBRILLATION: Chronic | ICD-10-CM

## 2023-11-13 RX ORDER — METOPROLOL SUCCINATE 50 MG/1
50 TABLET, EXTENDED RELEASE ORAL DAILY
Qty: 90 TABLET | Refills: 1 | Status: SHIPPED | OUTPATIENT
Start: 2023-11-13

## 2023-11-28 ENCOUNTER — TELEPHONE (OUTPATIENT)
Dept: CARDIOLOGY | Facility: HOSPITAL | Age: 73
End: 2023-11-28
Payer: MEDICARE

## 2023-11-28 NOTE — TELEPHONE ENCOUNTER
Received a call from [place] on behalf of [facility]. They are requesting a 90 day supply of patient's Farxiga, next time it is filled.    Thank you,  Rosana LAY C

## 2023-11-30 ENCOUNTER — TELEPHONE (OUTPATIENT)
Dept: CARDIOLOGY | Facility: CLINIC | Age: 73
End: 2023-11-30
Payer: MEDICARE

## 2023-11-30 ENCOUNTER — TELEPHONE (OUTPATIENT)
Dept: CARDIOLOGY | Facility: HOSPITAL | Age: 73
End: 2023-11-30
Payer: MEDICARE

## 2023-11-30 NOTE — TELEPHONE ENCOUNTER
Patient's wife left a voicemail this morning. She reports patient is starting to take on fluid. She is asking what they should do.    Call back number [phone]    Thanks,  Rosana LAY [facility]

## 2023-11-30 NOTE — TELEPHONE ENCOUNTER
I spoke with the patient's spouse who states that his weight has gained some, but has been very slow and insidious and is not significant.  He has a wet cough, and no swelling.  I advised that I would like him to be evaluated by his primary care physician as this does not sound to be related to heart failure.  I also asked that she keep a close eye on his weight and symptoms and if he does begin to have any edema or rapid weight gain to please let us know soon as possible.  All questions and concerns addressed at this time, understanding and agreement verbalized.

## 2023-12-04 ENCOUNTER — HOSPITAL ENCOUNTER (OUTPATIENT)
Dept: CARDIOLOGY | Facility: HOSPITAL | Age: 73
Discharge: HOME | End: 2023-12-04
Admitting: INTERNAL MEDICINE
Payer: MEDICARE

## 2023-12-04 VITALS
HEIGHT: 72 IN | SYSTOLIC BLOOD PRESSURE: 113 MMHG | HEART RATE: 62 BPM | BODY MASS INDEX: 30.23 KG/M2 | DIASTOLIC BLOOD PRESSURE: 50 MMHG | OXYGEN SATURATION: 92 % | WEIGHT: 223.2 LBS

## 2023-12-04 DIAGNOSIS — I51.9 DYSFUNCTION OF RIGHT CARDIAC VENTRICLE: ICD-10-CM

## 2023-12-04 DIAGNOSIS — G47.31 COMPLEX SLEEP APNEA SYNDROME: ICD-10-CM

## 2023-12-04 DIAGNOSIS — I25.5 ISCHEMIC CARDIOMYOPATHY: Chronic | ICD-10-CM

## 2023-12-04 DIAGNOSIS — I27.20 PULMONARY HYPERTENSION: Primary | ICD-10-CM

## 2023-12-04 PROCEDURE — 99214 OFFICE O/P EST MOD 30 MIN: CPT | Performed by: INTERNAL MEDICINE

## 2023-12-04 PROCEDURE — G0463 HOSPITAL OUTPT CLINIC VISIT: HCPCS

## 2023-12-04 RX ORDER — GUAIFENESIN 600 MG/1
1200 TABLET, EXTENDED RELEASE ORAL 2 TIMES DAILY
COMMUNITY

## 2023-12-04 NOTE — ADDENDUM NOTE
Encounter addended by: Diogo Lawton MD PhD on: 12/4/2023 1:04 PM   Actions taken: Clinical Note Signed

## 2023-12-04 NOTE — PROGRESS NOTES
Heart Failure & Pulmonary Arterial Hypertension Clinic  [facility]  Diogo Lawton M.D., Ph.D., [facility]       Mauricio Scott III, MD  [address] [address]    Thank you for asking me to see Renato Marquez.     History of Present Illness  This is a 73 y.o. male with:    1. PH  2. HFrEF--now HFimpEF    Renato Marquez is a 73 y.o. male who presents today.  The patient's cardiologist is Dr. Scott.     Patient returns today for further evaluation and management of pulmonary hypertension and HFrEF.  Patient was diagnosed with HFrEF several years ago.  He was found to have ASCHD.  He is status post CABG with a LIMA to the LAD, SVG to the OM, and SVG to the PDA in 2009 by Dr. Jeet Bhagat.  This was detected during a life screening. Originally the concern was over SHELLEY. He did undergo. Most recent ischemia evaluation was in January 2021 without evidence of ischemia.  Echocardiogram was in 2015 reported LVEF of 35-40%.  Over the years this has improved.  He now meets criteria for HFimpEF.      Patient returns to clinic today.  His course has been complicated by marginal hemodynamics.  In fact, this required discontinuation of ARNI.  He is on an SGLT2 inhibitor.  He is tolerating this well.  Denies orthopnea, PND, lower extremity edema, ascites, and early abdominal satiety. He does report some weight gain. He has had a cough which has responded to Tussinex. He added a double dose one day. He did have a slight improvement.       Review of Systems - Review of Systems   Cardiovascular:  Positive for dyspnea on exertion. Negative for chest pain, claudication, cyanosis, irregular heartbeat, leg swelling, near-syncope, orthopnea, palpitations, paroxysmal nocturnal dyspnea and syncope.   Respiratory:  Positive for shortness of breath. Negative for cough and hemoptysis.    All other systems reviewed and are negative.        All other systems were reviewed and were negative.    Patient  Active Problem List   Diagnosis    Coronary artery disease involving native coronary artery of native heart without angina pectoris    Carotid arterial disease    Permanent atrial fibrillation    Ischemic cardiomyopathy    Mitral regurgitation    Hypothyroidism    HTN (hypertension)    Dyslipidemia    DDD (degenerative disc disease), lumbar    Chronic bilateral low back pain with right-sided sciatica    Primary osteoarthritis of right knee    Class 1 obesity due to excess calories without serious comorbidity with body mass index (BMI) of 31.0 to 31.9 in adult    Complex sleep apnea syndrome    Hemorrhagic disorder due to circulating anticoagulants    Gastroesophageal reflux disease with esophagitis without hemorrhage    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     reports that he quit smoking about 31 years ago. His smoking use included cigarettes and cigars. He has never used smokeless tobacco. He reports current alcohol use of about 2.0 standard drinks of alcohol per week. He reports that he does not use drugs.    No Known Allergies    Social Determinants of Health     Tobacco Use: Medium Risk (12/4/2023)    Patient History     Smoking Tobacco Use: Former     Smokeless Tobacco Use: Never     Passive Exposure: Not on file   Alcohol Use: Not At Risk (4/19/2023)    AUDIT-C     Frequency of Alcohol Consumption: 4 or more times a week     Average Number of Drinks: 1 or 2     Frequency of Binge Drinking: Never   Financial Resource Strain: Low Risk  (9/8/2021)    Overall Financial Resource Strain (CARDIA)     Difficulty of Paying Living Expenses: Not hard at all   Food  Insecurity: No Food Insecurity (9/8/2021)    Hunger Vital Sign     Worried About Running Out of Food in the Last Year: Never true     Ran Out of Food in the Last Year: Never true   Transportation Needs: No Transportation Needs (9/8/2021)    PRAPARE - Transportation     Lack of Transportation (Medical): No     Lack of Transportation (Non-Medical): No   Physical Activity: Not on file   Stress: Not on file   Social Connections: Unknown (10/9/2023)    Family and Community Support     Help with Day-to-Day Activities: Not on file     Lonely or Isolated: Not on file   Interpersonal Safety: Not At Risk (4/19/2023)    Abuse Screen     Unsafe at Home or Work/School: no     Feels Threatened by Someone?: no     Does Anyone Keep You from Contacting Others or Doint Things Outside the Home?: no     Physical Sign of Abuse Present: no   Depression: Not at risk (10/6/2020)    PHQ-2     PHQ-2 Score: 0   Housing Stability: Unknown (4/19/2023)    Housing Stability     Current Living Arrangements: home     Potentially Unsafe Housing Conditions: Not on file   Utilities: Not on file   Health Literacy: Unknown (12/21/2022)    Education     Help with school or training?: Not on file     Preferred Language: English   Employment: Unknown (10/9/2023)    Employment     Do you want help finding or keeping work or a job?: Not on file   Disabilities: Not At Risk (4/19/2023)    Disabilities     Concentrating, Remembering, or Making Decisions Difficulty: no     Doing Errands Independently Difficulty: no        Physical Activity: Not on file          Current Outpatient Medications:     aspirin (ASPIR) 81 MG EC tablet, Take 1 tablet by mouth Daily., Disp: 90 tablet, Rfl: 3    clopidogrel (PLAVIX) 75 MG tablet, Take 1 tablet by mouth Daily., Disp: 90 tablet, Rfl: 3    famotidine (PEPCID) 40 MG tablet, TAKE 1 TABLET BY MOUTH TWICE DAILY AT LUNCH AND AT BEDTIME, Disp: 180 tablet, Rfl: 3    Farxiga 10 MG tablet, TAKE 1 TABLET BY MOUTH DAILY, Disp: 30  tablet, Rfl: 3    Magnesium 200 MG tablet, Take 250 mg by mouth Daily., Disp: , Rfl:     metoprolol succinate XL (TOPROL-XL) 50 MG 24 hr tablet, Take 1 tablet by mouth Daily., Disp: 90 tablet, Rfl: 1    pantoprazole (PROTONIX) 40 MG EC tablet, TAKE 1 TABLET BY MOUTH TWICE DAILY, Disp: 180 tablet, Rfl: 2    pravastatin (PRAVACHOL) 40 MG tablet, TAKE 1 TABLET BY MOUTH DAILY, Disp: 90 tablet, Rfl: 3    spironolactone (ALDACTONE) 25 MG tablet, Take 1 tablet by mouth Daily., Disp: 90 tablet, Rfl: 3    sucralfate (CARAFATE) 1 g tablet, Take 1 tablet by mouth 2 (Two) Times a Day., Disp: 180 tablet, Rfl: 3    torsemide (DEMADEX) 20 MG tablet, Take 1 tablet by mouth Daily., Disp: 90 tablet, Rfl: 0    Vital Sign Review:     Vitals:    12/04/23 1244   BP: 113/50   Pulse: 62   SpO2: 92%       PP:low  Pulse Ox: normal oxygenation on room air    Weight: 222 lbs        Physical Exam:    Vitals reviewed.   Constitutional:       General: Not in acute distress.     Appearance: Normal and healthy appearance. Well-developed, well-groomed and not in distress. Not ill-appearing, toxic-appearing or diaphoretic.      Interventions: Not intubated.  Eyes:      Conjunctiva/sclera: Conjunctivae normal.      Pupils: Pupils are equal, round, and reactive to light.   Neck:      Vascular: No hepatojugular reflux, JVD or JVR. JVD normal with 6 cm of water.   Pulmonary:      Effort: Pulmonary effort is normal. No tachypnea, bradypnea, accessory muscle usage, prolonged expiration, respiratory distress or retractions. Not intubated.      Breath sounds: Normal breath sounds and air entry. No stridor, decreased air movement or transmitted upper airway sounds. No decreased breath sounds. No wheezing. No rhonchi. No rales.   Cardiovascular:      PMI at left midclavicular line. Normal rate. Irregularly irregular rhythm. S1 with normal intensity. loud S2.       Murmurs: There is no murmur.      No gallop.  No click. No rub.   Neurological:      General: No  focal deficit present.      Mental Status: Alert and oriented to person, place and time.   Psychiatric:         Behavior: Behavior is cooperative.            DATA REVIEWED:       TTE/ADDY:    Results for orders placed during the hospital encounter of 05/23/23    Adult Transesophageal Echo (ADDY) W/ Cont if Necessary Per Protocol    Interpretation Summary    Left ventricular systolic function is normal. Left ventricular ejection fraction appears to be 56 - 60%.    Moderately reduced right ventricular systolic function noted.    The right ventricular cavity is mildly dilated.    The left atrial cavity is moderate to severely dilated.  There is an occlusion device in the left atrial appendage that is well-seated with no clear flow through or around the device.    The right atrial cavity is severely  dilated.    Moderate tricuspid valve regurgitation is present.    Estimated right ventricular systolic pressure from tricuspid regurgitation is mildly elevated (35-45 mmHg).    Moderate pulmonic valve regurgitation is present.    There is moderate plaque in the aortic arch present. There is moderate plaque in the descending aorta present.      My interpretation of the TTE is below.     LVEF is 60%.  Moderately reduced right ventricular systolic function.  RV cavity is dilated. LAAO device is present.  Moderate TR with pulmonary hypertension.        ==========================    CT SCAN: Dilated pulmonary artery and moderate emphysematous changes  -----------------------------------------------------        PFTs were interpreted today.  FEV1/FVC is 70% of predicted.  FVC is 85% of predicted.    Laboratory evaluations:    Lab Results   Component Value Date    GLUCOSE 97 08/14/2023    BUN 40 (H) 08/14/2023    CREATININE 1.70 (H) 09/21/2023    EGFRIFNONA 56 (L) 09/07/2021    EGFRIFAFRI 77 09/29/2020    BCR 24.8 08/14/2023    K 4.7 08/14/2023    CO2 24.0 08/14/2023    CALCIUM 9.2 08/14/2023    PROTENTOTREF 6.6 07/18/2023     "ALBUMIN 3.6 07/18/2023    LABIL2 1.3 07/18/2023    AST 27 04/14/2023    ALT 19 04/14/2023     Lab Results   Component Value Date    WBC 6.78 05/18/2023    HGB 11.9 (L) 05/18/2023    HCT 35.4 (L) 05/18/2023    MCV 92.9 05/18/2023     05/18/2023     Lab Results   Component Value Date    CHOL 156 11/23/2022    CHLPL 162 09/29/2020    TRIG 117 11/23/2022    HDL 50 11/23/2022    LDL 85 11/23/2022     Lab Results   Component Value Date    TSH 1.510 06/22/2021     Lab Results   Component Value Date    TROPONINT <0.010 06/20/2021     No results found for: \"HGBA1C\"  No results found for: \"DDIMER\"  Lab Results   Component Value Date    ALT 19 04/14/2023     No results found for: \"HGBA1C\"  Lab Results   Component Value Date    CREATININE 1.70 (H) 09/21/2023     No results found for: \"IRON\", \"TIBC\", \"FERRITIN\"  Lab Results   Component Value Date    INR 1.07 04/14/2023    INR 1.63 (H) 06/21/2021    INR 1.85 (H) 06/20/2021    PROTIME 14.1 04/14/2023    PROTIME 19.1 (H) 06/21/2021    PROTIME 21.0 (H) 06/20/2021       PAH RISK ASSESSMENT:    Assessment & Plan      1. Pulmonary hypertension.  This is WHO-group-2/3.  Most recent 2D TTE shows pulmonary hypertension.  CT chest with emphysematous changes.  He does have KATIE.  I suspect this is multifactorial.      -No current indications for PAH-specific medicines     2. Ischemic cardiomyopathy. Now with HFimpEF.  NYHA stage C; functional class III.  Today, euvolemic and perfused.  Clinical trajectory was assessed today and has worsened slightly. I do not appreciate elevated filling pressures, but he is more dyspneic. He did do a double dose of Torsemide. I asked him to do one more day with BID Torsemide.     -Toprol XL (AF)  -Spironolactone with quarterly assessment of GFR.  He is overdue for labs.  He says he had labs last week. Will get a copy from nephrologist.  - Farxiga 10 mg.  Sick precautions.  -Torsemide     3. RV Heart Failure.    RV is dilated. RVEF is abnormal.  RV " status:  RV ADVERSELY-REMODELED with a ESVi of >114ml.   The patient presents today with RHF. The etiology is: PAH/KATIE.  Today, he is clinically stable.      -Toprol XL  - Spironolactone with quarterly assessment of GFR  - Torsemide     4. KATIE (obstructive sleep apnea).  CPAP.  Follow-up with sleep medicine.           ORDERS PLACED TODAY:  Orders Placed This Encounter   Procedures    Basic Metabolic Panel          MEDS ORDERED TODAY:    No orders of the defined types were placed in this encounter.       MEDS DISCONTINUED TODAY:    There are no discontinued medications.             Return in about 3 months (around 3/4/2024).          This document has been electronically signed by Diogo Lawton MD PhD on December 4, 2023 13:01 EST        Diogo Lawton M.D., Ph.D., [facility] Heart Failure and Pulmonary Hypertension Clinic  System Medical Director for HF and PAH

## 2023-12-04 NOTE — ADDENDUM NOTE
Encounter addended by: Ada Ruth MA on: 12/4/2023 3:44 PM   Actions taken: Charge Capture section accepted

## 2023-12-04 NOTE — LETTER
December 4, 2023       No Recipients    Patient: Renato Marquez   YOB: 1950   Date of Visit: 12/4/2023     Dear Mauricio Scott III, MD:       Thank you for referring Renato Marquez to me for evaluation. Below are the relevant portions of my assessment and plan of care.    If you have questions, please do not hesitate to call me. I look forward to following Renato along with you.         Sincerely,        Diogo Lawton MD PhD        CC:   No Recipients    Diogo Lawton MD PhD  12/04/23 1301  Signed  [facility] Failure [facility]  [facility]  Diogo Lawton M.D., Ph.D., [facility]       Mauricio Scott III, MD  [address] [phone]    Thank you for asking me to see Renato Marquez.     History of Present Illness  This is a 73 y.o. male with:    1. PH  2. HFrEF--now HFimpEF    Renato Marquez is a 73 y.o. male who presents today.  The patient's cardiologist is Dr. Scott.     Patient returns today for further evaluation and management of pulmonary hypertension and HFrEF.  Patient was diagnosed with HFrEF several years ago.  He was found to have ASCHD.  He is status post CABG with a LIMA to the LAD, SVG to the OM, and SVG to the PDA in 2009 by Dr. Jeet Bhagat.  This was detected during a life screening. Originally the concern was over SHELLEY. He did undergo. Most recent ischemia evaluation was in January 2021 without evidence of ischemia.  Echocardiogram was in 2015 reported LVEF of 35-40%.  Over the years this has improved.  He now meets criteria for HFimpEF.      Patient returns to clinic today.  His course has been complicated by marginal hemodynamics.  In fact, this required discontinuation of ARNI.  He is on an SGLT2 inhibitor.  He is tolerating this well.  Denies orthopnea, PND, lower extremity edema, ascites, and early abdominal satiety. He does report some weight gain. He has had a cough which has responded to  Tussinex. He added a double dose one day. He did have a slight improvement.       Review of Systems - Review of Systems   Cardiovascular:  Positive for dyspnea on exertion. Negative for chest pain, claudication, cyanosis, irregular heartbeat, leg swelling, near-syncope, orthopnea, palpitations, paroxysmal nocturnal dyspnea and syncope.   Respiratory:  Positive for shortness of breath. Negative for cough and hemoptysis.    All other systems reviewed and are negative.        All other systems were reviewed and were negative.    Patient Active Problem List   Diagnosis   • Coronary artery disease involving native coronary artery of native heart without angina pectoris   • Carotid arterial disease   • Permanent atrial fibrillation   • Ischemic cardiomyopathy   • Mitral regurgitation   • Hypothyroidism   • HTN (hypertension)   • Dyslipidemia   • DDD (degenerative disc disease), lumbar   • Chronic bilateral low back pain with right-sided sciatica   • Primary osteoarthritis of right knee   • Class 1 obesity due to excess calories without serious comorbidity with body mass index (BMI) of 31.0 to 31.9 in adult   • Complex sleep apnea syndrome   • Hemorrhagic disorder due to circulating anticoagulants   • Gastroesophageal reflux disease with esophagitis without hemorrhage   • 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   • Diverticulosis   • Pulmonary hypertension   • Chronic right-sided heart failure   • Dysfunction of right cardiac ventricle   • PAD (peripheral artery disease)   • Presence of Watchman left atrial appendage closure device       family history includes Heart disease in his father; Hypertension in his father; No Known Problems in his mother; Stroke in his father.     reports that he quit smoking about 31 years ago. His smoking use included cigarettes and cigars. He has never used smokeless tobacco. He reports current alcohol use of about 2.0 standard drinks of alcohol per week. He reports that he does not use  drugs.    No Known Allergies    Social Determinants of Health     Tobacco Use: Medium Risk (12/4/2023)    Patient History    • Smoking Tobacco Use: Former    • Smokeless Tobacco Use: Never    • Passive Exposure: Not on file   Alcohol Use: Not At Risk (4/19/2023)    AUDIT-C    • Frequency of Alcohol Consumption: 4 or more times a week    • Average Number of Drinks: 1 or 2    • Frequency of Binge Drinking: Never   Financial Resource Strain: Low Risk  (9/8/2021)    Overall Financial Resource Strain (CARDIA)    • Difficulty of Paying Living Expenses: Not hard at all   Food Insecurity: No Food Insecurity (9/8/2021)    Hunger Vital Sign    • Worried About Running Out of Food in the Last Year: Never true    • Ran Out of Food in the Last Year: Never true   Transportation Needs: No Transportation Needs (9/8/2021)    PRAPARE - Transportation    • Lack of Transportation (Medical): No    • Lack of Transportation (Non-Medical): No   Physical Activity: Not on file   Stress: Not on file   Social Connections: Unknown (10/9/2023)    Family and Community Support    • Help with Day-to-Day Activities: Not on file    • Lonely or Isolated: Not on file   Interpersonal Safety: Not At Risk (4/19/2023)    Abuse Screen    • Unsafe at Home or Work/School: no    • Feels Threatened by Someone?: no    • Does Anyone Keep You from Contacting Others or Doint Things Outside the Home?: no    • Physical Sign of Abuse Present: no   Depression: Not at risk (10/6/2020)    PHQ-2    • PHQ-2 Score: 0   Housing Stability: Unknown (4/19/2023)    Housing Stability    • Current Living Arrangements: home    • Potentially Unsafe Housing Conditions: Not on file   Utilities: Not on file   Health Literacy: Unknown (12/21/2022)    Education    • Help with school or training?: Not on file    • Preferred Language: English   Employment: Unknown (10/9/2023)    Employment    • Do you want help finding or keeping work or a job?: Not on file   Disabilities: Not At Risk  (4/19/2023)    Disabilities    • Concentrating, Remembering, or Making Decisions Difficulty: no    • Doing Errands Independently Difficulty: no        Physical Activity: Not on file          Current Outpatient Medications:   •  aspirin (ASPIR) 81 MG EC tablet, Take 1 tablet by mouth Daily., Disp: 90 tablet, Rfl: 3  •  clopidogrel (PLAVIX) 75 MG tablet, Take 1 tablet by mouth Daily., Disp: 90 tablet, Rfl: 3  •  famotidine (PEPCID) 40 MG tablet, TAKE 1 TABLET BY MOUTH TWICE DAILY AT LUNCH AND AT BEDTIME, Disp: 180 tablet, Rfl: 3  •  Farxiga 10 MG tablet, TAKE 1 TABLET BY MOUTH DAILY, Disp: 30 tablet, Rfl: 3  •  Magnesium 200 MG tablet, Take 250 mg by mouth Daily., Disp: , Rfl:   •  metoprolol succinate XL (TOPROL-XL) 50 MG 24 hr tablet, Take 1 tablet by mouth Daily., Disp: 90 tablet, Rfl: 1  •  pantoprazole (PROTONIX) 40 MG EC tablet, TAKE 1 TABLET BY MOUTH TWICE DAILY, Disp: 180 tablet, Rfl: 2  •  pravastatin (PRAVACHOL) 40 MG tablet, TAKE 1 TABLET BY MOUTH DAILY, Disp: 90 tablet, Rfl: 3  •  spironolactone (ALDACTONE) 25 MG tablet, Take 1 tablet by mouth Daily., Disp: 90 tablet, Rfl: 3  •  sucralfate (CARAFATE) 1 g tablet, Take 1 tablet by mouth 2 (Two) Times a Day., Disp: 180 tablet, Rfl: 3  •  torsemide (DEMADEX) 20 MG tablet, Take 1 tablet by mouth Daily., Disp: 90 tablet, Rfl: 0    Vital Sign Review:     Vitals:    12/04/23 1244   BP: 113/50   Pulse: 62   SpO2: 92%       PP:low  Pulse Ox: normal oxygenation on room air    Weight: 222 lbs        Physical Exam:    Vitals reviewed.   Constitutional:       General: Not in acute distress.     Appearance: Normal and healthy appearance. Well-developed, well-groomed and not in distress. Not ill-appearing, toxic-appearing or diaphoretic.      Interventions: Not intubated.  Eyes:      Conjunctiva/sclera: Conjunctivae normal.      Pupils: Pupils are equal, round, and reactive to light.   Neck:      Vascular: No hepatojugular reflux, JVD or JVR. JVD normal with 6 cm of  water.   Pulmonary:      Effort: Pulmonary effort is normal. No tachypnea, bradypnea, accessory muscle usage, prolonged expiration, respiratory distress or retractions. Not intubated.      Breath sounds: Normal breath sounds and air entry. No stridor, decreased air movement or transmitted upper airway sounds. No decreased breath sounds. No wheezing. No rhonchi. No rales.   Cardiovascular:      PMI at left midclavicular line. Normal rate. Irregularly irregular rhythm. S1 with normal intensity. loud S2.       Murmurs: There is no murmur.      No gallop.  No click. No rub.   Neurological:      General: No focal deficit present.      Mental Status: Alert and oriented to person, place and time.   Psychiatric:         Behavior: Behavior is cooperative.            DATA REVIEWED:       TTE/ADDY:    Results for orders placed during the hospital encounter of 05/23/23    Adult Transesophageal Echo (ADDY) W/ Cont if Necessary Per Protocol    Interpretation Summary  •  Left ventricular systolic function is normal. Left ventricular ejection fraction appears to be 56 - 60%.  •  Moderately reduced right ventricular systolic function noted.  •  The right ventricular cavity is mildly dilated.  •  The left atrial cavity is moderate to severely dilated.  There is an occlusion device in the left atrial appendage that is well-seated with no clear flow through or around the device.  •  The right atrial cavity is severely  dilated.  •  Moderate tricuspid valve regurgitation is present.  •  Estimated right ventricular systolic pressure from tricuspid regurgitation is mildly elevated (35-45 mmHg).  •  Moderate pulmonic valve regurgitation is present.  •  There is moderate plaque in the aortic arch present. There is moderate plaque in the descending aorta present.      My interpretation of the TTE is below.     LVEF is 60%.  Moderately reduced right ventricular systolic function.  RV cavity is dilated. LAAO device is present.  Moderate TR with  "pulmonary hypertension.        ==========================    CT SCAN: Dilated pulmonary artery and moderate emphysematous changes  -----------------------------------------------------        PFTs were interpreted today.  FEV1/FVC is 70% of predicted.  FVC is 85% of predicted.    Laboratory evaluations:    Lab Results   Component Value Date    GLUCOSE 97 08/14/2023    BUN 40 (H) 08/14/2023    CREATININE 1.70 (H) 09/21/2023    EGFRIFNONA 56 (L) 09/07/2021    EGFRIFAFRI 77 09/29/2020    BCR 24.8 08/14/2023    K 4.7 08/14/2023    CO2 24.0 08/14/2023    CALCIUM 9.2 08/14/2023    PROTENTOTREF 6.6 07/18/2023    ALBUMIN 3.6 07/18/2023    LABIL2 1.3 07/18/2023    AST 27 04/14/2023    ALT 19 04/14/2023     Lab Results   Component Value Date    WBC 6.78 05/18/2023    HGB 11.9 (L) 05/18/2023    HCT 35.4 (L) 05/18/2023    MCV 92.9 05/18/2023     05/18/2023     Lab Results   Component Value Date    CHOL 156 11/23/2022    CHLPL 162 09/29/2020    TRIG 117 11/23/2022    HDL 50 11/23/2022    LDL 85 11/23/2022     Lab Results   Component Value Date    TSH 1.510 06/22/2021     Lab Results   Component Value Date    TROPONINT <0.010 06/20/2021     No results found for: \"HGBA1C\"  No results found for: \"DDIMER\"  Lab Results   Component Value Date    ALT 19 04/14/2023     No results found for: \"HGBA1C\"  Lab Results   Component Value Date    CREATININE 1.70 (H) 09/21/2023     No results found for: \"IRON\", \"TIBC\", \"FERRITIN\"  Lab Results   Component Value Date    INR 1.07 04/14/2023    INR 1.63 (H) 06/21/2021    INR 1.85 (H) 06/20/2021    PROTIME 14.1 04/14/2023    PROTIME 19.1 (H) 06/21/2021    PROTIME 21.0 (H) 06/20/2021       PAH RISK ASSESSMENT:    Assessment & Plan     1. Pulmonary hypertension.  This is WHO-group-2/3.  Most recent 2D TTE shows pulmonary hypertension.  CT chest with emphysematous changes.  He does have KATIE.  I suspect this is multifactorial.      -No current indications for PAH-specific medicines     2. Ischemic " cardiomyopathy. Now with HFimpEF.  NYHA stage C; functional class III.  Today, euvolemic and perfused.  Clinical trajectory was assessed today and has worsened slightly. I do not appreciate elevated filling pressures, but he is more dyspneic. He did do a double dose of Torsemide. I asked him to do one more day with BID Torsemide.     -Toprol XL (AF)  -Spironolactone with quarterly assessment of GFR.  He is overdue for labs.  Chem-7 was ordered today.  I instructed him to go to the lab.  We will call for results.  - Farxiga 10 mg.  Sick precautions.  -Torsemide     3. RV Heart Failure.    RV is dilated. RVEF is abnormal.  RV status:  RV ADVERSELY-REMODELED with a ESVi of >114ml.   The patient presents today with RHF. The etiology is: PAH/KATIE.  Today, he is clinically stable.      -Toprol XL  - Spironolactone with quarterly assessment of GFR  - Torsemide     4. KATIE (obstructive sleep apnea).  CPAP.  Follow-up with sleep medicine.           ORDERS PLACED TODAY:  Orders Placed This Encounter   Procedures   • Basic Metabolic Panel          MEDS ORDERED TODAY:    No orders of the defined types were placed in this encounter.       MEDS DISCONTINUED TODAY:    There are no discontinued medications.             Return in about 3 months (around 3/4/2024).          This document has been electronically signed by Diogo Lawton MD PhD on December 4, 2023 13:01 CELSA Lawton M.D., Ph.D., [facility]  System Medical Director for HF and PAH

## 2023-12-27 ENCOUNTER — HOSPITAL ENCOUNTER (EMERGENCY)
Facility: HOSPITAL | Age: 73
Discharge: HOME | End: 2023-12-27
Attending: EMERGENCY MEDICINE | Admitting: EMERGENCY MEDICINE
Payer: MEDICARE

## 2023-12-27 ENCOUNTER — APPOINTMENT (OUTPATIENT)
Dept: GENERAL RADIOLOGY | Facility: HOSPITAL | Age: 73
End: 2023-12-27
Payer: MEDICARE

## 2023-12-27 VITALS
TEMPERATURE: 98.1 F | WEIGHT: 217 LBS | RESPIRATION RATE: 18 BRPM | DIASTOLIC BLOOD PRESSURE: 60 MMHG | SYSTOLIC BLOOD PRESSURE: 133 MMHG | BODY MASS INDEX: 29.39 KG/M2 | OXYGEN SATURATION: 96 % | HEART RATE: 105 BPM | HEIGHT: 72 IN

## 2023-12-27 DIAGNOSIS — J40 BRONCHITIS: Primary | ICD-10-CM

## 2023-12-27 PROCEDURE — 71046 X-RAY EXAM CHEST 2 VIEWS: CPT

## 2023-12-27 PROCEDURE — 94664 DEMO&/EVAL PT USE INHALER: CPT

## 2023-12-27 PROCEDURE — 94799 UNLISTED PULMONARY SVC/PX: CPT

## 2023-12-27 PROCEDURE — 94761 N-INVAS EAR/PLS OXIMETRY MLT: CPT

## 2023-12-27 PROCEDURE — 0202U NFCT DS 22 TRGT SARS-COV-2: CPT | Performed by: EMERGENCY MEDICINE

## 2023-12-27 PROCEDURE — 99283 EMERGENCY DEPT VISIT LOW MDM: CPT

## 2023-12-27 PROCEDURE — 94640 AIRWAY INHALATION TREATMENT: CPT

## 2023-12-27 RX ORDER — ALBUTEROL SULFATE 90 UG/1
AEROSOL, METERED RESPIRATORY (INHALATION)
Qty: 8 G | Refills: 0 | Status: SHIPPED | OUTPATIENT
Start: 2023-12-27

## 2023-12-27 RX ORDER — ALBUTEROL SULFATE 90 UG/1
2 AEROSOL, METERED RESPIRATORY (INHALATION) ONCE
Status: COMPLETED | OUTPATIENT
Start: 2023-12-27 | End: 2023-12-27

## 2023-12-27 RX ADMIN — ALBUTEROL SULFATE 2 PUFF: 90 AEROSOL, METERED RESPIRATORY (INHALATION) at 09:44

## 2023-12-27 NOTE — ED PROVIDER NOTES
EMERGENCY DEPARTMENT ENCOUNTER    Room Number:  37/37  Date of encounter:  12/27/2023  PCP: Provider, No Known  Historian: Patient    Patient was placed in face mask during triage process. Patient was wearing facemask when I entered the room and throughout our encounter. I wore full protective equipment throughout this patient encounter including a face mask, eye protection, and gloves. Hand hygiene was performed before donning protective equipment and again following doffing of PPE after leaving the room.    HPI:  Chief Complaint: Cough  A complete HPI/ROS/PMH/PSH/SH/FH are unobtainable due to: N/A   Context: Renato Marquez is a 73 y.o. male who presents to the ED c/o cough x 5 weeks.  Minimally productive of some brown sputum more recently but was dry.  No fever associated.  Patient reports that it is daily and persistent with intermittent paroxysms.  No associated dyspnea on exertion above and beyond baseline.  No associated chest pain, fevers, syncope.  No change in bowel habits.  No lower extremity edema above baseline.  Patient reports that he has completed a round of antibiotics but is intolerant to steroids so has not tried these.  Multiple over-the-counter drugs without relief.    MEDICAL HISTORY REVIEW  Additional sources:  - Discussed/ obtained information from independent historians:      - External (non-ED) record review:   Heart failure clinic office note 12/4/2023 reviewed: Patient followed for pulmonary hypertension and congestive heart failure     - Chronic or social conditions impacting care: History of heart failure      PAST MEDICAL HISTORY  Active Ambulatory Problems     Diagnosis Date Noted    Coronary artery disease involving native coronary artery of native heart without angina pectoris     Carotid arterial disease     Permanent atrial fibrillation     Ischemic cardiomyopathy     Mitral regurgitation     Hypothyroidism 03/14/2013    HTN (hypertension) 03/14/2013    Dyslipidemia 03/14/2013     DDD (degenerative disc disease), lumbar 09/29/2020    Chronic bilateral low back pain with right-sided sciatica 09/29/2020    Primary osteoarthritis of right knee 11/19/2020    Class 1 obesity due to excess calories without serious comorbidity with body mass index (BMI) of 31.0 to 31.9 in adult 01/22/2021    Complex sleep apnea syndrome 04/02/2021    Hemorrhagic disorder due to circulating anticoagulants 06/21/2021    Gastroesophageal reflux disease with esophagitis without hemorrhage 08/02/2021    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 08/02/2021    Diverticulosis 08/02/2021    Pulmonary hypertension 07/18/2023    Chronic right-sided heart failure 07/18/2023    Dysfunction of right cardiac ventricle 08/01/2023    PAD (peripheral artery disease) 09/15/2023    Presence of Watchman left atrial appendage closure device 09/15/2023     Resolved Ambulatory Problems     Diagnosis Date Noted    Chronic anticoagulation 03/23/2017    Hx of melanoma of skin 09/29/2020    History of basal cell carcinoma (BCC) of skin 09/29/2020    Leg cramps 09/29/2020    Hyperkalemia 09/30/2020    Abnormal x-ray of lumbar spine 10/02/2020    Lumbar facet arthropathy 10/02/2020    Chronic pain of both knees 10/26/2020    Abnormal MRI, knee 11/17/2020    Complex tear of medial meniscus of right knee as current injury 11/19/2020    Observed sleep apnea 01/22/2021    Snoring 01/22/2021    Hypersomnia 01/22/2021    GI bleed 06/02/2021    Gastrointestinal hemorrhage associated with anorectal source 06/02/2021    Anemia due to acute blood loss 06/02/2021    Adenomatous polyp of ascending colon 06/08/2021    Rectal bleeding 06/21/2021    Hyponatremia 06/21/2021    Elevated brain natriuretic peptide (BNP) level 06/21/2021    Acute on chronic combined systolic and diastolic CHF (congestive heart failure) 06/21/2021    Personal history of colonic polyps 08/02/2021    Acute systolic heart failure 12/21/2022    Acute on chronic systolic heart failure  12/21/2022    Chronic systolic heart failure 12/21/2022    KATIE (obstructive sleep apnea) 07/18/2023     Past Medical History:   Diagnosis Date    Abnormal ECG     CAD (coronary artery disease)     Cancer     Chronic atrial fibrillation     Hyperlipidemia     Hypertension          PAST SURGICAL HISTORY  Past Surgical History:   Procedure Laterality Date    ATRIAL APPENDAGE EXCLUSION LEFT WITH TRANSESOPHAGEAL ECHOCARDIOGRAM N/A 4/19/2023    Procedure: Atrial Appendage Occlusion;  Surgeon: Dustin Neal MD;  Location:  [facility];  Service: Cardiovascular;  Laterality: N/A;    CAROTID ENDARTERECTOMY Right 2009    Dr Lewis    COLONOSCOPY      COLONOSCOPY N/A 6/22/2021    Procedure: COLONOSCOPY INTO CECUM AND TI;  Surgeon: Artur Jacobson MD;  Location:  [facility];  Service: Gastroenterology;  Laterality: N/A;  PRE: RECTAL BLEEDING  POST: DIVERTICULOSIS, HEMORRHOIDS    COLONOSCOPY W/ POLYPECTOMY N/A 6/3/2021    Procedure: COLONOSCOPY, polypectomies;  Surgeon: Luan Meek MD;  Location:  [facility];  Service: Gastroenterology;  Laterality: N/A;  Diverticulosis  Ascending colon polyp x 4 (3 cold snare)  Cecal polyp (cold snare)  Rectal polyp    CORONARY ARTERY BYPASS GRAFT  2009    MIMI Frederick to LAD, SVG to OM, SVG Post Desc    ENDOSCOPY N/A 6/3/2021    Procedure: ESOPHAGOGASTRODUODENOSCOPY with biopsies;  Surgeon: Luan Meek MD;  Location:  [facility];  Service: Gastroenterology;  Laterality: N/A;  Reflux  Gastric and duodenal ulcers  Biopsies: gastric, distal esophagus    FINGER SURGERY           FAMILY HISTORY  Family History   Problem Relation Age of Onset    No Known Problems Mother     Heart disease Father     Stroke Father     Hypertension Father          SOCIAL HISTORY  Social History     Socioeconomic History    Marital status:    Tobacco Use    Smoking status: Former     Types: Cigarettes, Cigars     Quit date: 12/29/1991     Years  since quittin.0    Smokeless tobacco: Never   Vaping Use    Vaping Use: Never used   Substance and Sexual Activity    Alcohol use: Yes     Alcohol/week: 2.0 standard drinks of alcohol     Types: 2 Shots of liquor per week     Comment: NIGHTLY/caffeine use    Drug use: No    Sexual activity: Not Currently         ALLERGIES  Patient has no known allergies.        REVIEW OF SYSTEMS  Review of Systems     All systems reviewed and negative except for those discussed in HPI.       PHYSICAL EXAM    I have reviewed the triage vital signs and nursing notes.    ED Triage Vitals   Temp Heart Rate Resp BP SpO2   23 0839 23 0839 23 0839 23 0845 23 0839   98.1 °F (36.7 °C) 120 20 133/60 94 %      Temp src Heart Rate Source Patient Position BP Location FiO2 (%)   23 0839 -- -- -- --   Tympanic           Physical Exam    Physical Exam   Constitutional: No distress.  Very pleasant and nontoxic.  Able to speak in full sentences without distress.  HENT:  Head: Normocephalic and atraumatic.   Oropharynx: Mucous membranes are moist.   Eyes: No scleral icterus.   Neck: Neck supple.   Cardiovascular: Pink, warm and well-perfused throughout.  Irregularly irregular with strong left radial pulse.  Rate controlled at this time.  Pulmonary/Chest: No respiratory distress.  No tachypnea or increased work of breathing.  Mildly diminished throughout with no rhonchi or rales appreciated.  Musculoskeletal: Moves all extremities equally.  No calf tenderness or erythema.  Neurological: Alert.  Speech fluent and easily intelligible  Skin: Skin is pink, warm, and dry. No pallor.   Psychiatric: Mood and affect normal.   Nursing note and vitals reviewed.    LAB RESULTS  Recent Results (from the past 24 hour(s))   Respiratory Panel PCR w/COVID-19(SARS-CoV-2) [facility] In-House, NP Swab in UTM/VTM, 2 HR TAT - Swab, Nasopharynx    Collection Time: 23  9:43 AM    Specimen: Nasopharynx; Swab   Result  Value Ref Range    ADENOVIRUS, PCR Not Detected Not Detected    Coronavirus 229E Not Detected Not Detected    Coronavirus HKU1 Not Detected Not Detected    Coronavirus NL63 Not Detected Not Detected    Coronavirus OC43 Not Detected Not Detected    COVID19 Not Detected Not Detected - Ref. Range    Human Metapneumovirus Not Detected Not Detected    Human Rhinovirus/Enterovirus Not Detected Not Detected    Influenza A PCR Not Detected Not Detected    Influenza B PCR Not Detected Not Detected    Parainfluenza Virus 1 Not Detected Not Detected    Parainfluenza Virus 2 Not Detected Not Detected    Parainfluenza Virus 3 Not Detected Not Detected    Parainfluenza Virus 4 Not Detected Not Detected    RSV, PCR Not Detected Not Detected    Bordetella pertussis pcr Not Detected Not Detected    Bordetella parapertussis PCR Not Detected Not Detected    Chlamydophila pneumoniae PCR Not Detected Not Detected    Mycoplasma pneumo by PCR Not Detected Not Detected       Ordered the above labs and independently reviewed the results.        RADIOLOGY  XR Chest 2 View    Result Date: 12/27/2023  XR CHEST 2 VW-12/27/2023  HISTORY: Cough.  Heart size is mild to moderately enlarged. Lungs appear relatively clear and well expanded. Sternotomy wires are seen. 2 PA and 1 lateral view are submitted. There are degenerative changes in the spine.      1. Cardiomegaly.   This report was finalized on 12/27/2023 10:14 AM by Dr. Rishi Salcido M.D on Workstation: [id number]       I ordered the above noted radiological studies. Reviewed by me and discussed with radiologist.  See dictation for official radiology interpretation.      PROCEDURES    Procedures        MEDICATIONS GIVEN IN ER    Medications   albuterol sulfate HFA (PROVENTIL HFA;VENTOLIN HFA;PROAIR HFA) inhaler 2 puff (2 puffs Inhalation Given 12/27/23 0944)         PROGRESS, DATA ANALYSIS, CONSULTS, AND MEDICAL DECISION MAKING    My differential diagnosis for cough includes but is not  limited to:  Upper respiratory infection, bronchitis, pneumonia, COPD exacerbation, cough variant asthma, cardiac asthma, coronary artery disease, congestive heart failure, bacterial, viral or lung infections, lung cancer, aspiration pneumonitis, aspiration of foreign body and Covid -19        All labs have been independently reviewed by me.  All radiology studies have been reviewed by me and discussed with radiologist dictating the report.   EKG's independently viewed and interpreted by me.  Discussion below represents my analysis of pertinent findings related to patient's condition, differential diagnosis, treatment plan and final disposition.      ED Course as of 12/27/23 1118   Wed Dec 27, 2023   0935 Patient agreeable with plan to begin more conservatively with albuterol inhaler, RVP and two-view chest and hold off on blood work at this time. [RS]   1026 RADIOLOGY      Study: Two-view chest  Findings: Cardiomegaly without clear evidence of focal infiltrate or decompensated congestive heart failure  I independently viewed and interpreted these images contemporaneously with treatment.    [RS]   1118 I believe this patient likely started with a viral illness that has been resolved.  Patient now has persistent recurrent irritating cough with no evidence of congestive heart failure, pneumonia, pneumothorax or other acute life-threatening findings.  Will recommend inhaler and hold off on steroids as patient reports that these were him up significantly.  Outpatient follow-up with his PCP for further evaluation and treatment or even with pulmonology should his symptoms persist for several more weeks. [RS]      ED Course User Index  [RS] Nico Liang MD       AS OF 11:18 EST VITALS:    BP - 133/60  HR - 105  TEMP - 98.1 °F (36.7 °C) (Tympanic)  O2 SATS - 96%        DIAGNOSIS  Final diagnoses:   Bronchitis         DISPOSITION  DISCHARGE    Patient discharged in stable condition.    Reviewed implications of results,  diagnosis, meds, responsibility to follow up, warning signs and symptoms of possible worsening, potential complications and reasons to return to ER.    Patient/Family voiced understanding of above instructions.    Discussed plan for discharge, as there is no emergent indication for admission. Patient referred to primary care provider for regular health maintenance. Pt/family is agreeable and understands need for follow up and possible repeat testing.  Pt is aware that discharge does not mean that nothing is wrong but it indicates no emergency is present that requires admission and they must continue care with follow-up as given below or physician of their choice.     FOLLOW-UP  Your primary care provider    Schedule an appointment as soon as possible for a visit in 3 days  If symptoms fail to improve    Casper Granger Jr., MD  [address] [id number] [phone]    Schedule an appointment as soon as possible for a visit in 2 weeks  If symptoms fail to resolve         Medication List        New Prescriptions      albuterol sulfate  (90 Base) MCG/ACT inhaler  Commonly known as: PROVENTIL HFA;VENTOLIN HFA;PROAIR HFA  Inhale 2 puffs every 4 hours when necessary wheeze, dyspnea, cough               Where to Get Your Medications        These medications were sent to [id number] - [place], [place] - [address] AT [place] [address] - [phone] [facility] [phone]   [address] [facility] [address]      Phone: [phone]   albuterol sulfate  (90 Base) MCG/ACT inhaler           Please note that portions of this were completed with a voice recognition program.       Note Disclaimer: At [facility], we believe that sharing information builds trust and better relationships. You are receiving this note because you are receiving care at [facility] or recently visited. It is possible you will see health information before a provider has  talked with you about it. This kind of information can be easy to misunderstand. To help you fully understand what it means for your health, we urge you to discuss this note with your provider.     Nico Liang MD  12/27/23 1114

## 2024-01-02 ENCOUNTER — OFFICE VISIT (OUTPATIENT)
Dept: CARDIOLOGY | Facility: CLINIC | Age: 74
End: 2024-01-02
Payer: MEDICARE

## 2024-01-02 VITALS
BODY MASS INDEX: 30.34 KG/M2 | HEART RATE: 77 BPM | OXYGEN SATURATION: 94 % | WEIGHT: 224 LBS | HEIGHT: 72 IN | DIASTOLIC BLOOD PRESSURE: 62 MMHG | SYSTOLIC BLOOD PRESSURE: 116 MMHG

## 2024-01-02 DIAGNOSIS — I73.9 PAD (PERIPHERAL ARTERY DISEASE): ICD-10-CM

## 2024-01-02 DIAGNOSIS — Z95.818 PRESENCE OF WATCHMAN LEFT ATRIAL APPENDAGE CLOSURE DEVICE: ICD-10-CM

## 2024-01-02 DIAGNOSIS — I25.5 ISCHEMIC CARDIOMYOPATHY: Chronic | ICD-10-CM

## 2024-01-02 DIAGNOSIS — I25.10 CORONARY ARTERY DISEASE INVOLVING NATIVE CORONARY ARTERY OF NATIVE HEART WITHOUT ANGINA PECTORIS: Chronic | ICD-10-CM

## 2024-01-02 DIAGNOSIS — I42.8 OTHER CARDIOMYOPATHY: Primary | ICD-10-CM

## 2024-01-02 DIAGNOSIS — I10 PRIMARY HYPERTENSION: Chronic | ICD-10-CM

## 2024-01-02 DIAGNOSIS — I34.0 NONRHEUMATIC MITRAL VALVE REGURGITATION: Chronic | ICD-10-CM

## 2024-01-02 DIAGNOSIS — I48.21 PERMANENT ATRIAL FIBRILLATION: Chronic | ICD-10-CM

## 2024-01-02 PROCEDURE — 3074F SYST BP LT 130 MM HG: CPT | Performed by: INTERNAL MEDICINE

## 2024-01-02 PROCEDURE — 99214 OFFICE O/P EST MOD 30 MIN: CPT | Performed by: INTERNAL MEDICINE

## 2024-01-02 PROCEDURE — 3078F DIAST BP <80 MM HG: CPT | Performed by: INTERNAL MEDICINE

## 2024-01-02 NOTE — PROGRESS NOTES
Subjective:     Encounter Date: 01/02/24        Patient ID: Renato Marquez is a 73 y.o. male.    Chief Complaint: CAD  Atrial Fibrillation  Past medical history includes atrial fibrillation.       Had the pleasure of seeing this patient in the office today.  He comes in for follow-up of his cardiac status.    He has had his left atrial appendage occlusion device placed.     He has a history of chronic atrial fibrillation. He has a history of CAD with prior CABG. This was performed in 2009 by Dr. Jeet Bhagat. He had a three-vessel CABG with LIMA to the LAD, SVG to the obtuse marginal, and SVG to the posterior descending coronary arteries. He also has a history of cerebrovascular disease with a right carotid endarterectomy in 2009. The carotid endarterectomy was performed at the same time by Dr. Lewis.He had an ejection fraction of 37% on his stress test that was performed in January 2016.  That showed no ischemia.  We discussed cardioversion with him, but he was having no symptoms and elected to remain on his current medical regimen.    He has been following in the heart failure clinic.  Recently he has gained about 5 pounds.  He says he is having problems with a cough that is much more at night when he is laying down than at other times.  He was seen in the emergency room and his respiratory viral panel was unremarkable.  NT proBNP was elevated but he does have elevated levels previously as well.  He has not noticed any lower extremity edema.  No chills or fevers.  Cough is nonproductive.    He had been in touch with Dr. Lawton in the heart failure clinic and they doubled up on his torsemide from 20 mg daily to 40 mg daily, he has not seen any real change.  He supposed to follow-up with them by phone soon.    He is on CPAP at night.    The following portions of the patient's history were reviewed and updated as appropriate: allergies, current medications, past family history, past medical history, past  social history, past surgical history and problem list.    Past Medical History:   Diagnosis Date    Abnormal ECG     CAD (coronary artery disease)     Cancer     skin cancer    Carotid arterial disease     Chronic atrial fibrillation     Complex sleep apnea syndrome     BiPAP ST    Hemorrhagic disorder due to circulating anticoagulants 2021    Hyperlipidemia     Hypertension     Ischemic cardiomyopathy     Mitral regurgitation     Rectal bleeding        Past Surgical History:   Procedure Laterality Date    ATRIAL APPENDAGE EXCLUSION LEFT WITH TRANSESOPHAGEAL ECHOCARDIOGRAM N/A 2023    Procedure: Atrial Appendage Occlusion;  Surgeon: Dustin Neal MD;  Location: [facility] CATH INVASIVE LOCATION;  Service: Cardiovascular;  Laterality: N/A;    CAROTID ENDARTERECTOMY Right 2009    Dr Lewis    COLONOSCOPY      COLONOSCOPY N/A 2021    Procedure: COLONOSCOPY INTO CECUM AND TI;  Surgeon: Artur Jacobson MD;  [facility];  Service: Gastroenterology;  Laterality: N/A;  PRE: RECTAL BLEEDING  POST: DIVERTICULOSIS, HEMORRHOIDS    COLONOSCOPY W/ POLYPECTOMY N/A 6/3/2021    Procedure: COLONOSCOPY, polypectomies;  Surgeon: Luan Meek MD;  Location: [facility] OR;  Service: Gastroenterology;  Laterality: N/A;  Diverticulosis  Ascending colon polyp x 4 (3 cold snare)  Cecal polyp (cold snare)  Rectal polyp    CORONARY ARTERY BYPASS GRAFT      MIMI Frederick to LAD, SVG to OM, SVG Post Desc    ENDOSCOPY N/A 6/3/2021    Procedure: ESOPHAGOGASTRODUODENOSCOPY with biopsies;  Surgeon: Luan Meek MD;  Location:  [facility] OR;  Service: Gastroenterology;  Laterality: N/A;  Reflux  Gastric and duodenal ulcers  Biopsies: gastric, distal esophagus    FINGER SURGERY         Social History     Socioeconomic History    Marital status:    Tobacco Use    Smoking status: Former     Types: Cigarettes, Cigars     Quit date: 1991     Years since quittin.0     "Smokeless tobacco: Never   Vaping Use    Vaping Use: Never used   Substance and Sexual Activity    Alcohol use: Yes     Alcohol/week: 2.0 standard drinks of alcohol     Types: 2 Shots of liquor per week     Comment: NIGHTLY/caffeine use    Drug use: No    Sexual activity: Not Currently         Procedures       Objective:     Vitals:    01/02/24 1114   BP: 116/62   BP Location: Left arm   Patient Position: Sitting   Cuff Size: Adult   Pulse: 77   SpO2: 94%   Weight: 102 kg (224 lb)   Height: 182.9 cm (72\")         Physical Exam  Constitutional:       General: He is not in acute distress.     Appearance: He is well-developed. He is not diaphoretic.   HENT:      Head: Normocephalic and atraumatic.      Nose: Nose normal.   Eyes:      General:         Right eye: No discharge.         Left eye: No discharge.      Conjunctiva/sclera: Conjunctivae normal.      Pupils: Pupils are equal, round, and reactive to light.   Neck:      Thyroid: No thyromegaly.      Trachea: No tracheal deviation.   Cardiovascular:      Rate and Rhythm: Normal rate. Rhythm irregularly irregular.      Pulses: Normal pulses.      Heart sounds: S1 normal and S2 normal. Murmur heard.      High-pitched blowing holosystolic murmur is present with a grade of 1/6 at the apex.      No S3 sounds.   Pulmonary:      Effort: Pulmonary effort is normal. No respiratory distress.      Breath sounds: Normal breath sounds. No stridor.   Chest:      Chest wall: No tenderness.   Abdominal:      General: Bowel sounds are normal. There is no distension.      Palpations: Abdomen is soft. There is no mass.      Tenderness: There is no abdominal tenderness. There is no guarding or rebound.   Musculoskeletal:         General: No tenderness or deformity. Normal range of motion.      Cervical back: Normal range of motion and neck supple.   Lymphadenopathy:      Cervical: No cervical adenopathy.   Skin:     General: Skin is warm and dry.      Findings: No erythema or rash. "   Neurological:      Mental Status: He is alert and oriented to person, place, and time.      Deep Tendon Reflexes: Reflexes are normal and symmetric.   Psychiatric:         Thought Content: Thought content normal.         Lab Review:             Lab Results   Component Value Date    GLUCOSE 97 08/14/2023    BUN 40 (H) 08/14/2023    CREATININE 1.70 (H) 09/21/2023    EGFRIFNONA 56 (L) 09/07/2021    EGFRIFAFRI 77 09/29/2020    BCR 24.8 08/14/2023    K 4.7 08/14/2023    CO2 24.0 08/14/2023    CALCIUM 9.2 08/14/2023    PROTENTOTREF 6.6 07/18/2023    ALBUMIN 3.6 07/18/2023    LABIL2 1.3 07/18/2023    AST 27 04/14/2023    ALT 19 04/14/2023       Results for orders placed during the hospital encounter of 05/23/23    Adult Transesophageal Echo (ADDY) W/ Cont if Necessary Per Protocol    Interpretation Summary    Left ventricular systolic function is normal. Left ventricular ejection fraction appears to be 56 - 60%.    Moderately reduced right ventricular systolic function noted.    The right ventricular cavity is mildly dilated.    The left atrial cavity is moderate to severely dilated.  There is an occlusion device in the left atrial appendage that is well-seated with no clear flow through or around the device.    The right atrial cavity is severely  dilated.    Moderate tricuspid valve regurgitation is present.    Estimated right ventricular systolic pressure from tricuspid regurgitation is mildly elevated (35-45 mmHg).    Moderate pulmonic valve regurgitation is present.    There is moderate plaque in the aortic arch present. There is moderate plaque in the descending aorta present.    CHADS-VASc Risk Assessment              3 Total Score    1 CHF    1 Hypertension    1 Age 65-74        Criteria that do not apply:    Age >/= 75    DM    PRIOR STROKE/TIA/THROMBO    Vascular Disease    Sex: Female                  Assessment:          Diagnosis Plan   1. Other cardiomyopathy  Adult Transthoracic Echo Complete W/ Cont if  Necessary Per Protocol      2. Permanent atrial fibrillation        3. Presence of Watchman left atrial appendage closure device        4. PAD (peripheral artery disease)        5. Nonrheumatic mitral valve regurgitation        6. Ischemic cardiomyopathy        7. Primary hypertension        8. Coronary artery disease involving native coronary artery of native heart without angina pectoris                 Plan:       Coronary artery disease: He has history of CAD with prior CABG with LIMA to LAD, SVG to OM, and SVG to the posterior descending coronary arteries in 2009 by Dr. Jete Bhagat.    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  They recently increased his torsemide from 20 to 40 mg daily but he still about 5 pounds over his dry weight he is having more problems with cough at night, I have increased his torsemide from 40 mg to 60 mg daily and asked him to report back at the end of the week.  Status post Watchman  Ischemic cardiomyopathy, most recent stress test showed no ischemia.  Permanent atrial fibrillation: His EAQ2OR2-WJCn score is 4. Heart rate is controlled, not on anticoagulation with his watchman in place  Valvular heart disease, continue to follow, no significant valvular issues on the most recent echocardiogram  Hypertension: Controlled on current regimen.  Dyslipidemia: He is treated with 80 mg pravastatin daily.  Right carotid artery stenosis: He had right carotid endarterectomy in 2009 by Dr. Lewis.  Sleep apnea: He is compliant with CPAP.    Claudication, following with Dr. Ambriz.      Thank you very much for allowing us to participate in the care of this pleasant patient.  Please don't hesitate to call if I can be of assistance in any way.      Current Outpatient Medications:     albuterol sulfate  (90 Base) MCG/ACT inhaler,  Inhale 2 puffs every 4 hours when necessary wheeze, dyspnea, cough, Disp: 8 g, Rfl: 0    aspirin (ASPIR) 81 MG EC tablet, Take 1 tablet by mouth Daily., Disp: 90 tablet, Rfl: 3    clopidogrel (PLAVIX) 75 MG tablet, Take 1 tablet by mouth Daily., Disp: 90 tablet, Rfl: 3    famotidine (PEPCID) 40 MG tablet, TAKE 1 TABLET BY MOUTH TWICE DAILY AT LUNCH AND AT BEDTIME, Disp: 180 tablet, Rfl: 3    Farxiga 10 MG tablet, TAKE 1 TABLET BY MOUTH DAILY, Disp: 30 tablet, Rfl: 3    guaiFENesin (MUCINEX) 600 MG 12 hr tablet, Take 2 tablets by mouth 2 (Two) Times a Day., Disp: , Rfl:     Magnesium 200 MG tablet, Take 250 mg by mouth Daily., Disp: , Rfl:     metoprolol succinate XL (TOPROL-XL) 50 MG 24 hr tablet, Take 1 tablet by mouth Daily., Disp: 90 tablet, Rfl: 1    pantoprazole (PROTONIX) 40 MG EC tablet, TAKE 1 TABLET BY MOUTH TWICE DAILY, Disp: 180 tablet, Rfl: 2    pravastatin (PRAVACHOL) 40 MG tablet, TAKE 1 TABLET BY MOUTH DAILY, Disp: 90 tablet, Rfl: 3    spironolactone (ALDACTONE) 25 MG tablet, Take 1 tablet by mouth Daily., Disp: 90 tablet, Rfl: 3    sucralfate (CARAFATE) 1 g tablet, Take 1 tablet by mouth 2 (Two) Times a Day., Disp: 180 tablet, Rfl: 3    torsemide (DEMADEX) 20 MG tablet, Take 1 tablet by mouth Daily., Disp: 90 tablet, Rfl: 0

## 2024-01-09 ENCOUNTER — TELEPHONE (OUTPATIENT)
Dept: CARDIOLOGY | Facility: CLINIC | Age: 74
End: 2024-01-09

## 2024-01-09 NOTE — TELEPHONE ENCOUNTER
I spoke with pt and his wife, Evelin.  I reviewed his weight log with him    Date- Pounds  1/2- 218 started 60 mg torsemide  1/3- 217 1/4- 215 1/5- 213 1/6- 213 1/7- 211 1/8- 212 went back on to 40 mg torsemide  1/9- 215    Pt endorses some worsened HILARIO since yesterday after decreasing torsemide.  Pt denies any edema or orthopnea.    Do you have any recommendations for this patient?    Thank you,    Evelia OLMOS RN  [facility] Triage  01/09/24  14:54 EST

## 2024-01-09 NOTE — TELEPHONE ENCOUNTER
Caller: JO ANN    Relationship: SPOUSE    Best call back number: [phone]    What is the best time to reach you: ANY        What was the call regarding: PT HAS BEEN ON THE INCREASED DOSE OF THE WATER PILLS FOR ABOUT A WEEK:  PT HAS LOST 7 LBS THROUGH SUNDAY.  ON MONDAY HE WENT BACK TO JUST ONE WATER PILL AND HAS GAINED SEVERAL POUNDS BACK SINCE THAN.

## 2024-01-11 NOTE — TELEPHONE ENCOUNTER
"1/10- 216  1/11- 215    Pt says that the HILARIO gets better when his weight is lighter.  At this time, he says that his SOA is \"fine\".  Pt denies edema.    Thank you,    Evelia OLMOS RN  [facility]  01/11/24 10:10 EST      "

## 2024-02-08 ENCOUNTER — TELEPHONE (OUTPATIENT)
Dept: CARDIOLOGY | Facility: HOSPITAL | Age: 74
End: 2024-02-08
Payer: MEDICARE

## 2024-02-08 NOTE — TELEPHONE ENCOUNTER
Farxiga no longer preferred on formulary. Ran test claim - Jardiance is $2. Script for Jardiance sent to pharmacy. Called and spoke to patient and his wife. Reviewed the change and advised he can finish his current supply of Farxiga and then should switch to Jardiance. No questions at this time.      Luci Fan, PharmD, BCACP  [facility]  Phone: [phone]

## 2024-02-13 DIAGNOSIS — Z95.818 PRESENCE OF WATCHMAN LEFT ATRIAL APPENDAGE CLOSURE DEVICE: Primary | ICD-10-CM

## 2024-02-13 DIAGNOSIS — I48.21 PERMANENT ATRIAL FIBRILLATION: ICD-10-CM

## 2024-02-22 DIAGNOSIS — E78.5 DYSLIPIDEMIA: ICD-10-CM

## 2024-02-23 ENCOUNTER — TELEPHONE (OUTPATIENT)
Dept: CARDIOLOGY | Facility: CLINIC | Age: 74
End: 2024-02-23
Payer: MEDICARE

## 2024-02-23 DIAGNOSIS — I25.10 CORONARY ARTERY DISEASE INVOLVING NATIVE CORONARY ARTERY OF NATIVE HEART WITHOUT ANGINA PECTORIS: Primary | ICD-10-CM

## 2024-02-23 RX ORDER — PRAVASTATIN SODIUM 40 MG
40 TABLET ORAL DAILY
Qty: 90 TABLET | Refills: 0 | Status: SHIPPED | OUTPATIENT
Start: 2024-02-23

## 2024-02-24 NOTE — TELEPHONE ENCOUNTER
Please call Ayaan Alma. He is due for blood work to be completed at the [facility] lab. Refills have been requested and he needs labs to safely prescribe meds. Orders have been placed. Please fast. Does he have a PCP? Thanks.

## 2024-02-26 NOTE — TELEPHONE ENCOUNTER
I spoke with pt and notified him that he will need to have fasting labs drawn prior to refills being sent.  Pt verbalized understanding, and states that he will have blood work done.  He also states that he does have a new PCP, Dr. James, who he just saw a few weeks ago.  I added him in EPIC.    Wendie Jiménez, RN  Triage RN  02/26/24 08:49 EST

## 2024-02-27 ENCOUNTER — TELEPHONE (OUTPATIENT)
Dept: CARDIOLOGY | Facility: CLINIC | Age: 74
End: 2024-02-27
Payer: MEDICARE

## 2024-02-27 ENCOUNTER — LAB (OUTPATIENT)
Dept: LAB | Facility: HOSPITAL | Age: 74
End: 2024-02-27
Payer: MEDICARE

## 2024-02-27 DIAGNOSIS — I25.10 CORONARY ARTERY DISEASE INVOLVING NATIVE CORONARY ARTERY OF NATIVE HEART WITHOUT ANGINA PECTORIS: ICD-10-CM

## 2024-02-27 LAB
ALBUMIN SERPL-MCNC: 4.1 G/DL (ref 3.5–5.2)
ALBUMIN/GLOB SERPL: 1.4 G/DL
ALP SERPL-CCNC: 97 U/L (ref 39–117)
ALT SERPL W P-5'-P-CCNC: 14 U/L (ref 1–41)
ANION GAP SERPL CALCULATED.3IONS-SCNC: 10 MMOL/L (ref 5–15)
AST SERPL-CCNC: 22 U/L (ref 1–40)
BASOPHILS # BLD AUTO: 0.01 10*3/MM3 (ref 0–0.2)
BASOPHILS NFR BLD AUTO: 0.2 % (ref 0–1.5)
BILIRUB SERPL-MCNC: 0.9 MG/DL (ref 0–1.2)
BUN SERPL-MCNC: 43 MG/DL (ref 8–23)
BUN/CREAT SERPL: 24.4 (ref 7–25)
CALCIUM SPEC-SCNC: 9.3 MG/DL (ref 8.6–10.5)
CHLORIDE SERPL-SCNC: 106 MMOL/L (ref 98–107)
CHOLEST SERPL-MCNC: 142 MG/DL (ref 0–200)
CO2 SERPL-SCNC: 26 MMOL/L (ref 22–29)
CREAT SERPL-MCNC: 1.76 MG/DL (ref 0.76–1.27)
DEPRECATED RDW RBC AUTO: 54.3 FL (ref 37–54)
EGFRCR SERPLBLD CKD-EPI 2021: 39.8 ML/MIN/1.73
EOSINOPHIL # BLD AUTO: 0.09 10*3/MM3 (ref 0–0.4)
EOSINOPHIL NFR BLD AUTO: 1.6 % (ref 0.3–6.2)
ERYTHROCYTE [DISTWIDTH] IN BLOOD BY AUTOMATED COUNT: 16.8 % (ref 12.3–15.4)
GLOBULIN UR ELPH-MCNC: 3 GM/DL
GLUCOSE SERPL-MCNC: 149 MG/DL (ref 65–99)
HCT VFR BLD AUTO: 37.5 % (ref 37.5–51)
HDLC SERPL-MCNC: 43 MG/DL (ref 40–60)
HGB BLD-MCNC: 11.4 G/DL (ref 13–17.7)
IMM GRANULOCYTES # BLD AUTO: 0.01 10*3/MM3 (ref 0–0.05)
IMM GRANULOCYTES NFR BLD AUTO: 0.2 % (ref 0–0.5)
LDLC SERPL CALC-MCNC: 84 MG/DL (ref 0–100)
LDLC/HDLC SERPL: 1.96 {RATIO}
LYMPHOCYTES # BLD AUTO: 0.82 10*3/MM3 (ref 0.7–3.1)
LYMPHOCYTES NFR BLD AUTO: 15 % (ref 19.6–45.3)
MCH RBC QN AUTO: 26.7 PG (ref 26.6–33)
MCHC RBC AUTO-ENTMCNC: 30.4 G/DL (ref 31.5–35.7)
MCV RBC AUTO: 87.8 FL (ref 79–97)
MONOCYTES # BLD AUTO: 0.75 10*3/MM3 (ref 0.1–0.9)
MONOCYTES NFR BLD AUTO: 13.7 % (ref 5–12)
NEUTROPHILS NFR BLD AUTO: 3.8 10*3/MM3 (ref 1.7–7)
NEUTROPHILS NFR BLD AUTO: 69.3 % (ref 42.7–76)
NRBC BLD AUTO-RTO: 0 /100 WBC (ref 0–0.2)
PLATELET # BLD AUTO: 130 10*3/MM3 (ref 140–450)
PMV BLD AUTO: 9.7 FL (ref 6–12)
POTASSIUM SERPL-SCNC: 4.3 MMOL/L (ref 3.5–5.2)
PROT SERPL-MCNC: 7.1 G/DL (ref 6–8.5)
RBC # BLD AUTO: 4.27 10*6/MM3 (ref 4.14–5.8)
SODIUM SERPL-SCNC: 142 MMOL/L (ref 136–145)
TRIGL SERPL-MCNC: 74 MG/DL (ref 0–150)
VLDLC SERPL-MCNC: 15 MG/DL (ref 5–40)
WBC NRBC COR # BLD AUTO: 5.48 10*3/MM3 (ref 3.4–10.8)

## 2024-02-27 PROCEDURE — 36415 COLL VENOUS BLD VENIPUNCTURE: CPT

## 2024-02-27 PROCEDURE — 80061 LIPID PANEL: CPT

## 2024-02-27 PROCEDURE — 80053 COMPREHEN METABOLIC PANEL: CPT

## 2024-02-27 PROCEDURE — 85025 COMPLETE CBC W/AUTO DIFF WBC: CPT

## 2024-02-27 NOTE — TELEPHONE ENCOUNTER
----- Message from SIRIA Johnson sent at 2/27/2024  3:15 PM EST -----  Let patient know Jenifer sent a refill in of his atorvastatin yesterday and his labs are stable he had done.   ----- Message -----  From: Lab, Background User  Sent: 2/27/2024  10:00 AM EST  To: SIRIA Flores

## 2024-02-27 NOTE — TELEPHONE ENCOUNTER
Results and recommendations called to pt.  Instructed to call with any further questions or concerns.  Verbalized understanding.    Wendie Jiménez RN  Triage Nurse, [facility] [place] [facility]  02/27/24 15:26 EST

## 2024-03-05 ENCOUNTER — HOSPITAL ENCOUNTER (OUTPATIENT)
Dept: CARDIOLOGY | Facility: HOSPITAL | Age: 74
Discharge: HOME | End: 2024-03-05
Admitting: INTERNAL MEDICINE
Payer: MEDICARE

## 2024-03-05 VITALS
WEIGHT: 219.6 LBS | HEART RATE: 66 BPM | OXYGEN SATURATION: 93 % | BODY MASS INDEX: 29.74 KG/M2 | HEIGHT: 72 IN | DIASTOLIC BLOOD PRESSURE: 45 MMHG | SYSTOLIC BLOOD PRESSURE: 114 MMHG

## 2024-03-05 DIAGNOSIS — I51.9 DYSFUNCTION OF RIGHT CARDIAC VENTRICLE: ICD-10-CM

## 2024-03-05 DIAGNOSIS — I27.20 PULMONARY HYPERTENSION: Primary | ICD-10-CM

## 2024-03-05 DIAGNOSIS — I25.5 ISCHEMIC CARDIOMYOPATHY: Chronic | ICD-10-CM

## 2024-03-05 PROCEDURE — 99214 OFFICE O/P EST MOD 30 MIN: CPT | Performed by: INTERNAL MEDICINE

## 2024-03-05 PROCEDURE — 94726 PLETHYSMOGRAPHY LUNG VOLUMES: CPT | Performed by: INTERNAL MEDICINE

## 2024-03-05 PROCEDURE — G0463 HOSPITAL OUTPT CLINIC VISIT: HCPCS

## 2024-03-05 NOTE — LETTER
March 5, 2024       No Recipients    Patient: Renato Marquez   YOB: 1950   Date of Visit: 3/5/2024     Dear Jerad James Jr., MD:       Thank you for referring Renato Marquez to me for evaluation. Below are the relevant portions of my assessment and plan of care.    If you have questions, please do not hesitate to call me. I look forward to following Renato along with you.         Sincerely,        Diogo Lawton MD PhD        CC:   No Recipients    Diogo Lawton MD PhD  03/05/24 1136  Signed  Heart Failure [facility] Pul[facility]pert[facility]  [facility]  Diogo Lawton M.D., Ph.D., [facility]       Mauricio Scott III, MD  [address] [place] [address]    Thank you for asking me to see Renato Marquez.     History of Present Illness  This is a 73 y.o. male with:    1. PH  2. HFrEF--now HFimpEF    Renato Marquez is a 73 y.o. male who presents today.  The patient's cardiologist is Dr. Scott.     Patient returns today for further evaluation and management of pulmonary hypertension and HFrEF.  Patient was diagnosed with HFrEF several years ago.  He was found to have ASCAD.  He is status post CABG with a LIMA to the LAD, SVG to the OM, and SVG to the PDA in 2009 by Dr. Jeet Bhagat.  This was detected during a life screening. Originally the concern was over SHELLEY. He did undergo. Most recent ischemia evaluation was in January 2021 without evidence of ischemia.  Echocardiogram was in 2015 reported LVEF of 35-40%.  Over the years this has improved.  He now meets criteria for HFimpEF.      The patient returns to the clinic today.  Originally, his course was complicated by marginal hemodynamics that required discontinuation of ARNI.  Otherwise, he remains on Toprol XL, spironolactone, and Jardiance.  His diuretic is torsemide.  He is able to obtain and afford these medications.  No recent emergency department visits or inpatient admissions related to  failure.  Overall, he reports he has been doing very well.  Denies orthopnea, PND, lower extremity edema, ascites, and early abdominal satiety.    Review of Systems - Review of Systems   Cardiovascular:  Positive for dyspnea on exertion.   Respiratory:  Positive for shortness of breath.    All other systems reviewed and are negative.        All other systems were reviewed and were negative.    Patient Active Problem List   Diagnosis   • Coronary artery disease involving native coronary artery of native heart without angina pectoris   • Carotid arterial disease   • Permanent atrial fibrillation   • Ischemic cardiomyopathy   • Mitral regurgitation   • Hypothyroidism   • HTN (hypertension)   • Dyslipidemia   • DDD (degenerative disc disease), lumbar   • Chronic bilateral low back pain with right-sided sciatica   • Primary osteoarthritis of right knee   • Class 1 obesity due to excess calories without serious comorbidity with body mass index (BMI) of 31.0 to 31.9 in adult   • Complex sleep apnea syndrome   • Hemorrhagic disorder due to circulating anticoagulants   • Gastroesophageal reflux disease with esophagitis without hemorrhage   • 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   • Diverticulosis   • Pulmonary hypertension   • Chronic right-sided heart failure   • Dysfunction of right cardiac ventricle   • PAD (peripheral artery disease)   • Presence of Watchman left atrial appendage closure device       family history includes Heart disease in his father; Hypertension in his father; No Known Problems in his mother; Stroke in his father.     reports that he quit smoking about 32 years ago. His smoking use included cigarettes and cigars. He has never used smokeless tobacco. He reports current alcohol use of about 2.0 standard drinks of alcohol per week. He reports that he does not use drugs.    No Known Allergies    Social Determinants of Health     Tobacco Use: Medium Risk (3/5/2024)    Patient History    • Smoking Tobacco  Use: Former    • Smokeless Tobacco Use: Never    • Passive Exposure: Not on file   Alcohol Use: Not At Risk (4/19/2023)    AUDIT-C    • Frequency of Alcohol Consumption: 4 or more times a week    • Average Number of Drinks: 1 or 2    • Frequency of Binge Drinking: Never   Financial Resource Strain: Low Risk  (9/8/2021)    Overall Financial Resource Strain (CARDIA)    • Difficulty of Paying Living Expenses: Not hard at all   Food Insecurity: No Food Insecurity (9/8/2021)    Hunger Vital Sign    • Worried About Running Out of Food in the Last Year: Never true    • Ran Out of Food in the Last Year: Never true   Transportation Needs: No Transportation Needs (9/8/2021)    PRAPARE - Transportation    • Lack of Transportation (Medical): No    • Lack of Transportation (Non-Medical): No   Physical Activity: Not on file   Stress: Not on file   Social Connections: Unknown (10/9/2023)    Family and Community Support    • Help with Day-to-Day Activities: Not on file    • Lonely or Isolated: Not on file   Interpersonal Safety: Not At Risk (12/27/2023)    Abuse Screen    • Unsafe at Home or Work/School: no    • Feels Threatened by Someone?: no    • Does Anyone Keep You from Contacting Others or Doint Things Outside the Home?: no    • Physical Sign of Abuse Present: no   Depression: Not at risk (10/6/2020)    PHQ-2    • PHQ-2 Score: 0   Housing Stability: Unknown (4/19/2023)    Housing Stability    • Current Living Arrangements: home    • Potentially Unsafe Housing Conditions: Not on file   Utilities: Not on file   Health Literacy: Unknown (12/22/2023)    Education    • Help with school or training?: Not on file    • Preferred Language: Not on file   Employment: Unknown (10/9/2023)    Employment    • Do you want help finding or keeping work or a job?: Not on file   Disabilities: Not At Risk (4/19/2023)    Disabilities    • Concentrating, Remembering, or Making Decisions Difficulty: no    • Doing Errands Independently Difficulty: no         Physical Activity: Not on file          Current Outpatient Medications:   •  albuterol sulfate  (90 Base) MCG/ACT inhaler, Inhale 2 puffs every 4 hours when necessary wheeze, dyspnea, cough, Disp: 8 g, Rfl: 0  •  aspirin (ASPIR) 81 MG EC tablet, Take 1 tablet by mouth Daily., Disp: 90 tablet, Rfl: 3  •  clopidogrel (PLAVIX) 75 MG tablet, Take 1 tablet by mouth Daily., Disp: 90 tablet, Rfl: 3  •  empagliflozin (JARDIANCE) 10 MG tablet tablet, Take 1 tablet by mouth Daily., Disp: 30 tablet, Rfl: 3  •  famotidine (PEPCID) 40 MG tablet, TAKE 1 TABLET BY MOUTH TWICE DAILY AT LUNCH AND AT BEDTIME, Disp: 180 tablet, Rfl: 3  •  guaiFENesin (MUCINEX) 600 MG 12 hr tablet, Take 2 tablets by mouth 2 (Two) Times a Day., Disp: , Rfl:   •  Magnesium 200 MG tablet, Take 250 mg by mouth Daily., Disp: , Rfl:   •  metoprolol succinate XL (TOPROL-XL) 50 MG 24 hr tablet, Take 1 tablet by mouth Daily., Disp: 90 tablet, Rfl: 1  •  pantoprazole (PROTONIX) 40 MG EC tablet, TAKE 1 TABLET BY MOUTH TWICE DAILY, Disp: 180 tablet, Rfl: 2  •  pravastatin (PRAVACHOL) 40 MG tablet, TAKE 1 TABLET BY MOUTH DAILY, Disp: 90 tablet, Rfl: 0  •  spironolactone (ALDACTONE) 25 MG tablet, Take 1 tablet by mouth Daily., Disp: 90 tablet, Rfl: 3  •  sucralfate (CARAFATE) 1 g tablet, Take 1 tablet by mouth 2 (Two) Times a Day., Disp: 180 tablet, Rfl: 3  •  torsemide (DEMADEX) 20 MG tablet, Take 1 tablet by mouth Daily., Disp: 90 tablet, Rfl: 0    Vital Sign Review:     Vitals:    03/05/24 1105   BP: 114/45   Pulse: 66   SpO2: 93%       PP: Normal  Pulse Ox: normal oxygenation on room air    Weight: 222 lbs  Physical Exam:    Vitals reviewed.   Constitutional:       General: Not in acute distress.     Appearance: Normal and healthy appearance. Well-developed, well-groomed and not in distress. Not ill-appearing, toxic-appearing or diaphoretic.      Interventions: Not intubated.  Eyes:      Conjunctiva/sclera: Conjunctivae normal.      Pupils: Pupils  are equal, round, and reactive to light.   Neck:      Vascular: No hepatojugular reflux, JVD or JVR. JVD normal with 6 cm of water.   Pulmonary:      Effort: Pulmonary effort is normal. No tachypnea, bradypnea, accessory muscle usage, prolonged expiration, respiratory distress or retractions. Not intubated.      Breath sounds: Normal breath sounds and air entry. No stridor, decreased air movement or transmitted upper airway sounds. No decreased breath sounds. No wheezing. No rhonchi. No rales.   Cardiovascular:      PMI at left midclavicular line. Normal rate. Irregularly irregular rhythm. S1 with normal intensity. loud S2.       Murmurs: There is no murmur.      No gallop.  No click. No rub.   Neurological:      General: No focal deficit present.      Mental Status: Alert and oriented to person, place and time.   Psychiatric:         Behavior: Behavior is cooperative.            DATA REVIEWED:       TTE/ADDY:    Results for orders placed during the hospital encounter of 05/23/23    Adult Transesophageal Echo (ADDY) W/ Cont if Necessary Per Protocol    Interpretation Summary  •  Left ventricular systolic function is normal. Left ventricular ejection fraction appears to be 56 - 60%.  •  Moderately reduced right ventricular systolic function noted.  •  The right ventricular cavity is mildly dilated.  •  The left atrial cavity is moderate to severely dilated.  There is an occlusion device in the left atrial appendage that is well-seated with no clear flow through or around the device.  •  The right atrial cavity is severely  dilated.  •  Moderate tricuspid valve regurgitation is present.  •  Estimated right ventricular systolic pressure from tricuspid regurgitation is mildly elevated (35-45 mmHg).  •  Moderate pulmonic valve regurgitation is present.  •  There is moderate plaque in the aortic arch present. There is moderate plaque in the descending aorta present.      My interpretation of the TTE is below.     LVEF is  "60%.  Moderately reduced right ventricular systolic function.  RV cavity is dilated.      ==========================    CT SCAN: Dilated pulmonary artery and moderate emphysematous changes  -----------------------------------------------------        PFTs were interpreted today.  FEV1/FVC is 70% of predicted.  FVC is 85% of predicted.    Laboratory evaluations:    Lab Results   Component Value Date    GLUCOSE 149 (H) 02/27/2024    BUN 43 (H) 02/27/2024    CREATININE 1.76 (H) 02/27/2024    EGFRIFNONA 56 (L) 09/07/2021    EGFRIFAFRI 77 09/29/2020    BCR 24.4 02/27/2024    K 4.3 02/27/2024    CO2 26.0 02/27/2024    CALCIUM 9.3 02/27/2024    PROTENTOTREF 6.6 07/18/2023    ALBUMIN 4.1 02/27/2024    LABIL2 1.3 07/18/2023    AST 22 02/27/2024    ALT 14 02/27/2024     Lab Results   Component Value Date    WBC 5.48 02/27/2024    HGB 11.4 (L) 02/27/2024    HCT 37.5 02/27/2024    MCV 87.8 02/27/2024     (L) 02/27/2024     Lab Results   Component Value Date    CHOL 142 02/27/2024    CHLPL 162 09/29/2020    TRIG 74 02/27/2024    HDL 43 02/27/2024    LDL 84 02/27/2024     Lab Results   Component Value Date    TSH 1.510 06/22/2021     Lab Results   Component Value Date    TROPONINT <0.010 06/20/2021     No results found for: \"HGBA1C\"  No results found for: \"DDIMER\"  Lab Results   Component Value Date    ALT 14 02/27/2024     No results found for: \"HGBA1C\"  Lab Results   Component Value Date    CREATININE 1.76 (H) 02/27/2024     No results found for: \"IRON\", \"TIBC\", \"FERRITIN\"  Lab Results   Component Value Date    INR 1.07 04/14/2023    INR 1.63 (H) 06/21/2021    INR 1.85 (H) 06/20/2021    PROTIME 14.1 04/14/2023    PROTIME 19.1 (H) 06/21/2021    PROTIME 21.0 (H) 06/20/2021       PAH RISK ASSESSMENT:    Assessment & Plan       ReDs Vest    Performed by: Diogo Latwon MD PhD  Authorized by: Diogo Lawton MD PhD    Weighing self daily:  Yes  Monitoring heart failure zones:  Yes  Today's HF zone:  " Green  ReDS value:  32      1. Pulmonary hypertension.  This is WHO-group-2/3.  Most recent 2D TTE shows pulmonary hypertension.  CT chest with emphysematous changes.  He does have KATIE.  This is multifactorial.      -No current indications for PAH-specific medicine.      2. Ischemic cardiomyopathy. Now with HFimpEF.  NYHA stage C; functional class II.  Today, he is euvolemic and perfused.      He has been clinically stable and now meets criteria for  HFimpEF.  We discussed possibly discontinuing him here at the [facility] and he could certainly get his care from Dr. Scott.  However, he has requested to remain in the [facility], which I am agreeable to.    -Toprol XL  - Spironolactone with quarterly assessment of GFR  - Jardiance 10 mg  - Torsemide      3. RV Heart Failure.    RV is dilated. RVEF is abnormal.  RV status:  RV ADVERSELY-REMODELED with a ESVi of >114ml.   The patient presents today with RHF. The etiology is: PAH/KATIE.     Today, he is clinically stable.  - Toprol-XL  - Spironolactone  - Torsemide             ORDERS PLACED TODAY:  Orders Placed This Encounter   Procedures   • ReDs Vest   • ReDs Vest          MEDS ORDERED TODAY:    No orders of the defined types were placed in this encounter.       MEDS DISCONTINUED TODAY:    There are no discontinued medications.             Return in about 3 months (around 6/5/2024).        This document has been electronically signed by Diogo Lawton MD PhD on March 5, 2024 11:35 EST        Diogo Lawton M.D., Ph.D., [facility]  System Medical Director for HF and PAH

## 2024-03-05 NOTE — ADDENDUM NOTE
Encounter addended by: Ada Ruth MA on: 3/5/2024 11:50 AM   Actions taken: Charge Capture section accepted

## 2024-03-05 NOTE — PROGRESS NOTES
Heart Failure & Pulmonary Arterial Hypertension Clinic  [facility]  Diogo Lawton M.D., Ph.D., [facility]       Mauricio Scott III, MD  [address] [address]    Thank you for asking me to see Renato Marquez.     History of Present Illness  This is a 73 y.o. male with:    1. PH  2. HFrEF--now HFimpEF    Renato Marquez is a 73 y.o. male who presents today.  The patient's cardiologist is Dr. Scott.     Patient returns today for further evaluation and management of pulmonary hypertension and HFrEF.  Patient was diagnosed with HFrEF several years ago.  He was found to have ASCAD.  He is status post CABG with a LIMA to the LAD, SVG to the OM, and SVG to the PDA in 2009 by Dr. Jeet Bhagat.  This was detected during a life screening. Originally the concern was over SHELLEY. He did undergo. Most recent ischemia evaluation was in January 2021 without evidence of ischemia.  Echocardiogram was in 2015 reported LVEF of 35-40%.  Over the years this has improved.  He now meets criteria for HFimpEF.      The patient returns to the clinic today.  Originally, his course was complicated by marginal hemodynamics that required discontinuation of ARNI.  Otherwise, he remains on Toprol XL, spironolactone, and Jardiance.  His diuretic is torsemide.  He is able to obtain and afford these medications.  No recent emergency department visits or inpatient admissions related to failure.  Overall, he reports he has been doing very well.  Denies orthopnea, PND, lower extremity edema, ascites, and early abdominal satiety.    Review of Systems - Review of Systems   Cardiovascular:  Positive for dyspnea on exertion.   Respiratory:  Positive for shortness of breath.    All other systems reviewed and are negative.        All other systems were reviewed and were negative.    Patient Active Problem List   Diagnosis    Coronary artery disease involving native coronary artery of native heart without angina pectoris     Carotid arterial disease    Permanent atrial fibrillation    Ischemic cardiomyopathy    Mitral regurgitation    Hypothyroidism    HTN (hypertension)    Dyslipidemia    DDD (degenerative disc disease), lumbar    Chronic bilateral low back pain with right-sided sciatica    Primary osteoarthritis of right knee    Class 1 obesity due to excess calories without serious comorbidity with body mass index (BMI) of 31.0 to 31.9 in adult    Complex sleep apnea syndrome    Hemorrhagic disorder due to circulating anticoagulants    Gastroesophageal reflux disease with esophagitis without hemorrhage    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     reports that he quit smoking about 32 years ago. His smoking use included cigarettes and cigars. He has never used smokeless tobacco. He reports current alcohol use of about 2.0 standard drinks of alcohol per week. He reports that he does not use drugs.    No Known Allergies    Social Determinants of Health     Tobacco Use: Medium Risk (3/5/2024)    Patient History     Smoking Tobacco Use: Former     Smokeless Tobacco Use: Never     Passive Exposure: Not on file   Alcohol Use: Not At Risk (4/19/2023)    AUDIT-C     Frequency of Alcohol Consumption: 4 or more times a week     Average Number of Drinks: 1 or 2     Frequency of Binge Drinking: Never   Financial Resource Strain: Low Risk  (9/8/2021)    Overall Financial Resource Strain (CARDIA)     Difficulty of Paying Living Expenses: Not hard at all   Food Insecurity: No Food Insecurity (9/8/2021)    Hunger Vital Sign     Worried About Running Out of Food in the Last Year: Never true      Ran Out of Food in the Last Year: Never true   Transportation Needs: No Transportation Needs (9/8/2021)    PRAPARE - Transportation     Lack of Transportation (Medical): No     Lack of Transportation (Non-Medical): No   Physical Activity: Not on file   Stress: Not on file   Social Connections: Unknown (10/9/2023)    Family and Community Support     Help with Day-to-Day Activities: Not on file     Lonely or Isolated: Not on file   Interpersonal Safety: Not At Risk (12/27/2023)    Abuse Screen     Unsafe at Home or Work/School: no     Feels Threatened by Someone?: no     Does Anyone Keep You from Contacting Others or Doint Things Outside the Home?: no     Physical Sign of Abuse Present: no   Depression: Not at risk (10/6/2020)    PHQ-2     PHQ-2 Score: 0   Housing Stability: Unknown (4/19/2023)    Housing Stability     Current Living Arrangements: home     Potentially Unsafe Housing Conditions: Not on file   Utilities: Not on file   Health Literacy: Unknown (12/22/2023)    Education     Help with school or training?: Not on file     Preferred Language: Not on file   Employment: Unknown (10/9/2023)    Employment     Do you want help finding or keeping work or a job?: Not on file   Disabilities: Not At Risk (4/19/2023)    Disabilities     Concentrating, Remembering, or Making Decisions Difficulty: no     Doing Errands Independently Difficulty: no        Physical Activity: Not on file          Current Outpatient Medications:     albuterol sulfate  (90 Base) MCG/ACT inhaler, Inhale 2 puffs every 4 hours when necessary wheeze, dyspnea, cough, Disp: 8 g, Rfl: 0    aspirin (ASPIR) 81 MG EC tablet, Take 1 tablet by mouth Daily., Disp: 90 tablet, Rfl: 3    clopidogrel (PLAVIX) 75 MG tablet, Take 1 tablet by mouth Daily., Disp: 90 tablet, Rfl: 3    empagliflozin (JARDIANCE) 10 MG tablet tablet, Take 1 tablet by mouth Daily., Disp: 30 tablet, Rfl: 3    famotidine (PEPCID) 40 MG tablet, TAKE 1 TABLET BY MOUTH TWICE DAILY  AT LUNCH AND AT BEDTIME, Disp: 180 tablet, Rfl: 3    guaiFENesin (MUCINEX) 600 MG 12 hr tablet, Take 2 tablets by mouth 2 (Two) Times a Day., Disp: , Rfl:     Magnesium 200 MG tablet, Take 250 mg by mouth Daily., Disp: , Rfl:     metoprolol succinate XL (TOPROL-XL) 50 MG 24 hr tablet, Take 1 tablet by mouth Daily., Disp: 90 tablet, Rfl: 1    pantoprazole (PROTONIX) 40 MG EC tablet, TAKE 1 TABLET BY MOUTH TWICE DAILY, Disp: 180 tablet, Rfl: 2    pravastatin (PRAVACHOL) 40 MG tablet, TAKE 1 TABLET BY MOUTH DAILY, Disp: 90 tablet, Rfl: 0    spironolactone (ALDACTONE) 25 MG tablet, Take 1 tablet by mouth Daily., Disp: 90 tablet, Rfl: 3    sucralfate (CARAFATE) 1 g tablet, Take 1 tablet by mouth 2 (Two) Times a Day., Disp: 180 tablet, Rfl: 3    torsemide (DEMADEX) 20 MG tablet, Take 1 tablet by mouth Daily., Disp: 90 tablet, Rfl: 0    Vital Sign Review:     Vitals:    03/05/24 1105   BP: 114/45   Pulse: 66   SpO2: 93%       PP: Normal  Pulse Ox: normal oxygenation on room air    Weight: 222 lbs  Physical Exam:    Vitals reviewed.   Constitutional:       General: Not in acute distress.     Appearance: Normal and healthy appearance. Well-developed, well-groomed and not in distress. Not ill-appearing, toxic-appearing or diaphoretic.      Interventions: Not intubated.  Eyes:      Conjunctiva/sclera: Conjunctivae normal.      Pupils: Pupils are equal, round, and reactive to light.   Neck:      Vascular: No hepatojugular reflux, JVD or JVR. JVD normal with 6 cm of water.   Pulmonary:      Effort: Pulmonary effort is normal. No tachypnea, bradypnea, accessory muscle usage, prolonged expiration, respiratory distress or retractions. Not intubated.      Breath sounds: Normal breath sounds and air entry. No stridor, decreased air movement or transmitted upper airway sounds. No decreased breath sounds. No wheezing. No rhonchi. No rales.   Cardiovascular:      PMI at left midclavicular line. Normal rate. Irregularly irregular rhythm.  S1 with normal intensity. loud S2.       Murmurs: There is no murmur.      No gallop.  No click. No rub.   Neurological:      General: No focal deficit present.      Mental Status: Alert and oriented to person, place and time.   Psychiatric:         Behavior: Behavior is cooperative.            DATA REVIEWED:       TTE/ADDY:    Results for orders placed during the hospital encounter of 05/23/23    Adult Transesophageal Echo (ADDY) W/ Cont if Necessary Per Protocol    Interpretation Summary    Left ventricular systolic function is normal. Left ventricular ejection fraction appears to be 56 - 60%.    Moderately reduced right ventricular systolic function noted.    The right ventricular cavity is mildly dilated.    The left atrial cavity is moderate to severely dilated.  There is an occlusion device in the left atrial appendage that is well-seated with no clear flow through or around the device.    The right atrial cavity is severely  dilated.    Moderate tricuspid valve regurgitation is present.    Estimated right ventricular systolic pressure from tricuspid regurgitation is mildly elevated (35-45 mmHg).    Moderate pulmonic valve regurgitation is present.    There is moderate plaque in the aortic arch present. There is moderate plaque in the descending aorta present.      My interpretation of the TTE is below.     LVEF is 60%.  Moderately reduced right ventricular systolic function.  RV cavity is dilated.      ==========================    CT SCAN: Dilated pulmonary artery and moderate emphysematous changes  -----------------------------------------------------        PFTs were interpreted today.  FEV1/FVC is 70% of predicted.  FVC is 85% of predicted.    Laboratory evaluations:    Lab Results   Component Value Date    GLUCOSE 149 (H) 02/27/2024    BUN 43 (H) 02/27/2024    CREATININE 1.76 (H) 02/27/2024    EGFRIFNONA 56 (L) 09/07/2021    EGFRIFAFRI 77 09/29/2020    BCR 24.4 02/27/2024    K 4.3 02/27/2024    CO2 26.0  "02/27/2024    CALCIUM 9.3 02/27/2024    PROTENTOTREF 6.6 07/18/2023    ALBUMIN 4.1 02/27/2024    LABIL2 1.3 07/18/2023    AST 22 02/27/2024    ALT 14 02/27/2024     Lab Results   Component Value Date    WBC 5.48 02/27/2024    HGB 11.4 (L) 02/27/2024    HCT 37.5 02/27/2024    MCV 87.8 02/27/2024     (L) 02/27/2024     Lab Results   Component Value Date    CHOL 142 02/27/2024    CHLPL 162 09/29/2020    TRIG 74 02/27/2024    HDL 43 02/27/2024    LDL 84 02/27/2024     Lab Results   Component Value Date    TSH 1.510 06/22/2021     Lab Results   Component Value Date    TROPONINT <0.010 06/20/2021     No results found for: \"HGBA1C\"  No results found for: \"DDIMER\"  Lab Results   Component Value Date    ALT 14 02/27/2024     No results found for: \"HGBA1C\"  Lab Results   Component Value Date    CREATININE 1.76 (H) 02/27/2024     No results found for: \"IRON\", \"TIBC\", \"FERRITIN\"  Lab Results   Component Value Date    INR 1.07 04/14/2023    INR 1.63 (H) 06/21/2021    INR 1.85 (H) 06/20/2021    PROTIME 14.1 04/14/2023    PROTIME 19.1 (H) 06/21/2021    PROTIME 21.0 (H) 06/20/2021       PAH RISK ASSESSMENT:    Assessment & Plan        ReDs Vest    Performed by: Diogo Lawton MD PhD  Authorized by: Diogo Lawton MD PhD    Weighing self daily:  Yes  Monitoring heart failure zones:  Yes  Today's HF zone:  Green  ReDS value:  32      1. Pulmonary hypertension.  This is WHO-group-2/3.  Most recent 2D TTE shows pulmonary hypertension.  CT chest with emphysematous changes.  He does have KATIE.  This is multifactorial.      -No current indications for PAH-specific medicine.      2. Ischemic cardiomyopathy. Now with HFimpEF.  NYHA stage C; functional class II.  Today, he is euvolemic and perfused.      He has been clinically stable and now meets criteria for  HFimpEF.  We discussed possibly discontinuing him here at the [facility] and he could certainly get his care from Dr. Scott.  However, he has requested to remain in " the [facility], which I am agreeable to.    -Toprol XL  - Spironolactone with quarterly assessment of GFR  - Jardiance 10 mg  - Torsemide      3. RV Heart Failure.    RV is dilated. RVEF is abnormal.  RV status:  RV ADVERSELY-REMODELED with a ESVi of >114ml.   The patient presents today with RHF. The etiology is: PAH/KATIE.     Today, he is clinically stable.  - Toprol-XL  - Spironolactone  - Torsemide             ORDERS PLACED TODAY:  Orders Placed This Encounter   Procedures    ReDs Vest    ReDs Vest          MEDS ORDERED TODAY:    No orders of the defined types were placed in this encounter.       MEDS DISCONTINUED TODAY:    There are no discontinued medications.             Return in about 3 months (around 6/5/2024).        This document has been electronically signed by Diogo Lawton MD PhD on March 5, 2024 11:35 EST        Diogo Lawton M.D., Ph.D., [facility]  System Medical Director for HF and PAH

## 2024-03-06 ENCOUNTER — OFFICE VISIT (OUTPATIENT)
Age: 74
End: 2024-03-06
Payer: MEDICARE

## 2024-03-06 VITALS
HEIGHT: 72 IN | WEIGHT: 218 LBS | BODY MASS INDEX: 29.53 KG/M2 | HEART RATE: 92 BPM | SYSTOLIC BLOOD PRESSURE: 108 MMHG | DIASTOLIC BLOOD PRESSURE: 62 MMHG

## 2024-03-06 DIAGNOSIS — Z95.818 PRESENCE OF WATCHMAN LEFT ATRIAL APPENDAGE CLOSURE DEVICE: Primary | ICD-10-CM

## 2024-03-06 DIAGNOSIS — I48.21 PERMANENT ATRIAL FIBRILLATION: ICD-10-CM

## 2024-03-06 PROCEDURE — 99213 OFFICE O/P EST LOW 20 MIN: CPT | Performed by: INTERNAL MEDICINE

## 2024-03-06 PROCEDURE — 1160F RVW MEDS BY RX/DR IN RCRD: CPT | Performed by: INTERNAL MEDICINE

## 2024-03-06 PROCEDURE — 1159F MED LIST DOCD IN RCRD: CPT | Performed by: INTERNAL MEDICINE

## 2024-03-06 PROCEDURE — 3074F SYST BP LT 130 MM HG: CPT | Performed by: INTERNAL MEDICINE

## 2024-03-06 PROCEDURE — 3078F DIAST BP <80 MM HG: CPT | Performed by: INTERNAL MEDICINE

## 2024-03-06 PROCEDURE — 93000 ELECTROCARDIOGRAM COMPLETE: CPT | Performed by: INTERNAL MEDICINE

## 2024-03-21 DIAGNOSIS — I48.20 CHRONIC ATRIAL FIBRILLATION: Chronic | ICD-10-CM

## 2024-03-21 RX ORDER — SPIRONOLACTONE 25 MG/1
25 TABLET ORAL DAILY
Qty: 90 TABLET | Refills: 1 | Status: SHIPPED | OUTPATIENT
Start: 2024-03-21

## 2024-03-28 ENCOUNTER — ANESTHESIA (OUTPATIENT)
Dept: POSTOP/PACU | Facility: HOSPITAL | Age: 74
End: 2024-03-28
Payer: MEDICARE

## 2024-03-28 ENCOUNTER — ANESTHESIA EVENT (OUTPATIENT)
Dept: POSTOP/PACU | Facility: HOSPITAL | Age: 74
End: 2024-03-28
Payer: MEDICARE

## 2024-03-28 ENCOUNTER — HOSPITAL ENCOUNTER (OUTPATIENT)
Dept: POSTOP/PACU | Facility: HOSPITAL | Age: 74
Discharge: HOME | End: 2024-03-28
Payer: MEDICARE

## 2024-03-28 VITALS
BODY MASS INDEX: 29.53 KG/M2 | DIASTOLIC BLOOD PRESSURE: 79 MMHG | HEART RATE: 67 BPM | TEMPERATURE: 97.5 F | SYSTOLIC BLOOD PRESSURE: 133 MMHG | RESPIRATION RATE: 16 BRPM | WEIGHT: 218 LBS | HEIGHT: 72 IN | OXYGEN SATURATION: 97 %

## 2024-03-28 VITALS — SYSTOLIC BLOOD PRESSURE: 117 MMHG | HEART RATE: 80 BPM | DIASTOLIC BLOOD PRESSURE: 66 MMHG | OXYGEN SATURATION: 97 %

## 2024-03-28 DIAGNOSIS — Z95.818 PRESENCE OF WATCHMAN LEFT ATRIAL APPENDAGE CLOSURE DEVICE: ICD-10-CM

## 2024-03-28 DIAGNOSIS — I48.21 PERMANENT ATRIAL FIBRILLATION: ICD-10-CM

## 2024-03-28 PROCEDURE — 93321 DOPPLER ECHO F-UP/LMTD STD: CPT

## 2024-03-28 PROCEDURE — 25010000002 PROPOFOL 10 MG/ML EMULSION: Performed by: NURSE ANESTHETIST, CERTIFIED REGISTERED

## 2024-03-28 PROCEDURE — 93325 DOPPLER ECHO COLOR FLOW MAPG: CPT

## 2024-03-28 PROCEDURE — 93312 ECHO TRANSESOPHAGEAL: CPT

## 2024-03-28 PROCEDURE — 25010000002 LIDOCAINE 1 % SOLUTION: Performed by: NURSE ANESTHETIST, CERTIFIED REGISTERED

## 2024-03-28 RX ORDER — HYDRALAZINE HYDROCHLORIDE 20 MG/ML
5 INJECTION INTRAMUSCULAR; INTRAVENOUS
Status: DISCONTINUED | OUTPATIENT
Start: 2024-03-28 | End: 2024-03-30 | Stop reason: HOSPADM

## 2024-03-28 RX ORDER — DROPERIDOL 2.5 MG/ML
0.62 INJECTION, SOLUTION INTRAMUSCULAR; INTRAVENOUS
Status: DISCONTINUED | OUTPATIENT
Start: 2024-03-28 | End: 2024-03-30 | Stop reason: HOSPADM

## 2024-03-28 RX ORDER — NALOXONE HCL 0.4 MG/ML
0.2 VIAL (ML) INJECTION AS NEEDED
Status: DISCONTINUED | OUTPATIENT
Start: 2024-03-28 | End: 2024-03-30 | Stop reason: HOSPADM

## 2024-03-28 RX ORDER — SODIUM CHLORIDE 9 MG/ML
INJECTION, SOLUTION INTRAVENOUS CONTINUOUS PRN
Status: DISCONTINUED | OUTPATIENT
Start: 2024-03-28 | End: 2024-03-28 | Stop reason: SURG

## 2024-03-28 RX ORDER — FLUMAZENIL 0.1 MG/ML
0.2 INJECTION INTRAVENOUS AS NEEDED
Status: DISCONTINUED | OUTPATIENT
Start: 2024-03-28 | End: 2024-03-30 | Stop reason: HOSPADM

## 2024-03-28 RX ORDER — IPRATROPIUM BROMIDE AND ALBUTEROL SULFATE 2.5; .5 MG/3ML; MG/3ML
3 SOLUTION RESPIRATORY (INHALATION) ONCE AS NEEDED
Status: DISCONTINUED | OUTPATIENT
Start: 2024-03-28 | End: 2024-03-30 | Stop reason: HOSPADM

## 2024-03-28 RX ORDER — PROMETHAZINE HYDROCHLORIDE 25 MG/1
25 TABLET ORAL ONCE AS NEEDED
Status: DISCONTINUED | OUTPATIENT
Start: 2024-03-28 | End: 2024-03-30 | Stop reason: HOSPADM

## 2024-03-28 RX ORDER — PROMETHAZINE HYDROCHLORIDE 25 MG/1
25 SUPPOSITORY RECTAL ONCE AS NEEDED
Status: DISCONTINUED | OUTPATIENT
Start: 2024-03-28 | End: 2024-03-30 | Stop reason: HOSPADM

## 2024-03-28 RX ORDER — LIDOCAINE HYDROCHLORIDE 10 MG/ML
INJECTION, SOLUTION INFILTRATION; PERINEURAL AS NEEDED
Status: DISCONTINUED | OUTPATIENT
Start: 2024-03-28 | End: 2024-03-28 | Stop reason: SURG

## 2024-03-28 RX ORDER — EPHEDRINE SULFATE 50 MG/ML
5 INJECTION, SOLUTION INTRAVENOUS ONCE AS NEEDED
Status: DISCONTINUED | OUTPATIENT
Start: 2024-03-28 | End: 2024-03-30 | Stop reason: HOSPADM

## 2024-03-28 RX ORDER — ONDANSETRON 2 MG/ML
4 INJECTION INTRAMUSCULAR; INTRAVENOUS ONCE AS NEEDED
Status: DISCONTINUED | OUTPATIENT
Start: 2024-03-28 | End: 2024-03-30 | Stop reason: HOSPADM

## 2024-03-28 RX ORDER — PROPOFOL 10 MG/ML
VIAL (ML) INTRAVENOUS AS NEEDED
Status: DISCONTINUED | OUTPATIENT
Start: 2024-03-28 | End: 2024-03-28 | Stop reason: SURG

## 2024-03-28 RX ORDER — DIPHENHYDRAMINE HYDROCHLORIDE 50 MG/ML
12.5 INJECTION INTRAMUSCULAR; INTRAVENOUS
Status: DISCONTINUED | OUTPATIENT
Start: 2024-03-28 | End: 2024-03-30 | Stop reason: HOSPADM

## 2024-03-28 RX ORDER — LABETALOL HYDROCHLORIDE 5 MG/ML
5 INJECTION, SOLUTION INTRAVENOUS
Status: DISCONTINUED | OUTPATIENT
Start: 2024-03-28 | End: 2024-03-30 | Stop reason: HOSPADM

## 2024-03-28 RX ADMIN — LIDOCAINE HYDROCHLORIDE 5 ML: 10 INJECTION, SOLUTION INFILTRATION; PERINEURAL at 07:28

## 2024-03-28 RX ADMIN — SODIUM CHLORIDE: 9 INJECTION, SOLUTION INTRAVENOUS at 07:23

## 2024-03-28 RX ADMIN — PROPOFOL 50 MG: 10 INJECTION, EMULSION INTRAVENOUS at 07:28

## 2024-03-28 RX ADMIN — PROPOFOL 50 MG: 10 INJECTION, EMULSION INTRAVENOUS at 07:32

## 2024-03-28 NOTE — ANESTHESIA POSTPROCEDURE EVALUATION
"Patient: Renato Marquez    Procedure Summary       Date: 03/28/24 Room / Location: [facility] PACU    Anesthesia Start: 0723 Anesthesia Stop: 0745    Procedure: ADULT TRANSESOPHAGEAL ECHO (ADDY) W/ CONT IF NECESSARY PER PROTOCOL Diagnosis:       Presence of Watchman left atrial appendage closure device      Permanent atrial fibrillation      (Re-evaluation of Prior ADDY for Interval Change)    Scheduled Providers: Reynaldo Roblero Jr., MD Provider: Oscar Ba MD    Anesthesia Type: MAC ASA Status: 3            Anesthesia Type: MAC    Vitals  Vitals Value Taken Time   /89 03/28/24 0815   Temp     Pulse 80 03/28/24 0816   Resp 20 03/28/24 0810   SpO2 96 % 03/28/24 0816   Vitals shown include unfiled device data.        Post Anesthesia Care and Evaluation    Patient location during evaluation: PACU  Patient participation: complete - patient participated  Level of consciousness: awake and alert  Pain management: adequate    Airway patency: patent  Anesthetic complications: No anesthetic complications  PONV Status: controlled  Cardiovascular status: acceptable and hemodynamically stable  Respiratory status: acceptable  Hydration status: acceptable    Comments: /79 (BP Location: Left arm, Patient Position: Lying)   Pulse 67   Temp 36.4 °C (97.5 °F) (Oral)   Resp 16   Ht 182.9 cm (72\")   Wt 98.9 kg (218 lb)   SpO2 97%   BMI 29.57 kg/m²     "

## 2024-03-28 NOTE — H&P
H&P reviewed agree with details.  Risks and benefits of procedure were explained in detail.  Questions and concerns were answered at bedside.  No contraindications to moving forward with procedure.  Follow diagnostic testing results for further treatment recommendations.      [facility] Structural Heart Follow Up Office Note     Encounter Date:24  Patient:Renato Marquez  :1950  MRN:[id number]    Referring Provider:  Mauricio Scott MD    Chief Complaint:   No chief complaint on file.      History of Presenting Illness:      Mr. Marquez is a 73 y.o. gentleman with past medical history notable for permanent atrial fibrillation, Carotid stenosis s/p CEA, coronary artery disease with prior CABG, chronic systolic heart failure, KATIE on cpap at night, pulmonary hypertension, and prior GI bleeding who presents to our office for scheduled follow up after Watchman.  In general patient is doing well he is tolerating his dual antiplatelet therapy without any bleeding issues.        Review of Systems:  Review of Systems   Constitutional: Negative.   HENT: Negative.     Eyes: Negative.    Cardiovascular: Negative.    Respiratory: Negative.     Endocrine: Negative.    Hematologic/Lymphatic: Negative.    Skin: Negative.    Musculoskeletal: Negative.    Gastrointestinal: Negative.    Genitourinary: Negative.    Neurological: Negative.    Psychiatric/Behavioral: Negative.     Allergic/Immunologic: Negative.        Current Outpatient Medications on File Prior to Encounter   Medication Sig Dispense Refill    aspirin (ASPIR) 81 MG EC tablet Take 1 tablet by mouth Daily. 90 tablet 3    clopidogrel (PLAVIX) 75 MG tablet Take 1 tablet by mouth Daily. 90 tablet 3    empagliflozin (JARDIANCE) 10 MG tablet tablet Take 1 tablet by mouth Daily. 30 tablet 3    famotidine (PEPCID) 40 MG tablet TAKE 1 TABLET BY MOUTH TWICE DAILY AT LUNCH AND AT BEDTIME 180 tablet 3    Magnesium 200 MG tablet Take 250 mg by mouth Daily.       metoprolol succinate XL (TOPROL-XL) 50 MG 24 hr tablet Take 1 tablet by mouth Daily. 90 tablet 1    spironolactone (ALDACTONE) 25 MG tablet TAKE 1 TABLET BY MOUTH DAILY 90 tablet 1    torsemide (DEMADEX) 20 MG tablet Take 1 tablet by mouth Daily. 90 tablet 0    albuterol sulfate  (90 Base) MCG/ACT inhaler Inhale 2 puffs every 4 hours when necessary wheeze, dyspnea, cough 8 g 0    guaiFENesin (MUCINEX) 600 MG 12 hr tablet Take 2 tablets by mouth 2 (Two) Times a Day.      pantoprazole (PROTONIX) 40 MG EC tablet TAKE 1 TABLET BY MOUTH TWICE DAILY 180 tablet 2    pravastatin (PRAVACHOL) 40 MG tablet TAKE 1 TABLET BY MOUTH DAILY 90 tablet 0    sucralfate (CARAFATE) 1 g tablet Take 1 tablet by mouth 2 (Two) Times a Day. 180 tablet 3     No current facility-administered medications on file prior to encounter.       No Known Allergies    Past Medical History:   Diagnosis Date    Abnormal ECG     CAD (coronary artery disease)     Cancer     skin cancer    Carotid arterial disease     Chronic atrial fibrillation     Complex sleep apnea syndrome     BiPAP ST    Hemorrhagic disorder due to circulating anticoagulants 06/21/2021    Hyperlipidemia     Hypertension     Ischemic cardiomyopathy     Mitral regurgitation     Rectal bleeding        Past Surgical History:   Procedure Laterality Date    ATRIAL APPENDAGE EXCLUSION LEFT WITH TRANSESOPHAGEAL ECHOCARDIOGRAM N/A 4/19/2023    Procedure: Atrial Appendage Occlusion;  Surgeon: Dustin Neal MD;  Location: [facility] CATH INVASIVE LOCATION;  Service: Cardiovascular;  Laterality: N/A;    CAROTID ENDARTERECTOMY Right 2009    Dr Lewis    COLONOSCOPY      COLONOSCOPY N/A 6/22/2021    Procedure: COLONOSCOPY INTO CECUM AND TI;  Surgeon: Artur Jacobson MD;  Location: [facility] ENDOSCOPY;  Service: Gastroenterology;  Laterality: N/A;  PRE: RECTAL BLEEDING  POST: DIVERTICULOSIS, HEMORRHOIDS    COLONOSCOPY W/ POLYPECTOMY N/A 6/3/2021    Procedure: COLONOSCOPY, polypectomies;   Surgeon: Luan Meek MD;  Location:  [facility] OR;  Service: Gastroenterology;  Laterality: N/A;  Diverticulosis  Ascending colon polyp x 4 (3 cold snare)  Cecal polyp (cold snare)  Rectal polyp    CORONARY ARTERY BYPASS GRAFT      RALPH FrederickA to LAD, SVG to OM, SVG Post Desc    ENDOSCOPY N/A 6/3/2021    Procedure: ESOPHAGOGASTRODUODENOSCOPY with biopsies;  Surgeon: Luan Meek MD;  Location:  [facility] OR;  Service: Gastroenterology;  Laterality: N/A;  Reflux  Gastric and duodenal ulcers  Biopsies: gastric, distal esophagus    FINGER SURGERY         Social History     Socioeconomic History    Marital status:    Tobacco Use    Smoking status: Former     Current packs/day: 0.00     Types: Cigarettes, Cigars     Quit date: 1991     Years since quittin.2    Smokeless tobacco: Never   Vaping Use    Vaping status: Never Used   Substance and Sexual Activity    Alcohol use: Yes     Alcohol/week: 2.0 standard drinks of alcohol     Types: 2 Shots of liquor per week     Comment: NIGHTLY/caffeine use    Drug use: No    Sexual activity: Not Currently       Family History   Problem Relation Age of Onset    No Known Problems Mother     Heart disease Father     Stroke Father     Hypertension Father        The following portions of the patient's history were reviewed and updated as appropriate: allergies, current medications, past family history, past medical history, past social history, past surgical history and problem list.       Objective:       Vitals:    24 0540   BP: 132/91   BP Location: Left arm   Patient Position: Lying   Pulse: 87   Resp: 20   Temp: 97.5 °F (36.4 °C)   SpO2: 94%       There is no height or weight on file to calculate BMI.    Physical Exam:  Constitutional: Well appearing, Well-developed, No acute distress   HENT: Oropharynx clear and membrane moist  Eyes: Normal conjunctiva, no sclera icterus.  Neck: Supple, no carotid bruit  bilaterally.  Cardiovascular: Irregularly irregular rate and rhythm, No Murmur, Trace bilateral lower extremity edema.  Pulmonary: Normal respiratory effort, normal lung sounds, no wheezing.  Neurological: Alert and orient x 3.   Skin: Warm, dry, no ecchymosis, no rash.  Psych: Appropriate mood and affect. Normal judgment and insight.       Lab Results   Component Value Date     02/27/2024     08/14/2023    K 4.3 02/27/2024    K 4.7 08/14/2023     02/27/2024     08/14/2023    CO2 26.0 02/27/2024    CO2 24.0 08/14/2023    BUN 43 (H) 02/27/2024    BUN 40 (H) 08/14/2023    CREATININE 1.76 (H) 02/27/2024    CREATININE 1.70 (H) 09/21/2023    EGFRIFNONA 56 (L) 09/07/2021    EGFRIFNONA 77 06/21/2021    EGFRIFAFRI 77 09/29/2020    GLUCOSE 149 (H) 02/27/2024    GLUCOSE 97 08/14/2023    CALCIUM 9.3 02/27/2024    CALCIUM 9.2 08/14/2023    PROTENTOTREF 6.6 07/18/2023    PROTENTOTREF 6.2 09/29/2020    ALBUMIN 4.1 02/27/2024    ALBUMIN 3.6 07/18/2023    BILITOT 0.9 02/27/2024    BILITOT 0.8 04/14/2023    AST 22 02/27/2024    AST 27 04/14/2023    ALT 14 02/27/2024    ALT 19 04/14/2023     Lab Results   Component Value Date    WBC 5.48 02/27/2024    WBC 6.78 05/18/2023    HGB 11.4 (L) 02/27/2024    HGB 11.9 (L) 05/18/2023    HCT 37.5 02/27/2024    HCT 35.4 (L) 05/18/2023    MCV 87.8 02/27/2024    MCV 92.9 05/18/2023     (L) 02/27/2024     05/18/2023     Lab Results   Component Value Date    CHOL 142 02/27/2024    CHOL 156 11/23/2022    TRIG 74 02/27/2024    TRIG 117 11/23/2022    HDL 43 02/27/2024    HDL 50 11/23/2022    LDL 84 02/27/2024    LDL 85 11/23/2022     Lab Results   Component Value Date    PROBNP 5,421.0 (H) 08/14/2023    PROBNP 6,028.0 (H) 08/01/2023     Lab Results   Component Value Date    TROPONINT <0.010 06/20/2021     Lab Results   Component Value Date    TSH 1.510 06/22/2021    TSH 1.260 09/29/2020     ProceduresTransesophageal echocardiogram 5/23/2023:  Left ventricular  systolic function is normal. Left ventricular ejection fraction appears to be 56 - 60%.  Moderately reduced right ventricular systolic function noted.  The right ventricular cavity is mildly dilated.  The left atrial cavity is moderate to severely dilated.  There is an occlusion device in the left atrial appendage that is well-seated with no clear flow through or around the device.  The right atrial cavity is severely  dilated.  Moderate tricuspid valve regurgitation is present.  Estimated right ventricular systolic pressure from tricuspid regurgitation is mildly elevated (35-45 mmHg).  Moderate pulmonic valve regurgitation is present.  There is moderate plaque in the aortic arch present. There is moderate plaque in the descending aorta present.    Limited Echocardiogram 4/20/2023:  There is no evidence of pericardial effusion. .  Normal left ventricular cavity size noted. Left ventricular wall thickness is consistent with mild to moderate concentric hypertrophy. All left ventricular wall segments contract normally.     Watchman Implantation 4/19/2023  Right common femoral venotomy utilizing micropuncture kit and ultrasound guidance.  Left common femoral venotomy utilizing micropuncture kit and ultrasound guidance.  Transseptal puncture of inter-atrial septum under fluoroscopic and transesophageal guidance   Successful placement of a 31 mm watchman left atrial appendage closure device.  Percutaneous closure of right femoral venotomy access site with #1 suture mediated closure device and figure of 8 suture.    Echocardiogram 11/17/2022:  Left ventricular ejection fraction appears to be 41 - 45%.  Left ventricular wall thickness is consistent with moderate concentric hypertrophy.  Left ventricular diastolic function was indeterminate.  The right atrial cavity is severely  dilated.  There is calcification of the aortic valve.  Estimated right ventricular systolic pressure from tricuspid regurgitation is moderately  elevated (45-55 mmHg). Calculated right ventricular systolic pressure from tricuspid regurgitation is 50 mmHg.  Moderate pulmonic valve regurgitation is present.    Holter Monitor 6/21/2021:  An abnormal monitor study.  Atrial fibrillation is present throughout  Average heart beat 93 bpm, range during A. fib  bpm, frequent episodes of heart rate greater than 100 are seen.  Frequent ventricular ectopy is seen  1 episode of wide-complex tachycardia that appears to be consistent with ventricular tachycardia is present at 1:25 PM, 29 beats duration, 170 bpm          Assessment:          Diagnosis Plan   1. Presence of Watchman left atrial appendage closure device  Adult Transesophageal Echo (ADDY) W/ Cont if Necessary Per Protocol    Adult Transesophageal Echo (ADDY) W/ Cont if Necessary Per Protocol      2. Permanent atrial fibrillation  Adult Transesophageal Echo (ADDY) W/ Cont if Necessary Per Protocol    Adult Transesophageal Echo (ADDY) W/ Cont if Necessary Per Protocol               Plan:       Mr. Marquez is a 73 y.o. gentleman with past medical history notable for permanent atrial fibrillation, Carotid stenosis s/p CEA, coronary artery disease with prior CABG, chronic systolic heart failure, KATIE on cpap at night, pulmonary hypertension, and prior GI bleeding who presents to our office for scheduled follow-up after watchman implantation.  From a watchman standpoint he is doing well with no bleeding on dual antiplatelet therapy.  From my standpoint he could stop his clopidogrel and remain just on 81 mg of aspirin.  He is due for his 1 year ADDY follow-up to reevaluate his Watchman we have been able to schedule that today currently scheduled for 3/13 we will follow-up on those results for him..    Permanent Atrial Fibrillation with Watchman Present:  Status post watchman 4/2023 transesophageal echocardiogram this week demonstrates normal device function  Eliquis discontinued 5/2023  On dual antiplatelet therapy  currently.  From a watchman standpoint could stop clopidogrel but has been continued on dual antiplatelet therapy for management of his peripheral vascular disease but from my standpoint if any problems with bleeding complications on dual antiplatelet therapy could transition to monotherapy at any time.      We will work on scheduling his 1 year follow-up Watchman which is now scheduled for 3/13 will follow-up on these results      Follow up:  Plan for ADDY on 3/13 to evaluate his watchman 1 year out from procedure to.      Thank you for allowing me to participate in the care of Renato Marquez. Feel free to contact me directly with any further questions or concerns.    Dustin Neal MD  [facility]  03/28/24  07:05 EDT

## 2024-03-28 NOTE — ANESTHESIA PREPROCEDURE EVALUATION
Anesthesia Evaluation     Patient summary reviewed and Nursing notes reviewed   NPO Solid Status: > 8 hours  NPO Liquid Status: > 2 hours           Airway   Mallampati: II  TM distance: >3 FB  Neck ROM: full  No difficulty expected  Dental - normal exam     Pulmonary - normal exam    breath sounds clear to auscultation  (+) a smoker Former,sleep apnea on CPAP  Cardiovascular     ECG reviewed  Rhythm: irregular  Rate: normal    (+) hypertension 2 medications or greater, valvular problems/murmurs MR, CAD, CABG >6 Months, dysrhythmias Atrial Fib, PVD, hyperlipidemia,  carotid artery disease    ROS comment: CAD with hx CABG in 1998/permanent afib, s/p Watchman device placement in 4/23/EF 60%, mod TI, mod IL, mild-mod MR by ADDY 5/23  PE comment: Afib/normal VR/no murmurs heard    Neuro/Psych  (+) numbness  GI/Hepatic/Renal/Endo    (+) GI bleeding , thyroid problem hypothyroidism    ROS Comment: Hx Florian's    Musculoskeletal     (+) back pain, chronic pain      ROS comment: Lumbar DDD  Abdominal  - normal exam   Substance History      OB/GYN          Other   arthritis, blood dyscrasia anemia,   history of cancer      Other Comment: Skin CA/Hgb 11.4                Anesthesia Plan    ASA 3     MAC     intravenous induction     Anesthetic plan, risks, benefits, and alternatives have been provided, discussed and informed consent has been obtained with: patient.      CODE STATUS:

## 2024-03-29 LAB
BH CV ECHO MEAS - RAP SYSTOLE: 3 MMHG
BH CV ECHO MEAS - RVSP: 47 MMHG
BH CV ECHO MEAS - TR MAX PG: 43.5 MMHG
BH CV ECHO MEAS - TR MAX VEL: 329.7 CM/SEC

## 2024-05-07 DIAGNOSIS — K21.00 GASTROESOPHAGEAL REFLUX DISEASE WITH ESOPHAGITIS WITHOUT HEMORRHAGE: ICD-10-CM

## 2024-05-07 DIAGNOSIS — K22.70 BARRETT'S ESOPHAGUS WITHOUT DYSPLASIA: ICD-10-CM

## 2024-05-07 RX ORDER — SUCRALFATE 1 G/1
1 TABLET ORAL 2 TIMES DAILY
Qty: 180 TABLET | Refills: 3 | Status: SHIPPED | OUTPATIENT
Start: 2024-05-07

## 2024-05-07 RX ORDER — EMPAGLIFLOZIN 10 MG/1
10 TABLET, FILM COATED ORAL DAILY
Qty: 30 TABLET | Refills: 3 | Status: SHIPPED | OUTPATIENT
Start: 2024-05-07

## 2024-05-12 DIAGNOSIS — I48.20 CHRONIC ATRIAL FIBRILLATION: Chronic | ICD-10-CM

## 2024-05-13 RX ORDER — METOPROLOL SUCCINATE 50 MG/1
50 TABLET, EXTENDED RELEASE ORAL DAILY
Qty: 90 TABLET | Refills: 1 | Status: SHIPPED | OUTPATIENT
Start: 2024-05-13

## 2024-06-03 ENCOUNTER — HOSPITAL ENCOUNTER (OUTPATIENT)
Dept: CARDIOLOGY | Facility: HOSPITAL | Age: 74
Discharge: HOME | End: 2024-06-03
Admitting: INTERNAL MEDICINE
Payer: MEDICARE

## 2024-06-03 VITALS
OXYGEN SATURATION: 92 % | HEIGHT: 72 IN | SYSTOLIC BLOOD PRESSURE: 124 MMHG | BODY MASS INDEX: 29.26 KG/M2 | WEIGHT: 216 LBS | HEART RATE: 58 BPM | DIASTOLIC BLOOD PRESSURE: 59 MMHG

## 2024-06-03 DIAGNOSIS — I50.812 CHRONIC RIGHT-SIDED HEART FAILURE: ICD-10-CM

## 2024-06-03 DIAGNOSIS — I50.32 HEART FAILURE WITH IMPROVED EJECTION FRACTION (HFIMPEF): ICD-10-CM

## 2024-06-03 DIAGNOSIS — I27.20 PULMONARY HYPERTENSION: Primary | ICD-10-CM

## 2024-06-03 PROCEDURE — G0463 HOSPITAL OUTPT CLINIC VISIT: HCPCS

## 2024-06-03 PROCEDURE — 94726 PLETHYSMOGRAPHY LUNG VOLUMES: CPT | Performed by: INTERNAL MEDICINE

## 2024-06-03 PROCEDURE — 99214 OFFICE O/P EST MOD 30 MIN: CPT | Performed by: INTERNAL MEDICINE

## 2024-06-03 NOTE — PROGRESS NOTES
Heart Failure & Pulmonary Arterial Hypertension Clinic  [facility]  Diogo Lawton M.D., Ph.D., [facility]       Mauricio Scott III, MD  [address] [facility] [address] [address]    Thank you for asking me to see Renato Marquez.     History of Present Illness  This is a 73 y.o. male with:    1. PH  2. HFrEF--now HFimpEF    Renato Marquez is a 73 y.o. male who presents today.  The patient's cardiologist is Dr. Scott.     Patient returns today for further evaluation and management of pulmonary hypertension and HFrEF.  Patient was diagnosed with HFrEF several years ago.  He was found to have ASCAD.  He is status post CABG with a LIMA to the LAD, SVG to the OM, and SVG to the PDA in 2009 by Dr. Jeet Bhagat.  This was detected during a life screening. Originally the concern was over SHELLEY. He did undergo. Most recent ischemia evaluation was in January 2021 without evidence of ischemia.  Echocardiogram was in 2015 reported LVEF of 35-40%.  Over the years this has improved.  He now meets criteria for HFimpEF.      The patient returns to the clinic today.  His original course was complicated by marginal hemodynamics that required discontinuation of Entresto.  Otherwise, he remains on Toprol-XL, spironolactone, and Jardiance.  He uses torsemide as his diuretic.  He is able to obtain and afford these medications.  No adverse effects.  No recent emergency department visits or inpatient admissions related to failure.  Overall, he has been doing really well.  Denies orthopnea, PND, lower extremity edema, ascites, and early abdominal satiety.    Review of Systems - Review of Systems   Cardiovascular:  Positive for dyspnea on exertion.   Respiratory:  Positive for shortness of breath.    All other systems reviewed and are negative.        All other systems were reviewed and were negative.    Patient Active Problem List   Diagnosis    Coronary artery disease involving native coronary artery of native heart  without angina pectoris    Carotid arterial disease    Permanent atrial fibrillation    Ischemic cardiomyopathy    Mitral regurgitation    Hypothyroidism    HTN (hypertension)    Dyslipidemia    DDD (degenerative disc disease), lumbar    Chronic bilateral low back pain with right-sided sciatica    Primary osteoarthritis of right knee    Class 1 obesity due to excess calories without serious comorbidity with body mass index (BMI) of 31.0 to 31.9 in adult    Complex sleep apnea syndrome    Hemorrhagic disorder due to circulating anticoagulants    Gastroesophageal reflux disease with esophagitis without hemorrhage    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     reports that he quit smoking about 32 years ago. His smoking use included cigarettes and cigars. He has never used smokeless tobacco. He reports current alcohol use of about 2.0 standard drinks of alcohol per week. He reports that he does not use drugs.    No Known Allergies        Current Outpatient Medications:     albuterol sulfate  (90 Base) MCG/ACT inhaler, Inhale 2 puffs every 4 hours when necessary wheeze, dyspnea, cough, Disp: 8 g, Rfl: 0    aspirin (ASPIR) 81 MG EC tablet, Take 1 tablet by mouth Daily., Disp: 90 tablet, Rfl: 3    guaiFENesin (MUCINEX) 600 MG 12 hr tablet, Take 2 tablets by mouth 2 (Two) Times a Day., Disp: , Rfl:     Jardiance 10 MG tablet tablet, TAKE 1 TABLET BY MOUTH DAILY, Disp: 30 tablet, Rfl: 3    Magnesium 200 MG tablet, Take 250 mg by mouth Daily., Disp: , Rfl:     metoprolol succinate XL (TOPROL-XL) 50 MG 24 hr tablet, TAKE 1 TABLET BY MOUTH DAILY, Disp: 90 tablet, Rfl:  1    pravastatin (PRAVACHOL) 40 MG tablet, TAKE 1 TABLET BY MOUTH DAILY, Disp: 90 tablet, Rfl: 0    spironolactone (ALDACTONE) 25 MG tablet, TAKE 1 TABLET BY MOUTH DAILY, Disp: 90 tablet, Rfl: 1    torsemide (DEMADEX) 20 MG tablet, Take 1 tablet by mouth Daily., Disp: 90 tablet, Rfl: 0    Vital Sign Review:     Vitals:    06/03/24 1127   BP: 124/59   Pulse: 58   SpO2: 92%         PP: Normal  Pulse Ox: normal oxygenation on room air    Weight: 222 lbs  Physical Exam:    Vitals reviewed.   Constitutional:       General: Not in acute distress.     Appearance: Normal and healthy appearance. Well-developed, well-groomed and not in distress. Not ill-appearing, toxic-appearing or diaphoretic.      Interventions: Not intubated.  Eyes:      Conjunctiva/sclera: Conjunctivae normal.      Pupils: Pupils are equal, round, and reactive to light.   Neck:      Vascular: No JVR. JVD normal with 6 cm of water.   Pulmonary:      Effort: Pulmonary effort is normal. No tachypnea, bradypnea, accessory muscle usage, prolonged expiration, respiratory distress or retractions. Not intubated.      Breath sounds: Normal breath sounds and air entry. No stridor or decreased air movement. No decreased breath sounds. No wheezing. No rhonchi. No rales.   Cardiovascular:      PMI at left midclavicular line. Bradycardia present. Irregularly irregular rhythm. S1 with normal intensity. loud S2.       Murmurs: There is no murmur.      No gallop.  No click. No rub.   Neurological:      General: No focal deficit present.      Mental Status: Alert and oriented to person, place and time.   Psychiatric:         Behavior: Behavior is cooperative.            DATA REVIEWED:       TTE/ADDY:    Results for orders placed during the hospital encounter of 03/28/24    Adult Transesophageal Echo (ADDY) W/ Cont if Necessary Per Protocol    Interpretation Summary    Left ventricular systolic function is mildly decreased. Left ventricular ejection fraction appears to be 46  "- 50%.    Moderately reduced right ventricular systolic function noted.    The right ventricular cavity is mildly dilated.    The left atrial cavity is severely dilated.    The right atrial cavity is moderately  dilated.    Moderate mitral valve regurgitation is present.    Estimated right ventricular systolic pressure from tricuspid regurgitation is moderately elevated (45-55 mmHg).    Left atrial appendage occlusion device is in stable position with no residual leak.      My interpretation of the TTE is below.     LVEF is 46-50%.  The RV cavity is mildly dilated.  Moderately reduced right ventricular systolic function.  Moderate mitral regurgitation.      ==========================    CT SCAN: Dilated pulmonary artery and moderate emphysematous changes  -----------------------------------------------------        PFTs were interpreted today.  FEV1/FVC is 70% of predicted.  FVC is 85% of predicted.    Laboratory evaluations:    Lab Results   Component Value Date    GLUCOSE 149 (H) 02/27/2024    BUN 43 (H) 02/27/2024    CREATININE 1.76 (H) 02/27/2024    EGFRIFNONA 56 (L) 09/07/2021    EGFRIFAFRI 77 09/29/2020    BCR 24.4 02/27/2024    K 4.3 02/27/2024    CO2 26.0 02/27/2024    CALCIUM 9.3 02/27/2024    PROTENTOTREF 6.6 07/18/2023    ALBUMIN 4.1 02/27/2024    LABIL2 1.3 07/18/2023    AST 22 02/27/2024    ALT 14 02/27/2024     Lab Results   Component Value Date    WBC 5.48 02/27/2024    HGB 11.4 (L) 02/27/2024    HCT 37.5 02/27/2024    MCV 87.8 02/27/2024     (L) 02/27/2024     Lab Results   Component Value Date    CHOL 142 02/27/2024    CHLPL 162 09/29/2020    TRIG 74 02/27/2024    HDL 43 02/27/2024    LDL 84 02/27/2024     Lab Results   Component Value Date    TSH 1.510 06/22/2021     Lab Results   Component Value Date    TROPONINT <0.010 06/20/2021     No results found for: \"HGBA1C\"  No results found for: \"DDIMER\"  Lab Results   Component Value Date    ALT 14 02/27/2024     No results found for: " "\"HGBA1C\"  Lab Results   Component Value Date    CREATININE 1.76 (H) 02/27/2024     No results found for: \"IRON\", \"TIBC\", \"FERRITIN\"  Lab Results   Component Value Date    INR 1.07 04/14/2023    INR 1.63 (H) 06/21/2021    INR 1.85 (H) 06/20/2021    PROTIME 14.1 04/14/2023    PROTIME 19.1 (H) 06/21/2021    PROTIME 21.0 (H) 06/20/2021       PAH RISK ASSESSMENT:  ReDs Vest    Performed by: Diogo Lawton MD PhD  Authorized by: Diogo Lawton MD PhD    ReDS value:  41  ReDS Value Description:  36-41 (orange) = Possible Hypervolemic Status      Assessment & Plan            1. Pulmonary hypertension.  This is WHO-group-2/3.  Most recent 2D TTE shows pulmonary hypertension.  CT chest with emphysematous changes.  He does have KATIE.  This is multifactorial.      He presents today with stable secondary pulmonary hypertension.  -No current indication for PAH-specific medicines       2. Ischemic cardiomyopathy. Now with HFimpEF.  NYHA stage C; functional class II.  Today, he is euvolemic and perfused.      - Toprol-XL  - Spironolactone with quarterly assessment of GFR  - Jardiance 10 mg  - Torsemide       3. RV Heart Failure.    RV is dilated. RVEF is abnormal.  RV status:  RV ADVERSELY-REMODELED with a ESVi of >114ml.   The patient presents today with RHF. The etiology is: PAH/KATIE.     Today, he is clinically stable.  RV remains adversely remodeled with moderate RV dysfunction.    - Toprol-XL  - Spironolactone  - Torsemide                 Return in about 3 months (around 9/3/2024).        This document has been electronically signed by Diogo Lawton MD PhD on Johnna 3, 2024 11:42 EDT      Diogo Lawton M.D., Ph.D., [facility] Failure [facility] Pulmonary Hypertension [facility]  System Medical Director for HF and PAH      "

## 2024-06-03 NOTE — LETTER
Johnna 3, 2024       No Recipients    Patient: Renato Marquez   YOB: 1950   Date of Visit: 6/3/2024     Dear Jerad James Jr., MD:       Thank you for referring Renato Marquez to me for evaluation. Below are the relevant portions of my assessment and plan of care.    If you have questions, please do not hesitate to call me. I look forward to following Renato along with you.         Sincerely,        Diogo Lawton MD PhD        CC:   No Recipients    Diogo Lawton MD PhD  06/03/24 1142  Signed  Heart Failure [facility] Pul[facility]pertension [facility]  [facility]  Diogo Lawton M.D., Ph.D., [facility]       Mauricio Scott III, MD  [address] [id number]    Thank you for asking me to see Renato Marquez.     History of Present Illness  This is a 73 y.o. male with:    1. PH  2. HFrEF--now HFimpEF    Renato Marquez is a 73 y.o. male who presents today.  The patient's cardiologist is Dr. Scott.     Patient returns today for further evaluation and management of pulmonary hypertension and HFrEF.  Patient was diagnosed with HFrEF several years ago.  He was found to have ASCAD.  He is status post CABG with a LIMA to the LAD, SVG to the OM, and SVG to the PDA in 2009 by Dr. Jeet Bhagat.  This was detected during a life screening. Originally the concern was over SHELLEY. He did undergo. Most recent ischemia evaluation was in January 2021 without evidence of ischemia.  Echocardiogram was in 2015 reported LVEF of 35-40%.  Over the years this has improved.  He now meets criteria for HFimpEF.      The patient returns to the clinic today.  His original course was complicated by marginal hemodynamics that required discontinuation of Entresto.  Otherwise, he remains on Toprol-XL, spironolactone, and Jardiance.  He uses torsemide as his diuretic.  He is able to obtain and afford these medications.  No adverse effects.  No recent emergency department visits or  inpatient admissions related to failure.  Overall, he has been doing really well.  Denies orthopnea, PND, lower extremity edema, ascites, and early abdominal satiety.    Review of Systems - Review of Systems   Cardiovascular:  Positive for dyspnea on exertion.   Respiratory:  Positive for shortness of breath.    All other systems reviewed and are negative.        All other systems were reviewed and were negative.    Patient Active Problem List   Diagnosis   • Coronary artery disease involving native coronary artery of native heart without angina pectoris   • Carotid arterial disease   • Permanent atrial fibrillation   • Ischemic cardiomyopathy   • Mitral regurgitation   • Hypothyroidism   • HTN (hypertension)   • Dyslipidemia   • DDD (degenerative disc disease), lumbar   • Chronic bilateral low back pain with right-sided sciatica   • Primary osteoarthritis of right knee   • Class 1 obesity due to excess calories without serious comorbidity with body mass index (BMI) of 31.0 to 31.9 in adult   • Complex sleep apnea syndrome   • Hemorrhagic disorder due to circulating anticoagulants   • Gastroesophageal reflux disease with esophagitis without hemorrhage   • 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   • Diverticulosis   • Pulmonary hypertension   • Chronic right-sided heart failure   • Dysfunction of right cardiac ventricle   • PAD (peripheral artery disease)   • Presence of Watchman left atrial appendage closure device       family history includes Heart disease in his father; Hypertension in his father; No Known Problems in his mother; Stroke in his father.     reports that he quit smoking about 32 years ago. His smoking use included cigarettes and cigars. He has never used smokeless tobacco. He reports current alcohol use of about 2.0 standard drinks of alcohol per week. He reports that he does not use drugs.    No Known Allergies        Current Outpatient Medications:   •  albuterol sulfate  (90 Base) MCG/ACT  inhaler, Inhale 2 puffs every 4 hours when necessary wheeze, dyspnea, cough, Disp: 8 g, Rfl: 0  •  aspirin (ASPIR) 81 MG EC tablet, Take 1 tablet by mouth Daily., Disp: 90 tablet, Rfl: 3  •  guaiFENesin (MUCINEX) 600 MG 12 hr tablet, Take 2 tablets by mouth 2 (Two) Times a Day., Disp: , Rfl:   •  Jardiance 10 MG tablet tablet, TAKE 1 TABLET BY MOUTH DAILY, Disp: 30 tablet, Rfl: 3  •  Magnesium 200 MG tablet, Take 250 mg by mouth Daily., Disp: , Rfl:   •  metoprolol succinate XL (TOPROL-XL) 50 MG 24 hr tablet, TAKE 1 TABLET BY MOUTH DAILY, Disp: 90 tablet, Rfl: 1  •  pravastatin (PRAVACHOL) 40 MG tablet, TAKE 1 TABLET BY MOUTH DAILY, Disp: 90 tablet, Rfl: 0  •  spironolactone (ALDACTONE) 25 MG tablet, TAKE 1 TABLET BY MOUTH DAILY, Disp: 90 tablet, Rfl: 1  •  torsemide (DEMADEX) 20 MG tablet, Take 1 tablet by mouth Daily., Disp: 90 tablet, Rfl: 0    Vital Sign Review:     Vitals:    06/03/24 1127   BP: 124/59   Pulse: 58   SpO2: 92%         PP: Normal  Pulse Ox: normal oxygenation on room air    Weight: 222 lbs  Physical Exam:    Vitals reviewed.   Constitutional:       General: Not in acute distress.     Appearance: Normal and healthy appearance. Well-developed, well-groomed and not in distress. Not ill-appearing, toxic-appearing or diaphoretic.      Interventions: Not intubated.  Eyes:      Conjunctiva/sclera: Conjunctivae normal.      Pupils: Pupils are equal, round, and reactive to light.   Neck:      Vascular: No JVR. JVD normal with 6 cm of water.   Pulmonary:      Effort: Pulmonary effort is normal. No tachypnea, bradypnea, accessory muscle usage, prolonged expiration, respiratory distress or retractions. Not intubated.      Breath sounds: Normal breath sounds and air entry. No stridor or decreased air movement. No decreased breath sounds. No wheezing. No rhonchi. No rales.   Cardiovascular:      PMI at left midclavicular line. Bradycardia present. Irregularly irregular rhythm. S1 with normal intensity. loud  S2.       Murmurs: There is no murmur.      No gallop.  No click. No rub.   Neurological:      General: No focal deficit present.      Mental Status: Alert and oriented to person, place and time.   Psychiatric:         Behavior: Behavior is cooperative.            DATA REVIEWED:       TTE/ADDY:    Results for orders placed during the hospital encounter of 03/28/24    Adult Transesophageal Echo (ADDY) W/ Cont if Necessary Per Protocol    Interpretation Summary  •  Left ventricular systolic function is mildly decreased. Left ventricular ejection fraction appears to be 46 - 50%.  •  Moderately reduced right ventricular systolic function noted.  •  The right ventricular cavity is mildly dilated.  •  The left atrial cavity is severely dilated.  •  The right atrial cavity is moderately  dilated.  •  Moderate mitral valve regurgitation is present.  •  Estimated right ventricular systolic pressure from tricuspid regurgitation is moderately elevated (45-55 mmHg).  •  Left atrial appendage occlusion device is in stable position with no residual leak.      My interpretation of the TTE is below.     LVEF is 46-50%.  The RV cavity is mildly dilated.  Moderately reduced right ventricular systolic function.  Moderate mitral regurgitation.      ==========================    CT SCAN: Dilated pulmonary artery and moderate emphysematous changes  -----------------------------------------------------        PFTs were interpreted today.  FEV1/FVC is 70% of predicted.  FVC is 85% of predicted.    Laboratory evaluations:    Lab Results   Component Value Date    GLUCOSE 149 (H) 02/27/2024    BUN 43 (H) 02/27/2024    CREATININE 1.76 (H) 02/27/2024    EGFRIFNONA 56 (L) 09/07/2021    EGFRIFAFRI 77 09/29/2020    BCR 24.4 02/27/2024    K 4.3 02/27/2024    CO2 26.0 02/27/2024    CALCIUM 9.3 02/27/2024    PROTENTOTREF 6.6 07/18/2023    ALBUMIN 4.1 02/27/2024    LABIL2 1.3 07/18/2023    AST 22 02/27/2024    ALT 14 02/27/2024     Lab Results  "  Component Value Date    WBC 5.48 02/27/2024    HGB 11.4 (L) 02/27/2024    HCT 37.5 02/27/2024    MCV 87.8 02/27/2024     (L) 02/27/2024     Lab Results   Component Value Date    CHOL 142 02/27/2024    CHLPL 162 09/29/2020    TRIG 74 02/27/2024    HDL 43 02/27/2024    LDL 84 02/27/2024     Lab Results   Component Value Date    TSH 1.510 06/22/2021     Lab Results   Component Value Date    TROPONINT <0.010 06/20/2021     No results found for: \"HGBA1C\"  No results found for: \"DDIMER\"  Lab Results   Component Value Date    ALT 14 02/27/2024     No results found for: \"HGBA1C\"  Lab Results   Component Value Date    CREATININE 1.76 (H) 02/27/2024     No results found for: \"IRON\", \"TIBC\", \"FERRITIN\"  Lab Results   Component Value Date    INR 1.07 04/14/2023    INR 1.63 (H) 06/21/2021    INR 1.85 (H) 06/20/2021    PROTIME 14.1 04/14/2023    PROTIME 19.1 (H) 06/21/2021    PROTIME 21.0 (H) 06/20/2021       PAH RISK ASSESSMENT:  ReDs Vest    Performed by: Diogo Lawton MD PhD  Authorized by: Diogo Lawton MD PhD    ReDS value:  41  ReDS Value Description:  36-41 (orange) = Possible Hypervolemic Status      Assessment & Plan           1. Pulmonary hypertension.  This is WHO-group-2/3.  Most recent 2D TTE shows pulmonary hypertension.  CT chest with emphysematous changes.  He does have KATIE.  This is multifactorial.      He presents today with stable secondary pulmonary hypertension.  -No current indication for PAH-specific medicines       2. Ischemic cardiomyopathy. Now with HFimpEF.  NYHA stage C; functional class II.  Today, he is euvolemic and perfused.      - Toprol-XL  - Spironolactone with quarterly assessment of GFR  - Jardiance 10 mg  - Torsemide       3. RV Heart Failure.    RV is dilated. RVEF is abnormal.  RV status:  RV ADVERSELY-REMODELED with a ESVi of >114ml.   The patient presents today with RHF. The etiology is: PAH/KATIE.     Today, he is clinically stable.  RV remains adversely " remodeled with moderate RV dysfunction.    - Toprol-XL  - Spironolactone  - Torsemide                 Return in about 3 months (around 9/3/2024).        This document has been electronically signed by Diogo Lawton MD PhD on Johnna 3, 2024 11:42 EDT      Diogo Lawton M.D., Ph.D., [facility]  System Medical Director for HF and PAH

## 2024-06-03 NOTE — ADDENDUM NOTE
Encounter addended by: Ada Ruth MA on: 6/3/2024 11:48 AM   Actions taken: Charge Capture section accepted

## 2024-07-01 ENCOUNTER — OFFICE VISIT (OUTPATIENT)
Dept: CARDIOLOGY | Facility: CLINIC | Age: 74
End: 2024-07-01
Payer: MEDICARE

## 2024-07-01 ENCOUNTER — HOSPITAL ENCOUNTER (OUTPATIENT)
Dept: CARDIOLOGY | Facility: HOSPITAL | Age: 74
Discharge: HOME | End: 2024-07-01
Admitting: INTERNAL MEDICINE
Payer: MEDICARE

## 2024-07-01 VITALS
OXYGEN SATURATION: 93 % | DIASTOLIC BLOOD PRESSURE: 70 MMHG | HEIGHT: 72 IN | BODY MASS INDEX: 29.26 KG/M2 | SYSTOLIC BLOOD PRESSURE: 128 MMHG | HEART RATE: 63 BPM | WEIGHT: 216 LBS

## 2024-07-01 VITALS
HEIGHT: 72 IN | HEART RATE: 95 BPM | DIASTOLIC BLOOD PRESSURE: 70 MMHG | SYSTOLIC BLOOD PRESSURE: 128 MMHG | WEIGHT: 212.6 LBS | BODY MASS INDEX: 28.79 KG/M2 | OXYGEN SATURATION: 96 %

## 2024-07-01 DIAGNOSIS — G47.31 COMPLEX SLEEP APNEA SYNDROME: ICD-10-CM

## 2024-07-01 DIAGNOSIS — I51.9 DYSFUNCTION OF RIGHT CARDIAC VENTRICLE: ICD-10-CM

## 2024-07-01 DIAGNOSIS — I10 PRIMARY HYPERTENSION: Chronic | ICD-10-CM

## 2024-07-01 DIAGNOSIS — I27.20 PULMONARY HYPERTENSION: ICD-10-CM

## 2024-07-01 DIAGNOSIS — I25.10 CORONARY ARTERY DISEASE INVOLVING NATIVE CORONARY ARTERY OF NATIVE HEART WITHOUT ANGINA PECTORIS: Primary | Chronic | ICD-10-CM

## 2024-07-01 DIAGNOSIS — I48.20 CHRONIC ATRIAL FIBRILLATION: Chronic | ICD-10-CM

## 2024-07-01 DIAGNOSIS — I48.21 PERMANENT ATRIAL FIBRILLATION: Chronic | ICD-10-CM

## 2024-07-01 DIAGNOSIS — I37.1 NONRHEUMATIC PULMONARY VALVE INSUFFICIENCY: ICD-10-CM

## 2024-07-01 DIAGNOSIS — I34.0 NONRHEUMATIC MITRAL VALVE REGURGITATION: Chronic | ICD-10-CM

## 2024-07-01 DIAGNOSIS — I25.5 ISCHEMIC CARDIOMYOPATHY: Chronic | ICD-10-CM

## 2024-07-01 DIAGNOSIS — I49.3 PVCS (PREMATURE VENTRICULAR CONTRACTIONS): ICD-10-CM

## 2024-07-01 DIAGNOSIS — I50.812 CHRONIC RIGHT-SIDED HEART FAILURE: ICD-10-CM

## 2024-07-01 DIAGNOSIS — Z95.818 PRESENCE OF WATCHMAN LEFT ATRIAL APPENDAGE CLOSURE DEVICE: ICD-10-CM

## 2024-07-01 DIAGNOSIS — I36.1 NONRHEUMATIC TRICUSPID VALVE REGURGITATION: ICD-10-CM

## 2024-07-01 DIAGNOSIS — E78.5 DYSLIPIDEMIA: ICD-10-CM

## 2024-07-01 DIAGNOSIS — I42.8 OTHER CARDIOMYOPATHY: ICD-10-CM

## 2024-07-01 PROBLEM — C44.41 BASAL CELL CARCINOMA (BCC) OF SCALP OR SKIN OF NECK: Status: ACTIVE | Noted: 2024-06-14

## 2024-07-01 PROBLEM — C44.01 BASAL CELL CARCINOMA (BCC) OF SKIN OF RIGHT UPPER LIP: Status: ACTIVE | Noted: 2024-06-14

## 2024-07-01 LAB
AORTIC ARCH: 2.5 CM
ASCENDING AORTA: 3.8 CM
BH CV ECHO MEAS - ACS: 2.47 CM
BH CV ECHO MEAS - AO MAX PG: 4.2 MMHG
BH CV ECHO MEAS - AO MEAN PG: 2 MMHG
BH CV ECHO MEAS - AO ROOT DIAM: 3.3 CM
BH CV ECHO MEAS - AO V2 MAX: 102 CM/SEC
BH CV ECHO MEAS - AO V2 VTI: 20.1 CM
BH CV ECHO MEAS - AVA(I,D): 2.26 CM2
BH CV ECHO MEAS - EDV(CUBED): 91.3 ML
BH CV ECHO MEAS - EDV(MOD-SP2): 151 ML
BH CV ECHO MEAS - EDV(MOD-SP4): 169 ML
BH CV ECHO MEAS - EF(MOD-BP): 54.8 %
BH CV ECHO MEAS - EF(MOD-SP2): 53.6 %
BH CV ECHO MEAS - EF(MOD-SP4): 55 %
BH CV ECHO MEAS - ESV(CUBED): 31.7 ML
BH CV ECHO MEAS - ESV(MOD-SP2): 70 ML
BH CV ECHO MEAS - ESV(MOD-SP4): 76 ML
BH CV ECHO MEAS - FS: 29.7 %
BH CV ECHO MEAS - IVS/LVPW: 0.9 CM
BH CV ECHO MEAS - IVSD: 1.37 CM
BH CV ECHO MEAS - LAT PEAK E' VEL: 9.8 CM/SEC
BH CV ECHO MEAS - LV DIASTOLIC VOL/BSA (35-75): 76.8 CM2
BH CV ECHO MEAS - LV MASS(C)D: 260.1 GRAMS
BH CV ECHO MEAS - LV MAX PG: 1.45 MMHG
BH CV ECHO MEAS - LV MEAN PG: 1 MMHG
BH CV ECHO MEAS - LV SYSTOLIC VOL/BSA (12-30): 34.5 CM2
BH CV ECHO MEAS - LV V1 MAX: 60.2 CM/SEC
BH CV ECHO MEAS - LV V1 VTI: 11.9 CM
BH CV ECHO MEAS - LVIDD: 4.5 CM
BH CV ECHO MEAS - LVIDS: 3.2 CM
BH CV ECHO MEAS - LVOT AREA: 3.8 CM2
BH CV ECHO MEAS - LVOT DIAM: 2.21 CM
BH CV ECHO MEAS - LVPWD: 1.52 CM
BH CV ECHO MEAS - MED PEAK E' VEL: 6.2 CM/SEC
BH CV ECHO MEAS - MR MAX PG: 83.2 MMHG
BH CV ECHO MEAS - MR MAX VEL: 456.1 CM/SEC
BH CV ECHO MEAS - MV DEC SLOPE: 619.6 CM/SEC2
BH CV ECHO MEAS - MV DEC TIME: 0.16 SEC
BH CV ECHO MEAS - MV E MAX VEL: 103.9 CM/SEC
BH CV ECHO MEAS - MV MAX PG: 6.4 MMHG
BH CV ECHO MEAS - MV MEAN PG: 3.7 MMHG
BH CV ECHO MEAS - MV P1/2T: 58.4 MSEC
BH CV ECHO MEAS - MV V2 VTI: 19.8 CM
BH CV ECHO MEAS - MVA(P1/2T): 3.8 CM2
BH CV ECHO MEAS - MVA(VTI): 2.29 CM2
BH CV ECHO MEAS - PA ACC TIME: 0.07 SEC
BH CV ECHO MEAS - PA V2 MAX: 65.2 CM/SEC
BH CV ECHO MEAS - PI END-D VEL: 141.2 CM/SEC
BH CV ECHO MEAS - QP/QS: 2.4
BH CV ECHO MEAS - RAP SYSTOLE: 15 MMHG
BH CV ECHO MEAS - RV MAX PG: 1.01 MMHG
BH CV ECHO MEAS - RV V1 MAX: 50.1 CM/SEC
BH CV ECHO MEAS - RV V1 VTI: 9.5 CM
BH CV ECHO MEAS - RVOT DIAM: 3.8 CM
BH CV ECHO MEAS - RVSP: 57.1 MMHG
BH CV ECHO MEAS - SUP REN AO DIAM: 2.2 CM
BH CV ECHO MEAS - SV(LVOT): 45.5 ML
BH CV ECHO MEAS - SV(MOD-SP2): 81 ML
BH CV ECHO MEAS - SV(MOD-SP4): 93 ML
BH CV ECHO MEAS - SV(RVOT): 109.2 ML
BH CV ECHO MEAS - SVI(LVOT): 20.6 ML/M2
BH CV ECHO MEAS - SVI(MOD-SP2): 36.8 ML/M2
BH CV ECHO MEAS - SVI(MOD-SP4): 42.2 ML/M2
BH CV ECHO MEAS - TAPSE (>1.6): 1.69 CM
BH CV ECHO MEAS - TR MAX PG: 42.1 MMHG
BH CV ECHO MEAS - TR MAX VEL: 324.4 CM/SEC
BH CV ECHO MEASUREMENTS AVERAGE E/E' RATIO: 12.99
BH CV XLRA - RV BASE: 4.4 CM
BH CV XLRA - RV LENGTH: 9.1 CM
BH CV XLRA - RV MID: 3.2 CM
BH CV XLRA - TDI S': 8.8 CM/SEC
LEFT ATRIUM VOLUME INDEX: 73.6 ML/M2
SINUS: 3.4 CM
STJ: 3 CM

## 2024-07-01 PROCEDURE — 25510000001 PERFLUTREN (DEFINITY) 8.476 MG IN SODIUM CHLORIDE (PF) 0.9 % 10 ML INJECTION: Performed by: INTERNAL MEDICINE

## 2024-07-01 PROCEDURE — 93306 TTE W/DOPPLER COMPLETE: CPT

## 2024-07-01 PROCEDURE — 3078F DIAST BP <80 MM HG: CPT | Performed by: NURSE PRACTITIONER

## 2024-07-01 PROCEDURE — 3074F SYST BP LT 130 MM HG: CPT | Performed by: NURSE PRACTITIONER

## 2024-07-01 PROCEDURE — 99214 OFFICE O/P EST MOD 30 MIN: CPT | Performed by: NURSE PRACTITIONER

## 2024-07-01 PROCEDURE — 1159F MED LIST DOCD IN RCRD: CPT | Performed by: NURSE PRACTITIONER

## 2024-07-01 PROCEDURE — 93000 ELECTROCARDIOGRAM COMPLETE: CPT | Performed by: NURSE PRACTITIONER

## 2024-07-01 PROCEDURE — 93306 TTE W/DOPPLER COMPLETE: CPT | Performed by: INTERNAL MEDICINE

## 2024-07-01 PROCEDURE — 1160F RVW MEDS BY RX/DR IN RCRD: CPT | Performed by: NURSE PRACTITIONER

## 2024-07-01 RX ORDER — ALLOPURINOL 300 MG/1
300 TABLET ORAL
COMMUNITY
Start: 2024-05-24

## 2024-07-01 RX ORDER — FERROUS SULFATE 325(65) MG
325 TABLET, DELAYED RELEASE (ENTERIC COATED) ORAL
COMMUNITY

## 2024-07-01 RX ORDER — PRAVASTATIN SODIUM 40 MG
40 TABLET ORAL DAILY
Qty: 90 TABLET | Refills: 3 | Status: SHIPPED | OUTPATIENT
Start: 2024-07-01

## 2024-07-01 RX ORDER — METOPROLOL SUCCINATE 50 MG/1
50 TABLET, EXTENDED RELEASE ORAL DAILY
Qty: 90 TABLET | Refills: 3 | Status: SHIPPED | OUTPATIENT
Start: 2024-07-01

## 2024-07-01 RX ORDER — SPIRONOLACTONE 25 MG/1
25 TABLET ORAL DAILY
Qty: 90 TABLET | Refills: 3 | Status: SHIPPED | OUTPATIENT
Start: 2024-07-01

## 2024-07-01 RX ORDER — FLUOROMETHOLONE 0.1 %
SUSPENSION, DROPS(FINAL DOSAGE FORM)(ML) OPHTHALMIC (EYE)
COMMUNITY
Start: 2024-06-06

## 2024-07-01 RX ADMIN — PERFLUTREN 2 ML: 6.52 INJECTION, SUSPENSION INTRAVENOUS at 11:09

## 2024-07-01 NOTE — PROGRESS NOTES
Date of Office Visit: 2024  Encounter Provider: SIRIA Benedict  Place of Service: [facility] CARDIOLOGY  Patient Name: Renato Marquez  :1950  Primary Cardiologist: Dr. Mauricio Scott    Chief Complaint   Patient presents with    HFpEf    Follow-up   :     HPI: Renato Marquez is a 74 y.o. male who presents today for cardiac follow-up visit.  He is a new patient to me and I reviewed his medical records.    He has known hypertension, hyperlipidemia, and obstructive sleep apnea on CPAP.  In , he was diagnosed with multivessel CAD and underwent CABG x 3 (LIMA to the LAD, SVG-obtuse marginal, and SVG to PDA).  He also was diagnosed with carotid artery stenosis and underwent carotid endarterectomy in .  At 1 point he had an 8 ejection fraction of 37%.     He has known atrial fibrillation.  In 2023, he underwent watchman implantation.    In 2024, he saw Dr. Alvaro Lawotn.  He noted that sacubitril-valsartan was discontinued due to hypotension.  He was noted to have stable secondary pulmonary hypertension Who Group 2/3 and right ventricular heart failure.    He presents today for cardiac follow-up visit.  Echocardiogram was just completed and the results are pending.  He states that he feels better than he has in a long time.  He is now able to play golf without stopping.  Occasionally he experiences some calf tenderness.  He denies chest pain, shortness of air, PND, orthopnea, edema, dizziness, syncope, or bleeding.  Blood pressure and heart rate are normal.  He was noted to have atrial fibrillation with PVCs on today's EKG and is asymptomatic.      Past Medical History:   Diagnosis Date    Abnormal ECG     CAD (coronary artery disease)     Cancer     skin cancer    Carotid arterial disease     Chronic atrial fibrillation     Complex sleep apnea syndrome     BiPAP ST    Hemorrhagic disorder due to circulating anticoagulants 2021     Hyperlipidemia     Hypertension     Ischemic cardiomyopathy     Mitral regurgitation     PVCs (premature ventricular contractions)     Rectal bleeding        Past Surgical History:   Procedure Laterality Date    ATRIAL APPENDAGE EXCLUSION LEFT WITH TRANSESOPHAGEAL ECHOCARDIOGRAM N/A 2023    Procedure: Atrial Appendage Occlusion;  Surgeon: Dustin Neal MD;  Location: [facility] CATH INVASIVE LOC[facility];  Service: Cardiovascular;  Laterality: N/A;    CAROTID ENDARTERECTOMY Right 2009    Dr Lewis    COLONOSCOPY      COLONOSCOPY N/A 2021    Procedure: COLONOSCOPY INTO CECUM AND TI;  Surgeon: Artur Jacobson MD;  [facility];  Service: Gastroenterology;  Laterality: N/A;  PRE: RECTAL BLEEDING  POST: DIVERTICULOSIS, HEMORRHOIDS    COLONOSCOPY W/ POLYPECTOMY N/A 6/3/2021    Procedure: COLONOSCOPY, polypectomies;  Surgeon: Luan Meek MD;  Location: [facility] OR;  Service: Gastroenterology;  Laterality: N/A;  Diverticulosis  Ascending colon polyp x 4 (3 cold snare)  Cecal polyp (cold snare)  Rectal polyp    CORONARY ARTERY BYPASS GRAFT      MIMI Frederick to LAD, SVG to OM, SVG Post Desc    ENDOSCOPY N/A 6/3/2021    Procedure: ESOPHAGOGASTRODUODENOSCOPY with biopsies;  Surgeon: Luan Meek MD;  Location: [facility] OR;  Service: Gastroenterology;  Laterality: N/A;  Reflux  Gastric and duodenal ulcers  Biopsies: gastric, distal esophagus    FINGER SURGERY         Social History     Socioeconomic History    Marital status:    Tobacco Use    Smoking status: Former     Current packs/day: 0.00     Types: Cigarettes, Cigars     Quit date: 1991     Years since quittin.5    Smokeless tobacco: Never   Vaping Use    Vaping status: Never Used   Substance and Sexual Activity    Alcohol use: Yes     Alcohol/week: 2.0 standard drinks of alcohol     Types: 2 Shots of liquor per week     Comment: NIGHTLY/caffeine use    Drug use: No    Sexual activity: Not  "Currently       Family History   Problem Relation Age of Onset    No Known Problems Mother     Heart disease Father     Stroke Father     Hypertension Father        The following portion of the patient's history were reviewed and updated as appropriate: past medical history, past surgical history, past social history, past family history, allergies, current medications, and problem list.    Review of Systems   Constitutional: Negative.   Cardiovascular: Negative.    Respiratory: Negative.     Hematologic/Lymphatic: Negative.    Neurological: Negative.        No Known Allergies      Current Outpatient Medications:     allopurinol (ZYLOPRIM) 300 MG tablet, 1 tablet., Disp: , Rfl:     aspirin (ASPIR) 81 MG EC tablet, Take 1 tablet by mouth Daily., Disp: 90 tablet, Rfl: 3    ferrous sulfate 325 (65 FE) MG EC tablet, Take 1 tablet by mouth., Disp: , Rfl:     fluorometholone (FML) 0.1 % ophthalmic suspension, , Disp: , Rfl:     Jardiance 10 MG tablet tablet, TAKE 1 TABLET BY MOUTH DAILY, Disp: 30 tablet, Rfl: 3    Magnesium 200 MG tablet, Take 250 mg by mouth Daily., Disp: , Rfl:     metoprolol succinate XL (TOPROL-XL) 50 MG 24 hr tablet, TAKE 1 TABLET BY MOUTH DAILY, Disp: 90 tablet, Rfl: 1    pravastatin (PRAVACHOL) 40 MG tablet, TAKE 1 TABLET BY MOUTH DAILY, Disp: 90 tablet, Rfl: 0    spironolactone (ALDACTONE) 25 MG tablet, TAKE 1 TABLET BY MOUTH DAILY, Disp: 90 tablet, Rfl: 1    torsemide (DEMADEX) 20 MG tablet, Take 1 tablet by mouth Daily., Disp: 90 tablet, Rfl: 0    albuterol sulfate  (90 Base) MCG/ACT inhaler, Inhale 2 puffs every 4 hours when necessary wheeze, dyspnea, cough, Disp: 8 g, Rfl: 0    guaiFENesin (MUCINEX) 600 MG 12 hr tablet, Take 2 tablets by mouth 2 (Two) Times a Day., Disp: , Rfl:         Objective:     Vitals:    07/01/24 1120   BP: 128/70   Cuff Size: Adult   Pulse: 95   SpO2: 96%   Weight: 96.4 kg (212 lb 9.6 oz)   Height: 182.9 cm (72.01\")     Body mass index is 28.83 " kg/m².    PHYSICAL EXAM:    Vitals Reviewed.   General Appearance: No acute distress, well developed and well nourished.   HENT: No hearing loss noted.    Respiratory: No signs of respiratory distress. Respiration rhythm and depth normal.  Clear to auscultation.   Cardiovascular:  Jugular Venous Pressure: Normal  Heart Rate and Rhythm: Irregular, irregular.  Heart Sounds: Normal S1 and S2. No S3 or S4 noted.  Murmurs: No murmurs noted. No rubs, thrills, or gallops.   Lower Extremities: No edema noted.  Musculoskeletal: Normal movement of extremities.  Skin: General appearance normal.    Psychiatric: Patient alert and oriented to person, place, and time. Speech and behavior appropriate. Normal mood and affect.       ECG 12 Lead    Date/Time: 7/1/2024 11:09 AM  Performed by: Jenifer Ruby APRN    Authorized by: Jenifer Ruby APRN  Comparison: compared with previous ECG from 3/6/2024  Similar to previous ECG  Rhythm: atrial fibrillation  Ectopy: unifocal PVCs  Rate: normal  BPM: 95  Conduction: conduction normal  ST Depression: II, III, aVF, V3, V4, V5 and V6  T inversion: V4, V5 and V6  QRS axis: normal    Clinical impression: abnormal EKG            Assessment:       Diagnosis Plan   1. Coronary artery disease involving native coronary artery of native heart without angina pectoris        2. Chronic right-sided heart failure        3. Dysfunction of right cardiac ventricle        4. Ischemic cardiomyopathy        5. Pulmonary hypertension        6. Permanent atrial fibrillation        7. Presence of Watchman left atrial appendage closure device        8. PVCs (premature ventricular contractions)        9. Nonrheumatic tricuspid valve regurgitation        10. Nonrheumatic mitral valve regurgitation        11. Nonrheumatic pulmonary valve insufficiency        12. Primary hypertension        13. Complex sleep apnea syndrome               Plan:       1.  Coronary Artery Disease: Status post CABG x 3 in 2009.   Today's angina.  Continue aspirin and pravastatin.    2-3.  Acute on chronic right-sided heart failure.  NYHA class I.  Echocardiogram results from today pending.  Euvolemic.  Continue metoprolol succinate, empagliflozin, torsemide, and spironolactone.  He did not tolerate sacubitril-valsartan due to low blood pressure.    4.  History of ischemic cardiomyopathy.    5.  Secondary pulmonary hypertension Who Group 2/3 followed by Dr. Lawton.    6/7.  Permanent atrial fibrillation with controlled ventricular response.  Continue metoprolol.  He is not anticoagulated Watchman device implantation.    8.  PVCs noted on today's EKG.  Asymptomatic.    9/10/11.  Mild to moderate mitral regurgitation, moderate tricuspid regurgitation, and moderate pulmonic regurgitation noted on ADDY in the past.    12.  Hypertension: Blood pressure well-controlled on current medications.    13.  Obstructive sleep apnea on CPAP.    14.  Echocardiogram results from today pending and we will call with results.  I have scheduled him for a follow-up visit with Dr. Scott in 6 months.     As always, it has been a pleasure to participate in your patient's care. Thank you.         Sincerely,         SIRIA Serrano  [facility] Cardiology      Dictated utilizing Dragon Dictation  I spent 32 minutes reviewing her medical records/testing/previous office notes/labs, face-to-face interaction with patient, physical examination, formulating the plan of care, and discussion of plan of care with patient.

## 2024-07-02 ENCOUNTER — TELEPHONE (OUTPATIENT)
Dept: CARDIOLOGY | Facility: CLINIC | Age: 74
End: 2024-07-02
Payer: MEDICARE

## 2024-07-02 NOTE — PROGRESS NOTES
I compared the echocardiogram results with the previous ADDY in March 2024.  Unchanged.  Patient feels good.  I have sent a copy to Dr. Lawton for his review and advisement.  Patient given results and verbalizes understanding.  Continue current medications.

## 2024-07-02 NOTE — TELEPHONE ENCOUNTER
Echocardiogram showed normal LVEF, diastolic function indeterminate, mildly reduced RV systolic function, biatrial dilation, RV dilation, moderate MR, moderate TR, and severe pulmonary hypertension with RVSP of 57 mmHg.    I compared to his previous ADDY and it appears to be essentially unchanged.  Patient overall is feeling great.  Dr. Scott has already reviewed.  I will send to Dr. Lawton for his review and any further recommendations.    Patient verbalized understanding.

## 2024-07-03 NOTE — TELEPHONE ENCOUNTER
Recommendations reviewed with pt.  Instructed to call with any further questions or concerns.  Verbalized understanding.    Wendie Jiménez RN  Triage Nurse, [facility]  07/03/24 12:25 EDT

## 2024-07-03 NOTE — TELEPHONE ENCOUNTER
Please call patient.  Let him know that Dr. Scott and Dr. Lawton both reviewed the echocardiogram and feel that everything is stable.  I gave results to patient yesterday.  Please keep scheduled appointment with Dr. Lawton in September and Dr. Scott in January.  Thank you

## 2024-07-25 ENCOUNTER — OFFICE VISIT (OUTPATIENT)
Dept: SLEEP MEDICINE | Facility: HOSPITAL | Age: 74
End: 2024-07-25
Payer: MEDICARE

## 2024-07-25 VITALS
OXYGEN SATURATION: 93 % | WEIGHT: 218.8 LBS | DIASTOLIC BLOOD PRESSURE: 60 MMHG | HEART RATE: 80 BPM | HEIGHT: 72 IN | BODY MASS INDEX: 29.64 KG/M2 | SYSTOLIC BLOOD PRESSURE: 110 MMHG

## 2024-07-25 DIAGNOSIS — G47.31 COMPLEX SLEEP APNEA SYNDROME: Primary | ICD-10-CM

## 2024-07-25 PROCEDURE — 3074F SYST BP LT 130 MM HG: CPT | Performed by: INTERNAL MEDICINE

## 2024-07-25 PROCEDURE — 3078F DIAST BP <80 MM HG: CPT | Performed by: INTERNAL MEDICINE

## 2024-07-25 PROCEDURE — 99214 OFFICE O/P EST MOD 30 MIN: CPT | Performed by: INTERNAL MEDICINE

## 2024-07-25 PROCEDURE — 1160F RVW MEDS BY RX/DR IN RCRD: CPT | Performed by: INTERNAL MEDICINE

## 2024-07-25 PROCEDURE — 1159F MED LIST DOCD IN RCRD: CPT | Performed by: INTERNAL MEDICINE

## 2024-07-25 PROCEDURE — G0463 HOSPITAL OUTPT CLINIC VISIT: HCPCS

## 2024-07-25 NOTE — PROGRESS NOTES
"  [facility]  [address]  [address]  [place] [address]  Phone   Fax       SLEEP CLINIC FOLLOW UP PROGRESS NOTE.    Renato Marquez  1950  74 y.o.  male      PCP: Jerad James Jr., MD      Date of visit: 7/25/2024    Chief Complaint   Patient presents with    Sleep Apnea    Obesity       HPI:  This is a 74 y.o. years old patient who has a history of complex sleep apnea is here for  the initial compliance follow-up.  Sleep apnea is severe in severity with a AHI of 70/h with central apneas 43/hr. Patient is using positive airway pressure therapy with BiPAP ST and the symptoms of snoring, non-restorative sleep and daytime excessive sleepiness have improved significantly on the therapy. Normally goes to bed at 12 midnight and wakes up at 5:30 AM.  The patient wakes up 3 time(s) during the night and has no problem going back to sleep.  Feels refreshed after waking up.  Patient also denies headaches and nasal congestion.   He is undergoing some radiation treatment because of the skin cancer on his face wants to try a different mask.    Mediactions and allergies are reviewed by me and documented in the encounter.     SOCIAL ( habits pertaining to sleep medicine)  History of smoking:No   History of alcohol use: 10 per week  Caffeine use: 2    REVIEW OF SYSTEMS:   [place] Sleepiness Scale :Total score: 4   Nsaal congestion:No   Dry mouth/nose:No   Post nasal drip; No   Acid reflux/Heartburn:No   Abd bloating:No   Morining headache:No   Anxiety:No   Depression:No     PHYSICAL EXAMINATION:  CONSTITUTIONAL:  Vitals:    07/25/24 1300   BP: 110/60   Pulse: 80   SpO2: 93%   Weight: 99.2 kg (218 lb 12.8 oz)   Height: 182.9 cm (72\")    Body mass index is 29.67 kg/m².   NOSE: nasal passages are clear, no nasal polyps, septum in the midline.  THROAT: throat is clear, oral airway Mallampati class 3  RESP SYSTEM: Breath sounds are normal, no wheezes or " crackles  CARDIOVASULAR: Heart rate is regular without murmur. No edema      Data reviewed:  The Smart card downloaded on 7/25/2024 has been reviewed independently by me for compliance and discussed the data with the patient.   Compliance; 100%  > 4 hr use, 96%  Average use of the device 5 hours and 45 minutes per night  Residual AHI: 8/hr  mask type: Fullface mask, I have given him my Tae fullface mask which is under the nose  Device DreamStation BiPAP ST  DME: [facility]    Patient is going to call me if he likes this mask so that they will I can change his prescription    ASSESSMENT AND PLAN:  Complex sleep apnea .  It includes both central sleep apnea and obstructive sleep apnea and is on BiPAP ST.  The symptoms of sleep apnea have improved with the device and the treatment.  Patient's compliance with the device is excellent for treatment of sleep apnea.  I have independently reviewed the smart card down load and discussed with the patient the download data and encouarged the patient to continue to use the device.The residual AHI is acceptable. The device is benefiting the patient and the device is medically necessary.  Without proper control of sleep apnea and good compliance there is a increased risk for hypertension, diabetes mellitus and nonrestorative sleep with hypersomnia which can increase risk for motor vehicle accidents.  Untreated sleep apnea is also a risk factor for development of atrial fibrillation, pulmonary hypertension and stroke. The patient is also instructed to get the supplies from the DME company and and change them on a regular basis.  A prescription for supplies has been sent to the DME company.  I have also discussed the good sleep hygiene habits and adequate amount of sleep needed for good health.  Return in about 1 year (around 7/25/2025) for with smart card down load. . Patient's questions were answered.        Jett Mcguire MD  Sleep Medicine  Medical Director for  [facility]  7/25/2024

## 2024-09-03 ENCOUNTER — HOSPITAL ENCOUNTER (OUTPATIENT)
Dept: CARDIOLOGY | Facility: HOSPITAL | Age: 74
Discharge: HOME | End: 2024-09-03
Admitting: INTERNAL MEDICINE
Payer: MEDICARE

## 2024-09-03 VITALS
WEIGHT: 211 LBS | HEART RATE: 62 BPM | DIASTOLIC BLOOD PRESSURE: 68 MMHG | BODY MASS INDEX: 28.58 KG/M2 | SYSTOLIC BLOOD PRESSURE: 125 MMHG | HEIGHT: 72 IN | OXYGEN SATURATION: 96 %

## 2024-09-03 DIAGNOSIS — I27.20 PULMONARY HYPERTENSION: Primary | ICD-10-CM

## 2024-09-03 DIAGNOSIS — I51.9 DYSFUNCTION OF RIGHT CARDIAC VENTRICLE: ICD-10-CM

## 2024-09-03 DIAGNOSIS — I50.812 CHRONIC RIGHT-SIDED HEART FAILURE: ICD-10-CM

## 2024-09-03 PROCEDURE — G0463 HOSPITAL OUTPT CLINIC VISIT: HCPCS

## 2024-09-03 PROCEDURE — 94726 PLETHYSMOGRAPHY LUNG VOLUMES: CPT | Performed by: INTERNAL MEDICINE

## 2024-09-03 NOTE — LETTER
September 3, 2024       No Recipients    Patient: Renato Marquez   YOB: 1950   Date of Visit: 9/3/2024     Dear Jerad James Jr., MD:       Thank you for referring Renato Marquez to me for evaluation. Below are the relevant portions of my assessment and plan of care.    If you have questions, please do not hesitate to call me. I look forward to following Renato along with you.         Sincerely,        Diogo Lawton MD PhD        CC:   No Recipients    Diogo Lawton MD PhD  09/03/24 1526  Signed  Heart Failure [facility] Pulmonary Arterial Hypertension [facility]  [facility]  Diogo Lawton M.D., Ph.D., [facility]       Mauricio Scott III, MD  [address] [place] [address]    Thank you for asking me to see Renato Marquez.     History of Present Illness  This is a 74 y.o. male with:    1. PH  2. HFrEF--now HFimpEF    Renato Marquez is a 74 y.o. male who presents today.  The patient's cardiologist is Dr. Scott.     Patient returns today for further evaluation and management of pulmonary hypertension and HFrEF.  Patient was diagnosed with HFrEF several years ago.  He was found to have ASCAD.  He is status post CABG with a LIMA to the LAD, SVG to the OM, and SVG to the PDA in 2009 by Dr. Jeet Bhagat.  This was detected during a life screening. Originally the concern was over SHELLEY. He did undergo. Most recent ischemia evaluation was in January 2021 without evidence of ischemia.  Echocardiogram was in 2015 reported LVEF of 35-40%.  Over the years this has improved.  He now meets criteria for HFimpEF.      Patient returns to the clinic today.  Clinical course was complicated by marginal hemodynamics that required the discontinuation of Entresto.  Otherwise, he is on Toprol-XL, spironolactone, and Jardiance.  Torsemide is his diuretic.  He is able to obtain and afford his medication.  No adverse effects.  No emergency department visits or inpatient admissions  related to failure.  Denies orthopnea, PND, lower extremity edema, ascites, and early abdominal satiety.      Review of Systems - Review of Systems   Cardiovascular:  Positive for dyspnea on exertion.   Respiratory:  Positive for shortness of breath.    All other systems reviewed and are negative.        All other systems were reviewed and were negative.    Patient Active Problem List   Diagnosis   • Coronary artery disease involving native coronary artery of native heart without angina pectoris   • Carotid arterial disease   • Permanent atrial fibrillation   • Ischemic cardiomyopathy   • Mitral regurgitation   • Hypothyroidism   • HTN (hypertension)   • Dyslipidemia   • DDD (degenerative disc disease), lumbar   • Chronic bilateral low back pain with right-sided sciatica   • Primary osteoarthritis of right knee   • Class 1 obesity due to excess calories without serious comorbidity with body mass index (BMI) of 31.0 to 31.9 in adult   • Complex sleep apnea syndrome   • Hemorrhagic disorder due to circulating anticoagulants   • Gastroesophageal reflux disease with esophagitis without hemorrhage   • 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   • Diverticulosis   • Pulmonary hypertension   • Chronic right-sided heart failure   • Dysfunction of right cardiac ventricle   • PAD (peripheral artery disease)   • Presence of Watchman left atrial appendage closure device   • PVCs (premature ventricular contractions)   • Basal cell carcinoma (BCC) of scalp or skin of neck   • Basal cell carcinoma (BCC) of skin of right upper lip       family history includes Heart disease in his father; Hypertension in his father; No Known Problems in his mother; Stroke in his father.     reports that he quit smoking about 32 years ago. His smoking use included cigarettes and cigars. He has never used smokeless tobacco. He reports current alcohol use of about 2.0 standard drinks of alcohol per week. He reports that he does not use drugs.    No Known  Allergies        Current Outpatient Medications:   •  allopurinol (ZYLOPRIM) 300 MG tablet, 1 tablet., Disp: , Rfl:   •  aspirin (ASPIR) 81 MG EC tablet, Take 1 tablet by mouth Daily., Disp: 90 tablet, Rfl: 3  •  ferrous sulfate 325 (65 FE) MG EC tablet, Take 1 tablet by mouth., Disp: , Rfl:   •  fluorometholone (FML) 0.1 % ophthalmic suspension, , Disp: , Rfl:   •  Jardiance 10 MG tablet tablet, TAKE 1 TABLET BY MOUTH DAILY, Disp: 30 tablet, Rfl: 3  •  Magnesium 200 MG tablet, Take 250 mg by mouth Daily., Disp: , Rfl:   •  metoprolol succinate XL (TOPROL-XL) 50 MG 24 hr tablet, Take 1 tablet by mouth Daily., Disp: 90 tablet, Rfl: 3  •  pravastatin (PRAVACHOL) 40 MG tablet, Take 1 tablet by mouth Daily., Disp: 90 tablet, Rfl: 3  •  spironolactone (ALDACTONE) 25 MG tablet, Take 1 tablet by mouth Daily., Disp: 90 tablet, Rfl: 3  •  torsemide (DEMADEX) 20 MG tablet, Take 1 tablet by mouth Daily., Disp: 90 tablet, Rfl: 0    Vital Sign Review:     Vitals:    09/03/24 1511   BP: 125/68   Pulse: 62   SpO2: 96%     PP: Normal  Pulse Ox: normal oxygenation on room air    Weight: 222 lbs  Physical Exam:    Vitals reviewed.   Constitutional:       General: Not in acute distress.     Appearance: Normal and healthy appearance. Not in distress. Not ill-appearing, toxic-appearing or diaphoretic.      Interventions: Not intubated.  Eyes:      Conjunctiva/sclera: Conjunctivae normal.      Pupils: Pupils are equal, round, and reactive to light.   Neck:      Vascular: No JVR. JVD normal with 6 cm of water.   Pulmonary:      Effort: Pulmonary effort is normal. No tachypnea, bradypnea, accessory muscle usage, prolonged expiration, respiratory distress or retractions. Not intubated.      Breath sounds: Normal breath sounds and air entry. No stridor, decreased air movement or transmitted upper airway sounds. No decreased breath sounds. No wheezing. No rhonchi. No rales.   Cardiovascular:      PMI at left midclavicular line. Normal rate.  Irregularly irregular rhythm. S1 with normal intensity. loud S2.       Murmurs: There is no murmur.      No gallop.  No click. No rub.   Neurological:      General: No focal deficit present.      Mental Status: Alert and oriented to person, place and time.   Psychiatric:         Behavior: Behavior is cooperative.          DATA REVIEWED:       TTE/ADDY:    Results for orders placed during the hospital encounter of 07/01/24    Adult Transthoracic Echo Complete W/ Cont if Necessary Per Protocol    Interpretation Summary  •  Left ventricular systolic function is normal. Calculated left ventricular EF = 54.8%  •  Left ventricular wall thickness is consistent with mild concentric hypertrophy.  •  Left ventricular diastolic function was indeterminate.  •  Mildly reduced right ventricular systolic function noted.  •  The right ventricular cavity is mildly dilated.  •  The left atrial cavity is severely dilated.  •  The right atrial cavity is severely  dilated.  •  Moderate mitral valve regurgitation is present.  •  Moderate tricuspid valve regurgitation is present.  •  Calculated right ventricular systolic pressure from tricuspid regurgitation is 57 mmHg.  •  Severe pulmonary hypertension is present.      My interpretation of the TTE is below.     LVEF is 54%.  RV cavity is dilated.  Mildly reduced right ventricular systolic function.  Pulmonary hypertension.        ==========================    CT SCAN: Dilated pulmonary artery and moderate emphysematous changes  -----------------------------------------------------        PFTs were interpreted today.  FEV1/FVC is 70% of predicted.  FVC is 85% of predicted.    Laboratory evaluations:  Lab Results   Component Value Date    PROBNP 5,421.0 (H) 08/14/2023     Lab Results   Component Value Date    GLUCOSE 149 (H) 02/27/2024    BUN 43 (H) 02/27/2024    CREATININE 1.76 (H) 02/27/2024    EGFRIFNONA 56 (L) 09/07/2021    EGFRIFAFRI 77 09/29/2020    BCR 24.4 02/27/2024    K 4.3  "02/27/2024    CO2 26.0 02/27/2024    CALCIUM 9.3 02/27/2024    PROTENTOTREF 6.6 07/18/2023    ALBUMIN 4.1 02/27/2024    LABIL2 1.3 07/18/2023    AST 22 02/27/2024    ALT 14 02/27/2024     No results found for: \"HGBA1C\"  No results found for: \"HGBA1C\"  Lab Results   Component Value Date    CREATININE 1.76 (H) 02/27/2024     No results found for: \"IRON\", \"TIBC\", \"FERRITIN\"  Lab Results   Component Value Date    URICACID 4.0 07/10/2024    WBC 5.48 02/27/2024    RBC 4.27 02/27/2024    HGB 11.4 (L) 02/27/2024    HCT 37.5 02/27/2024    MCV 87.8 02/27/2024    MCH 26.7 02/27/2024    MCHC 30.4 (L) 02/27/2024    RDW 16.8 (H) 02/27/2024    RDWSD 54.3 (H) 02/27/2024    MPV 9.7 02/27/2024     (L) 02/27/2024    NEUTRORELPCT 69.3 02/27/2024    LYMPHORELPCT 15.0 (L) 02/27/2024    MONORELPCT 13.7 (H) 02/27/2024    EOSRELPCT 1.6 02/27/2024    BASORELPCT 0.2 02/27/2024    NEUTROABS 3.80 02/27/2024    LYMPHSABS 0.82 02/27/2024    MONOSABS 0.75 02/27/2024    EOSABS 0.09 02/27/2024    BASOSABS 0.01 02/27/2024    IMMATUREGR 0.4 09/29/2020    IMMGRANABS 0.03 09/29/2020    NRBC 0.0 02/27/2024      Lab Results   Component Value Date    PROTURINE 18.3 07/21/2023    BSDNELA74LF 421 (H) 07/21/2023    ALBUMINU 60.4 07/21/2023    LABALPH1 2.2 07/21/2023    LABALPH2 8.2 07/21/2023    LABBETA 17.7 07/21/2023    GAMMAUR 11.5 07/21/2023     Lab Results   Component Value Date    FREEKAPPAL 44.0 (H) 07/18/2023      Lab Results   Component Value Date    TOTIGGREF 936 07/18/2023     07/18/2023    IGM 74 07/18/2023    PROTENTOTREF 6.6 07/18/2023    ALBUMIN 4.1 02/27/2024    LABALPH 0.2 07/18/2023    LABALPH 0.7 07/18/2023    LABBETA 17.7 07/21/2023    GAMMAGLOBULI 0.9 07/18/2023    MSPIKE Not Observed 07/18/2023    LABGLOBREF 3.0 07/18/2023    LABIL2 1.3 07/18/2023    IMMUNOFIXA Comment 07/18/2023    PLEANOTE Comment 07/21/2023    FREEKAPPAL 44.0 (H) 07/18/2023    IGLFLC 25.2 07/18/2023    KAPPALAMB 1.75 (H) 07/18/2023      No results found " "for: \"LABKAPP\"         PAH RISK ASSESSMENT:  ReDs Vest    Performed by: Diogo Lawton MD PhD  Authorized by: Diogo Lawton MD PhD    ReDS value:  30  ReDS Value Description:  25-35 (green) = Optimal Volume Status      Assessment & Plan            1. Pulmonary hypertension.  This is WHO-group-2/3.  Most recent 2D TTE shows pulmonary hypertension.  CT chest with emphysematous changes.  He does have KATIE.  This is multifactorial.      He presents today with stable secondary pulmonary hypertension.  - No current indication for PAH-specific medications         2. Ischemic cardiomyopathy. Now with HFimpEF.  NYHA stage C; functional class II.  Today, he is euvolemic and perfused.    - Toprol-XL  - Spironolactone with quarterly assessment of GFR. He has labs from July and mild CKD. I am OK with checking next time.   - Jardiance 10 mg with sick precautions  - Torsemide      3. RV Heart Failure.    RV is dilated. RVEF is abnormal.  RV status:  RV ADVERSELY-REMODELED with a ESVi of >114ml.   The patient presents today with RHF. The etiology is: PAH/KATIE.     Today, clinically stable.  RV remains adversely remodeled with mildly reduced RV dysfunction.    - Toprol-XL  - Spironolactone  - Torsemide                   Return in 3 months (on 12/3/2024) for 3 months with Isamar.        This document has been electronically signed by Diogo Lawton MD PhD on September 3, 2024 15:26 EDT      Diogo Lawton M.D., Ph.D., [facility]  System Medical Director for HF and PAH      "

## 2024-09-03 NOTE — ADDENDUM NOTE
Encounter addended by: Ada Ruth MA on: 9/3/2024 4:33 PM   Actions taken: Charge Capture section accepted

## 2024-09-03 NOTE — PROGRESS NOTES
Heart Failure & Pulmonary Arterial Hypertension Clinic  [facility]  Diogo Lawton M.D., Ph.D., [facility]       Mauricio Scott III, MD  [address] [address]    Thank you for asking me to see Renato Marquez.     History of Present Illness  This is a 74 y.o. male with:    1. PH  2. HFrEF--now HFimpEF    Renato Marquez is a 74 y.o. male who presents today.  The patient's cardiologist is Dr. Scott.     Patient returns today for further evaluation and management of pulmonary hypertension and HFrEF.  Patient was diagnosed with HFrEF several years ago.  He was found to have ASCAD.  He is status post CABG with a LIMA to the LAD, SVG to the OM, and SVG to the PDA in 2009 by Dr. Jeet Bhagat.  This was detected during a life screening. Originally the concern was over SHELLEY. He did undergo. Most recent ischemia evaluation was in January 2021 without evidence of ischemia.  Echocardiogram was in 2015 reported LVEF of 35-40%.  Over the years this has improved.  He now meets criteria for HFimpEF.      Patient returns to the clinic today.  Clinical course was complicated by marginal hemodynamics that required the discontinuation of Entresto.  Otherwise, he is on Toprol-XL, spironolactone, and Jardiance.  Torsemide is his diuretic.  He is able to obtain and afford his medication.  No adverse effects.  No emergency department visits or inpatient admissions related to failure.  Denies orthopnea, PND, lower extremity edema, ascites, and early abdominal satiety.      Review of Systems - Review of Systems   Cardiovascular:  Positive for dyspnea on exertion.   Respiratory:  Positive for shortness of breath.    All other systems reviewed and are negative.        All other systems were reviewed and were negative.    Patient Active Problem List   Diagnosis    Coronary artery disease involving native coronary artery of native heart without angina pectoris    Carotid arterial disease    Permanent  atrial fibrillation    Ischemic cardiomyopathy    Mitral regurgitation    Hypothyroidism    HTN (hypertension)    Dyslipidemia    DDD (degenerative disc disease), lumbar    Chronic bilateral low back pain with right-sided sciatica    Primary osteoarthritis of right knee    Class 1 obesity due to excess calories without serious comorbidity with body mass index (BMI) of 31.0 to 31.9 in adult    Complex sleep apnea syndrome    Hemorrhagic disorder due to circulating anticoagulants    Gastroesophageal reflux disease with esophagitis without hemorrhage    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    Diverticulosis    Pulmonary hypertension    Chronic right-sided heart failure    Dysfunction of right cardiac ventricle    PAD (peripheral artery disease)    Presence of Watchman left atrial appendage closure device    PVCs (premature ventricular contractions)    Basal cell carcinoma (BCC) of scalp or skin of neck    Basal cell carcinoma (BCC) of skin of right upper lip       family history includes Heart disease in his father; Hypertension in his father; No Known Problems in his mother; Stroke in his father.     reports that he quit smoking about 32 years ago. His smoking use included cigarettes and cigars. He has never used smokeless tobacco. He reports current alcohol use of about 2.0 standard drinks of alcohol per week. He reports that he does not use drugs.    No Known Allergies        Current Outpatient Medications:     allopurinol (ZYLOPRIM) 300 MG tablet, 1 tablet., Disp: , Rfl:     aspirin (ASPIR) 81 MG EC tablet, Take 1 tablet by mouth Daily., Disp: 90 tablet, Rfl: 3    ferrous sulfate 325 (65 FE) MG EC tablet, Take 1 tablet by mouth., Disp: , Rfl:     fluorometholone (FML) 0.1 % ophthalmic suspension, , Disp: , Rfl:     Jardiance 10 MG tablet tablet, TAKE 1 TABLET BY MOUTH DAILY, Disp: 30 tablet, Rfl: 3    Magnesium 200 MG tablet, Take 250 mg by mouth Daily., Disp: , Rfl:     metoprolol succinate XL (TOPROL-XL) 50 MG  24 hr tablet, Take 1 tablet by mouth Daily., Disp: 90 tablet, Rfl: 3    pravastatin (PRAVACHOL) 40 MG tablet, Take 1 tablet by mouth Daily., Disp: 90 tablet, Rfl: 3    spironolactone (ALDACTONE) 25 MG tablet, Take 1 tablet by mouth Daily., Disp: 90 tablet, Rfl: 3    torsemide (DEMADEX) 20 MG tablet, Take 1 tablet by mouth Daily., Disp: 90 tablet, Rfl: 0    Vital Sign Review:     Vitals:    09/03/24 1511   BP: 125/68   Pulse: 62   SpO2: 96%     PP: Normal  Pulse Ox: normal oxygenation on room air    Weight: 222 lbs  Physical Exam:    Vitals reviewed.   Constitutional:       General: Not in acute distress.     Appearance: Normal and healthy appearance. Not in distress. Not ill-appearing, toxic-appearing or diaphoretic.      Interventions: Not intubated.  Eyes:      Conjunctiva/sclera: Conjunctivae normal.      Pupils: Pupils are equal, round, and reactive to light.   Neck:      Vascular: No JVR. JVD normal with 6 cm of water.   Pulmonary:      Effort: Pulmonary effort is normal. No tachypnea, bradypnea, accessory muscle usage, prolonged expiration, respiratory distress or retractions. Not intubated.      Breath sounds: Normal breath sounds and air entry. No stridor, decreased air movement or transmitted upper airway sounds. No decreased breath sounds. No wheezing. No rhonchi. No rales.   Cardiovascular:      PMI at left midclavicular line. Normal rate. Irregularly irregular rhythm. S1 with normal intensity. loud S2.       Murmurs: There is no murmur.      No gallop.  No click. No rub.   Neurological:      General: No focal deficit present.      Mental Status: Alert and oriented to person, place and time.   Psychiatric:         Behavior: Behavior is cooperative.          DATA REVIEWED:       TTE/ADDY:    Results for orders placed during the hospital encounter of 07/01/24    Adult Transthoracic Echo Complete W/ Cont if Necessary Per Protocol    Interpretation Summary    Left ventricular systolic function is normal.  "Calculated left ventricular EF = 54.8%    Left ventricular wall thickness is consistent with mild concentric hypertrophy.    Left ventricular diastolic function was indeterminate.    Mildly reduced right ventricular systolic function noted.    The right ventricular cavity is mildly dilated.    The left atrial cavity is severely dilated.    The right atrial cavity is severely  dilated.    Moderate mitral valve regurgitation is present.    Moderate tricuspid valve regurgitation is present.    Calculated right ventricular systolic pressure from tricuspid regurgitation is 57 mmHg.    Severe pulmonary hypertension is present.      My interpretation of the TTE is below.     LVEF is 54%.  RV cavity is dilated.  Mildly reduced right ventricular systolic function.  Pulmonary hypertension.        ==========================    CT SCAN: Dilated pulmonary artery and moderate emphysematous changes  -----------------------------------------------------        PFTs were interpreted today.  FEV1/FVC is 70% of predicted.  FVC is 85% of predicted.    Laboratory evaluations:  Lab Results   Component Value Date    PROBNP 5,421.0 (H) 08/14/2023     Lab Results   Component Value Date    GLUCOSE 149 (H) 02/27/2024    BUN 43 (H) 02/27/2024    CREATININE 1.76 (H) 02/27/2024    EGFRIFNONA 56 (L) 09/07/2021    EGFRIFAFRI 77 09/29/2020    BCR 24.4 02/27/2024    K 4.3 02/27/2024    CO2 26.0 02/27/2024    CALCIUM 9.3 02/27/2024    PROTENTOTREF 6.6 07/18/2023    ALBUMIN 4.1 02/27/2024    LABIL2 1.3 07/18/2023    AST 22 02/27/2024    ALT 14 02/27/2024     No results found for: \"HGBA1C\"  No results found for: \"HGBA1C\"  Lab Results   Component Value Date    CREATININE 1.76 (H) 02/27/2024     No results found for: \"IRON\", \"TIBC\", \"FERRITIN\"  Lab Results   Component Value Date    URICACID 4.0 07/10/2024    WBC 5.48 02/27/2024    RBC 4.27 02/27/2024    HGB 11.4 (L) 02/27/2024    HCT 37.5 02/27/2024    MCV 87.8 02/27/2024    MCH 26.7 02/27/2024    MCHC " "30.4 (L) 02/27/2024    RDW 16.8 (H) 02/27/2024    RDWSD 54.3 (H) 02/27/2024    MPV 9.7 02/27/2024     (L) 02/27/2024    NEUTRORELPCT 69.3 02/27/2024    LYMPHORELPCT 15.0 (L) 02/27/2024    MONORELPCT 13.7 (H) 02/27/2024    EOSRELPCT 1.6 02/27/2024    BASORELPCT 0.2 02/27/2024    NEUTROABS 3.80 02/27/2024    LYMPHSABS 0.82 02/27/2024    MONOSABS 0.75 02/27/2024    EOSABS 0.09 02/27/2024    BASOSABS 0.01 02/27/2024    IMMATUREGR 0.4 09/29/2020    IMMGRANABS 0.03 09/29/2020    NRBC 0.0 02/27/2024      Lab Results   Component Value Date    PROTURINE 18.3 07/21/2023    NBDNJVI70AY 421 (H) 07/21/2023    ALBUMINU 60.4 07/21/2023    LABALPH1 2.2 07/21/2023    LABALPH2 8.2 07/21/2023    LABBETA 17.7 07/21/2023    GAMMAUR 11.5 07/21/2023     Lab Results   Component Value Date    FREEKAPPAL 44.0 (H) 07/18/2023      Lab Results   Component Value Date    TOTIGGREF 936 07/18/2023     07/18/2023    IGM 74 07/18/2023    PROTENTOTREF 6.6 07/18/2023    ALBUMIN 4.1 02/27/2024    LABALPH 0.2 07/18/2023    LABALPH 0.7 07/18/2023    LABBETA 17.7 07/21/2023    GAMMAGLOBULI 0.9 07/18/2023    MSPIKE Not Observed 07/18/2023    LABGLOBREF 3.0 07/18/2023    LABIL2 1.3 07/18/2023    IMMUNOFIXA Comment 07/18/2023    PLEANOTE Comment 07/21/2023    FREEKAPPAL 44.0 (H) 07/18/2023    IGLFLC 25.2 07/18/2023    KAPPALAMB 1.75 (H) 07/18/2023      No results found for: \"LABKAPP\"         PAH RISK ASSESSMENT:  ReDs Vest    Performed by: Diogo Lawton MD PhD  Authorized by: Diogo Lawton MD PhD    ReDS value:  30  ReDS Value Description:  25-35 (green) = Optimal Volume Status      Assessment & Plan            1. Pulmonary hypertension.  This is WHO-group-2/3.  Most recent 2D TTE shows pulmonary hypertension.  CT chest with emphysematous changes.  He does have KATIE.  This is multifactorial.      He presents today with stable secondary pulmonary hypertension.  - No current indication for PAH-specific medications         2. " Ischemic cardiomyopathy. Now with HFimpEF.  NYHA stage C; functional class II.  Today, he is euvolemic and perfused.    - Toprol-XL  - Spironolactone with quarterly assessment of GFR. He has labs from July and mild CKD. I am OK with checking next time.   - Jardiance 10 mg with sick precautions  - Torsemide      3. RV Heart Failure.    RV is dilated. RVEF is abnormal.  RV status:  RV ADVERSELY-REMODELED with a ESVi of >114ml.   The patient presents today with RHF. The etiology is: PAH/KATIE.     Today, clinically stable.  RV remains adversely remodeled with mildly reduced RV dysfunction.    - Toprol-XL  - Spironolactone  - Torsemide                   Return in 3 months (on 12/3/2024) for 3 months with Isamar.        This document has been electronically signed by Diogo Lawton MD PhD on September 3, 2024 15:26 EDT      Diogo Lawton M.D., Ph.D., [facility]  System Medical Director for HF and PAH

## 2024-09-23 RX ORDER — EMPAGLIFLOZIN 10 MG/1
10 TABLET, FILM COATED ORAL DAILY
Qty: 30 TABLET | Refills: 3 | Status: SHIPPED | OUTPATIENT
Start: 2024-09-23

## 2024-12-10 ENCOUNTER — TELEPHONE (OUTPATIENT)
Dept: CARDIOLOGY | Facility: HOSPITAL | Age: 74
End: 2024-12-10
Payer: MEDICARE

## 2024-12-10 ENCOUNTER — HOSPITAL ENCOUNTER (OUTPATIENT)
Dept: CARDIOLOGY | Facility: HOSPITAL | Age: 74
Discharge: HOME | End: 2024-12-10
Admitting: NURSE PRACTITIONER
Payer: MEDICARE

## 2024-12-10 VITALS
BODY MASS INDEX: 29.15 KG/M2 | HEIGHT: 72 IN | OXYGEN SATURATION: 96 % | SYSTOLIC BLOOD PRESSURE: 128 MMHG | WEIGHT: 215.2 LBS | DIASTOLIC BLOOD PRESSURE: 71 MMHG | HEART RATE: 81 BPM

## 2024-12-10 DIAGNOSIS — I27.20 PULMONARY HYPERTENSION: Primary | ICD-10-CM

## 2024-12-10 DIAGNOSIS — I50.32 HEART FAILURE WITH IMPROVED EJECTION FRACTION (HFIMPEF): ICD-10-CM

## 2024-12-10 DIAGNOSIS — I51.9 DYSFUNCTION OF RIGHT CARDIAC VENTRICLE: ICD-10-CM

## 2024-12-10 LAB
ANION GAP SERPL CALCULATED.3IONS-SCNC: 12 MMOL/L (ref 5–15)
BUN SERPL-MCNC: 50 MG/DL (ref 8–23)
BUN/CREAT SERPL: 32.3 (ref 7–25)
CALCIUM SPEC-SCNC: 9.9 MG/DL (ref 8.6–10.5)
CHLORIDE SERPL-SCNC: 99 MMOL/L (ref 98–107)
CO2 SERPL-SCNC: 26 MMOL/L (ref 22–29)
CREAT SERPL-MCNC: 1.55 MG/DL (ref 0.76–1.27)
EGFRCR SERPLBLD CKD-EPI 2021: 46.1 ML/MIN/1.73
GLUCOSE SERPL-MCNC: 109 MG/DL (ref 65–99)
POTASSIUM SERPL-SCNC: 4.3 MMOL/L (ref 3.5–5.2)
SODIUM SERPL-SCNC: 137 MMOL/L (ref 136–145)

## 2024-12-10 PROCEDURE — 80048 BASIC METABOLIC PNL TOTAL CA: CPT | Performed by: NURSE PRACTITIONER

## 2024-12-10 PROCEDURE — 94726 PLETHYSMOGRAPHY LUNG VOLUMES: CPT | Performed by: NURSE PRACTITIONER

## 2024-12-10 PROCEDURE — G0463 HOSPITAL OUTPT CLINIC VISIT: HCPCS

## 2024-12-10 RX ORDER — ROSUVASTATIN CALCIUM 10 MG/1
10 TABLET, COATED ORAL DAILY
COMMUNITY
Start: 2024-10-14

## 2024-12-10 RX ORDER — TAMSULOSIN HYDROCHLORIDE 0.4 MG/1
0.4 CAPSULE ORAL
COMMUNITY
Start: 2024-11-19

## 2024-12-10 RX ORDER — TORSEMIDE 20 MG/1
30 TABLET ORAL DAILY
Start: 2024-12-10

## 2024-12-10 NOTE — PROGRESS NOTES
[facility]      Congestive Heart Failure  Pertinent negatives include no chest pain or shortness of breath.       1. Pulmonary hypertension  2. HFimpEF/ischemic cardiomyopathy  3. RV heart failure    Subjective      Renato Mccracken a 74 y.o. male who presents today for pulmonary hypertension, HFimpEF, RV heart failure.   He has hypertension, hyperlipidemia, KATIE on CPAP.  In 2009 diagnosed with multivessel CAD and underwent CABG x 3 (LIMA to the LAD, SVG to obtuse marginal, and SVG to PDA) he also underwent carotid endarterectomy in 2009.   LVEF in 2022 was 41 to 45%  April 2023, he underwent watchman implantation.  He had 2D TTE 7/2024 that showed LVEF 54.8%.  Mild concentric LVH.  Indeterminate left ventricular diastolic function.  Mildly reduced right ventricular systolic function.  Mildly dilated right ventricular cavity.  Biatrial cavities severely dilated.  Moderate mitral valve regurgitation.  Moderate tricuspid valve regurgitation.  RVSP 57 mmHg.  Severe pulmonary hypertension  He has been an established patient in the HF clinic, but this is the first time I have seen the patient.   He has .     Review of Systems - Review of Systems   Constitutional: Positive for weight gain. Negative for weight loss.   Cardiovascular:  Negative for chest pain, dyspnea on exertion, leg swelling, orthopnea, paroxysmal nocturnal dyspnea and syncope.   Respiratory:  Negative for cough and shortness of breath.    Gastrointestinal:  Negative for bloating.   Neurological:  Negative for dizziness.          Patient Active Problem List   Diagnosis    Coronary artery disease involving native coronary artery of native heart without angina pectoris    Carotid arterial disease    Permanent atrial fibrillation    Ischemic cardiomyopathy    Mitral regurgitation    Hypothyroidism    HTN (hypertension)    Dyslipidemia    DDD (degenerative disc disease), lumbar    Chronic bilateral low back pain with  right-sided sciatica    Primary osteoarthritis of right knee    Class 1 obesity due to excess calories without serious comorbidity with body mass index (BMI) of 31.0 to 31.9 in adult    Complex sleep apnea syndrome    Hemorrhagic disorder due to circulating anticoagulants    Gastroesophageal reflux disease with esophagitis without hemorrhage    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    Diverticulosis    Pulmonary hypertension    Chronic right-sided heart failure    Dysfunction of right cardiac ventricle    PAD (peripheral artery disease)    Presence of Watchman left atrial appendage closure device    PVCs (premature ventricular contractions)    Basal cell carcinoma (BCC) of scalp or skin of neck    Basal cell carcinoma (BCC) of skin of right upper lip    Heart failure with improved ejection fraction (HFimpEF)     family history includes Heart disease in his father; Hypertension in his father; No Known Problems in his mother; Stroke in his father.   reports that he quit smoking about 32 years ago. His smoking use included cigarettes and cigars. He has never used smokeless tobacco. He reports current alcohol use of about 2.0 standard drinks of alcohol per week. He reports that he does not use drugs.  No Known Allergies  Physical Activity: Not on file          Current Outpatient Medications:     allopurinol (ZYLOPRIM) 300 MG tablet, 1 tablet., Disp: , Rfl:     aspirin (ASPIR) 81 MG EC tablet, Take 1 tablet by mouth Daily., Disp: 90 tablet, Rfl: 3    ferrous sulfate 325 (65 FE) MG EC tablet, Take 1 tablet by mouth., Disp: , Rfl:     fluorometholone (FML) 0.1 % ophthalmic suspension, , Disp: , Rfl:     Jardiance 10 MG tablet tablet, TAKE 1 TABLET BY MOUTH DAILY, Disp: 30 tablet, Rfl: 3    Magnesium 200 MG tablet, Take 250 mg by mouth Daily., Disp: , Rfl:     metoprolol succinate XL (TOPROL-XL) 50 MG 24 hr tablet, Take 1 tablet by mouth Daily., Disp: 90 tablet, Rfl: 3    rosuvastatin (CRESTOR) 10 MG tablet, Take 1 tablet  by mouth Daily., Disp: , Rfl:     spironolactone (ALDACTONE) 25 MG tablet, Take 1 tablet by mouth Daily., Disp: 90 tablet, Rfl: 3    tamsulosin (FLOMAX) 0.4 MG capsule 24 hr capsule, Take 1 capsule by mouth., Disp: , Rfl:     torsemide (DEMADEX) 20 MG tablet, Take 1.5 tablets by mouth Daily. May take full 2 tablets as needed for weight gain, Disp: , Rfl:     Objective   Vital Sign Review:   Vitals:    12/10/24 1046   BP: 128/71   Pulse: 81   SpO2: 96%     Body mass index is 29.18 kg/m².      12/10/24  1046   Weight: 97.6 kg (215 lb 3.2 oz)     Physical Exam:  Constitutional:       Appearance: Normal and healthy appearance.   Neck:      Vascular: No carotid bruit or JVD. JVD normal.   Pulmonary:      Effort: Pulmonary effort is normal.      Breath sounds: Normal breath sounds.   Cardiovascular:      PMI at left midclavicular line. Normal rate. Regular rhythm.   Pulses:     Intact distal pulses.   Edema:     Peripheral edema absent.   Abdominal:      Palpations: Abdomen is soft.      Tenderness: There is no abdominal tenderness.   Skin:     General: Skin is warm and dry.   Neurological:      General: No focal deficit present.      Mental Status: Alert and oriented to person, place and time.   Psychiatric:         Behavior: Behavior is cooperative.          DATA REVIEWED:   EKG:      ---------------------------------------------------  ECHO:    Results for orders placed during the hospital encounter of 07/01/24    Adult Transthoracic Echo Complete W/ Cont if Necessary Per Protocol    Interpretation Summary    Left ventricular systolic function is normal. Calculated left ventricular EF = 54.8%    Left ventricular wall thickness is consistent with mild concentric hypertrophy.    Left ventricular diastolic function was indeterminate.    Mildly reduced right ventricular systolic function noted.    The right ventricular cavity is mildly dilated.    The left atrial cavity is severely dilated.    The right atrial cavity is  "severely  dilated.    Moderate mitral valve regurgitation is present.    Moderate tricuspid valve regurgitation is present.    Calculated right ventricular systolic pressure from tricuspid regurgitation is 57 mmHg.    Severe pulmonary hypertension is present.          -----------------------------------------------------  RHC/LHC    No results found for this or any previous visit.      ---------------------------------------------------------------------------    CT:   No radiology results for the last 30 days.        --------------------------------------------------------------------------------------------------------------    Laboratory evaluations:  Renal Function: CrCl cannot be calculated (Patient's most recent lab result is older than the maximum 30 days allowed.).    Lab Results   Component Value Date    GLUCOSE 149 (H) 02/27/2024    BUN 43 (H) 02/27/2024    CREATININE 1.76 (H) 02/27/2024    EGFRIFNONA 56 (L) 09/07/2021    EGFRIFAFRI 77 09/29/2020    BCR 24.4 02/27/2024    K 4.3 02/27/2024    CO2 26.0 02/27/2024    CALCIUM 9.3 02/27/2024    PROTENTOTREF 6.6 07/18/2023    ALBUMIN 4.1 02/27/2024    LABIL2 1.3 07/18/2023    AST 22 02/27/2024    ALT 14 02/27/2024     Lab Results   Component Value Date    WBC 5.48 02/27/2024    HGB 11.4 (L) 02/27/2024    HCT 37.5 02/27/2024    MCV 87.8 02/27/2024     (L) 02/27/2024     No results found for: \"HGBA1C\"  No results found for: \"HGBA1C\"  Lab Results   Component Value Date    CREATININE 1.76 (H) 02/27/2024     No results found for: \"IRON\", \"TIBC\", \"FERRITIN\"    Result Review:  I have personally reviewed the results from the time of this admission to 12/10/2024 11:31 EST and agree with these findings:  [x]  Laboratory list / accordion  []  Microbiology  [x]  Radiology  [x]  EKG/Telemetry   [x]  Cardiology/Vascular   []  Pathology  [x]  Old records  []  Other:        ReDS VOLUMETRIC ASSESSMENT:  ReDs Vest    Performed by: Sera Espinoza APRN  Authorized by: " Sera Espinoza APRN    ReDS value:  37  ReDS Value Description:  36-41 (orange) = Possible Hypervolemic Status        Assessment & Plan      1. Heart failure with improved ejection fraction (HFimpEF)        Pulmonary hypertension--WHO-group-2/3.  2D TTE from 7/2024 showed continued pulmonary hypertension.  RVSP was 57 mmHg.  Overall pulmonary hypertension stable.  Continue management of heart failure.  He did have a CT of the chest that showed emphysema changes.  And he does have KATIE.    2. HFimpEF/ischemid cardiomyopathy-2D TTE from 7/2024 showed LVEF 54% -he has had some weight gain but after increasing his torsemide to 40 mg daily he has started to notice improvement.  He had chemistry panel done 11/5/2024 and potassium was 5.2, renal function was stable.Recommended to follow a low potassium diet. Continue spironolactone as he is also taking an increased torsemide dose.  We will check BMP today.   He has been taking torsemide 40mg daily--recommend he continue the 40mg daily for the next few days and call for any changes.   Directions for when to call the clinic reviewed with the patient to include weight gain of 2 to 3 pounds in 24 hours, weight gain of 5 to 10 pounds within 7 days; worsening shortness of breath; worsening lower extremity edema or abdominal distention.    3. RV heart failure- RV is dilated. RVEF is abnormal.  RV status:  RV ADVERSELY-REMODELED with a ESVi of >114ml.   The etiology is: PAH/KATIE. Stable today. Continue GDMT      NYHA stage C FC-II     Clinical status was assessed and has remained stable.  Treatment intent: curative   ReDS reading was obtained today.  ReDS result: 37       Today, Patient is approaching euvolemia and with perfusion. The patient's hemodynamics are currently acceptable. HR is: normal and is not at goal. BP/MAP was reviewed and there is room for medication up-titration in future if needed.  The patient's clinical course has been impacted by marginal hemodynamics.  LVEF: 54%.     GDMT Assessment: The patient is currently on triple therapy.      GDMT changes recommended today: No changes to medication therapy.      BB:   continue Metoprolol Succinate  50mg daily  Monitor heart rate.  Please call the HF for HR<50, dizziness, lightheadedness, syncope    A/A/A:     Entresto discontinued due to hypotension--he is currently HFimpEF and we will defer retrialing, but possible future addition of low dose ACE or ARB if needed  Occasional monitoring of Chem-7 is recommended; call for the development of a new cough or S/Sx angioedema    SGLT2-I:  continue Empagliflozin (Jardiance) 10 mg daily  Call for S/Sx genital mycotic infections  Do not take when inadequate oral intake, NPO, GI illness    MRA:  The patient is FC-NYHA Class II and MRA is indicated.   continue Spironolactone  25mg daily  Recommended quarterly assessment of GFR and electrolytes--BMP checked today. We will       Diuretic Regimen:  -DIURETIC:   toresemide 30 mg every day--take 2 tablets as needed for weight gain--he has been doing this and we will continue this for the next few days  -Fluid restriction:   -requested  -2000 ml  -Patient has been asked to weigh daily and was provided with a printed diuretic strategy.  -Sodium restriction:   -1,500 mg Na restriction was discussed.      Labs/Diagnostics/Referrals:    Labs -Chem-7       Lifestyle Advice:   - call office if I gain more than 2 pounds in one day or 5 pounds in one week   - keep legs up while sitting   - use salt in moderation   - watch for swelling in feet, ankles and legs every day   - weigh myself daily   -call for concerning s/sx   -- activity or exercise based on tolerance encouraged     CODE STATUS: FULL              Return in about 3 months (around 3/10/2025).              Thank you for allowing me to participate in the care of your patient,       Sera Espinoza, APRN  12/10/24  11:07 EST      **Eitan Disclaimer:**  Much of this encounter note is an  electronic transcription/translation of spoken language to printed text. The electronic translation of spoken language may permit erroneous, or at times, nonsensical words or phrases to be inadvertently transcribed. Although I have reviewed the note for such errors, some may still exist.    The information in this note was reviewed and updated during the visit to be as accurate as possible. As many patients have chronic medical problems, many of their individual problems and ongoing plans do not change significantly from visit to visit.  This information is correct and is consistent with my treatment plan as of today's visit.

## 2024-12-10 NOTE — TELEPHONE ENCOUNTER
----- Message from Sera Espinoza sent at 12/10/2024 12:41 PM EST -----  Please notify patient of results  Kidney function and electrolyte--including potassium is stable.

## 2024-12-10 NOTE — LETTER
December 11, 2024     Mauricio Scott III, MD  [address]  [address] [address]    Patient: Renato Marquez   YOB: 1950   Date of Visit: 12/10/2024     Dear Mauricio Scott III, MD:       Thank you for referring Renato Marquez to me for evaluation. Below are the relevant portions of my assessment and plan of care.    If you have questions, please do not hesitate to call me. I look forward to following Renato along with you.         Sincerely,        SIRIA Manning        CC: MD Olga Mares Jr., SIRIA Raya  12/11/24 1506  Signed    [facility]      Congestive Heart Failure  Pertinent negatives include no chest pain or shortness of breath.       1. Pulmonary hypertension  2. HFimpEF/ischemic cardiomyopathy  3. RV heart failure    Subjective     Renato Marquezis a 74 y.o. male who presents today for pulmonary hypertension, HFimpEF, RV heart failure.   He has hypertension, hyperlipidemia, KATIE on CPAP.  In 2009 diagnosed with multivessel CAD and underwent CABG x 3 (LIMA to the LAD, SVG to obtuse marginal, and SVG to PDA) he also underwent carotid endarterectomy in 2009.   LVEF in 2022 was 41 to 45%  April 2023, he underwent watchman implantation.  He had 2D TTE 7/2024 that showed LVEF 54.8%.  Mild concentric LVH.  Indeterminate left ventricular diastolic function.  Mildly reduced right ventricular systolic function.  Mildly dilated right ventricular cavity.  Biatrial cavities severely dilated.  Moderate mitral valve regurgitation.  Moderate tricuspid valve regurgitation.  RVSP 57 mmHg.  Severe pulmonary hypertension  He has been an established patient in the HF clinic, but this is the first time I have seen the patient.   He has .     Review of Systems - Review of Systems   Constitutional: Positive for weight gain. Negative for weight loss.   Cardiovascular:  Negative for chest pain, dyspnea on exertion, leg swelling,  orthopnea, paroxysmal nocturnal dyspnea and syncope.   Respiratory:  Negative for cough and shortness of breath.    Gastrointestinal:  Negative for bloating.   Neurological:  Negative for dizziness.          Patient Active Problem List   Diagnosis   • Coronary artery disease involving native coronary artery of native heart without angina pectoris   • Carotid arterial disease   • Permanent atrial fibrillation   • Ischemic cardiomyopathy   • Mitral regurgitation   • Hypothyroidism   • HTN (hypertension)   • Dyslipidemia   • DDD (degenerative disc disease), lumbar   • Chronic bilateral low back pain with right-sided sciatica   • Primary osteoarthritis of right knee   • Class 1 obesity due to excess calories without serious comorbidity with body mass index (BMI) of 31.0 to 31.9 in adult   • Complex sleep apnea syndrome   • Hemorrhagic disorder due to circulating anticoagulants   • Gastroesophageal reflux disease with esophagitis without hemorrhage   • 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   • Diverticulosis   • Pulmonary hypertension   • Chronic right-sided heart failure   • Dysfunction of right cardiac ventricle   • PAD (peripheral artery disease)   • Presence of Watchman left atrial appendage closure device   • PVCs (premature ventricular contractions)   • Basal cell carcinoma (BCC) of scalp or skin of neck   • Basal cell carcinoma (BCC) of skin of right upper lip   • Heart failure with improved ejection fraction (HFimpEF)     family history includes Heart disease in his father; Hypertension in his father; No Known Problems in his mother; Stroke in his father.   reports that he quit smoking about 32 years ago. His smoking use included cigarettes and cigars. He has never used smokeless tobacco. He reports current alcohol use of about 2.0 standard drinks of alcohol per week. He reports that he does not use drugs.  No Known Allergies  Physical Activity: Not on file          Current Outpatient Medications:   •   allopurinol (ZYLOPRIM) 300 MG tablet, 1 tablet., Disp: , Rfl:   •  aspirin (ASPIR) 81 MG EC tablet, Take 1 tablet by mouth Daily., Disp: 90 tablet, Rfl: 3  •  ferrous sulfate 325 (65 FE) MG EC tablet, Take 1 tablet by mouth., Disp: , Rfl:   •  fluorometholone (FML) 0.1 % ophthalmic suspension, , Disp: , Rfl:   •  Jardiance 10 MG tablet tablet, TAKE 1 TABLET BY MOUTH DAILY, Disp: 30 tablet, Rfl: 3  •  Magnesium 200 MG tablet, Take 250 mg by mouth Daily., Disp: , Rfl:   •  metoprolol succinate XL (TOPROL-XL) 50 MG 24 hr tablet, Take 1 tablet by mouth Daily., Disp: 90 tablet, Rfl: 3  •  rosuvastatin (CRESTOR) 10 MG tablet, Take 1 tablet by mouth Daily., Disp: , Rfl:   •  spironolactone (ALDACTONE) 25 MG tablet, Take 1 tablet by mouth Daily., Disp: 90 tablet, Rfl: 3  •  tamsulosin (FLOMAX) 0.4 MG capsule 24 hr capsule, Take 1 capsule by mouth., Disp: , Rfl:   •  torsemide (DEMADEX) 20 MG tablet, Take 1.5 tablets by mouth Daily. May take full 2 tablets as needed for weight gain, Disp: , Rfl:     Objective  Vital Sign Review:   Vitals:    12/10/24 1046   BP: 128/71   Pulse: 81   SpO2: 96%     Body mass index is 29.18 kg/m².      12/10/24  1046   Weight: 97.6 kg (215 lb 3.2 oz)     Physical Exam:  Constitutional:       Appearance: Normal and healthy appearance.   Neck:      Vascular: No carotid bruit or JVD. JVD normal.   Pulmonary:      Effort: Pulmonary effort is normal.      Breath sounds: Normal breath sounds.   Cardiovascular:      PMI at left midclavicular line. Normal rate. Regular rhythm.   Pulses:     Intact distal pulses.   Edema:     Peripheral edema absent.   Abdominal:      Palpations: Abdomen is soft.      Tenderness: There is no abdominal tenderness.   Skin:     General: Skin is warm and dry.   Neurological:      General: No focal deficit present.      Mental Status: Alert and oriented to person, place and time.   Psychiatric:         Behavior: Behavior is cooperative.          DATA REVIEWED:    EKG:      ---------------------------------------------------  ECHO:    Results for orders placed during the hospital encounter of 07/01/24    Adult Transthoracic Echo Complete W/ Cont if Necessary Per Protocol    Interpretation Summary  •  Left ventricular systolic function is normal. Calculated left ventricular EF = 54.8%  •  Left ventricular wall thickness is consistent with mild concentric hypertrophy.  •  Left ventricular diastolic function was indeterminate.  •  Mildly reduced right ventricular systolic function noted.  •  The right ventricular cavity is mildly dilated.  •  The left atrial cavity is severely dilated.  •  The right atrial cavity is severely  dilated.  •  Moderate mitral valve regurgitation is present.  •  Moderate tricuspid valve regurgitation is present.  •  Calculated right ventricular systolic pressure from tricuspid regurgitation is 57 mmHg.  •  Severe pulmonary hypertension is present.          -----------------------------------------------------  RHC/LHC    No results found for this or any previous visit.      ---------------------------------------------------------------------------    CT:   No radiology results for the last 30 days.        --------------------------------------------------------------------------------------------------------------    Laboratory evaluations:  Renal Function: CrCl cannot be calculated (Patient's most recent lab result is older than the maximum 30 days allowed.).    Lab Results   Component Value Date    GLUCOSE 149 (H) 02/27/2024    BUN 43 (H) 02/27/2024    CREATININE 1.76 (H) 02/27/2024    EGFRIFNONA 56 (L) 09/07/2021    EGFRIFAFRI 77 09/29/2020    BCR 24.4 02/27/2024    K 4.3 02/27/2024    CO2 26.0 02/27/2024    CALCIUM 9.3 02/27/2024    PROTENTOTREF 6.6 07/18/2023    ALBUMIN 4.1 02/27/2024    LABIL2 1.3 07/18/2023    AST 22 02/27/2024    ALT 14 02/27/2024     Lab Results   Component Value Date    WBC 5.48 02/27/2024    HGB 11.4 (L) 02/27/2024     "HCT 37.5 02/27/2024    MCV 87.8 02/27/2024     (L) 02/27/2024     No results found for: \"HGBA1C\"  No results found for: \"HGBA1C\"  Lab Results   Component Value Date    CREATININE 1.76 (H) 02/27/2024     No results found for: \"IRON\", \"TIBC\", \"FERRITIN\"    Result Review:  I have personally reviewed the results from the time of this admission to 12/10/2024 11:31 EST and agree with these findings:  [x]  Laboratory list / accordion  []  Microbiology  [x]  Radiology  [x]  EKG/Telemetry   [x]  Cardiology/Vascular   []  Pathology  [x]  Old records  []  Other:        ReDS VOLUMETRIC ASSESSMENT:  ReDs Vest    Performed by: Sera Espinoza APRN  Authorized by: Sera Espinoza APRN    ReDS value:  37  ReDS Value Description:  36-41 (orange) = Possible Hypervolemic Status        Assessment & Plan     1. Heart failure with improved ejection fraction (HFimpEF)        Pulmonary hypertension--WHO-group-2/3.  2D TTE from 7/2024 showed continued pulmonary hypertension.  RVSP was 57 mmHg.  Overall pulmonary hypertension stable.  Continue management of heart failure.  He did have a CT of the chest that showed emphysema changes.  And he does have KATIE.    2. HFimpEF/ischemid cardiomyopathy-2D TTE from 7/2024 showed LVEF 54% -he has had some weight gain but after increasing his torsemide to 40 mg daily he has started to notice improvement.  He had chemistry panel done 11/5/2024 and potassium was 5.2, renal function was stable.Recommended to follow a low potassium diet. Continue spironolactone as he is also taking an increased torsemide dose.  We will check BMP today.   He has been taking torsemide 40mg daily--recommend he continue the 40mg daily for the next few days and call for any changes.   Directions for when to call the clinic reviewed with the patient to include weight gain of 2 to 3 pounds in 24 hours, weight gain of 5 to 10 pounds within 7 days; worsening shortness of breath; worsening lower extremity edema or " abdominal distention.    3. RV heart failure- RV is dilated. RVEF is abnormal.  RV status:  RV ADVERSELY-REMODELED with a ESVi of >114ml.   The etiology is: PAH/KATIE. Stable today. Continue GDMT      NYHA stage C FC-II     Clinical status was assessed and has remained stable.  Treatment intent: curative   ReDS reading was obtained today.  ReDS result: 37       Today, Patient is approaching euvolemia and with perfusion. The patient's hemodynamics are currently acceptable. HR is: normal and is not at goal. BP/MAP was reviewed and there is room for medication up-titration in future if needed.  The patient's clinical course has been impacted by marginal hemodynamics. LVEF: 54%.     GDMT Assessment: The patient is currently on triple therapy.      GDMT changes recommended today: No changes to medication therapy.      BB:   continue Metoprolol Succinate  50mg daily  Monitor heart rate.  Please call the [facility] for HR<50, dizziness, lightheadedness, syncope    A/A/A:     Entresto discontinued due to hypotension--he is currently HFimpEF and we will defer retrialing, but possible future addition of low dose ACE or ARB if needed  Occasional monitoring of Chem-7 is recommended; call for the development of a new cough or S/Sx angioedema    SGLT2-I:  continue Empagliflozin (Jardiance) 10 mg daily  Call for S/Sx genital mycotic infections  Do not take when inadequate oral intake, NPO, GI illness    MRA:  The patient is FC-NYHA Class II and MRA is indicated.   continue Spironolactone  25mg daily  Recommended quarterly assessment of GFR and electrolytes--BMP checked today. We will       Diuretic Regimen:  -DIURETIC:   toresemide 30 mg every day--take 2 tablets as needed for weight gain--he has been doing this and we will continue this for the next few days  -Fluid restriction:   -requested  -2000 ml  -Patient has been asked to weigh daily and was provided with a printed diuretic strategy.  -Sodium restriction:   -1,500 mg Na restriction  was discussed.      Labs/Diagnostics/Referrals:    Labs -Chem-7       Lifestyle Advice:   - call office if I gain more than 2 pounds in one day or 5 pounds in one week   - keep legs up while sitting   - use salt in moderation   - watch for swelling in feet, ankles and legs every day   - weigh myself daily   -call for concerning s/sx   -- activity or exercise based on tolerance encouraged     CODE STATUS: FULL              Return in about 3 months (around 3/10/2025).              Thank you for allowing me to participate in the care of your patient,       Sera Espinoza, APRN  12/10/24  11:07 EST      **Eitan Disclaimer:**  Much of this encounter note is an electronic transcription/translation of spoken language to printed text. The electronic translation of spoken language may permit erroneous, or at times, nonsensical words or phrases to be inadvertently transcribed. Although I have reviewed the note for such errors, some may still exist.    The information in this note was reviewed and updated during the visit to be as accurate as possible. As many patients have chronic medical problems, many of their individual problems and ongoing plans do not change significantly from visit to visit.  This information is correct and is consistent with my treatment plan as of today's visit.

## 2024-12-13 DIAGNOSIS — I48.20 CHRONIC ATRIAL FIBRILLATION: Chronic | ICD-10-CM

## 2024-12-13 RX ORDER — SPIRONOLACTONE 25 MG/1
25 TABLET ORAL DAILY
Qty: 90 TABLET | Refills: 3 | OUTPATIENT
Start: 2024-12-13

## 2024-12-26 RX ORDER — EMPAGLIFLOZIN 10 MG/1
10 TABLET, FILM COATED ORAL DAILY
Qty: 30 TABLET | Refills: 3 | Status: SHIPPED | OUTPATIENT
Start: 2024-12-26

## 2025-01-07 ENCOUNTER — OFFICE VISIT (OUTPATIENT)
Dept: CARDIOLOGY | Facility: CLINIC | Age: 75
End: 2025-01-07
Payer: MEDICARE

## 2025-01-07 VITALS
BODY MASS INDEX: 29.93 KG/M2 | HEIGHT: 72 IN | WEIGHT: 221 LBS | HEART RATE: 67 BPM | DIASTOLIC BLOOD PRESSURE: 62 MMHG | OXYGEN SATURATION: 94 % | SYSTOLIC BLOOD PRESSURE: 120 MMHG

## 2025-01-07 DIAGNOSIS — I48.21 PERMANENT ATRIAL FIBRILLATION: Chronic | ICD-10-CM

## 2025-01-07 DIAGNOSIS — G47.39 COMPLEX SLEEP APNEA SYNDROME: ICD-10-CM

## 2025-01-07 DIAGNOSIS — I27.20 PULMONARY HYPERTENSION: ICD-10-CM

## 2025-01-07 DIAGNOSIS — I50.32 HEART FAILURE WITH IMPROVED EJECTION FRACTION (HFIMPEF): ICD-10-CM

## 2025-01-07 DIAGNOSIS — I25.5 ISCHEMIC CARDIOMYOPATHY: Chronic | ICD-10-CM

## 2025-01-07 DIAGNOSIS — I10 PRIMARY HYPERTENSION: Chronic | ICD-10-CM

## 2025-01-07 DIAGNOSIS — I25.10 CORONARY ARTERY DISEASE INVOLVING NATIVE CORONARY ARTERY OF NATIVE HEART WITHOUT ANGINA PECTORIS: Primary | Chronic | ICD-10-CM

## 2025-01-07 DIAGNOSIS — Z95.818 PRESENCE OF WATCHMAN LEFT ATRIAL APPENDAGE CLOSURE DEVICE: ICD-10-CM

## 2025-01-07 DIAGNOSIS — I34.0 NONRHEUMATIC MITRAL VALVE REGURGITATION: Chronic | ICD-10-CM

## 2025-01-07 NOTE — PROGRESS NOTES
Subjective:     Encounter Date: 01/07/25        Patient ID: Renato Marquez is a 74 y.o. male.    Chief Complaint: CAD  Atrial Fibrillation        Had the pleasure of seeing this patient in the office today.  He comes in for follow-up of his cardiac status.    He has had his left atrial appendage occlusion device placed.     He has a history of chronic atrial fibrillation. He has a history of CAD with prior CABG. This was performed in 2009 by Dr. Jeet Bhagat. He had a three-vessel CABG with LIMA to the LAD, SVG to the obtuse marginal, and SVG to the posterior descending coronary arteries. He also has a history of cerebrovascular disease with a right carotid endarterectomy in 2009. The carotid endarterectomy was performed at the same time by Dr. Lewis.He had an ejection fraction of 37% on his stress test that was performed in January 2016.  That showed no ischemia.  We discussed cardioversion with him, but he was having no symptoms and elected to remain on his current medical regimen.    He has been following in the heart failure clinic.     Generally has been feeling fine.  No chest pain or chest discomfort.  No shortness of breath.  He gained some weight but it did not seem to be fluid.  He says his weight started to go back down again.  No lower extremity edema.  No orthopnea or PND.  No recent illnesses.  No chills or fevers.    He is on CPAP at night.    The following portions of the patient's history were reviewed and updated as appropriate: allergies, current medications, past family history, past medical history, past social history, past surgical history and problem list.    Past Medical History:   Diagnosis Date    Abnormal ECG     CAD (coronary artery disease)     Cancer     skin cancer    Carotid arterial disease     Chronic atrial fibrillation     Complex sleep apnea syndrome     BiPAP ST    Hemorrhagic disorder due to circulating anticoagulants 06/21/2021    Hyperlipidemia     Hypertension      "Ischemic cardiomyopathy     Mitral regurgitation     PVCs (premature ventricular contractions)     Rectal bleeding        Past Surgical History:   Procedure Laterality Date    ATRIAL APPENDAGE EXCLUSION LEFT WITH TRANSESOPHAGEAL ECHOCARDIOGRAM N/A 4/19/2023    Procedure: Atrial Appendage Occlusion;  Surgeon: Dustin Neal MD;  Location: [facility] CATH INVASIVE LOC[facility];  Service: Cardiovascular;  Laterality: N/A;    CAROTID ENDARTERECTOMY Right 2009    Dr Lewis    COLONOSCOPY      COLONOSCOPY N/A 6/22/2021    Procedure: COLONOSCOPY INTO CECUM AND TI;  Surgeon: Artur Jacobson MD;  Location[facility] ENDOSCOPY;  Service: Gastroenterology;  Laterality: N/A;  PRE: RECTAL BLEEDING  POST: DIVERTICULOSIS, HEMORRHOIDS    COLONOSCOPY W/ POLYPECTOMY N/A 6/3/2021    Procedure: COLONOSCOPY, polypectomies;  Surgeon: Luan Meek MD;  Location: [facility] OR;  Service: Gastroenterology;  Laterality: N/A;  Diverticulosis  Ascending colon polyp x 4 (3 cold snare)  Cecal polyp (cold snare)  Rectal polyp    CORONARY ARTERY BYPASS GRAFT  2009    MIMI Frederick to LAD, SVG to OM, SVG Post Desc    ENDOSCOPY N/A 6/3/2021    Procedure: ESOPHAGOGASTRODUODENOSCOPY with biopsies;  Surgeon: Luan Meek MD;  Location: [facility] OR;  Service: Gastroenterology;  Laterality: N/A;  Reflux  Gastric and duodenal ulcers  Biopsies: gastric, distal esophagus    FINGER SURGERY             Procedures       Objective:     Vitals:    01/07/25 1037   BP: 120/62   BP Location: Left arm   Patient Position: Sitting   Cuff Size: Adult   Pulse: 67   SpO2: 94%   Weight: 100 kg (221 lb)   Height: 182.9 cm (72.01\")         Physical Exam  Constitutional:       General: He is not in acute distress.     Appearance: He is well-developed. He is not diaphoretic.   HENT:      Head: Normocephalic and atraumatic.      Nose: Nose normal.   Eyes:      General:         Right eye: No discharge.         Left eye: No discharge.      " Conjunctiva/sclera: Conjunctivae normal.      Pupils: Pupils are equal, round, and reactive to light.   Neck:      Thyroid: No thyromegaly.      Trachea: No tracheal deviation.   Cardiovascular:      Rate and Rhythm: Normal rate. Rhythm irregularly irregular.      Pulses: Normal pulses.      Heart sounds: S1 normal and S2 normal. Murmur heard.      High-pitched blowing holosystolic murmur is present with a grade of 1/6 at the apex.      No S3 sounds.   Pulmonary:      Effort: Pulmonary effort is normal. No respiratory distress.      Breath sounds: Normal breath sounds. No stridor.   Chest:      Chest wall: No tenderness.   Abdominal:      General: Bowel sounds are normal. There is no distension.      Palpations: Abdomen is soft. There is no mass.      Tenderness: There is no abdominal tenderness. There is no guarding or rebound.   Musculoskeletal:         General: No tenderness or deformity. Normal range of motion.      Cervical back: Normal range of motion and neck supple.   Lymphadenopathy:      Cervical: No cervical adenopathy.   Skin:     General: Skin is warm and dry.      Findings: No erythema or rash.   Neurological:      Mental Status: He is alert and oriented to person, place, and time.      Deep Tendon Reflexes: Reflexes are normal and symmetric.   Psychiatric:         Thought Content: Thought content normal.         Lab Review:             Lab Results   Component Value Date    GLUCOSE 109 (H) 12/10/2024    BUN 50 (H) 12/10/2024    CREATININE 1.55 (H) 12/10/2024    EGFRIFNONA 56 (L) 09/07/2021    EGFRIFAFRI 77 09/29/2020    BCR 32.3 (H) 12/10/2024    K 4.3 12/10/2024    CO2 26.0 12/10/2024    CALCIUM 9.9 12/10/2024    PROTENTOTREF 6.6 07/18/2023    ALBUMIN 4.1 02/27/2024    LABIL2 1.3 07/18/2023    AST 22 02/27/2024    ALT 14 02/27/2024       Results for orders placed during the hospital encounter of 07/01/24    Adult Transthoracic Echo Complete W/ Cont if Necessary Per Protocol    Interpretation Summary     Left ventricular systolic function is normal. Calculated left ventricular EF = 54.8%    Left ventricular wall thickness is consistent with mild concentric hypertrophy.    Left ventricular diastolic function was indeterminate.    Mildly reduced right ventricular systolic function noted.    The right ventricular cavity is mildly dilated.    The left atrial cavity is severely dilated.    The right atrial cavity is severely  dilated.    Moderate mitral valve regurgitation is present.    Moderate tricuspid valve regurgitation is present.    Calculated right ventricular systolic pressure from tricuspid regurgitation is 57 mmHg.    Severe pulmonary hypertension is present.    CHADS-VASc Risk Assessment              3 Total Score    1 CHF    1 Hypertension    1 Age 65-74        Criteria that do not apply:    Age >/= 75    DM    PRIOR STROKE/TIA/THROMBO    Vascular Disease    Sex: Female                  Assessment:          Diagnosis Plan   1. Coronary artery disease involving native coronary artery of native heart without angina pectoris        2. Primary hypertension        3. Ischemic cardiomyopathy        4. Nonrheumatic mitral valve regurgitation        5. Permanent atrial fibrillation        6. Presence of Watchman left atrial appendage closure device        7. Complex sleep apnea syndrome        8. Pulmonary hypertension        9. Heart failure with improved ejection fraction (HFimpEF)                   Plan:       Coronary artery disease: He has history of CAD with prior CABG with LIMA to LAD, SVG to OM, and SVG to the posterior descending coronary arteries in 2009 by Dr. Jeet Bhagat.    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  Euvolemic, continue current medical therapy.  Status post Watchman  Ischemic cardiomyopathy, most recent stress test showed no  ischemia.  Permanent atrial fibrillation: His [id number] score is 4. Heart rate is controlled, not on anticoagulation with his watchman in place  Valvular heart disease, continue to follow, no significant valvular issues on the most recent echocardiogram  Hypertension: Controlled on current regimen.  Dyslipidemia: He is treated with 80 mg pravastatin daily.  Right carotid artery stenosis: He had right carotid endarterectomy in 2009 by Dr. Lewis.  Sleep apnea: He is compliant with CPAP.    Claudication, following with Dr. Ambriz.  We discussed longitudinal aspects of care for his CAD.    Thank you very much for allowing us to participate in the care of this pleasant patient.  Please don't hesitate to call if I can be of assistance in any way.      Current Outpatient Medications:     allopurinol (ZYLOPRIM) 300 MG tablet, 1 tablet., Disp: , Rfl:     aspirin (ASPIR) 81 MG EC tablet, Take 1 tablet by mouth Daily., Disp: 90 tablet, Rfl: 3    ferrous sulfate 325 (65 FE) MG EC tablet, Take 1 tablet by mouth., Disp: , Rfl:     Jardiance 10 MG tablet tablet, TAKE 1 TABLET BY MOUTH DAILY, Disp: 30 tablet, Rfl: 3    Magnesium 200 MG tablet, Take 250 mg by mouth Daily., Disp: , Rfl:     metoprolol succinate XL (TOPROL-XL) 50 MG 24 hr tablet, Take 1 tablet by mouth Daily., Disp: 90 tablet, Rfl: 3    rosuvastatin (CRESTOR) 10 MG tablet, Take 1 tablet by mouth Daily., Disp: , Rfl:     spironolactone (ALDACTONE) 25 MG tablet, Take 1 tablet by mouth Daily., Disp: 90 tablet, Rfl: 3    tamsulosin (FLOMAX) 0.4 MG capsule 24 hr capsule, Take 1 capsule by mouth., Disp: , Rfl:     torsemide (DEMADEX) 20 MG tablet, Take 1.5 tablets by mouth Daily. May take full 2 tablets as needed for weight gain, Disp: , Rfl:

## 2025-03-10 ENCOUNTER — HOSPITAL ENCOUNTER (OUTPATIENT)
Dept: CARDIOLOGY | Facility: HOSPITAL | Age: 75
Discharge: HOME | End: 2025-03-10
Admitting: NURSE PRACTITIONER
Payer: MEDICARE

## 2025-03-10 VITALS
WEIGHT: 212.4 LBS | HEART RATE: 71 BPM | BODY MASS INDEX: 28.77 KG/M2 | DIASTOLIC BLOOD PRESSURE: 58 MMHG | OXYGEN SATURATION: 94 % | HEIGHT: 72 IN | SYSTOLIC BLOOD PRESSURE: 106 MMHG

## 2025-03-10 DIAGNOSIS — I50.812 CHRONIC RIGHT-SIDED HEART FAILURE: ICD-10-CM

## 2025-03-10 DIAGNOSIS — I27.20 PULMONARY HYPERTENSION: Primary | ICD-10-CM

## 2025-03-10 DIAGNOSIS — I50.32 HEART FAILURE WITH IMPROVED EJECTION FRACTION (HFIMPEF): ICD-10-CM

## 2025-03-10 PROCEDURE — G0463 HOSPITAL OUTPT CLINIC VISIT: HCPCS

## 2025-03-10 PROCEDURE — 94726 PLETHYSMOGRAPHY LUNG VOLUMES: CPT | Performed by: NURSE PRACTITIONER

## 2025-03-10 PROCEDURE — 99214 OFFICE O/P EST MOD 30 MIN: CPT | Performed by: NURSE PRACTITIONER

## 2025-03-10 NOTE — LETTER
March 10, 2025     Mauricio Scott III, MD  [address]  [facility] [address]  [facility] [address]    Patient: Renato Marquez   YOB: 1950   Date of Visit: 3/10/2025     Dear Mauricio Scott III, MD:       Thank you for referring Renato Marquez to me for evaluation. Below are the relevant portions of my assessment and plan of care.    If you have questions, please do not hesitate to call me. I look forward to following Renato along with you.         Sincerely,        SIRIA Manning        CC: MD Olga Mares Jr., SIRIA Raya  03/10/25 1305  Signed    [facility]      Congestive Heart Failure  Pertinent negatives include no chest pain or shortness of breath.       1. Pulmonary hypertension  2. HFimpEF/ischemic cardiomyopathy  3. RV heart failure    Subjective     Renato Marquezis a 74 y.o. male who presents today for pulmonary hypertension, HFimpEF, RV heart failure.   He has hypertension, hyperlipidemia, KATIE on CPAP.  In 2009 diagnosed with multivessel CAD and underwent CABG x 3 (LIMA to the LAD, SVG to obtuse marginal, and SVG to PDA) he also underwent carotid endarterectomy in 2009.   LVEF in 2022 was 41 to 45%  April 2023, he underwent watchman implantation.  He had 2D TTE 7/2024 that showed LVEF 54.8%.  Mild concentric LVH.  Indeterminate left ventricular diastolic function.  Mildly reduced right ventricular systolic function.  Mildly dilated right ventricular cavity.  Biatrial cavities severely dilated.  Moderate mitral valve regurgitation.  Moderate tricuspid valve regurgitation.  RVSP 57 mmHg.  Severe pulmonary hypertension    Today he reports stable exercise intolerance.  At last visit he increased his torsemide for a few days, had weight loss, and torsemide returned back to 30 mg daily.  He denies any weight gain, leg edema, ascites, orthopnea, or PND.    Review of Systems - Review of Systems   Constitutional: Negative for weight gain  and weight loss.   Cardiovascular:  Negative for chest pain, dyspnea on exertion, leg swelling, orthopnea, paroxysmal nocturnal dyspnea and syncope.   Respiratory:  Negative for cough and shortness of breath.    Gastrointestinal:  Negative for bloating.   Neurological:  Negative for dizziness.          Patient Active Problem List   Diagnosis   • Coronary artery disease involving native coronary artery of native heart without angina pectoris   • Carotid arterial disease   • Permanent atrial fibrillation   • Ischemic cardiomyopathy   • Mitral regurgitation   • Hypothyroidism   • HTN (hypertension)   • Dyslipidemia   • DDD (degenerative disc disease), lumbar   • Chronic bilateral low back pain with right-sided sciatica   • Primary osteoarthritis of right knee   • Class 1 obesity due to excess calories without serious comorbidity with body mass index (BMI) of 31.0 to 31.9 in adult   • Complex sleep apnea syndrome   • Hemorrhagic disorder due to circulating anticoagulants   • Gastroesophageal reflux disease with esophagitis without hemorrhage   • 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   • Diverticulosis   • Pulmonary hypertension   • Chronic right-sided heart failure   • Dysfunction of right cardiac ventricle   • PAD (peripheral artery disease)   • Presence of Watchman left atrial appendage closure device   • PVCs (premature ventricular contractions)   • Basal cell carcinoma (BCC) of scalp or skin of neck   • Basal cell carcinoma (BCC) of skin of right upper lip   • Heart failure with improved ejection fraction (HFimpEF)     family history includes Heart disease in his father; Hypertension in his father; No Known Problems in his mother; Stroke in his father.   reports that he has been smoking cigarettes and cigars. He has been exposed to tobacco smoke. He has never used smokeless tobacco. He reports current alcohol use of about 2.0 standard drinks of alcohol per week. He reports that he does not use drugs.  No Known  Allergies  Physical Activity: Not on file          Current Outpatient Medications:   •  allopurinol (ZYLOPRIM) 300 MG tablet, 1 tablet., Disp: , Rfl:   •  aspirin (ASPIR) 81 MG EC tablet, Take 1 tablet by mouth Daily., Disp: 90 tablet, Rfl: 3  •  ferrous sulfate 325 (65 FE) MG EC tablet, Take 1 tablet by mouth., Disp: , Rfl:   •  Jardiance 10 MG tablet tablet, TAKE 1 TABLET BY MOUTH DAILY, Disp: 30 tablet, Rfl: 3  •  Magnesium 200 MG tablet, Take 250 mg by mouth Daily., Disp: , Rfl:   •  metoprolol succinate XL (TOPROL-XL) 50 MG 24 hr tablet, Take 1 tablet by mouth Daily., Disp: 90 tablet, Rfl: 3  •  rosuvastatin (CRESTOR) 10 MG tablet, Take 1 tablet by mouth Daily., Disp: , Rfl:   •  spironolactone (ALDACTONE) 25 MG tablet, Take 1 tablet by mouth Daily., Disp: 90 tablet, Rfl: 3  •  tamsulosin (FLOMAX) 0.4 MG capsule 24 hr capsule, Take 1 capsule by mouth., Disp: , Rfl:   •  torsemide (DEMADEX) 20 MG tablet, Take 1.5 tablets by mouth Daily. May take full 2 tablets as needed for weight gain, Disp: , Rfl:     Objective  Vital Sign Review:   Vitals:    03/10/25 1047   BP: 106/58   Pulse: 71   SpO2: 94%     Body mass index is 28.8 kg/m².      03/10/25  1047   Weight: 96.3 kg (212 lb 6.4 oz)     Physical Exam:  Constitutional:       Appearance: Normal and healthy appearance.   Neck:      Vascular: No carotid bruit or JVD. JVD normal.   Pulmonary:      Effort: Pulmonary effort is normal.      Breath sounds: Normal breath sounds.   Cardiovascular:      PMI at left midclavicular line. Normal rate. Regular rhythm.   Pulses:     Intact distal pulses.   Edema:     Peripheral edema absent.   Abdominal:      Palpations: Abdomen is soft.      Tenderness: There is no abdominal tenderness.   Skin:     General: Skin is warm and dry.   Neurological:      General: No focal deficit present.      Mental Status: Alert and oriented to person, place and time.   Psychiatric:         Behavior: Behavior is cooperative.          DATA REVIEWED:    EKG:      ---------------------------------------------------  ECHO:    Results for orders placed during the hospital encounter of 07/01/24    Adult Transthoracic Echo Complete W/ Cont if Necessary Per Protocol    Interpretation Summary  •  Left ventricular systolic function is normal. Calculated left ventricular EF = 54.8%  •  Left ventricular wall thickness is consistent with mild concentric hypertrophy.  •  Left ventricular diastolic function was indeterminate.  •  Mildly reduced right ventricular systolic function noted.  •  The right ventricular cavity is mildly dilated.  •  The left atrial cavity is severely dilated.  •  The right atrial cavity is severely  dilated.  •  Moderate mitral valve regurgitation is present.  •  Moderate tricuspid valve regurgitation is present.  •  Calculated right ventricular systolic pressure from tricuspid regurgitation is 57 mmHg.  •  Severe pulmonary hypertension is present.          -----------------------------------------------------  RHC/LHC    No results found for this or any previous visit.      ---------------------------------------------------------------------------    CT:   No radiology results for the last 30 days.        --------------------------------------------------------------------------------------------------------------    Laboratory evaluations:  Renal Function: CrCl cannot be calculated (Patient's most recent lab result is older than the maximum 30 days allowed.).    Lab Results   Component Value Date    GLUCOSE 109 (H) 12/10/2024    BUN 50 (H) 12/10/2024    CREATININE 1.55 (H) 12/10/2024    EGFRIFNONA 56 (L) 09/07/2021    EGFRIFAFRI 77 09/29/2020    BCR 32.3 (H) 12/10/2024    K 4.3 12/10/2024    CO2 26.0 12/10/2024    CALCIUM 9.9 12/10/2024    ALBUMIN 4.1 02/27/2024    AST 22 02/27/2024    ALT 14 02/27/2024     Lab Results   Component Value Date    WBC 5.48 02/27/2024    HGB 11.4 (L) 02/27/2024    HCT 37.5 02/27/2024    MCV 87.8 02/27/2024      "(L) 02/27/2024     No results found for: \"HGBA1C\"  No results found for: \"HGBA1C\"  Lab Results   Component Value Date    CREATININE 1.55 (H) 12/10/2024     No results found for: \"IRON\", \"TIBC\", \"FERRITIN\"    Result Review:  I have personally reviewed the results from the time of this admission to 3/10/2025 13:03 EDT and agree with these findings:  [x]  Laboratory list / accordion  []  Microbiology  [x]  Radiology  [x]  EKG/Telemetry   [x]  Cardiology/Vascular   []  Pathology  [x]  Old records  []  Other:        ReDS VOLUMETRIC ASSESSMENT:  ReDs Vest    Performed by: Sera Espinoza APRN  Authorized by: Sera Espinoza APRN    ReDS value:  33  ReDS Value Description:  25-35 (green) = Optimal Volume Status        Assessment & Plan     1. Pulmonary hypertension    2. Heart failure with improved ejection fraction (HFimpEF)    3. Chronic right-sided heart failure          Pulmonary hypertension--WHO-group-2/3.  2D TTE from 7/2024 showed continued pulmonary hypertension.  RVSP was 57 mmHg.  Overall pulmonary hypertension stable.  Continue management of heart failure.  He did have a CT of the chest that showed emphysema changes.  And he does have KATIE.    2. HFimpEF/ischemid cardiomyopathy-2D TTE from 7/2024 showed LVEF 54%  He appears euvolemic today.  He tolerated the increased torsemide dose without any issues, had improvement in weight and torsemide was returned to 30 mg daily and weight has been stable since.  Tolerating GDMT.  Denies any issues obtaining medications.  Will check BMP today.    3. RV heart failure- RV is dilated. RVEF is abnormal.  RV status:  RV ADVERSELY-REMODELED with a ESVi of >114ml.   The etiology is: PAH/KATIE. Stable today. Continue GDMT      NYHA stage C FC-II     Clinical status was assessed and has remained stable.  Treatment intent: curative   ReDS reading was obtained today.  ReDS result: 33       Today, Patient is approaching euvolemia and with perfusion. The patient's " hemodynamics are currently acceptable. HR is: normal and is not at goal. BP/MAP was reviewed and there is room for medication up-titration in future if needed.  The patient's clinical course has been impacted by marginal hemodynamics. LVEF: 54%.     GDMT Assessment: The patient is currently on triple therapy.      GDMT changes recommended today: No changes to medication therapy.      BB:   continue Metoprolol Succinate  50mg daily  Monitor heart rate.  Please call the [facility] for HR<50, dizziness, lightheadedness, syncope    A/A/A:     Entresto discontinued due to hypotension--he is currently HFimpEF and we will defer retrialing, but possible future addition of low dose ACE or ARB if needed  Occasional monitoring of Chem-7 is recommended; call for the development of a new cough or S/Sx angioedema    SGLT2-I:  continue Empagliflozin (Jardiance) 10 mg daily  Call for S/Sx genital mycotic infections  Do not take when inadequate oral intake, NPO, GI illness    MRA:  The patient is FC-NYHA Class II and MRA is indicated.   continue Spironolactone  25mg daily  Recommended [id number] assessment of GFR and electrolytes--BMP checked today.       Diuretic Regimen:  -DIURETIC:   toresemide 30 mg every day  -Fluid restriction:   -requested  -2000 ml  -Patient has been asked to weigh daily and was provided with a printed diuretic strategy.  -Sodium restriction:   -1,500 mg Na restriction was discussed.      Labs/Diagnostics/Referrals:    Labs -Chem-7       Lifestyle Advice:   - call office if I gain more than 2 pounds in one day or 5 pounds in one week   - keep legs up while sitting   - use salt in moderation   - watch for swelling in feet, ankles and legs every day   - weigh myself daily   -call for concerning s/sx   -- activity or exercise based on tolerance encouraged     CODE STATUS: FULL              Return in about 6 months (around 9/10/2025).              Thank you for allowing me to participate in the care of your patient,        Sera Espinoza, APRN  03/10/25  11:07 EST      **Eitan Disclaimer:**  Much of this encounter note is an electronic transcription/translation of spoken language to printed text. The electronic translation of spoken language may permit erroneous, or at times, nonsensical words or phrases to be inadvertently transcribed. Although I have reviewed the note for such errors, some may still exist.    The information in this note was reviewed and updated during the visit to be as accurate as possible. As many patients have chronic medical problems, many of their individual problems and ongoing plans do not change significantly from visit to visit.  This information is correct and is consistent with my treatment plan as of today's visit.

## 2025-03-10 NOTE — PATIENT INSTRUCTIONS
Directions for when to call the clinic reviewed with the patient to include weight gain of 2 to 3 pounds in 24 hours, weight gain of 5 to 10 pounds within 7 days; worsening shortness of breath; worsening lower extremity edema or abdominal distention.

## 2025-03-10 NOTE — PROGRESS NOTES
[facility]      Congestive Heart Failure  Pertinent negatives include no chest pain or shortness of breath.       1. Pulmonary hypertension  2. HFimpEF/ischemic cardiomyopathy  3. RV heart failure    Subjective      Renato Mccracken a 74 y.o. male who presents today for pulmonary hypertension, HFimpEF, RV heart failure.   He has hypertension, hyperlipidemia, KATIE on CPAP.  In 2009 diagnosed with multivessel CAD and underwent CABG x 3 (LIMA to the LAD, SVG to obtuse marginal, and SVG to PDA) he also underwent carotid endarterectomy in 2009.   LVEF in 2022 was 41 to 45%  April 2023, he underwent watchman implantation.  He had 2D TTE 7/2024 that showed LVEF 54.8%.  Mild concentric LVH.  Indeterminate left ventricular diastolic function.  Mildly reduced right ventricular systolic function.  Mildly dilated right ventricular cavity.  Biatrial cavities severely dilated.  Moderate mitral valve regurgitation.  Moderate tricuspid valve regurgitation.  RVSP 57 mmHg.  Severe pulmonary hypertension    Today he reports stable exercise intolerance.  At last visit he increased his torsemide for a few days, had weight loss, and torsemide returned back to 30 mg daily.  He denies any weight gain, leg edema, ascites, orthopnea, or PND.    Review of Systems - Review of Systems   Constitutional: Negative for weight gain and weight loss.   Cardiovascular:  Negative for chest pain, dyspnea on exertion, leg swelling, orthopnea, paroxysmal nocturnal dyspnea and syncope.   Respiratory:  Negative for cough and shortness of breath.    Gastrointestinal:  Negative for bloating.   Neurological:  Negative for dizziness.          Patient Active Problem List   Diagnosis    Coronary artery disease involving native coronary artery of native heart without angina pectoris    Carotid arterial disease    Permanent atrial fibrillation    Ischemic cardiomyopathy    Mitral regurgitation    Hypothyroidism    HTN  (hypertension)    Dyslipidemia    DDD (degenerative disc disease), lumbar    Chronic bilateral low back pain with right-sided sciatica    Primary osteoarthritis of right knee    Class 1 obesity due to excess calories without serious comorbidity with body mass index (BMI) of 31.0 to 31.9 in adult    Complex sleep apnea syndrome    Hemorrhagic disorder due to circulating anticoagulants    Gastroesophageal reflux disease with esophagitis without hemorrhage    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    Diverticulosis    Pulmonary hypertension    Chronic right-sided heart failure    Dysfunction of right cardiac ventricle    PAD (peripheral artery disease)    Presence of Watchman left atrial appendage closure device    PVCs (premature ventricular contractions)    Basal cell carcinoma (BCC) of scalp or skin of neck    Basal cell carcinoma (BCC) of skin of right upper lip    Heart failure with improved ejection fraction (HFimpEF)     family history includes Heart disease in his father; Hypertension in his father; No Known Problems in his mother; Stroke in his father.   reports that he has been smoking cigarettes and cigars. He has been exposed to tobacco smoke. He has never used smokeless tobacco. He reports current alcohol use of about 2.0 standard drinks of alcohol per week. He reports that he does not use drugs.  No Known Allergies  Physical Activity: Not on file          Current Outpatient Medications:     allopurinol (ZYLOPRIM) 300 MG tablet, 1 tablet., Disp: , Rfl:     aspirin (ASPIR) 81 MG EC tablet, Take 1 tablet by mouth Daily., Disp: 90 tablet, Rfl: 3    ferrous sulfate 325 (65 FE) MG EC tablet, Take 1 tablet by mouth., Disp: , Rfl:     Jardiance 10 MG tablet tablet, TAKE 1 TABLET BY MOUTH DAILY, Disp: 30 tablet, Rfl: 3    Magnesium 200 MG tablet, Take 250 mg by mouth Daily., Disp: , Rfl:     metoprolol succinate XL (TOPROL-XL) 50 MG 24 hr tablet, Take 1 tablet by mouth Daily., Disp: 90 tablet, Rfl: 3     rosuvastatin (CRESTOR) 10 MG tablet, Take 1 tablet by mouth Daily., Disp: , Rfl:     spironolactone (ALDACTONE) 25 MG tablet, Take 1 tablet by mouth Daily., Disp: 90 tablet, Rfl: 3    tamsulosin (FLOMAX) 0.4 MG capsule 24 hr capsule, Take 1 capsule by mouth., Disp: , Rfl:     torsemide (DEMADEX) 20 MG tablet, Take 1.5 tablets by mouth Daily. May take full 2 tablets as needed for weight gain, Disp: , Rfl:     Objective   Vital Sign Review:   Vitals:    03/10/25 1047   BP: 106/58   Pulse: 71   SpO2: 94%     Body mass index is 28.8 kg/m².      03/10/25  1047   Weight: 96.3 kg (212 lb 6.4 oz)     Physical Exam:  Constitutional:       Appearance: Normal and healthy appearance.   Neck:      Vascular: No carotid bruit or JVD. JVD normal.   Pulmonary:      Effort: Pulmonary effort is normal.      Breath sounds: Normal breath sounds.   Cardiovascular:      PMI at left midclavicular line. Normal rate. Regular rhythm.   Pulses:     Intact distal pulses.   Edema:     Peripheral edema absent.   Abdominal:      Palpations: Abdomen is soft.      Tenderness: There is no abdominal tenderness.   Skin:     General: Skin is warm and dry.   Neurological:      General: No focal deficit present.      Mental Status: Alert and oriented to person, place and time.   Psychiatric:         Behavior: Behavior is cooperative.          DATA REVIEWED:   EKG:      ---------------------------------------------------  ECHO:    Results for orders placed during the hospital encounter of 07/01/24    Adult Transthoracic Echo Complete W/ Cont if Necessary Per Protocol    Interpretation Summary    Left ventricular systolic function is normal. Calculated left ventricular EF = 54.8%    Left ventricular wall thickness is consistent with mild concentric hypertrophy.    Left ventricular diastolic function was indeterminate.    Mildly reduced right ventricular systolic function noted.    The right ventricular cavity is mildly dilated.    The left atrial cavity is  "severely dilated.    The right atrial cavity is severely  dilated.    Moderate mitral valve regurgitation is present.    Moderate tricuspid valve regurgitation is present.    Calculated right ventricular systolic pressure from tricuspid regurgitation is 57 mmHg.    Severe pulmonary hypertension is present.          -----------------------------------------------------  RHC/LHC    No results found for this or any previous visit.      ---------------------------------------------------------------------------    CT:   No radiology results for the last 30 days.        --------------------------------------------------------------------------------------------------------------    Laboratory evaluations:  Renal Function: CrCl cannot be calculated (Patient's most recent lab result is older than the maximum 30 days allowed.).    Lab Results   Component Value Date    GLUCOSE 109 (H) 12/10/2024    BUN 50 (H) 12/10/2024    CREATININE 1.55 (H) 12/10/2024    EGFRIFNONA 56 (L) 09/07/2021    EGFRIFAFRI 77 09/29/2020    BCR 32.3 (H) 12/10/2024    K 4.3 12/10/2024    CO2 26.0 12/10/2024    CALCIUM 9.9 12/10/2024    ALBUMIN 4.1 02/27/2024    AST 22 02/27/2024    ALT 14 02/27/2024     Lab Results   Component Value Date    WBC 5.48 02/27/2024    HGB 11.4 (L) 02/27/2024    HCT 37.5 02/27/2024    MCV 87.8 02/27/2024     (L) 02/27/2024     No results found for: \"HGBA1C\"  No results found for: \"HGBA1C\"  Lab Results   Component Value Date    CREATININE 1.55 (H) 12/10/2024     No results found for: \"IRON\", \"TIBC\", \"FERRITIN\"    Result Review:  I have personally reviewed the results from the time of this admission to 3/10/2025 13:03 EDT and agree with these findings:  [x]  Laboratory list / accordion  []  Microbiology  [x]  Radiology  [x]  EKG/Telemetry   [x]  Cardiology/Vascular   []  Pathology  [x]  Old records  []  Other:        ReDS VOLUMETRIC ASSESSMENT:  ReDs Vest    Performed by: Sera Espinoza APRN  Authorized by: " Sera Espinoza APRN    ReDS value:  33  ReDS Value Description:  25-35 (green) = Optimal Volume Status        Assessment & Plan      1. Pulmonary hypertension    2. Heart failure with improved ejection fraction (HFimpEF)    3. Chronic right-sided heart failure          Pulmonary hypertension--WHO-group-2/3.  2D TTE from 7/2024 showed continued pulmonary hypertension.  RVSP was 57 mmHg.  Overall pulmonary hypertension stable.  Continue management of heart failure.  He did have a CT of the chest that showed emphysema changes.  And he does have KATIE.    2. HFimpEF/ischemid cardiomyopathy-2D TTE from 7/2024 showed LVEF 54%  He appears euvolemic today.  He tolerated the increased torsemide dose without any issues, had improvement in weight and torsemide was returned to 30 mg daily and weight has been stable since.  Tolerating GDMT.  Denies any issues obtaining medications.  Will check BMP today.    3. RV heart failure- RV is dilated. RVEF is abnormal.  RV status:  RV ADVERSELY-REMODELED with a ESVi of >114ml.   The etiology is: PAH/KATIE. Stable today. Continue GDMT      NYHA stage C FC-II     Clinical status was assessed and has remained stable.  Treatment intent: curative   ReDS reading was obtained today.  ReDS result: 33       Today, Patient is approaching euvolemia and with perfusion. The patient's hemodynamics are currently acceptable. HR is: normal and is not at goal. BP/MAP was reviewed and there is room for medication up-titration in future if needed.  The patient's clinical course has been impacted by marginal hemodynamics. LVEF: 54%.     GDMT Assessment: The patient is currently on triple therapy.      GDMT changes recommended today: No changes to medication therapy.      BB:   continue Metoprolol Succinate  50mg daily  Monitor heart rate.  Please call the [facility] for HR<50, dizziness, lightheadedness, syncope    A/A/A:     Entresto discontinued due to hypotension--he is currently HFimpEF and we will defer  retrialing, but possible future addition of low dose ACE or ARB if needed  Occasional monitoring of Chem-7 is recommended; call for the development of a new cough or S/Sx angioedema    SGLT2-I:  continue Empagliflozin (Jardiance) 10 mg daily  Call for S/Sx genital mycotic infections  Do not take when inadequate oral intake, NPO, GI illness    MRA:  The patient is FC-NYHA Class II and MRA is indicated.   continue Spironolactone  25mg daily  Recommended [id number] assessment of GFR and electrolytes--BMP checked today.       Diuretic Regimen:  -DIURETIC:   toresemide 30 mg every day  -Fluid restriction:   -requested  -2000 ml  -Patient has been asked to weigh daily and was provided with a printed diuretic strategy.  -Sodium restriction:   -1,500 mg Na restriction was discussed.      Labs/Diagnostics/Referrals:    Labs -Chem-7       Lifestyle Advice:   - call office if I gain more than 2 pounds in one day or 5 pounds in one week   - keep legs up while sitting   - use salt in moderation   - watch for swelling in feet, ankles and legs every day   - weigh myself daily   -call for concerning s/sx   -- activity or exercise based on tolerance encouraged     CODE STATUS: FULL              Return in about 6 months (around 9/10/2025).              Thank you for allowing me to participate in the care of your patient,       Sera Espinoza, APRN  03/10/25  11:07 EST      **Eitan Disclaimer:**  Much of this encounter note is an electronic transcription/translation of spoken language to printed text. The electronic translation of spoken language may permit erroneous, or at times, nonsensical words or phrases to be inadvertently transcribed. Although I have reviewed the note for such errors, some may still exist.    The information in this note was reviewed and updated during the visit to be as accurate as possible. As many patients have chronic medical problems, many of their individual problems and ongoing plans do not change  significantly from visit to visit.  This information is correct and is consistent with my treatment plan as of today's visit.

## 2025-05-27 NOTE — PROGRESS NOTES
Spoke to patient's wife.  I discussed Dr. Scott' recommendation of stopping ASA and Xarelto and starting Plavix.  He had already stopped both the ASA and Xarelto.  I have asked her to call with any bleeding or concerns.     He cannot take Xatelto as after 48 hours of medication, he has GI bleeding.  He has been seen and treated by GI and it was originally felt he had adequate healing and mediation supplementation to be able to take the Xarelto, but after restarting with GI approval, he developed GI bleeding again.  When he stops the medication, the bleeding stops as well.    He will take Plavix 75 mg daily and call with any s/s of bleeding.  He will not take ASA or Xarelto.  
No

## 2025-05-29 RX ORDER — EMPAGLIFLOZIN 10 MG/1
10 TABLET, FILM COATED ORAL DAILY
Qty: 30 TABLET | Refills: 3 | Status: SHIPPED | OUTPATIENT
Start: 2025-05-29

## 2025-06-17 ENCOUNTER — TELEPHONE (OUTPATIENT)
Dept: CARDIOLOGY | Facility: HOSPITAL | Age: 75
End: 2025-06-17
Payer: MEDICARE

## 2025-06-17 NOTE — TELEPHONE ENCOUNTER
I called Renato for a 2-year follow up from Nik 4/19/23. Renato states he is doing well and denies any bleeding complications. He is currently taking ASA 81mg daily, tolerating well. RTRN

## 2025-07-24 ENCOUNTER — OFFICE VISIT (OUTPATIENT)
Dept: SLEEP MEDICINE | Facility: HOSPITAL | Age: 75
End: 2025-07-24
Payer: MEDICARE

## 2025-07-24 VITALS
BODY MASS INDEX: 28.85 KG/M2 | DIASTOLIC BLOOD PRESSURE: 68 MMHG | HEIGHT: 72 IN | WEIGHT: 213 LBS | HEART RATE: 74 BPM | OXYGEN SATURATION: 94 % | SYSTOLIC BLOOD PRESSURE: 134 MMHG

## 2025-07-24 DIAGNOSIS — G47.39 COMPLEX SLEEP APNEA SYNDROME: Primary | ICD-10-CM

## 2025-07-24 PROCEDURE — 3075F SYST BP GE 130 - 139MM HG: CPT | Performed by: INTERNAL MEDICINE

## 2025-07-24 PROCEDURE — G0463 HOSPITAL OUTPT CLINIC VISIT: HCPCS

## 2025-07-24 PROCEDURE — 1160F RVW MEDS BY RX/DR IN RCRD: CPT | Performed by: INTERNAL MEDICINE

## 2025-07-24 PROCEDURE — 99214 OFFICE O/P EST MOD 30 MIN: CPT | Performed by: INTERNAL MEDICINE

## 2025-07-24 PROCEDURE — 3078F DIAST BP <80 MM HG: CPT | Performed by: INTERNAL MEDICINE

## 2025-07-24 PROCEDURE — 1159F MED LIST DOCD IN RCRD: CPT | Performed by: INTERNAL MEDICINE

## 2025-07-24 NOTE — PROGRESS NOTES
"  [facility]  Sleep Medicine   [address]  [address]  [place] [address]  Phone   Fax         SLEEP CLINIC FOLLOW UP PROGRESS NOTE.    Renato Marquez  1950  75 y.o.  male      PCP: Jerad James Jr., MD      Date of visit: 7/24/2025    Chief Complaint   Patient presents with    Sleep Apnea       HPI:  This is a 75 y.o. years old patient who has a history of complex sleep apnea is here for  the initial compliance follow-up.  Sleep apnea is severe in severity with a AHI of 70/h with central apneas 43/hr. Patient is using positive airway pressure therapy with BiPAP ST and the symptoms of snoring, non-restorative sleep and daytime excessive sleepiness have improved significantly on the therapy. Normally goes to bed at 12 midnight and wakes up at 5:30 AM.  The patient wakes up 3 time(s) during the night and has no problem going back to sleep.  Feels refreshed after waking up.  Patient also denies headaches and nasal congestion.   He is undergoing radiation treatment because of the skin cancer on his face wants to try a different mask.    Mediactions and allergies are reviewed by me and documented in the encounter.     SOCIAL ( habits pertaining to sleep medicine)  History of smoking:No   History of alcohol use: 10 per week  Caffeine use: 2    REVIEW OF SYSTEMS:   [place] Sleepiness Scale :Total score: 0   Nsaal congestion:No   Dry mouth/nose:No   Post nasal drip; No   Acid reflux/Heartburn:No   Abd bloating:No   Morining headache:No   Anxiety:No   Depression:No     PHYSICAL EXAMINATION:  CONSTITUTIONAL:  Vitals:    07/24/25 1009   BP: 134/68   Pulse: 74   SpO2: 94%   Weight: 96.6 kg (213 lb)   Height: 182.9 cm (72.01\")    Body mass index is 28.88 kg/m².   NOSE: nasal passages are clear, no nasal polyps, septum in the midline.  THROAT: throat is clear, oral airway Mallampati class 3  RESP SYSTEM: Breath sounds are normal, no wheezes or crackles  CARDIOVASULAR: " Heart rate is regular without murmur. No edema      Data reviewed:  The Smart card downloaded on 7/24/2025 has been reviewed independently by me for compliance and discussed the data with the patient.   Compliance; 100%  > 4 hr use, 96%  Average use of the device 5 hours and 45 minutes per night  Residual AHI: 8/hr  mask type: Fullface mask, I have given him my Tae fullface mask which is under the nose  Device DreamStation BiPAP ST  DME: [facility]    Patient is going to call me if he likes this mask so that they will I can change his prescription    ASSESSMENT AND PLAN:  Complex sleep apnea .  It includes both central sleep apnea and obstructive sleep apnea and is on BiPAP ST.  The symptoms of sleep apnea have improved with the device and the treatment.  Patient's compliance with the device is excellent for treatment of sleep apnea.  I have independently reviewed the smart card down load and discussed with the patient the download data and encouarged the patient to continue to use the device.The residual AHI is acceptable. The device is benefiting the patient and the device is medically necessary.  Without proper control of sleep apnea and good compliance there is a increased risk for hypertension, diabetes mellitus and nonrestorative sleep with hypersomnia which can increase risk for motor vehicle accidents.  Untreated sleep apnea is also a risk factor for development of atrial fibrillation, pulmonary hypertension and stroke. The patient is also instructed to get the supplies from the DME company and and change them on a regular basis.  A prescription for supplies has been sent to the DME company.  I have also discussed the good sleep hygiene habits and adequate amount of sleep needed for good health.  Return in about 1 year (around 7/24/2026) for with smart card down load. . Patient's questions were answered.        Jett Mcguire MD  Sleep Medicine  Medical Director for [facility]  [place]  7/24/2025

## 2025-08-22 ENCOUNTER — OFFICE VISIT (OUTPATIENT)
Age: 75
End: 2025-08-22
Payer: MEDICARE

## 2025-08-22 VITALS
HEART RATE: 79 BPM | WEIGHT: 207.5 LBS | BODY MASS INDEX: 28.1 KG/M2 | HEIGHT: 72 IN | SYSTOLIC BLOOD PRESSURE: 123 MMHG | DIASTOLIC BLOOD PRESSURE: 70 MMHG

## 2025-08-22 DIAGNOSIS — I48.21 PERMANENT ATRIAL FIBRILLATION: Chronic | ICD-10-CM

## 2025-08-22 DIAGNOSIS — I27.20 PULMONARY HYPERTENSION: ICD-10-CM

## 2025-08-22 DIAGNOSIS — I25.5 ISCHEMIC CARDIOMYOPATHY: Chronic | ICD-10-CM

## 2025-08-22 DIAGNOSIS — I70.213 ATHEROSCLEROSIS OF NATIVE ARTERIES OF EXTREMITIES WITH INTERMITTENT CLAUDICATION, BILATERAL LEGS: Primary | ICD-10-CM

## 2025-08-25 ENCOUNTER — PATIENT ROUNDING (BHMG ONLY) (OUTPATIENT)
Age: 75
End: 2025-08-25
Payer: MEDICARE

## (undated) DEVICE — PK CATH CARD 40

## (undated) DEVICE — FRCP BX RADJAW4 NDL 2.8 240CM LG OG BX40

## (undated) DEVICE — CATH DIAG IMPULSE PIG 5F 100CM

## (undated) DEVICE — TRAP POLYP 4CHAMBER

## (undated) DEVICE — VIAL FORMALIN CAP 10P 40ML

## (undated) DEVICE — GW INQW FIX/CORE PTFE J/3MM .035 260CM

## (undated) DEVICE — 1 X VERSACROSS CONNECT TRANSSEPTAL DILATOR (INCLUDING 1 X J-TIP MECHANICAL GUIDEWIRE); 1 X VERSACROSS RF WIRE (INCLUDING 1 X CONNECTOR CABLE (SINGLE USE)); 1 X DISPERSIVE ELECTRODE: Brand: VERSACROSS CONNECT LAAC ACCESS SOLUTION

## (undated) DEVICE — INTRO SHEATH ART/FEM ENGAGE .035 8F12CM

## (undated) DEVICE — SPNG GZ WOVN 4X4IN 12PLY 10/BX STRL

## (undated) DEVICE — ACCESS SHEATH WITH DILATOR: Brand: WATCHMAN FXD CURVE™ ACCESS SYSTEM

## (undated) DEVICE — SUCTION CANISTER, 1000CC,SAFELINER: Brand: DEROYAL

## (undated) DEVICE — GLV SURG SENSICARE PI MIC PF SZ7.5 LF STRL

## (undated) DEVICE — DIL VESL 10FR .038 20CM

## (undated) DEVICE — THE BITE BLOCK MAXI, LATEX FREE STRAP IS USED TO PROTECT THE ENDOSCOPE INSERTION TUBE FROM BEING BITTEN BY THE PATIENT.

## (undated) DEVICE — DIL VESL 14F.038 20CM

## (undated) DEVICE — BLANKT WARM UNDER/BDY FUL/ACC A/ 90X206CM

## (undated) DEVICE — HI-TORQUE SUPRA CORE .035 PERIPHERAL GUIDE WIRE .035 X 190 CM: Brand: HI-TORQUE SUPRA CORE

## (undated) DEVICE — ADAPT CLN BIOGUARD AIR/H2O DISP

## (undated) DEVICE — JACKT LAB F/R KNIT CUFF/COLR XLG BLU

## (undated) DEVICE — SNAR POLYP SENSATION STDOVL 27 240 BX40

## (undated) DEVICE — DRSNG PRESS SAFEGUARD

## (undated) DEVICE — THE TORRENT IRRIGATION SCOPE CONNECTOR IS USED WITH THE TORRENT IRRIGATION TUBING TO PROVIDE IRRIGATION FLUIDS SUCH AS STERILE WATER DURING GASTROINTESTINAL ENDOSCOPIC PROCEDURES WHEN USED IN CONJUNCTION WITH AN IRRIGATION PUMP (OR ELECTROSURGICAL UNIT).: Brand: TORRENT

## (undated) DEVICE — SENSR O2 OXIMAX FNGR A/ 18IN NONSTR

## (undated) DEVICE — PERCLOSE™ PROSTYLE™ SUTURE-MEDIATED CLOSURE AND REPAIR SYSTEM: Brand: PERCLOSE™ PROSTYLE™

## (undated) DEVICE — LN SMPL CO2 SHTRM SD STREAM W/M LUER

## (undated) DEVICE — DIL VESL 12F.038 20CM

## (undated) DEVICE — Device

## (undated) DEVICE — DIL VESL 16F .038 20CM

## (undated) DEVICE — CANN O2 ETCO2 FITS ALL CONN CO2 SMPL A/ 7IN DISP LF

## (undated) DEVICE — INTRO PERFORMER CHECKFLO/LG RAD/BND NO/GW 16F .038IN 30CM

## (undated) DEVICE — KT ORCA ORCAPOD DISP STRL

## (undated) DEVICE — GW AMPLTZ SUPERSTIFF SHT/TPR STR .035IN 145CM

## (undated) DEVICE — TUBING, SUCTION, 1/4" X 10', STRAIGHT: Brand: MEDLINE

## (undated) DEVICE — BW-412T DISP COMBO CLEANING BRUSH: Brand: SINGLE USE COMBINATION CLEANING BRUSH

## (undated) DEVICE — MTS LHK BHS BAPTIST LOUISVILLE: Brand: NAMIC